# Patient Record
Sex: FEMALE | Race: WHITE | NOT HISPANIC OR LATINO | Employment: OTHER | URBAN - METROPOLITAN AREA
[De-identification: names, ages, dates, MRNs, and addresses within clinical notes are randomized per-mention and may not be internally consistent; named-entity substitution may affect disease eponyms.]

---

## 2017-02-17 ENCOUNTER — OFFICE VISIT (OUTPATIENT)
Dept: PAIN MEDICINE | Facility: CLINIC | Age: 77
End: 2017-02-17
Payer: MEDICARE

## 2017-02-17 VITALS
HEIGHT: 59 IN | WEIGHT: 121.06 LBS | BODY MASS INDEX: 24.4 KG/M2 | SYSTOLIC BLOOD PRESSURE: 112 MMHG | HEART RATE: 82 BPM | DIASTOLIC BLOOD PRESSURE: 70 MMHG

## 2017-02-17 DIAGNOSIS — M51.36 DDD (DEGENERATIVE DISC DISEASE), LUMBAR: Primary | ICD-10-CM

## 2017-02-17 DIAGNOSIS — M54.16 LUMBAR RADICULITIS: ICD-10-CM

## 2017-02-17 PROCEDURE — 99204 OFFICE O/P NEW MOD 45 MIN: CPT | Mod: S$GLB,,, | Performed by: ANESTHESIOLOGY

## 2017-02-17 PROCEDURE — 3078F DIAST BP <80 MM HG: CPT | Mod: S$GLB,,, | Performed by: ANESTHESIOLOGY

## 2017-02-17 PROCEDURE — 1159F MED LIST DOCD IN RCRD: CPT | Mod: S$GLB,,, | Performed by: ANESTHESIOLOGY

## 2017-02-17 PROCEDURE — 3074F SYST BP LT 130 MM HG: CPT | Mod: S$GLB,,, | Performed by: ANESTHESIOLOGY

## 2017-02-17 PROCEDURE — 99999 PR PBB SHADOW E&M-EST. PATIENT-LVL III: CPT | Mod: PBBFAC,,, | Performed by: ANESTHESIOLOGY

## 2017-02-17 PROCEDURE — 1160F RVW MEDS BY RX/DR IN RCRD: CPT | Mod: S$GLB,,, | Performed by: ANESTHESIOLOGY

## 2017-02-17 PROCEDURE — 1157F ADVNC CARE PLAN IN RCRD: CPT | Mod: S$GLB,,, | Performed by: ANESTHESIOLOGY

## 2017-02-17 PROCEDURE — 1125F AMNT PAIN NOTED PAIN PRSNT: CPT | Mod: S$GLB,,, | Performed by: ANESTHESIOLOGY

## 2017-02-17 RX ORDER — TEMAZEPAM 30 MG/1
CAPSULE ORAL
Refills: 0 | COMMUNITY
Start: 2017-01-26 | End: 2017-10-13 | Stop reason: SDUPTHER

## 2017-02-17 RX ORDER — GABAPENTIN 300 MG/1
CAPSULE ORAL
Refills: 1 | Status: ON HOLD | COMMUNITY
Start: 2016-12-04 | End: 2020-02-06

## 2017-02-17 RX ORDER — PRAVASTATIN SODIUM 40 MG/1
TABLET ORAL NIGHTLY
COMMUNITY
Start: 2017-01-12 | End: 2020-02-13 | Stop reason: ALTCHOICE

## 2017-02-17 RX ORDER — ONDANSETRON 4 MG/1
TABLET, ORALLY DISINTEGRATING ORAL
Refills: 0 | COMMUNITY
Start: 2016-12-02 | End: 2017-10-13 | Stop reason: SDUPTHER

## 2017-02-17 RX ORDER — HYDROCODONE BITARTRATE AND ACETAMINOPHEN 5; 325 MG/1; MG/1
1 TABLET ORAL EVERY 8 HOURS PRN
Qty: 45 TABLET | Refills: 0 | Status: SHIPPED | OUTPATIENT
Start: 2017-02-17 | End: 2017-03-19

## 2017-02-17 NOTE — MR AVS SNAPSHOT
[facility]  [address] [place] [address]  [address]  [facility] [place] [address]  Phone: [phone]                  Sunita Carlson   2017 9:20 AM   Office Visit    Description:  Female : 1940   Provider:  Christian James MD   Department:  [facility]           Reason for Visit     Low-back Pain     Hip Pain           Diagnoses this Visit        Comments    DDD (degenerative disc disease), lumbar    -  Primary     Lumbar radiculitis                To Do List           Goals (5 Years of Data)     None       These Medications        Disp Refills Start End    hydrocodone-acetaminophen 5-325mg (NORCO) 5-325 mg per tablet 45 tablet 0 2017 3/19/2017    Take 1 tablet by mouth every 8 (eight) hours as needed for Pain. - Oral    Pharmacy: [address] [facility] Ph #: [phone]         [facility] On Call     [facility] On Call Nurse Care Line -  Assistance  Registered nurses in the [facility] On Call Center provide clinical advisement, health education, appointment booking, and other advisory services.  Call for this free service at [phone].             Medications           Message regarding Medications     Verify the changes and/or additions to your medication regime listed below are the same as discussed with your clinician today.  If any of these changes or additions are incorrect, please notify your healthcare provider.        START taking these NEW medications        Refills    hydrocodone-acetaminophen 5-325mg (NORCO) 5-325 mg per tablet 0    Sig: Take 1 tablet by mouth every 8 (eight) hours as needed for Pain.    Class: Print    Route: Oral           Verify that the below list of medications is an accurate representation of the medications you are currently taking.  If none reported, the list may be blank. If incorrect, please contact your healthcare provider. Carry this list with you in case of emergency.          "  Current Medications     dexlansoprazole (DEXILANT) 30 mg CpDM Take 60 mg by mouth.    diphenoxylate-atropine 2.5-0.025 mg (LOMOTIL) 2.5-0.025 mg per tablet Take 1 tablet by mouth 4 (four) times daily as needed for Diarrhea.    eluxadoline (VIBERZI) 75 mg Tab Take by mouth.    gabapentin (NEURONTIN) 400 MG capsule Take 400 mg by mouth 3 (three) times daily.    lidocaine (LIDODERM) 5 %(700 mg/patch) Place 1 patch onto the skin every 24 hours. Remove & Discard patch within 12 hours or as directed by MD    lisinopril 10 MG tablet     moxifloxacin (VIGAMOX) 0.5 % ophthalmic solution Place 1 drop into the right eye 4 (four) times daily.    ondansetron (ZOFRAN-ODT) 4 MG TbDL DIS ONE T PO  Q 8 H PRN NV    pravastatin (PRAVACHOL) 40 MG tablet     temazepam (RESTORIL) 30 mg capsule TK 1 C PO QHS    gabapentin (NEURONTIN) 300 MG capsule TK 2 CS PO HS    hydrocodone-acetaminophen 5-325mg (NORCO) 5-325 mg per tablet Take 1 tablet by mouth every 8 (eight) hours as needed for Pain.    ondansetron (ZOFRAN) 4 MG tablet Take 1 tablet (4 mg total) by mouth every 8 (eight) hours as needed for Nausea.    sucralfate (CARAFATE) 1 gram tablet Take 1 g by mouth 4 (four) times daily.    temazepam (RESTORIL) 7.5 MG Cap Take 15 mg by mouth nightly as needed.           Clinical Reference Information           Your Vitals Were     BP Pulse Height Weight BMI    112/70 82 4' 11" (1.499 m) 54.9 kg (121 lb 0.5 oz) 24.45 kg/m2      Blood Pressure          Most Recent Value    BP  112/70      Allergies as of 2/17/2017     Demerol [Meperidine]    Percocet [Oxycodone-acetaminophen]      Immunizations Administered on Date of Encounter - 2/17/2017     None      Language Assistance Services     ATTENTION: Language assistance services are available, free of charge. Please call [phone].      ATENCIÓN: Si habla radhaañol, tiene a hazel disposición servicios gratuitos de asistencia lingüística. Llame al [phone].     [place] Ý: N?u b?n nói Ti?ng Vi?t, có " các d?ch v? h? tr? ngôn ng? mi?n phí michelleh cho b?n. G?i s? [phone].         [facility] complies with applicable Federal civil rights laws and does not discriminate on the basis of race, color, national origin, age, disability, or sex.

## 2017-02-17 NOTE — PROGRESS NOTES
This note was completed with dictation software and grammatical errors may exist.    Referring Physician: Froilan Barba MD    PCP: Behzad Birch MD      CC: Low back and left leg pain    HPI:   Patient is 68-year-old female referred to us for lower back and left leg pain.  Pain has been present for over 5 years.  No traumatic incident.  She has intermittent aching, throbbing, sharp pain in her lower back.  Pain radiates down her left leg into her left posterior thigh.  Pain worsens with standing, walking, bending and getting up.  Pain improves with rest.  She denies any weakness.  No bowel bladder changes.  MRI lumbar spine shows lumbar facet hypertrophy as well as possible L5 nerve impingement.  She underwent caudal ALANNA's with Dr. Barba with moderate benefit of her generalized lower back pain.  She continues to have left-sided radicular pain.  She rates her pain 4/10 today, 10/10 at worse.    ROS:  CONSTITUTIONAL: No fevers, chills, night sweats, wt. loss, appetite changes  SKIN: no rashes or itching  ENT: No headaches, head trauma, vision changes, or eye pain  LYMPH NODES: None noticed   CV: No chest pain, palpitations.   RESP: No shortness of breath, dyspnea on exertion, cough, wheezing, or hemoptysis  GI: No nausea, emesis, diarrhea, constipation, melena, hematochezia, pain.    : No dysuria, hematuria, urgency, or frequency   HEME: No easy bruising, bleeding problems  PSYCHIATRIC: No depression, anxiety, psychosis, hallucinations.  NEURO: No seizures, memory loss, dizziness or difficulty sleeping  MSK: + History of present illness      Past Medical History   Diagnosis Date    Arthritis     Cataract     Coronary artery disease     Hypertension     Insomnia     Neuropathy     Retinal detachment     Stomach ulcer      Past Surgical History   Procedure Laterality Date    Cardiac surgery      Carotid endarterectomy       L    Cholecystectomy      Appendectomy      Hysterectomy       "Tonsillectomy      Bladder suspension      Cataract extraction       Family History   Problem Relation Age of Onset    Cancer Father     Macular degeneration Maternal Aunt     Cancer Maternal Aunt     Macular degeneration Maternal Uncle     Diabetes Paternal Aunt     Cancer Paternal Aunt     Blindness Maternal Grandfather      Social History     Social History    Marital status:      Spouse name: N/A    Number of children: N/A    Years of education: N/A     Social History Main Topics    Smoking status: Never Smoker    Smokeless tobacco: None    Alcohol use No    Drug use: No    Sexual activity: Yes     Birth control/ protection: Surgical     Other Topics Concern    None     Social History Narrative    None         Medications/Allergies: See med card    Vitals:    02/17/17 0953   BP: 112/70   Pulse: 82   Weight: 54.9 kg (121 lb 0.5 oz)   Height: 4' 11" (1.499 m)   PainSc:   7   PainLoc: Hip         Physical exam:    GENERAL: A and O x3, the patient appears well groomed and is in no acute distress.  Skin: No rashes or obvious lesions  HEENT: normocephalic, atraumatic  CARDIOVASCULAR:  Palpable peripheral pulses  LUNGS: easy work of breathing  ABDOMEN: soft, nontender   UPPER EXTREMITIES: Normal alignment, normal range of motion, no atrophy, no skin changes,  hair growth and nail growth normal and equal bilaterally. No swelling, no tenderness.    LOWER EXTREMITIES:  Normal alignment, normal range of motion, no atrophy, no skin changes,  hair growth and nail growth normal and equal bilaterally. No swelling, no tenderness.    LUMBAR SPINE  Lumbar spine: ROM is full with flexion extension and oblique extension with no increased pain.    Tom's test causes no increased pain on either side.    Supine straight leg raise is positive on the left at 60°    Internal and external rotation of the hip causes no increased pain on either side.  Myofascial exam: No tenderness to palpation across lumbar " paraspinous muscles.      MENTAL STATUS: normal orientation, speech, language, and fund of knowledge for social situation.  Emotional state appropriate.    CRANIAL NERVES:  II:  PERRL bilaterally,   III,IV,VI: EOMI.    V:  Facial sensation equal bilaterally  VII:  Facial motor function normal.  VIII:  Hearing equal to finger rub bilaterally  IX/X: Gag normal, palate symmetric  XI:  Shoulder shrug equal, head turn equal  XII:  Tongue midline without fasciculations      MOTOR: Tone and bulk: normal bilateral upper and lower Strength: normal   Delt Bi Tri WE WF     R 5 5 5 5 5 5   L 5 5 5 5 5 5     IP ADD ABD Quad TA Gas HAM  R 5 5 5 5 5 5 5  L 5 5 5 5 5 5 5    SENSATION: Light touch and pinprick intact bilaterally  REFLEXES: normal, symmetric, nonbrisk.  Toes down, no clonus. No hoffmans.  GAIT: normal rise, base, steps, and arm swing.        Imaging:  MRI lumbar spine without contrast 10/2016 ([facility])  L3-4, broad-based disc bulge and facet hypertrophy results in mild central canal narrowing and foraminal narrowing.  L4-5, broad-based disc bulge with moderate facet hypertrophy,  Left greater than right.  There is mild spurring of the left facet joint which narrows the left lateral recess and compresses the left L5 nerve.  L5-S1, mild facet hypertrophy    Assessment:  Patient referred for lower back and left leg pain  1. DDD (degenerative disc disease), lumbar    2. Lumbar radiculitis          Plan:  - I have stressed the importance of physical activity and exercise to improve overall health  - I think that the patient's back pain and radicular leg symptoms are due to degenerative disc disease and have recommended a lumbar epidural steroid injection to the Left L4-5 and L5-S1 level.    - She did request medication.  She can take Norco 5 mg as needed for pain.  Hold for any sedation  - Continue gabapentin as prescribed  - Follow-up after procedure          Thank you for referring this interesting patient, and I look  forward to continuing to collaborate in her care.

## 2017-02-24 DIAGNOSIS — M54.16 LUMBAR RADICULOPATHY: Primary | ICD-10-CM

## 2017-03-06 ENCOUNTER — SURGERY (OUTPATIENT)
Age: 77
End: 2017-03-06

## 2017-03-06 ENCOUNTER — HOSPITAL ENCOUNTER (OUTPATIENT)
Facility: AMBULARY SURGERY CENTER | Age: 77
Discharge: HOME | End: 2017-03-06
Attending: ANESTHESIOLOGY | Admitting: ANESTHESIOLOGY
Payer: MEDICARE

## 2017-03-06 DIAGNOSIS — M51.36 DDD (DEGENERATIVE DISC DISEASE), LUMBAR: Primary | ICD-10-CM

## 2017-03-06 PROBLEM — M51.369 DDD (DEGENERATIVE DISC DISEASE), LUMBAR: Status: ACTIVE | Noted: 2017-03-06

## 2017-03-06 PROCEDURE — 64484 NJX AA&/STRD TFRM EPI L/S EA: CPT | Mod: LT,,, | Performed by: ANESTHESIOLOGY

## 2017-03-06 PROCEDURE — 99152 MOD SED SAME PHYS/QHP 5/>YRS: CPT | Mod: ,,, | Performed by: ANESTHESIOLOGY

## 2017-03-06 PROCEDURE — 64483 NJX AA&/STRD TFRM EPI L/S 1: CPT | Mod: LT,,, | Performed by: ANESTHESIOLOGY

## 2017-03-06 PROCEDURE — 64484 NJX AA&/STRD TFRM EPI L/S EA: CPT | Performed by: ANESTHESIOLOGY

## 2017-03-06 PROCEDURE — 64483 NJX AA&/STRD TFRM EPI L/S 1: CPT | Performed by: ANESTHESIOLOGY

## 2017-03-06 RX ORDER — DEXAMETHASONE SODIUM PHOSPHATE 10 MG/ML
INJECTION INTRAMUSCULAR; INTRAVENOUS
Status: DISCONTINUED | OUTPATIENT
Start: 2017-03-06 | End: 2017-03-06 | Stop reason: HOSPADM

## 2017-03-06 RX ORDER — FENTANYL CITRATE 50 UG/ML
INJECTION, SOLUTION INTRAMUSCULAR; INTRAVENOUS
Status: DISCONTINUED
Start: 2017-03-06 | End: 2017-03-06 | Stop reason: HOSPADM

## 2017-03-06 RX ORDER — BUPIVACAINE HYDROCHLORIDE 2.5 MG/ML
INJECTION, SOLUTION EPIDURAL; INFILTRATION; INTRACAUDAL
Status: DISCONTINUED | OUTPATIENT
Start: 2017-03-06 | End: 2017-03-06 | Stop reason: HOSPADM

## 2017-03-06 RX ORDER — SODIUM CHLORIDE, SODIUM LACTATE, POTASSIUM CHLORIDE, CALCIUM CHLORIDE 600; 310; 30; 20 MG/100ML; MG/100ML; MG/100ML; MG/100ML
INJECTION, SOLUTION INTRAVENOUS ONCE AS NEEDED
Status: COMPLETED | OUTPATIENT
Start: 2017-03-06 | End: 2017-03-06

## 2017-03-06 RX ORDER — MIDAZOLAM HYDROCHLORIDE 1 MG/ML
INJECTION INTRAMUSCULAR; INTRAVENOUS
Status: DISCONTINUED
Start: 2017-03-06 | End: 2017-03-06 | Stop reason: HOSPADM

## 2017-03-06 RX ORDER — MIDAZOLAM HYDROCHLORIDE 1 MG/ML
INJECTION INTRAMUSCULAR; INTRAVENOUS
Status: DISCONTINUED | OUTPATIENT
Start: 2017-03-06 | End: 2017-03-06 | Stop reason: HOSPADM

## 2017-03-06 RX ORDER — ALPRAZOLAM 1 MG/1
1 TABLET, ORALLY DISINTEGRATING ORAL
Status: DISCONTINUED | OUTPATIENT
Start: 2017-03-06 | End: 2017-03-06 | Stop reason: HOSPADM

## 2017-03-06 RX ORDER — FENTANYL CITRATE 50 UG/ML
INJECTION, SOLUTION INTRAMUSCULAR; INTRAVENOUS
Status: DISCONTINUED | OUTPATIENT
Start: 2017-03-06 | End: 2017-03-06 | Stop reason: HOSPADM

## 2017-03-06 RX ORDER — ONDANSETRON 2 MG/ML
INJECTION INTRAMUSCULAR; INTRAVENOUS
Status: DISCONTINUED | OUTPATIENT
Start: 2017-03-06 | End: 2017-03-06 | Stop reason: HOSPADM

## 2017-03-06 RX ORDER — LIDOCAINE HYDROCHLORIDE 10 MG/ML
INJECTION, SOLUTION EPIDURAL; INFILTRATION; INTRACAUDAL; PERINEURAL
Status: DISCONTINUED | OUTPATIENT
Start: 2017-03-06 | End: 2017-03-06 | Stop reason: HOSPADM

## 2017-03-06 RX ORDER — ONDANSETRON 2 MG/ML
INJECTION INTRAMUSCULAR; INTRAVENOUS
Status: DISCONTINUED
Start: 2017-03-06 | End: 2017-03-06 | Stop reason: HOSPADM

## 2017-03-06 RX ADMIN — MIDAZOLAM HYDROCHLORIDE 2 MG: 1 INJECTION INTRAMUSCULAR; INTRAVENOUS at 02:03

## 2017-03-06 RX ADMIN — ONDANSETRON 4 MG: 2 INJECTION INTRAMUSCULAR; INTRAVENOUS at 02:03

## 2017-03-06 RX ADMIN — LIDOCAINE HYDROCHLORIDE 10 ML: 10 INJECTION, SOLUTION EPIDURAL; INFILTRATION; INTRACAUDAL; PERINEURAL at 02:03

## 2017-03-06 RX ADMIN — BUPIVACAINE HYDROCHLORIDE 3 ML: 2.5 INJECTION, SOLUTION EPIDURAL; INFILTRATION; INTRACAUDAL at 02:03

## 2017-03-06 RX ADMIN — SODIUM CHLORIDE, SODIUM LACTATE, POTASSIUM CHLORIDE, CALCIUM CHLORIDE: 600; 310; 30; 20 INJECTION, SOLUTION INTRAVENOUS at 02:03

## 2017-03-06 RX ADMIN — DEXAMETHASONE SODIUM PHOSPHATE 10 MG: 10 INJECTION INTRAMUSCULAR; INTRAVENOUS at 02:03

## 2017-03-06 RX ADMIN — FENTANYL CITRATE 50 MCG: 50 INJECTION, SOLUTION INTRAMUSCULAR; INTRAVENOUS at 02:03

## 2017-03-06 NOTE — OP NOTE
PROCEDURE DATE: 3/6/2017    PROCEDURE: Left L4-5 and L5-S1 transforaminal epidural steroid injection under fluoroscopy    DIAGNOSIS: Lumbar disc displacement without myelopathy  Post op diagnosis: Same    PHYSICIAN: Christian James MD    MEDICATIONS INJECTED:  Dexamethasone 5mg (0.5ml) and 1.5ml 0.25% bupivicaine at each nerve root.     LOCAL ANESTHETIC INJECTED:  Lidocaine 1%. 2 ml per site.    SEDATION MEDICATIONS: RN IV sedation    ESTIMATED BLOOD LOSS:  None    COMPLICATIONS:  none    TECHNIQUE:   A time-out was taken to identify patient and procedure side prior to starting the procedure. The patient was placed in a prone position, prepped and draped in the usual sterile fashion using ChloraPrep and sterile towels.  The area to be injected was determined under fluoroscopic guidance in AP and oblique view.  Local anesthetic was given by raising a wheal and going down to the hub of a 25-gauge 1.5 inch needle.  In oblique view, a 3.5 inch 22-gauge bent-tip spinal needle was introduced towards 6 oclock position of the pedicle of each above named nerve root level.  The needle was walked medially then hinged into the neural foramen and position was confirmed in AP and lateral views.  1ml contrast dye was injected to confirm appropriate placement and that there was no vascular uptake.  After negative aspiration for blood or CSF, the medication was then injected. This was performed at the left L4-5 and L5-S1 level(s). The patient tolerated the procedure well.    The patient was monitored after the procedure.  Patient was given post procedure and discharge instructions to follow at home. The patient was discharged in a stable condition.

## 2017-03-06 NOTE — PLAN OF CARE
Patient stable and states ready to go home. Spouse at chairside and states ready to take patient home. Both patient and spouse verbalize understanding of all discharge instructions. Discharged via ambulatory accompanied by pnurse and patient's spouse who is driving patient home.

## 2017-03-06 NOTE — IP AVS SNAPSHOT
[facility] [place] Location  [address] [address]           Patient Discharge Instructions     Our goal is to set you up for success. This packet includes information on your condition, medications, and your home care. It will help you to care for yourself so you don't get sicker and need to go back to the hospital.     Please ask your nurse if you have any questions.        There are many details to remember when preparing to leave the hospital. Here is what you will need to do:    1. Take your medicine. If you are prescribed medications, review your Medication List in the following pages. You may have new medications to  at the pharmacy and others that you'll need to stop taking. Review the instructions for how and when to take your medications. Talk with your doctor or nurses if you are unsure of what to do.     2. Go to your follow-up appointments. Specific follow-up information is listed in the following pages. Your may be contacted by a transition nurse or clinical provider about future appointments. Be sure we have all of the phone numbers to reach you, if needed. Please contact your provider's office if you are unable to make an appointment.     3. Watch for warning signs. Your doctor or nurse will give you detailed warning signs to watch for and when to call for assistance. These instructions may also include educational information about your condition. If you experience any of warning signs to your health, call your doctor.               [facility]  Unless otherwise directed by your provider, please contact [facility], our nurse care line that is available for 24/7 assistance.     [phone] (toll-free)    Registered nurses in the [facility] Center provide clinical advisement, health education, appointment booking, and other advisory services.                    ** Verify the list of medication(s) below is accurate and up  to date. Carry this with you in case of emergency. If your medications have changed, please notify your healthcare provider.             Medication List      CONTINUE taking these medications        Additional Info                      BC HEADACHE POWDER ORAL   Refills:  0    Instructions:  Take by mouth daily as needed.     Begin Date    AM    Noon    PM    Bedtime       DEXILANT 30 mg Cpdm   Refills:  0   Dose:  60 mg   Generic drug:  dexlansoprazole    Instructions:  Take 60 mg by mouth.     Begin Date    AM    Noon    PM    Bedtime       diphenoxylate-atropine 2.5-0.025 mg 2.5-0.025 mg per tablet   Commonly known as:  LOMOTIL   Refills:  0   Dose:  1 tablet    Instructions:  Take 1 tablet by mouth 4 (four) times daily as needed for Diarrhea.     Begin Date    AM    Noon    PM    Bedtime       * gabapentin 300 MG capsule   Commonly known as:  NEURONTIN   Refills:  1    Instructions:  TK 2 CS PO HS     Begin Date    AM    Noon    PM    Bedtime       * gabapentin 400 MG capsule   Commonly known as:  NEURONTIN   Refills:  0   Dose:  400 mg    Instructions:  Take 400 mg by mouth 3 (three) times daily.     Begin Date    AM    Noon    PM    Bedtime       hydrocodone-acetaminophen 5-325mg 5-325 mg per tablet   Commonly known as:  NORCO   Quantity:  45 tablet   Refills:  0   Dose:  1 tablet    Instructions:  Take 1 tablet by mouth every 8 (eight) hours as needed for Pain.     Begin Date    AM    Noon    PM    Bedtime       lidocaine 5 %   Commonly known as:  LIDODERM   Refills:  0   Dose:  1 patch    Instructions:  Place 1 patch onto the skin every 24 hours. Remove & Discard patch within 12 hours or as directed by MD     Begin Date    AM    Noon    PM    Bedtime       lisinopril 10 MG tablet   Refills:  3      Begin Date    AM    Noon    PM    Bedtime       ondansetron 4 MG tablet   Commonly known as:  ZOFRAN   Quantity:  14 tablet   Refills:  0   Dose:  4 mg    Instructions:  Take 1 tablet (4 mg total) by mouth every 8  (eight) hours as needed for Nausea.     Begin Date    AM    Noon    PM    Bedtime       ondansetron 4 MG Tbdl   Commonly known as:  ZOFRAN-ODT   Refills:  0    Instructions:  DIS ONE T PO  Q 8 H PRN NV     Begin Date    AM    Noon    PM    Bedtime       pravastatin 40 MG tablet   Commonly known as:  PRAVACHOL   Refills:  0      Begin Date    AM    Noon    PM    Bedtime       * temazepam 30 mg capsule   Commonly known as:  RESTORIL   Refills:  0    Instructions:  TK 1 C PO QHS     Begin Date    AM    Noon    PM    Bedtime       * temazepam 7.5 MG Cap   Commonly known as:  RESTORIL   Refills:  0   Dose:  15 mg    Instructions:  Take 15 mg by mouth nightly as needed.     Begin Date    AM    Noon    PM    Bedtime       VIBERZI 75 mg Tab   Refills:  0   Generic drug:  eluxadoline    Instructions:  Take by mouth.     Begin Date    AM    Noon    PM    Bedtime       * Notice:  This list has 4 medication(s) that are the same as other medications prescribed for you. Read the directions carefully, and ask your doctor or other care provider to review them with you.      STOP taking these medications     moxifloxacin 0.5 % ophthalmic solution   Commonly known as:  VIGAMOX       sucralfate 1 gram tablet   Commonly known as:  CARAFATE                  Please bring to all follow up appointments:    1. A copy of your discharge instructions.  2. All medicines you are currently taking in their original bottles.  3. Identification and insurance card.    Please arrive 15 minutes ahead of scheduled appointment time.    Please call 24 hours in advance if you must reschedule your appointment and/or time.        Your Scheduled Appointments     Mar 27, 2017 10:00 AM CDT   Established Patient Visit with [facility] ([facility])    [address] [place] [address] [address]  [place] [id number] [phone]                Discharge Instructions     Future Orders    Activity as tolerated      Call MD for:  difficulty breathing or increased cough     Call MD for:  persistent nausea and vomiting or diarrhea     Call MD for:  redness, tenderness, or signs of infection (pain, swelling, redness, odor or green/yellow discharge around incision site)     Call MD for:  severe persistent headache     Call MD for:  severe uncontrolled pain     Call MD for:  temperature >100.4     Diet general     Questions:    Total calories:      Fat restriction, if any:      Protein restriction, if any:      Na restriction, if any:      Fluid restriction:      Additional restrictions:          Discharge Instructions       steroid Injection: Recovery at Home  What to know about pain relief  An injection to reduce inflammation takes a few days to work, sometimes even up to a week. There may even be more pain at first.  Tips for recovery  · You may use an ice pack at your injection site for comfort.  · You may shower this evening.   · Do not use a heating pad or take tub baths or swim for 2 days.  · Take your usual medication for pain if needed.  · Gradually increase your activities.  · Dont lift anything over 10 lbs for the first 24 hours  · Dont drive the day of the procedure.  · Wait until tomorrow to resume any blood thinners (aspirin, Plavix, Coumadin) but you may resume all your other medications today.      When to call your doctor  Call right away if you notice any of the following symptoms:  · Severe pain or headache  · Fever or chills  · Redness or swelling around the injection site   · Difficulty breathing  · Vomiting  · Increasing numbness or weakness in arms or legs  · If you are diabetic, a steroid injection can increase your blood sugar so moniter it carefully.            You have been given medicine by vein to sedate you during your procedure today. This may have included both a pain medicine and sleeping medicine. Most of the effects have worn off; however, you may continue to have some drowsiness for the next 6-8  "hours.  HOME CARE:  Have a responsible adult for the next 8 hours for worsening of your condition  Do not take any oral medicine for pain/sleep during the next 4 hours since this might react with the other medicines you were given  Do not drink any alcohol for the next 24 hours  Do not drive or operate dangerous machinery during next 24 hours.  Follow up with your doctor if not alert and back to your usual level of activity in 24 hours    SEEK PROMPT MEDICAL ATTENTION FOR:  Increased drowsiness, weakness, or dizziness  Repeated vomiting  If care giver is unable to wake you        Primary Diagnosis     Your primary diagnosis was:  Degeneration Of Lumbar Or Lumbosacral Intervertebral Disc      Admission Information     Date & Time Provider Department CSN    3/6/2017  2:03 PM Christian James MD [facility] [id number]      Care Providers     Provider Role Specialty Primary office phone    Christian James MD Attending Provider Pain Medicine [phone]    Christian James MD Surgeon  Pain Medicine [phone]      Your Vitals Were     BP Pulse Temp Resp Height Weight    177/71 (BP Location: Right arm, Patient Position: Lying, BP Method: Automatic) 76 97.9 °F (36.6 °C) (Skin) 18 4' 11" (1.499 m) 54.9 kg (121 lb)    SpO2 BMI             98% 24.44 kg/m2         Recent Lab Values        6/25/2015                          12:10 PM           A1C 5.4                       Allergies as of 3/6/2017        Reactions    Demerol [Meperidine] Nausea And Vomiting    Percocet [Oxycodone-acetaminophen] Itching      Language Assistance Services     ATTENTION: Language assistance services are available, free of charge. Please call [phone].      ATENCIÓN: Si habla español, tiene a hazel disposición servicios gratuitos de asistencia lingüística. Llame al [phone].     [facility] Ý: N?u b?n nói Ti?ng Vi?t, có các d?ch v? h? tr? ngôn ng? mi?n phí dành cho b?n. G?i s? [phone].         [facility] complies " with applicable Federal civil rights laws and does not discriminate on the basis of race, color, national origin, age, disability, or sex.

## 2017-03-06 NOTE — DISCHARGE SUMMARY
[facility]  Discharge Note  Short Stay    Admit Date: 3/6/2017    Discharge Date and Time: 3/6/2017    Attending Physician: Christian James MD     Discharge Provider: Christian James    Diagnoses:  Active Hospital Problems    Diagnosis  POA    *DDD (degenerative disc disease), lumbar [M51.36]  Yes      Resolved Hospital Problems    Diagnosis Date Resolved POA   No resolved problems to display.       Hospital Course: Lumbar ALANNA  Discharged Condition: Good    Final Diagnoses:   Active Hospital Problems    Diagnosis  POA    *DDD (degenerative disc disease), lumbar [M51.36]  Yes      Resolved Hospital Problems    Diagnosis Date Resolved POA   No resolved problems to display.       Disposition: Home or Self Care    Follow up/Patient Instructions:    Medications:  Reconciled Home Medications:   Current Discharge Medication List      CONTINUE these medications which have NOT CHANGED    Details   ASPIRIN/SALICYLAMIDE/CAFFEINE (BC HEADACHE POWDER ORAL) Take by mouth daily as needed.      dexlansoprazole (DEXILANT) 30 mg CpDM Take 60 mg by mouth.      diphenoxylate-atropine 2.5-0.025 mg (LOMOTIL) 2.5-0.025 mg per tablet Take 1 tablet by mouth 4 (four) times daily as needed for Diarrhea.      eluxadoline (VIBERZI) 75 mg Tab Take by mouth.      !! gabapentin (NEURONTIN) 300 MG capsule TK 2 CS PO HS  Refills: 1      !! gabapentin (NEURONTIN) 400 MG capsule Take 400 mg by mouth 3 (three) times daily.      hydrocodone-acetaminophen 5-325mg (NORCO) 5-325 mg per tablet Take 1 tablet by mouth every 8 (eight) hours as needed for Pain.  Qty: 45 tablet, Refills: 0    Associated Diagnoses: DDD (degenerative disc disease), lumbar; Lumbar radiculitis      lidocaine (LIDODERM) 5 %(700 mg/patch) Place 1 patch onto the skin every 24 hours. Remove & Discard patch within 12 hours or as directed by MD      lisinopril 10 MG tablet Refills: 3      ondansetron (ZOFRAN) 4 MG tablet Take 1 tablet (4 mg total) by mouth every 8 (eight) hours as  needed for Nausea.  Qty: 14 tablet, Refills: 0      ondansetron (ZOFRAN-ODT) 4 MG TbDL DIS ONE T PO  Q 8 H PRN NV  Refills: 0      pravastatin (PRAVACHOL) 40 MG tablet       !! temazepam (RESTORIL) 30 mg capsule TK 1 C PO QHS  Refills: 0      !! temazepam (RESTORIL) 7.5 MG Cap Take 15 mg by mouth nightly as needed.       !! - Potential duplicate medications found. Please discuss with provider.      STOP taking these medications       moxifloxacin (VIGAMOX) 0.5 % ophthalmic solution Comments:   Reason for Stopping:         sucralfate (CARAFATE) 1 gram tablet Comments:   Reason for Stopping:               Discharge Procedure Orders  Diet general     Activity as tolerated     Call MD for:  temperature >100.4     Call MD for:  persistent nausea and vomiting or diarrhea     Call MD for:  severe uncontrolled pain     Call MD for:  redness, tenderness, or signs of infection (pain, swelling, redness, odor or green/yellow discharge around incision site)     Call MD for:  difficulty breathing or increased cough     Call MD for:  severe persistent headache          Follow up with MD in 2-3 weeks    Discharge Procedure Orders (must include Diet, Follow-up, Activity):    Discharge Procedure Orders (must include Diet, Follow-up, Activity)  Diet general     Activity as tolerated     Call MD for:  temperature >100.4     Call MD for:  persistent nausea and vomiting or diarrhea     Call MD for:  severe uncontrolled pain     Call MD for:  redness, tenderness, or signs of infection (pain, swelling, redness, odor or green/yellow discharge around incision site)     Call MD for:  difficulty breathing or increased cough     Call MD for:  severe persistent headache

## 2017-03-06 NOTE — DISCHARGE INSTRUCTIONS
steroid Injection: Recovery at Home  What to know about pain relief  An injection to reduce inflammation takes a few days to work, sometimes even up to a week. There may even be more pain at first.  Tips for recovery  · You may use an ice pack at your injection site for comfort.  · You may shower this evening.   · Do not use a heating pad or take tub baths or swim for 2 days.  · Take your usual medication for pain if needed.  · Gradually increase your activities.  · Dont lift anything over 10 lbs for the first 24 hours  · Dont drive the day of the procedure.  · Wait until tomorrow to resume any blood thinners (aspirin, Plavix, Coumadin) but you may resume all your other medications today.      When to call your doctor  Call right away if you notice any of the following symptoms:  · Severe pain or headache  · Fever or chills  · Redness or swelling around the injection site   · Difficulty breathing  · Vomiting  · Increasing numbness or weakness in arms or legs  · If you are diabetic, a steroid injection can increase your blood sugar so moniter it carefully.            You have been given medicine by vein to sedate you during your procedure today. This may have included both a pain medicine and sleeping medicine. Most of the effects have worn off; however, you may continue to have some drowsiness for the next 6-8 hours.  HOME CARE:  Have a responsible adult for the next 8 hours for worsening of your condition  Do not take any oral medicine for pain/sleep during the next 4 hours since this might react with the other medicines you were given  Do not drink any alcohol for the next 24 hours  Do not drive or operate dangerous machinery during next 24 hours.  Follow up with your doctor if not alert and back to your usual level of activity in 24 hours    SEEK PROMPT MEDICAL ATTENTION FOR:  Increased drowsiness, weakness, or dizziness  Repeated vomiting  If care giver is unable to wake you

## 2017-03-06 NOTE — H&P (VIEW-ONLY)
This note was completed with dictation software and grammatical errors may exist.    Referring Physician: Froilan Barba MD    PCP: Behzad Birch MD      CC: Low back and left leg pain    HPI:   Patient is 68-year-old female referred to us for lower back and left leg pain.  Pain has been present for over 5 years.  No traumatic incident.  She has intermittent aching, throbbing, sharp pain in her lower back.  Pain radiates down her left leg into her left posterior thigh.  Pain worsens with standing, walking, bending and getting up.  Pain improves with rest.  She denies any weakness.  No bowel bladder changes.  MRI lumbar spine shows lumbar facet hypertrophy as well as possible L5 nerve impingement.  She underwent caudal ALANNA's with Dr. Barba with moderate benefit of her generalized lower back pain.  She continues to have left-sided radicular pain.  She rates her pain 4/10 today, 10/10 at worse.    ROS:  CONSTITUTIONAL: No fevers, chills, night sweats, wt. loss, appetite changes  SKIN: no rashes or itching  ENT: No headaches, head trauma, vision changes, or eye pain  LYMPH NODES: None noticed   CV: No chest pain, palpitations.   RESP: No shortness of breath, dyspnea on exertion, cough, wheezing, or hemoptysis  GI: No nausea, emesis, diarrhea, constipation, melena, hematochezia, pain.    : No dysuria, hematuria, urgency, or frequency   HEME: No easy bruising, bleeding problems  PSYCHIATRIC: No depression, anxiety, psychosis, hallucinations.  NEURO: No seizures, memory loss, dizziness or difficulty sleeping  MSK: + History of present illness      Past Medical History   Diagnosis Date    Arthritis     Cataract     Coronary artery disease     Hypertension     Insomnia     Neuropathy     Retinal detachment     Stomach ulcer      Past Surgical History   Procedure Laterality Date    Cardiac surgery      Carotid endarterectomy       L    Cholecystectomy      Appendectomy      Hysterectomy       "Tonsillectomy      Bladder suspension      Cataract extraction       Family History   Problem Relation Age of Onset    Cancer Father     Macular degeneration Maternal Aunt     Cancer Maternal Aunt     Macular degeneration Maternal Uncle     Diabetes Paternal Aunt     Cancer Paternal Aunt     Blindness Maternal Grandfather      Social History     Social History    Marital status:      Spouse name: N/A    Number of children: N/A    Years of education: N/A     Social History Main Topics    Smoking status: Never Smoker    Smokeless tobacco: None    Alcohol use No    Drug use: No    Sexual activity: Yes     Birth control/ protection: Surgical     Other Topics Concern    None     Social History Narrative    None         Medications/Allergies: See med card    Vitals:    02/17/17 0953   BP: 112/70   Pulse: 82   Weight: 54.9 kg (121 lb 0.5 oz)   Height: 4' 11" (1.499 m)   PainSc:   7   PainLoc: Hip         Physical exam:    GENERAL: A and O x3, the patient appears well groomed and is in no acute distress.  Skin: No rashes or obvious lesions  HEENT: normocephalic, atraumatic  CARDIOVASCULAR:  Palpable peripheral pulses  LUNGS: easy work of breathing  ABDOMEN: soft, nontender   UPPER EXTREMITIES: Normal alignment, normal range of motion, no atrophy, no skin changes,  hair growth and nail growth normal and equal bilaterally. No swelling, no tenderness.    LOWER EXTREMITIES:  Normal alignment, normal range of motion, no atrophy, no skin changes,  hair growth and nail growth normal and equal bilaterally. No swelling, no tenderness.    LUMBAR SPINE  Lumbar spine: ROM is full with flexion extension and oblique extension with no increased pain.    Tom's test causes no increased pain on either side.    Supine straight leg raise is positive on the left at 60°    Internal and external rotation of the hip causes no increased pain on either side.  Myofascial exam: No tenderness to palpation across lumbar " paraspinous muscles.      MENTAL STATUS: normal orientation, speech, language, and fund of knowledge for social situation.  Emotional state appropriate.    CRANIAL NERVES:  II:  PERRL bilaterally,   III,IV,VI: EOMI.    V:  Facial sensation equal bilaterally  VII:  Facial motor function normal.  VIII:  Hearing equal to finger rub bilaterally  IX/X: Gag normal, palate symmetric  XI:  Shoulder shrug equal, head turn equal  XII:  Tongue midline without fasciculations      MOTOR: Tone and bulk: normal bilateral upper and lower Strength: normal   Delt Bi Tri WE WF     R 5 5 5 5 5 5   L 5 5 5 5 5 5     IP ADD ABD Quad TA Gas HAM  R 5 5 5 5 5 5 5  L 5 5 5 5 5 5 5    SENSATION: Light touch and pinprick intact bilaterally  REFLEXES: normal, symmetric, nonbrisk.  Toes down, no clonus. No hoffmans.  GAIT: normal rise, base, steps, and arm swing.        Imaging:  MRI lumbar spine without contrast 10/2016 ([facility])  L3-4, broad-based disc bulge and facet hypertrophy results in mild central canal narrowing and foraminal narrowing.  L4-5, broad-based disc bulge with moderate facet hypertrophy,  Left greater than right.  There is mild spurring of the left facet joint which narrows the left lateral recess and compresses the left L5 nerve.  L5-S1, mild facet hypertrophy    Assessment:  Patient referred for lower back and left leg pain  1. DDD (degenerative disc disease), lumbar    2. Lumbar radiculitis          Plan:  - I have stressed the importance of physical activity and exercise to improve overall health  - I think that the patient's back pain and radicular leg symptoms are due to degenerative disc disease and have recommended a lumbar epidural steroid injection to the Left L4-5 and L5-S1 level.    - She did request medication.  She can take Norco 5 mg as needed for pain.  Hold for any sedation  - Continue gabapentin as prescribed  - Follow-up after procedure          Thank you for referring this interesting patient, and I look  forward to continuing to collaborate in her care.

## 2017-03-07 VITALS
DIASTOLIC BLOOD PRESSURE: 77 MMHG | HEART RATE: 71 BPM | SYSTOLIC BLOOD PRESSURE: 163 MMHG | OXYGEN SATURATION: 99 % | BODY MASS INDEX: 24.39 KG/M2 | HEIGHT: 59 IN | TEMPERATURE: 98 F | WEIGHT: 121 LBS | RESPIRATION RATE: 18 BRPM

## 2017-03-21 ENCOUNTER — HOSPITAL ENCOUNTER (OUTPATIENT)
Dept: RADIOLOGY | Facility: HOSPITAL | Age: 77
Discharge: HOME | End: 2017-03-21
Attending: FAMILY MEDICINE
Payer: MEDICARE

## 2017-03-21 DIAGNOSIS — R10.32 ABDOMINAL PAIN, LEFT LOWER QUADRANT: ICD-10-CM

## 2017-03-21 PROCEDURE — 74178 CT ABD&PLV WO CNTR FLWD CNTR: CPT | Mod: TC

## 2017-03-21 PROCEDURE — 74178 CT ABD&PLV WO CNTR FLWD CNTR: CPT | Mod: 26,,, | Performed by: RADIOLOGY

## 2017-03-21 PROCEDURE — 25500020 PHARM REV CODE 255

## 2017-03-21 RX ADMIN — IOHEXOL 75 ML: 350 INJECTION, SOLUTION INTRAVENOUS at 10:03

## 2017-03-27 ENCOUNTER — OFFICE VISIT (OUTPATIENT)
Dept: PAIN MEDICINE | Facility: CLINIC | Age: 77
End: 2017-03-27
Payer: MEDICARE

## 2017-03-27 VITALS
HEART RATE: 84 BPM | WEIGHT: 119.69 LBS | DIASTOLIC BLOOD PRESSURE: 85 MMHG | SYSTOLIC BLOOD PRESSURE: 150 MMHG | HEIGHT: 59 IN | BODY MASS INDEX: 24.13 KG/M2

## 2017-03-27 DIAGNOSIS — M79.18 MYOFASCIAL PAIN: ICD-10-CM

## 2017-03-27 DIAGNOSIS — M51.36 DDD (DEGENERATIVE DISC DISEASE), LUMBAR: ICD-10-CM

## 2017-03-27 DIAGNOSIS — M54.16 LUMBAR RADICULOPATHY: Primary | ICD-10-CM

## 2017-03-27 PROCEDURE — 1125F AMNT PAIN NOTED PAIN PRSNT: CPT | Mod: S$GLB,,, | Performed by: PHYSICIAN ASSISTANT

## 2017-03-27 PROCEDURE — 3077F SYST BP >= 140 MM HG: CPT | Mod: S$GLB,,, | Performed by: PHYSICIAN ASSISTANT

## 2017-03-27 PROCEDURE — 99214 OFFICE O/P EST MOD 30 MIN: CPT | Mod: S$GLB,,, | Performed by: PHYSICIAN ASSISTANT

## 2017-03-27 PROCEDURE — 99999 PR PBB SHADOW E&M-EST. PATIENT-LVL III: CPT | Mod: PBBFAC,,, | Performed by: PHYSICIAN ASSISTANT

## 2017-03-27 PROCEDURE — 1159F MED LIST DOCD IN RCRD: CPT | Mod: S$GLB,,, | Performed by: PHYSICIAN ASSISTANT

## 2017-03-27 PROCEDURE — 1160F RVW MEDS BY RX/DR IN RCRD: CPT | Mod: S$GLB,,, | Performed by: PHYSICIAN ASSISTANT

## 2017-03-27 PROCEDURE — 3079F DIAST BP 80-89 MM HG: CPT | Mod: S$GLB,,, | Performed by: PHYSICIAN ASSISTANT

## 2017-03-27 PROCEDURE — 1157F ADVNC CARE PLAN IN RCRD: CPT | Mod: S$GLB,,, | Performed by: PHYSICIAN ASSISTANT

## 2017-03-27 NOTE — PROGRESS NOTES
Referring Physician: No ref. provider found    PCP: Behzad Birch MD      CC: Low back and left leg pain    Interval History:  Ms. Carlson is a 76 y.o. female with chronic low back and left leg pain who presents today for f/u s/p left L4-5 and L5-S1 TFESI. Reports 50% improvement in pain. She was hoping for more relief. Pain is worse in her low back on the left now. Leg pain is greatly improved on the left. Pain today is rated 6/10. Denies LE weakness. Denies b/b incontinence.   Pt has been seen in the clinic before, however pt is new to me.     History below per Dr. James    HPI:   Patient is 68-year-old female referred to us for lower back and left leg pain.  Pain has been present for over 5 years.  No traumatic incident.  She has intermittent aching, throbbing, sharp pain in her lower back.  Pain radiates down her left leg into her left posterior thigh.  Pain worsens with standing, walking, bending and getting up.  Pain improves with rest.  She denies any weakness.  No bowel bladder changes.  MRI lumbar spine shows lumbar facet hypertrophy as well as possible L5 nerve impingement.  She underwent caudal ALANNA's with Dr. Barba with moderate benefit of her generalized lower back pain.  She continues to have left-sided radicular pain.  She rates her pain 4/10 today, 10/10 at worse.    ROS:  CONSTITUTIONAL: No fevers, chills, night sweats, wt. loss, appetite changes  SKIN: no rashes or itching  ENT: No headaches, head trauma, vision changes, or eye pain  LYMPH NODES: None noticed   CV: No chest pain, palpitations.   RESP: No shortness of breath, dyspnea on exertion, cough, wheezing, or hemoptysis  GI: No nausea, emesis, diarrhea, constipation, melena, hematochezia, pain.    : No dysuria, hematuria, urgency, or frequency   HEME: No easy bruising, bleeding problems  PSYCHIATRIC: No depression, anxiety, psychosis, hallucinations.  NEURO: No seizures, memory loss, dizziness or difficulty sleeping  MSK: + History of  "present illness      Past Medical History:   Diagnosis Date    Arthritis     Cataract     Coronary artery disease     Hypertension     Insomnia     Neuropathy     Retinal detachment     Stomach ulcer      Past Surgical History:   Procedure Laterality Date    APPENDECTOMY      BLADDER SUSPENSION      CARDIAC SURGERY      carotid endarterectomy      L    CATARACT EXTRACTION      CHOLECYSTECTOMY      HYSTERECTOMY      TONSILLECTOMY       Family History   Problem Relation Age of Onset    Cancer Father     Macular degeneration Maternal Aunt     Cancer Maternal Aunt     Macular degeneration Maternal Uncle     Diabetes Paternal Aunt     Cancer Paternal Aunt     Blindness Maternal Grandfather      Social History     Social History    Marital status:      Spouse name: N/A    Number of children: N/A    Years of education: N/A     Social History Main Topics    Smoking status: Never Smoker    Smokeless tobacco: None    Alcohol use No    Drug use: No    Sexual activity: Yes     Birth control/ protection: Surgical     Other Topics Concern    None     Social History Narrative         Medications/Allergies: See med card    Vitals:    03/27/17 1018   BP: (!) 150/85   Pulse: 84   Weight: 54.3 kg (119 lb 11.4 oz)   Height: 4' 11" (1.499 m)   PainSc:   6         Physical exam:    GENERAL: A and O x3, the patient appears well groomed and is in no acute distress.  Skin: No rashes or obvious lesions  HEENT: normocephalic, atraumatic  CARDIOVASCULAR:  RRR  LUNGS: non labored breathing  ABDOMEN: soft, nontender   UPPER EXTREMITIES: Normal alignment, normal range of motion, no atrophy, no skin changes,  hair growth and nail growth normal and equal bilaterally. No swelling, no tenderness.    LOWER EXTREMITIES:  Normal alignment, normal range of motion, no atrophy, no skin changes,  hair growth and nail growth normal and equal bilaterally. No swelling, no tenderness.    LUMBAR SPINE  Lumbar spine: ROM is " full with flexion extension and oblique extension with no increased pain.    Tom's test causes no increased pain on either side.    Supine straight leg raise is positive on the left at 60°    Internal and external rotation of the hip causes no increased pain on either side.  Myofascial exam: No tenderness to palpation across lumbar paraspinous muscles.      MENTAL STATUS: normal orientation, speech, language, and fund of knowledge for social situation.  Emotional state appropriate.    CRANIAL NERVES:  II:  PERRL bilaterally,   III,IV,VI: EOMI.    V:  Facial sensation equal bilaterally  VII:  Facial motor function normal.  VIII:  Hearing equal to finger rub bilaterally  IX/X: Gag normal, palate symmetric  XI:  Shoulder shrug equal, head turn equal  XII:  Tongue midline without fasciculations      MOTOR: Tone and bulk: normal bilateral upper and lower Strength: normal   Delt Bi Tri WE WF     R 5 5 5 5 5 5   L 5 5 5 5 5 5     IP ADD ABD Quad TA Gas HAM  R 5 5 5 5 5 5 5  L 5 5 5 5 5 5 5    SENSATION: Light touch and pinprick intact bilaterally  REFLEXES: normal, symmetric, nonbrisk.  Toes down, no clonus. No hoffmans.  GAIT: normal rise, base, steps, and arm swing.        Imaging:  MRI lumbar spine without contrast 10/2016 ([facility])  L3-4, broad-based disc bulge and facet hypertrophy results in mild central canal narrowing and foraminal narrowing.  L4-5, broad-based disc bulge with moderate facet hypertrophy,  Left greater than right.  There is mild spurring of the left facet joint which narrows the left lateral recess and compresses the left L5 nerve.  L5-S1, mild facet hypertrophy    Assessment:  Ms. Carlson is a 76 y.o. female back and left leg pain  1. Lumbar radiculopathy    2. DDD (degenerative disc disease), lumbar    3. Myofascial pain        Plan:  1.  I have stressed the importance of physical activity and exercise to improve overall health. Home exercises given  2. Monitor progress and consider repeating  lumbar epidural steroid injection to the Left L4-5 and L5-S1 level.    3. She did not request medication.  She can continue Norco 5 mg as needed for pain.  Hold for any sedation  4.  Continue gabapentin as prescribed  5. Follow-up prm

## 2017-03-27 NOTE — MR AVS SNAPSHOT
[facility]  [address] [place] [address]  [address]  [place] [address]  Phone: [phone]                  Sunita Carlson   3/27/2017 10:00 AM   Office Visit    Description:  Female : 1940   Provider:  Nivia Peter PA-C   Department:  [facility]           Reason for Visit     Back Pain                To Do List           Goals (5 Years of Data)     None      [facility]     [facility] On [facility] Nurse Care [facility] -  Assistance  Registered nurses in the [facility] Call Center provide clinical advisement, health education, appointment booking, and other advisory services.  Call for this free service at [phone].             Medications           Message regarding Medications     Verify the changes and/or additions to your medication regime listed below are the same as discussed with your clinician today.  If any of these changes or additions are incorrect, please notify your healthcare provider.             Verify that the below list of medications is an accurate representation of the medications you are currently taking.  If none reported, the list may be blank. If incorrect, please contact your healthcare provider. Carry this list with you in case of emergency.           Current Medications     ASPIRIN/SALICYLAMIDE/CAFFEINE (BC HEADACHE POWDER ORAL) Take by mouth daily as needed.    dexlansoprazole (DEXILANT) 30 mg CpDM Take 60 mg by mouth.    diphenoxylate-atropine 2.5-0.025 mg (LOMOTIL) 2.5-0.025 mg per tablet Take 1 tablet by mouth 4 (four) times daily as needed for Diarrhea.    eluxadoline (VIBERZI) 75 mg Tab Take by mouth.    gabapentin (NEURONTIN) 300 MG capsule TK 2 CS PO HS    gabapentin (NEURONTIN) 400 MG capsule Take 400 mg by mouth 3 (three) times daily.    lidocaine (LIDODERM) 5 %(700 mg/patch) Place 1 patch onto the skin every 24 hours. Remove & Discard patch within 12 hours or as directed by MD    lisinopril 10 MG tablet     ondansetron (ZOFRAN) 4  "MG tablet Take 1 tablet (4 mg total) by mouth every 8 (eight) hours as needed for Nausea.    ondansetron (ZOFRAN-ODT) 4 MG TbDL DIS ONE T PO  Q 8 H PRN NV    pravastatin (PRAVACHOL) 40 MG tablet     temazepam (RESTORIL) 30 mg capsule TK 1 C PO QHS    temazepam (RESTORIL) 7.5 MG Cap Take 15 mg by mouth nightly as needed.           Clinical Reference Information           Your Vitals Were     BP Pulse Height Weight BMI    150/85 84 4' 11" (1.499 m) 54.3 kg (119 lb 11.4 oz) 24.18 kg/m2      Blood Pressure          Most Recent Value    BP  (!)  150/85      Allergies as of 3/27/2017     Demerol [Meperidine]    Percocet [Oxycodone-acetaminophen]      Immunizations Administered on Date of Encounter - 3/27/2017     None      Instructions      Exercises To Strengthen Your Lower Back  Strong lower-back and abdominal muscles work together to support your spine. These exercises will help strengthen the muscles of the lower back. It is important that you begin exercising slowly and increase levels gradually.  Always begin any exercise program with stretching. If you feel pain while doing any of these exercises, stop and talk to your doctor about a more specific exercise program that suits your condition better.  Low Back Stretch  · Lie on your back with your knees bent and both feet on the ground.  ·   Slowly raise your left knee to your chest as you flatten your lower back against the floor. Hold for 5 seconds.  · Relax and repeat the exercise with your right knee.  · Do 10 of these exercises for each leg.  · Repeat hugging both knees to your chest at the same time.  Building Lower Back Strength  1. Kneeling Lumbar Extension: Begin on your hands and knees. Simultaneously raise and straighten your right arm and left leg until they are parallel to the ground. Hold for 2 seconds and come back slowly to a starting position. Repeat with left arm and right leg, alternating 10 times.  2. Prone Lumbar Extension: Lie face down, arms " extended overhead, palms on the floor. Simultaneously raise your right arm and left leg as high as comfortably possible. Hold for 10 seconds and slowly return to start. Repeat with left arm and right leg, alternating 10 times. Gradually build up to 20 times. (Advanced: Repeat this exercise raising both arms and both legs a few inches off the floor at the same time. Hold for 5 seconds and release.)  3. Pelvic Tilt: Lie on the floor on your back with your knees bent at 90 degrees. Your feet should be flat on the floor. Inhale, exhale, then slowly contract your abdominal muscles bringing your navel toward your spine. Let your pelvis rock back until your lower back is flat on the floor. Hold for 10 seconds while breathing smoothly.  4. Abdominal Crunch: Perform a Pelvic Tilt (above) flattening your lower back against the floor. Holding the tension in your abdominal muscles, take another breath and raise your shoulder blades off the ground (this is not a full sit-up). Keep your head in line with your body (dont bend your neck forward). Hold for 2 seconds, then slowly lower.      Back Exercises: Abdominal Lift  The Abdominal Lift strengthens your lower abdominal muscles, helping you keep your pelvis and back stable.  · Lie on the floor with both knees bent. Put your feet flat on the floor and your arms by your sides. Tighten your abdominal muscles.  · Lift one bent knee and move it toward your upper body. Keep your abdominal muscles tight and your back flat on the floor. Hold for 10 seconds.  · Repeat 3 times. Then, switch legs.         Back Exercises: Bridge  The Bridge exercise strengthens your abdominal, buttocks, and hamstring muscles. This helps keep your back stable and aligned when you walk.  · Lie on the floor with your back and palms flat. Bend your knees. Keep your feet flat on the floor.  · Contract your abdominal and buttocks muscles. Slowly lift your buttocks off the floor until there is a straight line from  your knees to your shoulders.  · Hold for 5  seconds. Repeat 10 times.          Back Exercises: Hip Lift        To start, lie on your back with your knees bent and feet flat on the floor. Dont press your neck or lower back to the floor. Breathe deeply. You should feel comfortable and relaxed in this position.  · Slowly raise your hips upward.  · Tighten your abdomen and buttocks. Be careful not to arch your back.  · Hold for 5 seconds. Lower your hips to the floor.  · Repeat 10 times.  For your safety, check with your healthcare provider before starting an exercise program.       Back Exercises: Hip Rotator Stretch        To start, lie on your back with your knees bent and feet flat on the floor. Dont press your neck or lower back to the floor. Breathe deeply. You should feel comfortable and relaxed in this position.  · Rest your right ankle on your left knee.  · Place a towel behind your left thigh and use it to pull the knee toward your chest. Feel the stretch in your buttocks.  · Hold for 30-60 seconds. Release.  · Repeat 2 times.  · Switch legs.   For your safety, check with your healthcare provider before starting an exercise program.       Back Exercises: Knee Lift        To start, lie on your back with your knees bent and feet flat on the floor. Dont press your neck or lower back to the floor. Breathe deeply. You should feel comfortable and relaxed in this position.  · Lift one bent knee and move it toward your upper body. Keep your abdominal muscles tight and your back flat on the floor.  · Hold for 10 seconds. Return to start position.  · Repeat 3 times.  · Switch legs.        Back Exercises: Leg Pull        To start, lie on your back with your knees bent and feet flat on the floor. Dont press your neck or lower back to the floor. Breathe deeply. You should feel comfortable and relaxed in this position.  · Pull one knee to your chest.  · Hold for 30-60 seconds. Return to starting  position.  · Repeat 2 times.  · Switch legs.  · For a double leg pull, pull both legs to your chest at the same time. Repeat 2 times.  For your safety, check with your healthcare provider before starting an exercise program.       Back Exercises: Leg Reach        Do this exercise on your hands and knees. Keep your knees under your hips and your hands under your shoulders. Keep your spine in a neutral position (not arched or sagging). Be sure to maintain your necks natural curve.  · Extend one leg straight back. Dont arch your back or let your head or body sag.  · Hold for 5 seconds. Return to starting position.  · Repeat 5 times.  · Switch legs.       Back Exercises: Lower Back Rotation  To start, lie on your back with your knees bent and feet flat on the floor. Dont press your neck or lower back to the floor. Breathe deeply. You should feel comfortable and relaxed in this position.  · Drop both knees to one side. Turn your head to the other side. Keep your shoulders flat on the floor.  · Hold for 20 seconds.  · Slowly switch sides.  · Repeat 2 times.                                   Back Exercises: Lower Back Stretch                        To start, sit in a chair with your feet flat on the floor. Shift your weight slightly forward to avoid rounding your back. Relax, and keep your ears, shoulders, and hips aligned.  · Sit with your feet well apart.  · Bend forward and touch the floor with the backs of your hands. Relax and let your body drop.  · Hold for 20 seconds. Return to starting position.  · Repeat 2 times.       Back Exercises: Partial Curl-Ups        To start, lie on your back with your knees bent and feet flat on the floor. Dont press your neck or lower back to the floor. Breathe deeply. You should feel comfortable and relaxed in this position.  · Cross your arms loosely.  · Tighten your abdomen and curl MCFP up, keeping your head in line with your shoulders.  · Hold for 5 seconds. Uncurl to lie  down.  · Repeat 5 times.       Back Exercises: Pelvic Tilt  To start, lie on your back with your knees bent and feet flat on the floor. Dont press your neck or lower back to the floor. Breathe deeply. You should feel comfortable and relaxed in this position.  · Tighten your abdomen and buttocks, and press your lower back toward the floor. This should be a small, subtle movement.  · Hold for 5 seconds. Release.  · Repeat 5 times.                           Back Exercises: Seated Rotation      To start, sit in a chair with your feet flat on the floor. Shift your weight slightly forward to avoid rounding your back. Relax, and keep your ears, shoulders, and hips aligned.  · Fold your arms, elbows just below shoulder height.  · Turn from the waist with hips forward. Turn your head last.  · Hold for a count of 5. Return to starting position.  · Repeat 5 times on one side. Then switch sides.          Back Exercises: Side Stretch  To start, sit in a chair with your feet flat on the floor. Shift your weight slightly forward to avoid rounding your back. Relax. Keep your ears, shoulders, and hips aligned.  · Stretch your right arm overhead.  · Slowly bend to the left. Dont twist your torso.  · Hold for 20 seconds. Return to starting position.  · Repeat 2 times. Then, switch to the other side.      © 7088-5012 [employer]. [address] [place] [address]. All rights reserved. This information is not intended as a substitute for professional medical care. Always follow your healthcare professional's instructions.               Language Assistance Services     ATTENTION: Language assistance services are available, free of charge. Please call [phone].      ATENCIÓN: Si habla español, tiene a hazel disposición servicios gratuitos de asistencia lingüística. Llame al [phone].     [facility] Ý: N?u b?n nói Ti?ng Vi?t, có các d?ch v? h? tr? ngôn ng? mi?n phí dành cho b?n. G?i s? [phone].          [facility] complies with applicable Federal civil rights laws and does not discriminate on the basis of race, color, national origin, age, disability, or sex.

## 2017-03-27 NOTE — PATIENT INSTRUCTIONS
Exercises To Strengthen Your Lower Back  Strong lower-back and abdominal muscles work together to support your spine. These exercises will help strengthen the muscles of the lower back. It is important that you begin exercising slowly and increase levels gradually.  Always begin any exercise program with stretching. If you feel pain while doing any of these exercises, stop and talk to your doctor about a more specific exercise program that suits your condition better.  Low Back Stretch  · Lie on your back with your knees bent and both feet on the ground.  ·   Slowly raise your left knee to your chest as you flatten your lower back against the floor. Hold for 5 seconds.  · Relax and repeat the exercise with your right knee.  · Do 10 of these exercises for each leg.  · Repeat hugging both knees to your chest at the same time.  Building Lower Back Strength  1. Kneeling Lumbar Extension: Begin on your hands and knees. Simultaneously raise and straighten your right arm and left leg until they are parallel to the ground. Hold for 2 seconds and come back slowly to a starting position. Repeat with left arm and right leg, alternating 10 times.  2. Prone Lumbar Extension: Lie face down, arms extended overhead, palms on the floor. Simultaneously raise your right arm and left leg as high as comfortably possible. Hold for 10 seconds and slowly return to start. Repeat with left arm and right leg, alternating 10 times. Gradually build up to 20 times. (Advanced: Repeat this exercise raising both arms and both legs a few inches off the floor at the same time. Hold for 5 seconds and release.)  3. Pelvic Tilt: Lie on the floor on your back with your knees bent at 90 degrees. Your feet should be flat on the floor. Inhale, exhale, then slowly contract your abdominal muscles bringing your navel toward your spine. Let your pelvis rock back until your lower back is flat on the floor. Hold for 10 seconds while breathing  smoothly.  4. Abdominal Crunch: Perform a Pelvic Tilt (above) flattening your lower back against the floor. Holding the tension in your abdominal muscles, take another breath and raise your shoulder blades off the ground (this is not a full sit-up). Keep your head in line with your body (dont bend your neck forward). Hold for 2 seconds, then slowly lower.      Back Exercises: Abdominal Lift  The Abdominal Lift strengthens your lower abdominal muscles, helping you keep your pelvis and back stable.  · Lie on the floor with both knees bent. Put your feet flat on the floor and your arms by your sides. Tighten your abdominal muscles.  · Lift one bent knee and move it toward your upper body. Keep your abdominal muscles tight and your back flat on the floor. Hold for 10 seconds.  · Repeat 3 times. Then, switch legs.         Back Exercises: Bridge  The Bridge exercise strengthens your abdominal, buttocks, and hamstring muscles. This helps keep your back stable and aligned when you walk.  · Lie on the floor with your back and palms flat. Bend your knees. Keep your feet flat on the floor.  · Contract your abdominal and buttocks muscles. Slowly lift your buttocks off the floor until there is a straight line from your knees to your shoulders.  · Hold for 5  seconds. Repeat 10 times.          Back Exercises: Hip Lift        To start, lie on your back with your knees bent and feet flat on the floor. Dont press your neck or lower back to the floor. Breathe deeply. You should feel comfortable and relaxed in this position.  · Slowly raise your hips upward.  · Tighten your abdomen and buttocks. Be careful not to arch your back.  · Hold for 5 seconds. Lower your hips to the floor.  · Repeat 10 times.  For your safety, check with your healthcare provider before starting an exercise program.       Back Exercises: Hip Rotator Stretch        To start, lie on your back with your knees bent and feet flat on the floor. Dont press your  neck or lower back to the floor. Breathe deeply. You should feel comfortable and relaxed in this position.  · Rest your right ankle on your left knee.  · Place a towel behind your left thigh and use it to pull the knee toward your chest. Feel the stretch in your buttocks.  · Hold for 30-60 seconds. Release.  · Repeat 2 times.  · Switch legs.   For your safety, check with your healthcare provider before starting an exercise program.       Back Exercises: Knee Lift        To start, lie on your back with your knees bent and feet flat on the floor. Dont press your neck or lower back to the floor. Breathe deeply. You should feel comfortable and relaxed in this position.  · Lift one bent knee and move it toward your upper body. Keep your abdominal muscles tight and your back flat on the floor.  · Hold for 10 seconds. Return to start position.  · Repeat 3 times.  · Switch legs.        Back Exercises: Leg Pull        To start, lie on your back with your knees bent and feet flat on the floor. Dont press your neck or lower back to the floor. Breathe deeply. You should feel comfortable and relaxed in this position.  · Pull one knee to your chest.  · Hold for 30-60 seconds. Return to starting position.  · Repeat 2 times.  · Switch legs.  · For a double leg pull, pull both legs to your chest at the same time. Repeat 2 times.  For your safety, check with your healthcare provider before starting an exercise program.       Back Exercises: Leg Reach        Do this exercise on your hands and knees. Keep your knees under your hips and your hands under your shoulders. Keep your spine in a neutral position (not arched or sagging). Be sure to maintain your necks natural curve.  · Extend one leg straight back. Dont arch your back or let your head or body sag.  · Hold for 5 seconds. Return to starting position.  · Repeat 5 times.  · Switch legs.       Back Exercises: Lower Back Rotation  To start, lie on your back with your knees bent  and feet flat on the floor. Dont press your neck or lower back to the floor. Breathe deeply. You should feel comfortable and relaxed in this position.  · Drop both knees to one side. Turn your head to the other side. Keep your shoulders flat on the floor.  · Hold for 20 seconds.  · Slowly switch sides.  · Repeat 2 times.                                   Back Exercises: Lower Back Stretch                        To start, sit in a chair with your feet flat on the floor. Shift your weight slightly forward to avoid rounding your back. Relax, and keep your ears, shoulders, and hips aligned.  · Sit with your feet well apart.  · Bend forward and touch the floor with the backs of your hands. Relax and let your body drop.  · Hold for 20 seconds. Return to starting position.  · Repeat 2 times.       Back Exercises: Partial Curl-Ups        To start, lie on your back with your knees bent and feet flat on the floor. Dont press your neck or lower back to the floor. Breathe deeply. You should feel comfortable and relaxed in this position.  · Cross your arms loosely.  · Tighten your abdomen and curl correction up, keeping your head in line with your shoulders.  · Hold for 5 seconds. Uncurl to lie down.  · Repeat 5 times.       Back Exercises: Pelvic Tilt  To start, lie on your back with your knees bent and feet flat on the floor. Dont press your neck or lower back to the floor. Breathe deeply. You should feel comfortable and relaxed in this position.  · Tighten your abdomen and buttocks, and press your lower back toward the floor. This should be a small, subtle movement.  · Hold for 5 seconds. Release.  · Repeat 5 times.                           Back Exercises: Seated Rotation      To start, sit in a chair with your feet flat on the floor. Shift your weight slightly forward to avoid rounding your back. Relax, and keep your ears, shoulders, and hips aligned.  · Fold your arms, elbows just below shoulder height.  · Turn from the  waist with hips forward. Turn your head last.  · Hold for a count of 5. Return to starting position.  · Repeat 5 times on one side. Then switch sides.          Back Exercises: Side Stretch  To start, sit in a chair with your feet flat on the floor. Shift your weight slightly forward to avoid rounding your back. Relax. Keep your ears, shoulders, and hips aligned.  · Stretch your right arm overhead.  · Slowly bend to the left. Dont twist your torso.  · Hold for 20 seconds. Return to starting position.  · Repeat 2 times. Then, switch to the other side.      © 3980-1982 [employer]. [address] [place] [address], [place] [address]. All rights reserved. This information is not intended as a substitute for professional medical care. Always follow your healthcare professional's instructions.

## 2017-04-24 ENCOUNTER — HOSPITAL ENCOUNTER (OUTPATIENT)
Dept: RADIOLOGY | Facility: HOSPITAL | Age: 77
Discharge: HOME | End: 2017-04-24
Attending: INTERNAL MEDICINE
Payer: MEDICARE

## 2017-04-24 DIAGNOSIS — E04.9 NONTOXIC GOITER, UNSPECIFIED: ICD-10-CM

## 2017-04-24 PROCEDURE — 76536 US EXAM OF HEAD AND NECK: CPT | Mod: 26,,, | Performed by: RADIOLOGY

## 2017-04-24 PROCEDURE — 76536 US EXAM OF HEAD AND NECK: CPT | Mod: TC

## 2017-04-28 ENCOUNTER — TELEPHONE (OUTPATIENT)
Dept: ENDOCRINOLOGY | Facility: CLINIC | Age: 77
End: 2017-04-28

## 2017-04-28 NOTE — TELEPHONE ENCOUNTER
Advised I would place on waitlist.. Advised I would check with Dr Hernández's schedule in [place]. She wanted to be scheduled for 8/10/17 at 8am

## 2017-04-28 NOTE — TELEPHONE ENCOUNTER
----- Message from Iveth Saenz sent at 4/28/2017  9:16 AM CDT -----  Please call patient in regards to receiving her lab results from Dr Birch's office, [phone] (home)

## 2017-08-24 DIAGNOSIS — K52.9 NONINFECTIOUS GASTROENTERITIS AND COLITIS: Primary | ICD-10-CM

## 2017-08-28 ENCOUNTER — HOSPITAL ENCOUNTER (OUTPATIENT)
Dept: RADIOLOGY | Facility: HOSPITAL | Age: 77
Discharge: HOME | End: 2017-08-28
Attending: INTERNAL MEDICINE
Payer: MEDICARE

## 2017-08-28 DIAGNOSIS — K52.9 INFLAMMATORY BOWEL DISEASE: Primary | ICD-10-CM

## 2017-08-28 DIAGNOSIS — K52.9 NONINFECTIOUS GASTROENTERITIS AND COLITIS: ICD-10-CM

## 2017-08-28 PROCEDURE — 74177 CT ABD & PELVIS W/CONTRAST: CPT | Mod: TC

## 2017-08-28 PROCEDURE — 25500020 PHARM REV CODE 255

## 2017-08-28 PROCEDURE — 74177 CT ABD & PELVIS W/CONTRAST: CPT | Mod: 26,,, | Performed by: RADIOLOGY

## 2017-08-28 RX ADMIN — IOHEXOL 75 ML: 350 INJECTION, SOLUTION INTRAVENOUS at 10:08

## 2017-08-28 RX ADMIN — IOHEXOL 30 ML: 350 INJECTION, SOLUTION INTRAVENOUS at 10:08

## 2017-09-20 ENCOUNTER — OFFICE VISIT (OUTPATIENT)
Dept: OPHTHALMOLOGY | Facility: CLINIC | Age: 77
End: 2017-09-20
Payer: MEDICARE

## 2017-09-20 DIAGNOSIS — H54.40 BLINDNESS, ONE EYE: ICD-10-CM

## 2017-09-20 DIAGNOSIS — H33.21 RD (RETINAL DETACHMENT), RIGHT: ICD-10-CM

## 2017-09-20 DIAGNOSIS — H18.421 BAND KERATOPATHY, RIGHT: Primary | ICD-10-CM

## 2017-09-20 PROCEDURE — 99999 PR PBB SHADOW E&M-EST. PATIENT-LVL II: CPT | Mod: PBBFAC,,, | Performed by: OPHTHALMOLOGY

## 2017-09-20 PROCEDURE — 92014 COMPRE OPH EXAM EST PT 1/>: CPT | Mod: S$GLB,,, | Performed by: OPHTHALMOLOGY

## 2017-09-20 RX ORDER — OMEPRAZOLE 40 MG/1
CAPSULE, DELAYED RELEASE ORAL
COMMUNITY
End: 2020-12-07

## 2017-09-20 RX ORDER — ROSUVASTATIN CALCIUM 20 MG/1
TABLET, COATED ORAL
COMMUNITY
End: 2017-10-13

## 2017-09-20 RX ORDER — OLMESARTAN MEDOXOMIL 40 MG/1
TABLET ORAL
COMMUNITY
End: 2017-10-13

## 2017-09-20 RX ORDER — NITROGLYCERIN 0.4 MG/1
TABLET SUBLINGUAL
COMMUNITY

## 2017-09-20 RX ORDER — TEMAZEPAM 30 MG/1
CAPSULE ORAL
COMMUNITY
End: 2019-08-29

## 2017-09-20 RX ORDER — DIAZEPAM 2 MG/1
TABLET ORAL
COMMUNITY
End: 2017-10-13

## 2017-09-20 RX ORDER — DEXLANSOPRAZOLE 60 MG/1
CAPSULE, DELAYED RELEASE ORAL
COMMUNITY
End: 2017-10-13 | Stop reason: SDUPTHER

## 2017-09-20 RX ORDER — ASPIRIN 81 MG/1
TABLET ORAL
COMMUNITY
End: 2019-09-11

## 2017-09-20 RX ORDER — NEBIVOLOL 5 MG/1
TABLET ORAL
COMMUNITY
End: 2017-10-13

## 2017-09-20 NOTE — PROGRESS NOTES
HPI     DLS 4/15/17 Dr. Morales    Pt states she is here today to get the white spot removed from OD as   before. Would love to get it done today.  HAving pain and it is very   noticeable in photos again.      S/P EDTA chelation 4/15/16       Last edited by Tracie Bianchi on 9/20/2017  3:34 PM. (History)            Assessment /Plan     For exam results, see Encounter Report.    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  May consider EDTA chelation OD.

## 2017-09-27 ENCOUNTER — HOSPITAL ENCOUNTER (OUTPATIENT)
Dept: RADIOLOGY | Facility: HOSPITAL | Age: 77
Discharge: HOME | End: 2017-09-27
Attending: INTERNAL MEDICINE
Payer: MEDICARE

## 2017-09-27 DIAGNOSIS — J06.9 ACUTE UPPER RESPIRATORY INFECTIONS OF OTHER MULTIPLE SITES: Primary | ICD-10-CM

## 2017-09-27 DIAGNOSIS — J06.9 ACUTE UPPER RESPIRATORY INFECTIONS OF OTHER MULTIPLE SITES: ICD-10-CM

## 2017-09-27 PROCEDURE — 71020 XR CHEST PA AND LATERAL: CPT | Mod: TC

## 2017-09-27 PROCEDURE — 71020 XR CHEST PA AND LATERAL: CPT | Mod: 26,,, | Performed by: RADIOLOGY

## 2017-10-13 ENCOUNTER — TELEPHONE (OUTPATIENT)
Dept: OPHTHALMOLOGY | Facility: CLINIC | Age: 77
End: 2017-10-13

## 2017-10-13 ENCOUNTER — OFFICE VISIT (OUTPATIENT)
Dept: ENDOCRINOLOGY | Facility: CLINIC | Age: 77
End: 2017-10-13
Payer: MEDICARE

## 2017-10-13 ENCOUNTER — LAB VISIT (OUTPATIENT)
Dept: LAB | Facility: HOSPITAL | Age: 77
End: 2017-10-13
Attending: INTERNAL MEDICINE
Payer: MEDICARE

## 2017-10-13 VITALS
WEIGHT: 120.81 LBS | SYSTOLIC BLOOD PRESSURE: 132 MMHG | HEIGHT: 59 IN | BODY MASS INDEX: 24.36 KG/M2 | HEART RATE: 78 BPM | DIASTOLIC BLOOD PRESSURE: 76 MMHG | RESPIRATION RATE: 18 BRPM

## 2017-10-13 DIAGNOSIS — Z95.1 HX OF CABG: ICD-10-CM

## 2017-10-13 DIAGNOSIS — E78.00 HYPERCHOLESTEROLEMIA: ICD-10-CM

## 2017-10-13 DIAGNOSIS — Z78.0 POSTMENOPAUSAL: ICD-10-CM

## 2017-10-13 DIAGNOSIS — I10 ESSENTIAL HYPERTENSION: ICD-10-CM

## 2017-10-13 DIAGNOSIS — K21.9 GASTROESOPHAGEAL REFLUX DISEASE WITHOUT ESOPHAGITIS: ICD-10-CM

## 2017-10-13 DIAGNOSIS — E78.5 HYPERLIPIDEMIA, UNSPECIFIED HYPERLIPIDEMIA TYPE: ICD-10-CM

## 2017-10-13 DIAGNOSIS — M81.0 OSTEOPOROSIS, UNSPECIFIED OSTEOPOROSIS TYPE, UNSPECIFIED PATHOLOGICAL FRACTURE PRESENCE: Primary | ICD-10-CM

## 2017-10-13 DIAGNOSIS — M81.0 OSTEOPOROSIS, UNSPECIFIED OSTEOPOROSIS TYPE, UNSPECIFIED PATHOLOGICAL FRACTURE PRESENCE: ICD-10-CM

## 2017-10-13 DIAGNOSIS — E04.2 MULTIPLE THYROID NODULES: ICD-10-CM

## 2017-10-13 PROBLEM — Z86.73 HISTORY OF STROKE: Status: ACTIVE | Noted: 2017-10-13

## 2017-10-13 LAB
25(OH)D3+25(OH)D2 SERPL-MCNC: 31 NG/ML
ALBUMIN SERPL BCP-MCNC: 3.4 G/DL
ALP SERPL-CCNC: 95 U/L
ALT SERPL W/O P-5'-P-CCNC: 19 U/L
ANION GAP SERPL CALC-SCNC: 11 MMOL/L
AST SERPL-CCNC: 22 U/L
BILIRUB SERPL-MCNC: 0.5 MG/DL
BUN SERPL-MCNC: 12 MG/DL
CA-I BLDV-SCNC: 1.32 MMOL/L
CALCIUM SERPL-MCNC: 9.8 MG/DL
CHLORIDE SERPL-SCNC: 106 MMOL/L
CO2 SERPL-SCNC: 27 MMOL/L
CREAT SERPL-MCNC: 0.8 MG/DL
EST. GFR  (AFRICAN AMERICAN): >60 ML/MIN/1.73 M^2
EST. GFR  (NON AFRICAN AMERICAN): >60 ML/MIN/1.73 M^2
GLUCOSE SERPL-MCNC: 163 MG/DL
MAGNESIUM SERPL-MCNC: 1.9 MG/DL
PHOSPHATE SERPL-MCNC: 3 MG/DL
POTASSIUM SERPL-SCNC: 3.9 MMOL/L
PROT SERPL-MCNC: 6.9 G/DL
PTH-INTACT SERPL-MCNC: 74 PG/ML
SODIUM SERPL-SCNC: 144 MMOL/L
T3 SERPL-MCNC: 96 NG/DL
T4 FREE SERPL-MCNC: 0.98 NG/DL
TSH SERPL DL<=0.005 MIU/L-ACNC: 1.69 UIU/ML

## 2017-10-13 PROCEDURE — 82523 COLLAGEN CROSSLINKS: CPT

## 2017-10-13 PROCEDURE — 82306 VITAMIN D 25 HYDROXY: CPT

## 2017-10-13 PROCEDURE — 84100 ASSAY OF PHOSPHORUS: CPT

## 2017-10-13 PROCEDURE — 99999 PR PBB SHADOW E&M-EST. PATIENT-LVL III: CPT | Mod: PBBFAC,,, | Performed by: INTERNAL MEDICINE

## 2017-10-13 PROCEDURE — 84480 ASSAY TRIIODOTHYRONINE (T3): CPT

## 2017-10-13 PROCEDURE — 84443 ASSAY THYROID STIM HORMONE: CPT

## 2017-10-13 PROCEDURE — 83970 ASSAY OF PARATHORMONE: CPT

## 2017-10-13 PROCEDURE — 82330 ASSAY OF CALCIUM: CPT

## 2017-10-13 PROCEDURE — 80053 COMPREHEN METABOLIC PANEL: CPT

## 2017-10-13 PROCEDURE — 84439 ASSAY OF FREE THYROXINE: CPT

## 2017-10-13 PROCEDURE — 83735 ASSAY OF MAGNESIUM: CPT

## 2017-10-13 PROCEDURE — 99204 OFFICE O/P NEW MOD 45 MIN: CPT | Mod: S$GLB,,, | Performed by: INTERNAL MEDICINE

## 2017-10-13 RX ORDER — ZOLEDRONIC ACID 5 MG/100ML
5 INJECTION, SOLUTION INTRAVENOUS ONCE
Qty: 100 ML | Refills: 0 | Status: SHIPPED | OUTPATIENT
Start: 2017-10-13 | End: 2017-10-13 | Stop reason: SDUPTHER

## 2017-10-13 NOTE — PROGRESS NOTES
Subjective:      Patient ID: Sunita Carlson is a 76 y.o. female.    Chief Complaint:   76 yr old lady seen for initial care visit on account of osteoporosis.    History of Present Illness    Patient is a 76 yr old lady with osteoporosis (last DEXA from 12/15) seen for initial care visit today. She in addition has GERD, essential HTN, hyperlipidemia with hypercholesterolemia and thyroid nodular disease with most recent thyroid USS from 04/17 (the nodules are presently below the threshold for FNAB). +FHx of thyroid disease sister, niece.    She is here to have her thyroid checked.     Pt has noticed hair loss this past year, trouble swallowing with liquids and solids every now and then, no swelling, no constipation. Good mood.    Wt is stable    She does have diarrhea, sweating, no palpitations  Pt had apparently been on Reclast infusions for a few years which she tolerated well but was then switched to prolia which was associated with significant post injection asthenia. She has thus stopped taking prolia and has not been on any active osteoporosis medication for ~ 2 years now.    She has some intermittent dysphagia both to solids and liquids which does not appear progressive but no voice change nor SOB. This does not appear to be progressive. She also has some chronic stable scalp hair loss.    Trouble falling asleep.    Pt stays at home most of the time.    Review of Systems   Constitutional: Negative for appetite change and unexpected weight change.   HENT: Positive for trouble swallowing. Negative for congestion.    Eyes: Positive for visual disturbance (Visual loss in right eye; chronic).   Respiratory: Negative for cough and shortness of breath.    Cardiovascular: Negative for chest pain and palpitations.   Gastrointestinal: Positive for abdominal pain and nausea.        Hiatal Hernia   Endocrine: Negative for polydipsia, polyphagia and polyuria.   Genitourinary: Negative for difficulty urinating and dysuria.  "  Musculoskeletal: Positive for back pain. Negative for arthralgias.   Skin: Negative for rash.   Neurological: Positive for numbness (Fingers). Negative for dizziness.   Psychiatric/Behavioral: Positive for sleep disturbance. The patient is not nervous/anxious.        Objective: /76   Pulse 78   Resp 18   Ht 4' 11" (1.499 m)   Wt 54.8 kg (120 lb 13 oz)   BMI 24.40 kg/m²        Physical Exam   Constitutional: She is oriented to person, place, and time. She appears well-developed and well-nourished.   HENT:   Head: Normocephalic and atraumatic.   Eyes: EOM are normal.       White growth on right eye   Neck: Normal range of motion. Neck supple.   Cardiovascular: Normal rate, regular rhythm and normal heart sounds.  Exam reveals no friction rub.    No murmur heard.  Pulmonary/Chest: Breath sounds normal. No respiratory distress. She has no wheezes.   Abdominal: Soft. She exhibits no distension. There is no tenderness.   Musculoskeletal: Normal range of motion. She exhibits no edema.   Neurological: She is alert and oriented to person, place, and time. She displays abnormal reflex.   Steady gait   Skin: Skin is warm and dry. No rash noted. She is not diaphoretic.   Psychiatric: She has a normal mood and affect. Her behavior is normal. Judgment and thought content normal.     Lab Review:     Results for MILVIA AGUIAR (MRN [id number]) as of 10/13/2017 09:19   Ref. Range 4/24/2017 14:18 4/24/2017 14:57 8/28/2017 10:25   BUN, Bld Latest Ref Range: 8 - 23 mg/dL   19   Creatinine Latest Ref Range: 0.5 - 1.4 mg/dL   0.8   eGFR if non African American Latest Ref Range: >60 mL/min/1.73 m^2   >60   eGFR if  Latest Ref Range: >60 mL/min/1.73 m^2   >60   TSH Latest Ref Range: 0.400 - 4.000 uIU/mL 1.441         Assessment:     1. Osteoporosis, unspecified osteoporosis type, unspecified pathological fracture presence  PTH, intact    Vitamin D    Phosphorus    Calcium, ionized    Comprehensive metabolic " panel    C TELOPEPTIDE (CTX), SERUM    MAGNESIUM    TSH    T3    T4, free    DXA Bone Density Spine And Hip    CBC auto differential   2. Postmenopausal  DXA Bone Density Spine and Hip   3. Gastroesophageal reflux disease without esophagitis     4. Essential hypertension  CBC auto differential    Lipid panel   5. Hyperlipidemia, unspecified hyperlipidemia type  CBC auto differential    Lipid panel   6. Hypercholesterolemia     7. Hx of CABG  CBC auto differential    Lipid panel   8. Multiple thyroid nodules  US Soft Tissue Head Neck Thyroid     Plan:     Osteoporosis  Reclast 5 mg IV after dental approval  Advised on fall precaution  DXA in December 2017  Labs noted above today    GERD  Continue Prilosec and Dexlansoprazole    Hypertension  Chronic-stable-Continue ACEi    Hyperlipidemia  Chronic-monitor- LP today    Hx of CABG  Chronic-stable-f/u with PCP    Multiple Thyroid Nodules  Chronic-stable- repeat u/s in 2018  TFTs today    Seen in conjunction with NAVI Jimenez PA-C     Patient was seen, evaluated and discussed with Richmond Jimenez. I agree with the clinical case summary, history and examination findings as detailed above as well as the indicated management plan detailed in the note above.

## 2017-10-13 NOTE — TELEPHONE ENCOUNTER
----- Message from Lashonda Mcdermott sent at 10/13/2017  3:35 PM CDT -----  Contact: Sunita  Ms. Carlson is returning your call. She can be reached at [phone].

## 2017-10-13 NOTE — LETTER
October 13, 2017      Behzad Birch MD  [address]  [place] [address]           [place] [facility]  [address]  [place] [address]  Phone: [phone]          Patient: Sunita Carlson   MR Number: [id number]   YOB: 1940   Date of Visit: 10/13/2017       Dear Dr. Behzad Birch:    Thank you for referring Sunita Carlson to me for evaluation. Attached you will find relevant portions of my assessment and plan of care.    If you have questions, please do not hesitate to call me. I look forward to following Sunita Carlson along with you.    Sincerely,    Salvador Gutiérrez MD    Enclosure  CC:  No Recipients    If you would like to receive this communication electronically, please contact [email or web address] or [phone] to request more information on [employer] Link access.    For providers and/or their staff who would like to refer a patient to [facility], please contact us through our one-stop-shop provider referral line, [facility], at [phone].    If you feel you have received this communication in error or would no longer like to receive these types of communications, please e-mail [email or web address]

## 2017-10-13 NOTE — TELEPHONE ENCOUNTER
----- Message from Sunita Yanes sent at 10/12/2017  2:48 PM CDT -----  Contact: Sunita Carlson   Pt would like to speak with  nurse to rescheduled the appointment ,pt can be reached at [phone] please thank you.

## 2017-10-15 ENCOUNTER — TELEPHONE (OUTPATIENT)
Dept: DIABETES | Facility: CLINIC | Age: 77
End: 2017-10-15

## 2017-10-16 LAB — COLLAGEN CTX SERPL-MCNC: 198 PG/ML

## 2017-10-18 NOTE — TELEPHONE ENCOUNTER
Results given.. Verbalized understanding.. Patient advised she would like to hold off on Reclast until she has her repeat dexa scan in December.

## 2017-10-23 ENCOUNTER — TELEPHONE (OUTPATIENT)
Dept: ENDOCRINOLOGY | Facility: CLINIC | Age: 77
End: 2017-10-23

## 2017-10-23 NOTE — TELEPHONE ENCOUNTER
----- Message from Donna Jain sent at 10/23/2017 10:37 AM CDT -----  Contact: patient  Patient is requesting a return call regarding her injection, at [phone].    Thank you

## 2017-10-27 ENCOUNTER — PROCEDURE VISIT (OUTPATIENT)
Dept: OPHTHALMOLOGY | Facility: CLINIC | Age: 77
End: 2017-10-27
Payer: MEDICARE

## 2017-10-27 DIAGNOSIS — H33.21 RD (RETINAL DETACHMENT), RIGHT: ICD-10-CM

## 2017-10-27 DIAGNOSIS — H18.421 BAND KERATOPATHY, RIGHT: ICD-10-CM

## 2017-10-27 DIAGNOSIS — H54.40 BLINDNESS, ONE EYE: Primary | ICD-10-CM

## 2017-10-27 PROCEDURE — 92012 INTRM OPH EXAM EST PATIENT: CPT | Mod: 57,S$GLB,, | Performed by: OPHTHALMOLOGY

## 2017-10-27 PROCEDURE — 65436 CURETTE/TREAT CORNEA: CPT | Mod: RT,S$GLB,, | Performed by: OPHTHALMOLOGY

## 2017-10-27 NOTE — PROGRESS NOTES
HPI     Band Keratopathy    Additional comments: Right Eye.            Comments   75 y/o female presents for EDTA today.  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   Pt states Percocet that was given at discharge last procedure visit caused   her to itch. Questions if she can take Benedryl.        Last edited by Renée Alvarenga on 10/27/2017  2:32 PM. (History)            Assessment /Plan     For exam results, see Encounter Report.    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  Discussed risks, benefits, and alternatives to surgical intervention. Patient voices understanding and wishes to proceed.      SURGEON:  Hiral Morales M.D.    PREOPERATIVE DIAGNOSIS:Band K    POSTOPERATIVE DIAGNOSIS: Band K    PROCEDURES:    Superficial Keratectomy OD    ANESTHESIA: Topical Tetracaine 0.5%    COMPLICATIONS:  None    ESTIMATED BLOOD LOSS: None    SPECIMENS: None    INDICATIONS:  The patient has a history of the diagnosis above, causing visually significant visual loss. After a thorough discussion of risks, benefits, and alternatives, it was agreed to proceed with surgical removal to attempt visual rehabilitation and improve activities of daily living which have suffered due to the impaired visual status.    PROCEDURE IN DETAIL  The patient was placed in a supine position on the procedure table while the eye was prepped and draped in standard sterile fashion with 5% Betadine. A lid speculum was placed and the procedure begun by debridement of the corneal epithelium with a [place] blade, which was also used to remove as much of the diseased cornea as could safely and easily be accomplished.   A iva zane was used to remove additional diseased cornea and smooth the stromal surface.  EDTA 2% solution was used to remove any residual calcium deposits in the cornea, if present.  Antibiotic drops were placed on the eye, and a bandage contact lens  applied.  The patient will continue with antibiotic and anti-inflammatory treatment, and be followed up in the eye clinic in 1 week.

## 2017-11-01 ENCOUNTER — OFFICE VISIT (OUTPATIENT)
Dept: OPHTHALMOLOGY | Facility: CLINIC | Age: 77
End: 2017-11-01
Payer: MEDICARE

## 2017-11-01 DIAGNOSIS — H54.40 BLINDNESS, ONE EYE: ICD-10-CM

## 2017-11-01 DIAGNOSIS — H18.421 BAND KERATOPATHY, RIGHT: ICD-10-CM

## 2017-11-01 DIAGNOSIS — H33.21 RD (RETINAL DETACHMENT), RIGHT: ICD-10-CM

## 2017-11-01 DIAGNOSIS — Z98.890 POST-OPERATIVE STATE: Primary | ICD-10-CM

## 2017-11-01 PROCEDURE — 99999 PR PBB SHADOW E&M-EST. PATIENT-LVL II: CPT | Mod: PBBFAC,,, | Performed by: OPHTHALMOLOGY

## 2017-11-01 PROCEDURE — 99024 POSTOP FOLLOW-UP VISIT: CPT | Mod: S$GLB,,, | Performed by: OPHTHALMOLOGY

## 2017-11-01 NOTE — PROGRESS NOTES
HPI     S/P SK OD 10/27/2017    Patient states her right eye is doing much better today.     Eye meds:  Vigamox QID OD    Last edited by Chantal Ta on 11/1/2017  3:38 PM. (History)            Assessment /Plan     For exam results, see Encounter Report.    Post-operative state    RD (retinal detachment), right    Blindness, one eye    Band keratopathy, right      S/p SK EDTA with healed epi  BCL removed.

## 2017-12-13 DIAGNOSIS — Z12.31 ENCOUNTER FOR SCREENING MAMMOGRAM FOR MALIGNANT NEOPLASM OF BREAST: Primary | ICD-10-CM

## 2017-12-28 ENCOUNTER — HOSPITAL ENCOUNTER (OUTPATIENT)
Dept: RADIOLOGY | Facility: CLINIC | Age: 77
Discharge: HOME | End: 2017-12-28
Attending: PHYSICIAN ASSISTANT
Payer: MEDICARE

## 2017-12-28 ENCOUNTER — HOSPITAL ENCOUNTER (OUTPATIENT)
Dept: RADIOLOGY | Facility: CLINIC | Age: 77
Discharge: HOME | End: 2017-12-28
Attending: INTERNAL MEDICINE
Payer: MEDICARE

## 2017-12-28 DIAGNOSIS — M81.0 OSTEOPOROSIS, UNSPECIFIED OSTEOPOROSIS TYPE, UNSPECIFIED PATHOLOGICAL FRACTURE PRESENCE: ICD-10-CM

## 2017-12-28 DIAGNOSIS — Z12.31 ENCOUNTER FOR SCREENING MAMMOGRAM FOR MALIGNANT NEOPLASM OF BREAST: ICD-10-CM

## 2017-12-28 PROCEDURE — 77080 DXA BONE DENSITY AXIAL: CPT | Mod: 26,,, | Performed by: RADIOLOGY

## 2017-12-28 PROCEDURE — 77067 SCR MAMMO BI INCL CAD: CPT | Mod: 26,,, | Performed by: RADIOLOGY

## 2017-12-28 PROCEDURE — 77063 BREAST TOMOSYNTHESIS BI: CPT | Mod: 26,,, | Performed by: RADIOLOGY

## 2017-12-28 PROCEDURE — 77080 DXA BONE DENSITY AXIAL: CPT | Mod: TC,PO

## 2017-12-28 PROCEDURE — 77067 SCR MAMMO BI INCL CAD: CPT | Mod: TC,PO

## 2018-01-02 ENCOUNTER — TELEPHONE (OUTPATIENT)
Dept: ENDOCRINOLOGY | Facility: CLINIC | Age: 78
End: 2018-01-02

## 2018-01-02 NOTE — TELEPHONE ENCOUNTER
----- Message from Emilee Hernandez sent at 1/2/2018  9:53 AM CST -----  Contact: self  Patient is calling for bone density results. Please call patient at [phone]. Thanks!

## 2018-01-04 ENCOUNTER — TELEPHONE (OUTPATIENT)
Dept: ENDOCRINOLOGY | Facility: CLINIC | Age: 78
End: 2018-01-04

## 2018-01-04 NOTE — TELEPHONE ENCOUNTER
----- Message from Megan Blakely sent at 1/4/2018 12:13 PM CST -----  Contact: Patient  Patient would like the doctors office to fax her a copy of her bone density report.  Call Back#[phone]  Fax#Is the same as the home  Thanks

## 2018-03-19 ENCOUNTER — HOSPITAL ENCOUNTER (OUTPATIENT)
Dept: RADIOLOGY | Facility: CLINIC | Age: 78
Discharge: HOME | End: 2018-03-19
Attending: PHYSICIAN ASSISTANT
Payer: MEDICARE

## 2018-03-19 DIAGNOSIS — E04.2 MULTIPLE THYROID NODULES: ICD-10-CM

## 2018-03-19 PROCEDURE — 76536 US EXAM OF HEAD AND NECK: CPT | Mod: TC,PO

## 2018-03-19 PROCEDURE — 76536 US EXAM OF HEAD AND NECK: CPT | Mod: 26,,, | Performed by: RADIOLOGY

## 2018-03-22 ENCOUNTER — TELEPHONE (OUTPATIENT)
Dept: ENDOCRINOLOGY | Facility: CLINIC | Age: 78
End: 2018-03-22

## 2018-03-22 NOTE — TELEPHONE ENCOUNTER
Patient states that she want to know if she should come in for appointment since she has already been given her test results

## 2018-03-26 ENCOUNTER — OFFICE VISIT (OUTPATIENT)
Dept: ENDOCRINOLOGY | Facility: CLINIC | Age: 78
End: 2018-03-26
Payer: MEDICARE

## 2018-03-26 VITALS
BODY MASS INDEX: 23.56 KG/M2 | RESPIRATION RATE: 18 BRPM | HEIGHT: 59 IN | HEART RATE: 76 BPM | DIASTOLIC BLOOD PRESSURE: 73 MMHG | SYSTOLIC BLOOD PRESSURE: 146 MMHG | WEIGHT: 116.88 LBS

## 2018-03-26 DIAGNOSIS — I10 ESSENTIAL HYPERTENSION: ICD-10-CM

## 2018-03-26 DIAGNOSIS — K21.9 GASTROESOPHAGEAL REFLUX DISEASE WITHOUT ESOPHAGITIS: ICD-10-CM

## 2018-03-26 DIAGNOSIS — E78.5 HYPERLIPIDEMIA, UNSPECIFIED HYPERLIPIDEMIA TYPE: ICD-10-CM

## 2018-03-26 DIAGNOSIS — E78.00 HYPERCHOLESTEROLEMIA: ICD-10-CM

## 2018-03-26 DIAGNOSIS — M81.0 OSTEOPOROSIS, UNSPECIFIED OSTEOPOROSIS TYPE, UNSPECIFIED PATHOLOGICAL FRACTURE PRESENCE: Primary | ICD-10-CM

## 2018-03-26 DIAGNOSIS — E04.1 THYROID CYST: ICD-10-CM

## 2018-03-26 PROCEDURE — 3078F DIAST BP <80 MM HG: CPT | Mod: S$GLB,,, | Performed by: INTERNAL MEDICINE

## 2018-03-26 PROCEDURE — 99999 PR PBB SHADOW E&M-EST. PATIENT-LVL IV: CPT | Mod: PBBFAC,,, | Performed by: INTERNAL MEDICINE

## 2018-03-26 PROCEDURE — 99214 OFFICE O/P EST MOD 30 MIN: CPT | Mod: S$GLB,,, | Performed by: INTERNAL MEDICINE

## 2018-03-26 PROCEDURE — 3077F SYST BP >= 140 MM HG: CPT | Mod: S$GLB,,, | Performed by: INTERNAL MEDICINE

## 2018-03-26 RX ORDER — DICYCLOMINE HYDROCHLORIDE 10 MG/1
CAPSULE ORAL 2 TIMES DAILY PRN
COMMUNITY
Start: 2018-03-23

## 2018-03-26 NOTE — PROGRESS NOTES
Subjective:       Patient ID: Sunita Carlson is a 77 y.o. female.    Chief Complaint: Patient in for follow up of osteoporosis, thyroid cysts.    HPI    Patient is a 76 yr old lady with osteoporosis (last DEXA from 12/17) seen for followup.     She in addition has GERD, essential HTN, hyperlipidemia with hypercholesterolemia and thyroid nodular disease     Most recent Thyroid US from 03/18 (+FHx of thyroid disease sister, niece).     She thinks her previous symptoms were secondary to a medications she was taking.     Denies any complaints of compressive symptoms.  Denies any hyper or hypothyroidism     Review of Systems   Constitutional: Negative for unexpected weight change.   Eyes: Negative for visual disturbance.   Respiratory: Negative for shortness of breath.    Cardiovascular: Negative for chest pain.   Gastrointestinal: Negative for abdominal pain.   Endocrine: Negative for polydipsia.   Genitourinary: Negative for urgency.   Musculoskeletal: Negative for arthralgias.   Skin: Negative for wound.   Neurological: Negative for headaches.   Hematological: Does not bruise/bleed easily.   Psychiatric/Behavioral: Negative for sleep disturbance.         Objective:       Vitals:    03/26/18 1152   BP: (!) 146/73   Pulse: 76   Resp: 18     Body mass index is 23.6 kg/m².    Physical Exam   Neck: No thyromegaly present.   Cardiovascular: Normal rate.    Pulmonary/Chest: Effort normal.   Abdominal: Soft.   Musculoskeletal: She exhibits no edema.   Vitals reviewed.      TSH   Date Value Ref Range Status   10/13/2017 1.687 0.400 - 4.000 uIU/mL Final     Free T4   Date Value Ref Range Status   10/13/2017 0.98 0.71 - 1.51 ng/dL Final       Thyroid US 3/19/18  FINDINGS:  The right thyroid measures 5.3 x 1.6 x 1.7 cm for a calculated volume of 7.8 cc.  The left thyroid measures 4 x 1 x 1.2 cm for a calculated volume of 2.6 cc and a total thyroid volume of 10.4 cc which is unchanged.    There are multiple cysts identified in  both sides of the thyroid.  On the right these measure 5 mm, 6 mm, 1.2 cm and 1 cm.  On the left the largest cyst is 4 mm.  Significant solid nodule is not identified.    IMPRESSION:  Small thyroid with multiple small cysts.  An enlarging solid nodule is not seen      DXA 12/28/17  FINDINGS:    DEXA scanning was performed over the left hip and lumbar spine.  Review of the images confirms satisfactory positioning and technique.    The L1 to L4 vertebral bone mineral density is equal to 0.905  g/cm squared with a T score of -1.3 and a Z score of 1.3.  This represents a 0.6 decrease in bone mineral density in the spine since the prior study, not significant.    The left femoral neck bone mineral density is equal to 0.588 g/cm squared with a T score of -2.4 and a Z score of -0.1.  This represents a 3.6% increase in bone density in the left femoral neck since the prior study.    The left hip total bone mineral density is 0.762g/cm squared with a T score of -1.5 and a Z score of 0.5.  This represents a 0.3% increase in bone density since the prior study, not significant.    Assessment:       1. Osteoporosis, unspecified osteoporosis type, unspecified pathological fracture presence    2. Hypercholesterolemia    3. Hyperlipidemia, unspecified hyperlipidemia type    4. Essential hypertension    5. Gastroesophageal reflux disease without esophagitis    6. Thyroid cyst        Plan:       Gastroesophageal reflux disease without esophagitis  PO bisphosphonates contraindicated    Osteoporosis  Risks include low weight,  race, menopause    Reviewed basics of quantity versus quality  Reassuring she is not fracturing      RDA of calcium (2650-8376 mg /day in divided doses) and vitamin D 2000iu a day  discussed  Calcium from food sources preferred  Fall precautions emphasized  +weight bearing exercise  [email or web address] website information given    Continue Reclast - tolerated first dose without issues.   Prolia tried in the past with  bad.    Discussed optimal duration of bisphosphonate use is unknown - drug holiday after 5-10 po versus drug hold ay after 3 reclast treatment     Hyperlipidemia  Tolerating statin    Thyroid cyst  Multiple cysts seen on US.  TSH wnl.    Discussed options for nodular/cystic thyroid:  Observe, FNA, surgery.    Cysts are likely benign, and currently do not meet criteria for FNA.  Will repeat US in one year.  If enlarging or change to characteristic, will consider FNA at that time.    Discussed surgery indications - compressive symptoms.  Does not have that currently.    Will continue to monitor thyroid.  If FNA is negative then plan follow up in 1 year with TSH and thyroid u/s prior    All questions were answered.    Essential hypertension  On ACEi    Jaqueline Umesh Chowdary MD  Endocrinology Fellow     This case has been reviewed with staff, Dr. Gutiérrez.

## 2018-03-27 PROBLEM — E04.1 THYROID CYST: Status: ACTIVE | Noted: 2018-03-27

## 2018-03-27 NOTE — ASSESSMENT & PLAN NOTE
Risks include low weight,  race, menopause    Reviewed basics of quantity versus quality  Reassuring she is not fracturing      RDA of calcium (2979-3727 mg /day in divided doses) and vitamin D 2000iu a day  discussed  Calcium from food sources preferred  Fall precautions emphasized  +weight bearing exercise  [email or web address] website information given    Continue Reclast - tolerated first dose without issues.   Prolia tried in the past with bad.    Discussed optimal duration of bisphosphonate use is unknown - drug holiday after 5-10 po versus drug hold ay after 3 reclast treatment

## 2018-03-27 NOTE — ASSESSMENT & PLAN NOTE
Multiple cysts seen on US.  TSH wnl.    Discussed options for nodular/cystic thyroid:  Observe, FNA, surgery.    Cysts are likely benign, and currently do not meet criteria for FNA.  Will repeat US in one year.  If enlarging or change to characteristic, will consider FNA at that time.    Discussed surgery indications - compressive symptoms.  Does not have that currently.    Will continue to monitor thyroid.  If FNA is negative then plan follow up in 1 year with TSH and thyroid u/s prior    All questions were answered.

## 2018-03-29 NOTE — TELEPHONE ENCOUNTER
The patient has been called and a message has been left for the patient to call the office back

## 2018-04-02 ENCOUNTER — LAB VISIT (OUTPATIENT)
Dept: LAB | Facility: HOSPITAL | Age: 78
End: 2018-04-02
Attending: INTERNAL MEDICINE
Payer: MEDICARE

## 2018-04-02 DIAGNOSIS — I25.810 CORONARY ATHEROSCLEROSIS OF AUTOLOGOUS VEIN BYPASS GRAFT: ICD-10-CM

## 2018-04-02 DIAGNOSIS — E78.2 MIXED HYPERLIPIDEMIA: ICD-10-CM

## 2018-04-02 DIAGNOSIS — I10 HYPERTENSION, ESSENTIAL: ICD-10-CM

## 2018-04-02 LAB
ALBUMIN SERPL BCP-MCNC: 3.8 G/DL
ALP SERPL-CCNC: 86 U/L
ALT SERPL W/O P-5'-P-CCNC: 12 U/L
ANION GAP SERPL CALC-SCNC: 8 MMOL/L
ANISOCYTOSIS BLD QL SMEAR: SLIGHT
AST SERPL-CCNC: 19 U/L
BASOPHILS # BLD AUTO: 0.06 K/UL
BASOPHILS NFR BLD: 2.1 %
BILIRUB SERPL-MCNC: 0.7 MG/DL
BUN SERPL-MCNC: 14 MG/DL
BURR CELLS BLD QL SMEAR: ABNORMAL
CALCIUM SERPL-MCNC: 9.6 MG/DL
CHLORIDE SERPL-SCNC: 103 MMOL/L
CHOLEST SERPL-MCNC: 188 MG/DL
CHOLEST/HDLC SERPL: 3.4 {RATIO}
CO2 SERPL-SCNC: 28 MMOL/L
CREAT SERPL-MCNC: 0.8 MG/DL
DIFFERENTIAL METHOD: ABNORMAL
EOSINOPHIL # BLD AUTO: 0.1 K/UL
EOSINOPHIL NFR BLD: 2.5 %
ERYTHROCYTE [DISTWIDTH] IN BLOOD BY AUTOMATED COUNT: 12.9 %
EST. GFR  (AFRICAN AMERICAN): >60 ML/MIN/1.73 M^2
EST. GFR  (NON AFRICAN AMERICAN): >60 ML/MIN/1.73 M^2
GLUCOSE SERPL-MCNC: 84 MG/DL
HCT VFR BLD AUTO: 42.6 %
HDLC SERPL-MCNC: 55 MG/DL
HDLC SERPL: 29.3 %
HGB BLD-MCNC: 13.5 G/DL
IMM GRANULOCYTES # BLD AUTO: 0.01 K/UL
IMM GRANULOCYTES NFR BLD AUTO: 0.4 %
LDLC SERPL CALC-MCNC: 108 MG/DL
LYMPHOCYTES # BLD AUTO: 1.7 K/UL
LYMPHOCYTES NFR BLD: 58.5 %
MCH RBC QN AUTO: 29.3 PG
MCHC RBC AUTO-ENTMCNC: 31.7 G/DL
MCV RBC AUTO: 93 FL
MONOCYTES # BLD AUTO: 0.4 K/UL
MONOCYTES NFR BLD: 14.9 %
NEUTROPHILS # BLD AUTO: 0.6 K/UL
NEUTROPHILS NFR BLD: 21.6 %
NONHDLC SERPL-MCNC: 133 MG/DL
NRBC BLD-RTO: 0 /100 WBC
OVALOCYTES BLD QL SMEAR: ABNORMAL
PLATELET # BLD AUTO: 238 K/UL
PLATELET BLD QL SMEAR: ABNORMAL
PMV BLD AUTO: 9.7 FL
POIKILOCYTOSIS BLD QL SMEAR: SLIGHT
POTASSIUM SERPL-SCNC: 4.5 MMOL/L
PROT SERPL-MCNC: 7.1 G/DL
RBC # BLD AUTO: 4.6 M/UL
SODIUM SERPL-SCNC: 139 MMOL/L
TRIGL SERPL-MCNC: 125 MG/DL
WBC # BLD AUTO: 2.82 K/UL

## 2018-04-02 PROCEDURE — 80053 COMPREHEN METABOLIC PANEL: CPT

## 2018-04-02 PROCEDURE — 80061 LIPID PANEL: CPT

## 2018-04-02 PROCEDURE — 36415 COLL VENOUS BLD VENIPUNCTURE: CPT | Mod: PO

## 2018-04-02 PROCEDURE — 85025 COMPLETE CBC W/AUTO DIFF WBC: CPT

## 2018-06-12 ENCOUNTER — TELEPHONE (OUTPATIENT)
Dept: PAIN MEDICINE | Facility: CLINIC | Age: 78
End: 2018-06-12

## 2018-06-12 NOTE — TELEPHONE ENCOUNTER
----- Message from Catrachita Olivares sent at 6/12/2018  9:45 AM CDT -----  Contact: self  Patient [phone] is a patient of Dr James/patient only wants to see Dr James but the first available appt that I have is the middle of July 2018/she is asking to speak with nurse

## 2018-06-12 NOTE — TELEPHONE ENCOUNTER
----- Message from Patsy Smith sent at 6/12/2018  3:01 PM CDT -----  Contact: [phone]  Patient is returning nurse's phone call, stated she don't need the appt.  Please call patient back at [phone].

## 2018-08-23 ENCOUNTER — LAB VISIT (OUTPATIENT)
Dept: LAB | Facility: HOSPITAL | Age: 78
End: 2018-08-23
Attending: INTERNAL MEDICINE
Payer: MEDICARE

## 2018-08-23 DIAGNOSIS — E78.2 MIXED HYPERLIPIDEMIA: Primary | ICD-10-CM

## 2018-08-23 DIAGNOSIS — I10 ESSENTIAL HYPERTENSION, MALIGNANT: ICD-10-CM

## 2018-08-23 LAB
ALBUMIN SERPL BCP-MCNC: 4 G/DL
ALP SERPL-CCNC: 97 U/L
ALT SERPL W/O P-5'-P-CCNC: 13 U/L
ANION GAP SERPL CALC-SCNC: 9 MMOL/L
AST SERPL-CCNC: 18 U/L
BASOPHILS # BLD AUTO: 0 K/UL
BASOPHILS NFR BLD: 0.8 %
BILIRUB SERPL-MCNC: 0.4 MG/DL
BILIRUB UR QL STRIP: NEGATIVE
BUN SERPL-MCNC: 16 MG/DL
CALCIUM SERPL-MCNC: 10.7 MG/DL
CHLORIDE SERPL-SCNC: 103 MMOL/L
CHOLEST SERPL-MCNC: 175 MG/DL
CHOLEST/HDLC SERPL: 2.8 {RATIO}
CLARITY UR: CLEAR
CO2 SERPL-SCNC: 27 MMOL/L
COLOR UR: YELLOW
CREAT SERPL-MCNC: 0.9 MG/DL
DIFFERENTIAL METHOD: ABNORMAL
EOSINOPHIL # BLD AUTO: 0.2 K/UL
EOSINOPHIL NFR BLD: 3.4 %
ERYTHROCYTE [DISTWIDTH] IN BLOOD BY AUTOMATED COUNT: 12.3 %
EST. GFR  (AFRICAN AMERICAN): >60 ML/MIN/1.73 M^2
EST. GFR  (NON AFRICAN AMERICAN): >60 ML/MIN/1.73 M^2
GLUCOSE SERPL-MCNC: 87 MG/DL
GLUCOSE UR QL STRIP: NEGATIVE
HCT VFR BLD AUTO: 40.4 %
HDLC SERPL-MCNC: 63 MG/DL
HDLC SERPL: 36 %
HGB BLD-MCNC: 14 G/DL
HGB UR QL STRIP: ABNORMAL
KETONES UR QL STRIP: NEGATIVE
LDLC SERPL CALC-MCNC: 84.4 MG/DL
LEUKOCYTE ESTERASE UR QL STRIP: NEGATIVE
LYMPHOCYTES # BLD AUTO: 2 K/UL
LYMPHOCYTES NFR BLD: 43.5 %
MCH RBC QN AUTO: 30.5 PG
MCHC RBC AUTO-ENTMCNC: 34.5 G/DL
MCV RBC AUTO: 88 FL
MONOCYTES # BLD AUTO: 0.6 K/UL
MONOCYTES NFR BLD: 13.1 %
NEUTROPHILS # BLD AUTO: 1.8 K/UL
NEUTROPHILS NFR BLD: 39.2 %
NITRITE UR QL STRIP: NEGATIVE
NONHDLC SERPL-MCNC: 112 MG/DL
PH UR STRIP: 6 [PH] (ref 5–8)
PLATELET # BLD AUTO: 340 K/UL
PMV BLD AUTO: 7.4 FL
POTASSIUM SERPL-SCNC: 4.1 MMOL/L
PROT SERPL-MCNC: 7.2 G/DL
PROT UR QL STRIP: NEGATIVE
RBC # BLD AUTO: 4.58 M/UL
SODIUM SERPL-SCNC: 139 MMOL/L
SP GR UR STRIP: <=1.005 (ref 1–1.03)
TRIGL SERPL-MCNC: 138 MG/DL
URN SPEC COLLECT METH UR: ABNORMAL
UROBILINOGEN UR STRIP-ACNC: NEGATIVE EU/DL
WBC # BLD AUTO: 4.6 K/UL

## 2018-08-23 PROCEDURE — 36415 COLL VENOUS BLD VENIPUNCTURE: CPT

## 2018-08-23 PROCEDURE — 87086 URINE CULTURE/COLONY COUNT: CPT

## 2018-08-23 PROCEDURE — 80061 LIPID PANEL: CPT

## 2018-08-23 PROCEDURE — 81003 URINALYSIS AUTO W/O SCOPE: CPT

## 2018-08-23 PROCEDURE — 80053 COMPREHEN METABOLIC PANEL: CPT

## 2018-08-23 PROCEDURE — 85025 COMPLETE CBC W/AUTO DIFF WBC: CPT

## 2018-08-25 LAB
BACTERIA UR CULT: NORMAL
BACTERIA UR CULT: NORMAL

## 2018-09-14 ENCOUNTER — OFFICE VISIT (OUTPATIENT)
Dept: ENDOCRINOLOGY | Facility: CLINIC | Age: 78
End: 2018-09-14
Payer: MEDICARE

## 2018-09-14 ENCOUNTER — LAB VISIT (OUTPATIENT)
Dept: LAB | Facility: HOSPITAL | Age: 78
End: 2018-09-14
Attending: INTERNAL MEDICINE
Payer: MEDICARE

## 2018-09-14 VITALS
DIASTOLIC BLOOD PRESSURE: 72 MMHG | WEIGHT: 116.63 LBS | BODY MASS INDEX: 23.51 KG/M2 | HEIGHT: 59 IN | HEART RATE: 83 BPM | TEMPERATURE: 98 F | SYSTOLIC BLOOD PRESSURE: 125 MMHG | RESPIRATION RATE: 18 BRPM

## 2018-09-14 DIAGNOSIS — E04.1 THYROID CYST: ICD-10-CM

## 2018-09-14 DIAGNOSIS — Z95.1 HX OF CABG: ICD-10-CM

## 2018-09-14 DIAGNOSIS — E78.5 HYPERLIPIDEMIA, UNSPECIFIED HYPERLIPIDEMIA TYPE: ICD-10-CM

## 2018-09-14 DIAGNOSIS — K21.9 GASTROESOPHAGEAL REFLUX DISEASE WITHOUT ESOPHAGITIS: ICD-10-CM

## 2018-09-14 DIAGNOSIS — Z86.73 HISTORY OF STROKE: ICD-10-CM

## 2018-09-14 DIAGNOSIS — I10 ESSENTIAL HYPERTENSION: ICD-10-CM

## 2018-09-14 DIAGNOSIS — E78.00 HYPERCHOLESTEROLEMIA: ICD-10-CM

## 2018-09-14 DIAGNOSIS — Z78.0 POSTMENOPAUSAL: ICD-10-CM

## 2018-09-14 DIAGNOSIS — M81.0 OSTEOPOROSIS, UNSPECIFIED OSTEOPOROSIS TYPE, UNSPECIFIED PATHOLOGICAL FRACTURE PRESENCE: Primary | ICD-10-CM

## 2018-09-14 LAB
T3 SERPL-MCNC: 74 NG/DL
T4 FREE SERPL-MCNC: 0.9 NG/DL
TSH SERPL DL<=0.005 MIU/L-ACNC: 1.28 UIU/ML

## 2018-09-14 PROCEDURE — 1101F PT FALLS ASSESS-DOCD LE1/YR: CPT | Mod: ,,, | Performed by: INTERNAL MEDICINE

## 2018-09-14 PROCEDURE — 84443 ASSAY THYROID STIM HORMONE: CPT

## 2018-09-14 PROCEDURE — 84439 ASSAY OF FREE THYROXINE: CPT

## 2018-09-14 PROCEDURE — 84432 ASSAY OF THYROGLOBULIN: CPT

## 2018-09-14 PROCEDURE — 99214 OFFICE O/P EST MOD 30 MIN: CPT | Mod: PBBFAC,PO | Performed by: INTERNAL MEDICINE

## 2018-09-14 PROCEDURE — 84480 ASSAY TRIIODOTHYRONINE (T3): CPT

## 2018-09-14 PROCEDURE — 99999 PR PBB SHADOW E&M-EST. PATIENT-LVL IV: CPT | Mod: PBBFAC,,, | Performed by: INTERNAL MEDICINE

## 2018-09-14 PROCEDURE — 3074F SYST BP LT 130 MM HG: CPT | Mod: ,,, | Performed by: INTERNAL MEDICINE

## 2018-09-14 PROCEDURE — 99214 OFFICE O/P EST MOD 30 MIN: CPT | Mod: S$PBB,,, | Performed by: INTERNAL MEDICINE

## 2018-09-14 PROCEDURE — 3078F DIAST BP <80 MM HG: CPT | Mod: ,,, | Performed by: INTERNAL MEDICINE

## 2018-09-14 RX ORDER — BUPROPION HYDROCHLORIDE 300 MG/1
300 TABLET ORAL DAILY
COMMUNITY
End: 2020-09-10

## 2018-09-14 RX ORDER — ZOLEDRONIC ACID 5 MG/100ML
5 INJECTION, SOLUTION INTRAVENOUS ONCE
Qty: 100 ML | Refills: 0 | Status: SHIPPED | OUTPATIENT
Start: 2018-09-14 | End: 2018-09-14

## 2018-09-14 NOTE — PROGRESS NOTES
"Subjective:      Patient ID: Sunita Carlson is a 77 y.o. female.    Chief Complaint:      77yr old postmenopausal lady with osteoporosis and thyroid cysts seen in ffup.    History of Present Illness    Patient is a 77 yr old postmenopausal lady with osteoporosis (last DEXA from 12/17) seen for followup. Her next scheduled DEXA should be for ~ 12/19. She is presently being managed for this with Reclast infusions and received her first dose earlier in November 2017.     She in addition has GERD, essential HTN, hyperlipidemia with hypercholesterolemia and thyroid nodular disease      Most recent Thyroid US from 03/18  showed multiple simple cysts with no nodules visualized and so no up thyroid sonogram is indicated.     She thinks her previous symptoms were secondary to a medications she was taking.      Denies any complaints of compressive symptoms.  Denies any hyper or hypothyroidism suggestive symptoms.    Patient has had no falls since last visit.         Review of Systems   Constitutional: Negative for unexpected weight change.   HENT: Negative for facial swelling.    Eyes: Negative for visual disturbance.   Respiratory: Negative for shortness of breath.    Cardiovascular: Negative for chest pain.   Gastrointestinal: Negative for abdominal pain.   Endocrine: Negative for polydipsia.   Genitourinary: Negative for urgency.   Musculoskeletal: Negative for arthralgias.   Skin: Negative for wound.   Neurological: Negative for headaches.   Hematological: Does not bruise/bleed easily.   Psychiatric/Behavioral: Negative for sleep disturbance.       Objective:   /72 (BP Location: Right arm, Patient Position: Sitting, BP Method: Medium (Automatic))   Pulse 83   Temp 98.2 °F (36.8 °C) (Oral)   Resp 18   Ht 4' 11" (1.499 m)   Wt 52.9 kg (116 lb 10 oz)   BMI 23.56 kg/m² neck circ; 12" waist circ; 32.5" hip circ; 38" waist to hip ratio; 0.86 Body surface area is 1.48 meters squared.         Physical Exam "   Constitutional: She appears well-developed.   Pleasant elderly lady. Asthenic build. In no acute distress. Well hydrated. Not pale, anicteric and afebrile.   HENT:   Head: Normocephalic.   Eyes: Conjunctivae and EOM are normal. Pupils are equal, round, and reactive to light. No scleral icterus.   Neck: No thyromegaly present.   Cardiovascular: Normal rate, regular rhythm and normal heart sounds.   No murmur heard.  Pulmonary/Chest: Effort normal and breath sounds normal. No respiratory distress.   Abdominal: Soft. There is no tenderness.   Musculoskeletal: She exhibits no edema or deformity.   Lymphadenopathy:     She has no cervical adenopathy.   Neurological: No cranial nerve deficit.   Skin: Skin is warm and dry. No erythema. No pallor.   Psychiatric: She has a normal mood and affect. Her behavior is normal. Thought content normal.   Vitals reviewed.      Lab Review:     Results for MILVIA AGUIAR (MRN [id number]) as of 9/14/2018 13:56   Ref. Range 4/2/2018 13:07 4/2/2018 13:12 8/23/2018 07:46 8/23/2018 08:00   WBC Latest Ref Range: 3.90 - 12.70 K/uL  2.82 (L) 4.60    RBC Latest Ref Range: 4.00 - 5.40 M/uL  4.60 4.58    Hemoglobin Latest Ref Range: 12.0 - 16.0 g/dL  13.5 14.0    Hematocrit Latest Ref Range: 37.0 - 48.5 %  42.6 40.4    MCV Latest Ref Range: 82 - 98 fL  93 88    MCH Latest Ref Range: 27.0 - 31.0 pg  29.3 30.5    MCHC Latest Ref Range: 32.0 - 36.0 g/dL  31.7 (L) 34.5    RDW Latest Ref Range: 11.5 - 14.5 %  12.9 12.3    Platelets Latest Ref Range: 150 - 350 K/uL  238 340    MPV Latest Ref Range: 9.2 - 12.9 fL  9.7 7.4 (L)    Gran% Latest Ref Range: 38.0 - 73.0 %  21.6 (L) 39.2    Gran # (ANC) Latest Ref Range: 1.8 - 7.7 K/uL  0.6 (L) 1.8    Immature Granulocytes Latest Ref Range: 0.0 - 0.5 %  0.4     Immature Grans (Abs) Latest Ref Range: 0.00 - 0.04 K/uL  0.01     Lymph% Latest Ref Range: 18.0 - 48.0 %  58.5 (H) 43.5    Lymph # Latest Ref Range: 1.0 - 4.8 K/uL  1.7 2.0    Mono% Latest Ref Range:  4.0 - 15.0 %  14.9 13.1    Mono # Latest Ref Range: 0.3 - 1.0 K/uL  0.4 0.6    Eosinophil% Latest Ref Range: 0.0 - 8.0 %  2.5 3.4    Eos # Latest Ref Range: 0.0 - 0.5 K/uL  0.1 0.2    Basophil% Latest Ref Range: 0.0 - 1.9 %  2.1 (H) 0.8    Baso # Latest Ref Range: 0.00 - 0.20 K/uL  0.06 0.00    nRBC Latest Ref Range: 0 /100 WBC  0     Ovalocytes Unknown  Occasional     Aniso Unknown  Slight     Poik Unknown  Slight     Platelet Estimate Unknown  Appears normal     [place] Cells Unknown  Occasional     Sodium Latest Ref Range: 136 - 145 mmol/L  139 139    Potassium Latest Ref Range: 3.5 - 5.1 mmol/L  4.5 4.1    Chloride Latest Ref Range: 95 - 110 mmol/L  103 103    CO2 Latest Ref Range: 23 - 29 mmol/L  28 27    Anion Gap Latest Ref Range: 8 - 16 mmol/L  8 9    BUN, Bld Latest Ref Range: 8 - 23 mg/dL  14 16    Creatinine Latest Ref Range: 0.5 - 1.4 mg/dL  0.8 0.9    eGFR if non African American Latest Ref Range: >60 mL/min/1.73 m^2  >60.0 >60    eGFR if  Latest Ref Range: >60 mL/min/1.73 m^2  >60.0 >60    Glucose Latest Ref Range: 70 - 110 mg/dL  84 87    Calcium Latest Ref Range: 8.7 - 10.5 mg/dL  9.6 10.7 (H)    Alkaline Phosphatase Latest Ref Range: 55 - 135 U/L  86 97    Total Protein Latest Ref Range: 6.0 - 8.4 g/dL  7.1 7.2    Albumin Latest Ref Range: 3.5 - 5.2 g/dL  3.8 4.0    Total Bilirubin Latest Ref Range: 0.1 - 1.0 mg/dL  0.7 0.4    AST Latest Ref Range: 10 - 40 U/L  19 18    ALT Latest Ref Range: 10 - 44 U/L  12 13    Triglycerides Latest Ref Range: 30 - 150 mg/dL  125 138    Cholesterol Latest Ref Range: 120 - 199 mg/dL  188 175    HDL Latest Ref Range: 40 - 75 mg/dL  55 63    LDL Cholesterol Latest Ref Range: 63.0 - 159.0 mg/dL  108.0 84.4    Total Cholesterol/HDL Ratio Latest Ref Range: 2.0 - 5.0   3.4 2.8    Specimen UA Unknown Urine, Clean Catch   Urine, Clean Catch   Color, UA Latest Ref Range: Yellow, Straw, Eliz  Yellow   Yellow   pH, UA Latest Ref Range: 5.0 - 8.0  6.0   6.0    Specific [place], UA Latest Ref Range: 1.005 - 1.030  1.010   <=1.005 (A)   Appearance, UA Latest Ref Range: Clear  Clear   Clear   Protein, UA Latest Ref Range: Negative  Negative   Negative   Glucose, UA Latest Ref Range: Negative  Negative   Negative   Ketones, UA Latest Ref Range: Negative  Negative   Negative   Occult Blood UA Latest Ref Range: Negative  1+ (A)   Trace (A)   Nitrite, UA Latest Ref Range: Negative  Negative   Negative   Urobilinogen, UA Latest Ref Range: <2.0 EU/dL Negative   Negative   Bilirubin (UA) Latest Ref Range: Negative  Negative   Negative   Leukocytes, UA Latest Ref Range: Negative  Negative   Negative   RBC, UA Latest Ref Range: 0 - 4 /hpf 1      WBC, UA Latest Ref Range: 0 - 5 /hpf 0      Squam Epithel, UA Latest Units: /hpf 0      Microscopic Comment Unknown SEE COMMENT      CULTURE, URINE Unknown    Rpt   Urine Culture, Routine Unknown    clinically necess...   Differential Method Unknown  Automated Automated    HDL/Chol Ratio Latest Ref Range: 20.0 - 50.0 %  29.3 36.0    Non-HDL Cholesterol Latest Units: mg/dL  133 112        Assessment:     1. Osteoporosis, unspecified osteoporosis type, unspecified pathological fracture presence  zoledronic acid-mannitol & water (RECLAST) 5 mg/100 mL PgBk   2. Postmenopausal     3. Essential hypertension     4. Hyperlipidemia, unspecified hyperlipidemia type     5. Hypercholesterolemia     6. Hx of CABG     7. History of stroke     8. Thyroid cyst     9. Gastroesophageal reflux disease without esophagitis  zoledronic acid-mannitol & water (RECLAST) 5 mg/100 mL PgBk        Gastroesophageal reflux disease without esophagitis  PO bisphosphonates contraindicated     Osteoporosis  Risks include low weight,  race, menopause     Reviewed basics of quantity versus quality  Reassuring she is not fracturing      RDA of calcium (6245-3417 mg /day in divided doses) and vitamin D 2000iu a day  discussed  Calcium from food sources preferred  Fall  precautions emphasized  +weight bearing exercise  [email or web address] website information given     Continue Reclast - tolerated first dose without issues.              Prolia tried in the past with bad.     Discussed optimal duration of bisphosphonate use is unknown - drug holiday after 5-10 po versus drug hold ay after 3 reclast treatment      Hyperlipidemia  Tolerating statin     Thyroid cyst  These were all simple cysts. No further sonographic surviellance of these is indicated at the moment.     All questions were answered.     Essential hypertension  On ACEi        Plan:       FFup in ~ 6mths

## 2018-09-17 LAB
THRYOGLOBULIN INTERPRETATION: ABNORMAL
THYROGLOB AB SERPL-ACNC: <1.8 IU/ML
THYROGLOB SERPL-MCNC: 21 NG/ML

## 2018-10-12 ENCOUNTER — TELEPHONE (OUTPATIENT)
Dept: ENDOCRINOLOGY | Facility: CLINIC | Age: 78
End: 2018-10-12

## 2018-10-12 NOTE — TELEPHONE ENCOUNTER
Advised patient to contact [facility] for Reclast, referral is approved

## 2018-10-12 NOTE — TELEPHONE ENCOUNTER
----- Message from Catrachita Olivares sent at 10/12/2018 11:41 AM CDT -----  Contact: self  Patient [phone] is calling to schedule her injection that the Nurse schedules for her/please call

## 2018-10-23 ENCOUNTER — TELEPHONE (OUTPATIENT)
Dept: ENDOCRINOLOGY | Facility: CLINIC | Age: 78
End: 2018-10-23

## 2018-10-23 DIAGNOSIS — M81.0 OSTEOPOROSIS, UNSPECIFIED OSTEOPOROSIS TYPE, UNSPECIFIED PATHOLOGICAL FRACTURE PRESENCE: Primary | ICD-10-CM

## 2018-10-23 RX ORDER — ZOLEDRONIC ACID 5 MG/100ML
5 INJECTION, SOLUTION INTRAVENOUS ONCE
Qty: 100 ML | Refills: 0 | OUTPATIENT
Start: 2018-10-23 | End: 2018-10-23

## 2018-10-23 NOTE — TELEPHONE ENCOUNTER
"----- Message from Linsey De La Cruz sent at 10/22/2018 12:57 PM CDT -----  Regarding: Needed for Reclast appointment  Johnny Moctezuma,     Please request Dr Gutiérrez to sign a Treatment Plan for Reclast.  She will also need a Serum Calcium and Creatinine checked within 30 days of getting Reclast.      Her Reclast appointment here at [facility] is on 11/5/18. Her last Reclast was on 11/2/17.     Thank you,     Linsey Marroquin" Dorothy  [facility]  [phone]  "

## 2018-10-31 ENCOUNTER — LAB VISIT (OUTPATIENT)
Dept: LAB | Facility: HOSPITAL | Age: 78
End: 2018-10-31
Attending: INTERNAL MEDICINE
Payer: MEDICARE

## 2018-10-31 DIAGNOSIS — M81.0 OSTEOPOROSIS, UNSPECIFIED OSTEOPOROSIS TYPE, UNSPECIFIED PATHOLOGICAL FRACTURE PRESENCE: ICD-10-CM

## 2018-10-31 LAB
ANION GAP SERPL CALC-SCNC: 11 MMOL/L
BUN SERPL-MCNC: 8 MG/DL
CALCIUM SERPL-MCNC: 9.7 MG/DL
CALCIUM SERPL-MCNC: 9.7 MG/DL
CHLORIDE SERPL-SCNC: 106 MMOL/L
CO2 SERPL-SCNC: 25 MMOL/L
CREAT SERPL-MCNC: 0.9 MG/DL
EST. GFR  (AFRICAN AMERICAN): >60 ML/MIN/1.73 M^2
EST. GFR  (NON AFRICAN AMERICAN): >60 ML/MIN/1.73 M^2
GLUCOSE SERPL-MCNC: 127 MG/DL
POTASSIUM SERPL-SCNC: 4 MMOL/L
SODIUM SERPL-SCNC: 142 MMOL/L

## 2018-10-31 PROCEDURE — 80048 BASIC METABOLIC PNL TOTAL CA: CPT

## 2018-10-31 PROCEDURE — 36415 COLL VENOUS BLD VENIPUNCTURE: CPT

## 2018-11-05 ENCOUNTER — TELEPHONE (OUTPATIENT)
Dept: ENDOCRINOLOGY | Facility: CLINIC | Age: 78
End: 2018-11-05

## 2018-11-05 RX ORDER — ZOLEDRONIC ACID 5 MG/100ML
5 INJECTION, SOLUTION INTRAVENOUS ONCE
Qty: 100 ML | Refills: 0 | Status: SHIPPED | OUTPATIENT
Start: 2018-11-05 | End: 2018-11-05

## 2018-11-05 NOTE — TELEPHONE ENCOUNTER
----- Message from Jorge L Arriaga sent at 11/5/2018  1:08 PM CST -----  Type: Needs Medical Advice    Who Called:  Patient  Best Call Back Number: [phone] (home)   Additional Information: Please call patient - in regards to a prescription that has to be ordered..

## 2018-11-05 NOTE — TELEPHONE ENCOUNTER
----- Message from Kelly Cross sent at 11/5/2018 10:59 AM CST -----  Contact: Cinthya davis/ [facility]  Type: Needs Medical Advice    Who Called:  Cinthya Saha [facility]  Symptoms (please be specific):  na  How long has patient had these symptoms:  na  Pharmacy name and phone #:  na  Best Call Back Number: Cinthya at   Additional Information: Calling to speak with the Nurse because the Infusion is today. She needs the order before the patient arrives. Please advise.

## 2018-11-05 NOTE — TELEPHONE ENCOUNTER
----- Message from Ariana Ruiz RN sent at 11/5/2018 10:02 AM CST -----  Good morning,    My name is Ariana Ruiz and I am a nurse with the [facility].  Mrs Carlson is on our schedule today for her Reclast infusion but I do not have any current orders for her for this medication.  Could you please have Dr Gutiérrez put in a new order for her so that we can administer her the reclast when she arrives this afternoon.  Should you have any questions or need any assistance, our infusion center can be reached at [phone].    Thank you  Ariana Ruiz RN

## 2018-11-16 ENCOUNTER — TELEPHONE (OUTPATIENT)
Dept: ENDOCRINOLOGY | Facility: CLINIC | Age: 78
End: 2018-11-16

## 2018-11-16 NOTE — TELEPHONE ENCOUNTER
----- Message from Emilee Hernandez sent at 11/16/2018 12:48 PM CST -----  Contact: Juan with [facility] infusion  Juan with [facility] infusion calling regarding a order clarification. Juan is faxing the request. Please call Juan if any questions at [phone]. Thanks!

## 2018-11-26 ENCOUNTER — TELEPHONE (OUTPATIENT)
Dept: ENDOCRINOLOGY | Facility: CLINIC | Age: 78
End: 2018-11-26

## 2018-11-26 NOTE — TELEPHONE ENCOUNTER
----- Message from Linsey De La Cruz sent at 11/26/2018  1:34 PM CST -----  Contact: order clarification  Johnny Moctezuma,     I didn't receive the fax with the signed clarification.  Please try and send it again at:  [phone].    Thank you so much,  Linsey Espinal (Toni)  [facility]  [phone]

## 2018-11-27 DIAGNOSIS — Z12.31 ENCOUNTER FOR SCREENING MAMMOGRAM FOR MALIGNANT NEOPLASM OF BREAST: Primary | ICD-10-CM

## 2018-12-31 ENCOUNTER — HOSPITAL ENCOUNTER (OUTPATIENT)
Dept: RADIOLOGY | Facility: HOSPITAL | Age: 78
Discharge: HOME | End: 2018-12-31
Attending: INTERNAL MEDICINE
Payer: MEDICARE

## 2018-12-31 DIAGNOSIS — Z12.31 ENCOUNTER FOR SCREENING MAMMOGRAM FOR MALIGNANT NEOPLASM OF BREAST: ICD-10-CM

## 2018-12-31 PROCEDURE — 77067 SCR MAMMO BI INCL CAD: CPT | Mod: 26,,, | Performed by: RADIOLOGY

## 2018-12-31 PROCEDURE — 77063 MAMMO DIGITAL SCREENING BILAT WITH TOMOSYNTHESIS_CAD: ICD-10-PCS | Mod: 26,,, | Performed by: RADIOLOGY

## 2018-12-31 PROCEDURE — 77067 SCR MAMMO BI INCL CAD: CPT | Mod: TC

## 2018-12-31 PROCEDURE — 77067 MAMMO DIGITAL SCREENING BILAT WITH TOMOSYNTHESIS_CAD: ICD-10-PCS | Mod: 26,,, | Performed by: RADIOLOGY

## 2018-12-31 PROCEDURE — 77063 BREAST TOMOSYNTHESIS BI: CPT | Mod: 26,,, | Performed by: RADIOLOGY

## 2019-01-02 DIAGNOSIS — R92.8 OTH ABN AND INCONCLUSIVE FINDINGS ON DX IMAGING OF BREAST: Primary | ICD-10-CM

## 2019-01-14 ENCOUNTER — HOSPITAL ENCOUNTER (OUTPATIENT)
Dept: RADIOLOGY | Facility: HOSPITAL | Age: 79
Discharge: HOME | End: 2019-01-14
Attending: INTERNAL MEDICINE
Payer: MEDICARE

## 2019-01-14 DIAGNOSIS — R92.8 OTH ABN AND INCONCLUSIVE FINDINGS ON DX IMAGING OF BREAST: ICD-10-CM

## 2019-01-14 PROCEDURE — 77065 DX MAMMO INCL CAD UNI: CPT | Mod: 26,,, | Performed by: RADIOLOGY

## 2019-01-14 PROCEDURE — 77061 BREAST TOMOSYNTHESIS UNI: CPT | Mod: TC

## 2019-01-14 PROCEDURE — 77065 MAMMO DIGITAL DIAGNOSTIC RIGHT WITH TOMOSYNTHESIS_CAD: ICD-10-PCS | Mod: 26,,, | Performed by: RADIOLOGY

## 2019-01-14 PROCEDURE — 76642 US BREAST RIGHT LIMITED: ICD-10-PCS | Mod: 26,RT,, | Performed by: RADIOLOGY

## 2019-01-14 PROCEDURE — 77061 MAMMO DIGITAL DIAGNOSTIC RIGHT WITH TOMOSYNTHESIS_CAD: ICD-10-PCS | Mod: 26,,, | Performed by: RADIOLOGY

## 2019-01-14 PROCEDURE — 77061 BREAST TOMOSYNTHESIS UNI: CPT | Mod: 26,,, | Performed by: RADIOLOGY

## 2019-01-14 PROCEDURE — 76642 ULTRASOUND BREAST LIMITED: CPT | Mod: TC,RT

## 2019-01-14 PROCEDURE — 76642 ULTRASOUND BREAST LIMITED: CPT | Mod: 26,RT,, | Performed by: RADIOLOGY

## 2019-01-14 PROCEDURE — 77065 DX MAMMO INCL CAD UNI: CPT | Mod: TC

## 2019-01-23 ENCOUNTER — OFFICE VISIT (OUTPATIENT)
Dept: SURGERY | Facility: CLINIC | Age: 79
End: 2019-01-23
Payer: MEDICARE

## 2019-01-23 VITALS
BODY MASS INDEX: 22.58 KG/M2 | SYSTOLIC BLOOD PRESSURE: 131 MMHG | WEIGHT: 111.75 LBS | HEART RATE: 66 BPM | DIASTOLIC BLOOD PRESSURE: 66 MMHG

## 2019-01-23 DIAGNOSIS — N63.10 BREAST MASS, RIGHT: Primary | ICD-10-CM

## 2019-01-23 PROCEDURE — 99999 PR PBB SHADOW E&M-EST. PATIENT-LVL III: ICD-10-PCS | Mod: PBBFAC,,, | Performed by: SURGERY

## 2019-01-23 PROCEDURE — 99203 OFFICE O/P NEW LOW 30 MIN: CPT | Mod: S$GLB,,, | Performed by: SURGERY

## 2019-01-23 PROCEDURE — 3078F PR MOST RECENT DIASTOLIC BLOOD PRESSURE < 80 MM HG: ICD-10-PCS | Mod: CPTII,S$GLB,, | Performed by: SURGERY

## 2019-01-23 PROCEDURE — 99203 PR OFFICE/OUTPT VISIT, NEW, LEVL III, 30-44 MIN: ICD-10-PCS | Mod: S$GLB,,, | Performed by: SURGERY

## 2019-01-23 PROCEDURE — 1101F PT FALLS ASSESS-DOCD LE1/YR: CPT | Mod: CPTII,S$GLB,, | Performed by: SURGERY

## 2019-01-23 PROCEDURE — 99999 PR PBB SHADOW E&M-EST. PATIENT-LVL III: CPT | Mod: PBBFAC,,, | Performed by: SURGERY

## 2019-01-23 PROCEDURE — 1101F PR PT FALLS ASSESS DOC 0-1 FALLS W/OUT INJ PAST YR: ICD-10-PCS | Mod: CPTII,S$GLB,, | Performed by: SURGERY

## 2019-01-23 PROCEDURE — 3078F DIAST BP <80 MM HG: CPT | Mod: CPTII,S$GLB,, | Performed by: SURGERY

## 2019-01-23 PROCEDURE — 3075F SYST BP GE 130 - 139MM HG: CPT | Mod: CPTII,S$GLB,, | Performed by: SURGERY

## 2019-01-23 PROCEDURE — 3075F PR MOST RECENT SYSTOLIC BLOOD PRESS GE 130-139MM HG: ICD-10-PCS | Mod: CPTII,S$GLB,, | Performed by: SURGERY

## 2019-01-23 NOTE — LETTER
January 27, 2019      Behzad Birch MD  [address]  [facility] [address]           [facility]  [address]  [place] [address]  Phone: [phone]          Patient: Sunita Carlson   MR Number: [id number]   YOB: 1940   Date of Visit: 1/23/2019       Dear Dr. Behzad Birch:    Thank you for referring Sunita Carlson to me for evaluation. Attached you will find relevant portions of my assessment and plan of care.    If you have questions, please do not hesitate to call me. I look forward to following Sunita Carlson along with you.    Sincerely,    Satnam Paz MD    Enclosure  CC:  No Recipients    If you would like to receive this communication electronically, please contact [email or web address] or [phone] to request more information on [employer] Link access.    For providers and/or their staff who would like to refer a patient to [facility], please contact us through our one-stop-shop provider referral line, [facility], at [phone].    If you feel you have received this communication in error or would no longer like to receive these types of communications, please e-mail [email or web address]

## 2019-01-23 NOTE — PATIENT INSTRUCTIONS
.STEREOTACTIC BREAST BIOPSY  ULTRASOUND GUIDED BREAST BIOPSY  INSTRUCTIONS    YOUR BIOPSY WILL BE PERFORMED AT [facility](REGISTRATION/OUTPATIENT)  [phone]  PLEASE NOTIFY YOUR DOCTOR IF YOU NEED TO CANCEL YOUR PROCEDURE: [phone]  IF YOU ARE TAKING BLOOD THINNERS LIKE ASPIRIN, COUMADIN, PRADEXA, PLAVIX, EFFIENT, APIXABAN OR ANY OTHER MEDICATION THAT THINS YOUR BLOOD PLEASE NOTIFY THE PRESCRIBING DOCTOR FOR SPECIAL INSTRUCTIONS AS YOU WILL NEED TO STOP THOSE PRIOR TO THE BIOPSY  DATE___730 am 01/30/19_____________________________________________  730AM 745AM 800AM  ULTRASOUND BIOPSY  1230PM 100PM 130PM  STEREOTACTICS BIOPSY    PLEASE ARRIVE 15 MINUTES PRIOR TO YOUR APPOINTMENT TIME  THE BIOPSY WILL BE PERFORMED BY DR JAIRO STALEY  DO NOT WEAR ANY LOTION, POWDER OR DEODARANT FOR THE PROCEDURE  POST BIOPSY INSTRUCTIONS:  YOU  MAY APPLY ICE PACKS TO THE BIOPSY AREA IF NEEDED  WEAR A GOOD SUPPORT BRA AROUND THE CLOCK FOR THE FIRST 48 HOURS FOLLOWING THE BIOPSY.    FOLLOW UP:    WHEN THE PATHOLOGY RESULTS COME IN YOU WILL GET A PHONE CALL FROM OUR BREAST CARE COORDINATOR JAD SOFIA LPN.   SHE WILL GIVE YOU THE RESULTS AND SCHEDULE APPOINTMENTS ACCORDINGLY.  IT CAN TAKE UP 7-14 DAYS FOR THE PATHOLOGY REPORTS TO COME IN

## 2019-01-27 NOTE — PROGRESS NOTES
Subjective:       Patient ID: Sunita Carlson is a 78 y.o. female.    Chief Complaint: Consult (needs breast biopsy)      HPI 78-year-old female referred for evaluation of a new right breast mass.  Family history is significant for 3 maternal aunts with breast cancer.  Age of menarche was 10.  Patient had a hysterectomy at age 18.  She took hormones for many years but has been off for 18 years.  She denies any symptoms.  The mass was found on a screening test.    Past Medical History:   Diagnosis Date    Arthritis     Cataract     Coronary artery disease     Hypertension     Insomnia     Neuropathy     Retinal detachment     Stomach ulcer      Past Surgical History:   Procedure Laterality Date    APPENDECTOMY      BLADDER SUSPENSION      CARDIAC SURGERY      carotid endarterectomy      L    CATARACT EXTRACTION      CHOLECYSTECTOMY      HYSTERECTOMY      INJECTION-STEROID-EPIDURAL-TRANSFORAMINAL Left 3/6/2017    Performed by [facility] at [facility]    OOPHORECTOMY      SUPERFICIAL KERATECTOMY Right 10/27/2017    Dr. Morales    TONSILLECTOMY           Current Outpatient Medications:     aspirin (ECOTRIN) 81 MG EC tablet, Take by mouth., Disp: , Rfl:     ASPIRIN/SALICYLAMIDE/CAFFEINE (BC HEADACHE POWDER ORAL), Take by mouth daily as needed., Disp: , Rfl:     buPROPion (WELLBUTRIN XL) 300 MG 24 hr tablet, Take 300 mg by mouth once daily., Disp: , Rfl:     dexlansoprazole (DEXILANT) 30 mg CpDM, Take 60 mg by mouth., Disp: , Rfl:     dicyclomine (BENTYL) 10 MG capsule, , Disp: , Rfl:     diphenoxylate-atropine 2.5-0.025 mg (LOMOTIL) 2.5-0.025 mg per tablet, Take 1 tablet by mouth 4 (four) times daily as needed for Diarrhea., Disp: , Rfl:     eluxadoline (VIBERZI) 75 mg Tab, Take by mouth., Disp: , Rfl:     gabapentin (NEURONTIN) 300 MG capsule, 3 tablets HS, Disp: , Rfl: 1    lidocaine (LIDODERM) 5 %(700 mg/patch), Place 1 patch onto the skin every 24 hours. Remove & Discard patch within 12  hours or as directed by MD, Disp: , Rfl:     lisinopril 10 MG tablet, , Disp: , Rfl: 3    nitroGLYCERIN (NITROSTAT) 0.4 MG SL tablet, Place under the tongue., Disp: , Rfl:     omeprazole (PRILOSEC) 40 MG capsule, Take by mouth., Disp: , Rfl:     ondansetron (ZOFRAN) 4 MG tablet, Take 1 tablet (4 mg total) by mouth every 8 (eight) hours as needed for Nausea., Disp: 14 tablet, Rfl: 0    pravastatin (PRAVACHOL) 40 MG tablet, , Disp: , Rfl:     temazepam (RESTORIL) 30 mg capsule, Take by mouth., Disp: , Rfl:     Review of patient's allergies indicates:   Allergen Reactions    Demerol [meperidine] Nausea And Vomiting    Hydrocodone Itching    Percocet [oxycodone-acetaminophen] Itching    Tramadol        Family History   Problem Relation Age of Onset    Cancer Father     Macular degeneration Maternal Aunt     Cancer Maternal Aunt     Breast cancer Maternal Aunt     Macular degeneration Maternal Uncle     Diabetes Paternal Aunt     Cancer Paternal Aunt     Blindness Maternal Grandfather      Social History     Socioeconomic History    Marital status:      Spouse name: Not on file    Number of children: Not on file    Years of education: Not on file    Highest education level: Not on file   Social Needs    Financial resource strain: Not on file    Food insecurity - worry: Not on file    Food insecurity - inability: Not on file    Transportation needs - medical: Not on file    Transportation needs - non-medical: Not on file   Occupational History    Not on file   Tobacco Use    Smoking status: Former Smoker     Years: 4.00     Types: Cigarettes     Last attempt to quit: 10/13/1962     Years since quittin.3    Smokeless tobacco: Never Used   Substance and Sexual Activity    Alcohol use: No    Drug use: No    Sexual activity: Yes     Birth control/protection: Surgical   Other Topics Concern    Not on file   Social History Narrative    Not on file       Review of Systems    Constitutional: Negative for activity change, chills, fever and unexpected weight change.   HENT: Negative for congestion, sore throat, trouble swallowing and voice change.    Eyes: Negative for redness and visual disturbance.   Respiratory: Negative for cough, shortness of breath and wheezing.    Cardiovascular: Negative for chest pain and palpitations.   Gastrointestinal: Negative for abdominal pain, blood in stool, nausea and vomiting.   Endocrine: Negative.    Genitourinary: Negative for dysuria, frequency and hematuria.   Musculoskeletal: Negative for arthralgias, back pain and neck pain.   Skin: Negative for rash and wound.   Allergic/Immunologic: Negative.    Neurological: Negative for dizziness, weakness and headaches.   Hematological: Negative for adenopathy.   Psychiatric/Behavioral: Negative for agitation and dysphoric mood. The patient is not nervous/anxious.      Objective:     Physical Exam   Constitutional: She is oriented to person, place, and time. She appears well-developed and well-nourished. No distress.   HENT:   Head: Normocephalic and atraumatic.   Mouth/Throat: Oropharynx is clear and moist. No oropharyngeal exudate.   Eyes: Conjunctivae and EOM are normal. Pupils are equal, round, and reactive to light. No scleral icterus.   Neck: Normal range of motion. No thyromegaly present.   Cardiovascular: Normal rate and regular rhythm.   No murmur heard.  Pulmonary/Chest: Effort normal and breath sounds normal. She has no wheezes. She has no rales.   Right Breast Exam:  There are no palpable dominant masses.  There are no overlying skin changes.  There is no contour change of the breast.  There is no nipple discharge.  There is no significant breast tenderness.  There is no palpable axillary or supraclavicular adenopathy.    There is no abnormality detected on physical exam of the contralateral breast.   Abdominal: Soft. Bowel sounds are normal. She exhibits no distension and no mass. There is no  tenderness. No hernia.   Musculoskeletal: Normal range of motion. She exhibits no edema.   Lymphadenopathy:     She has no cervical adenopathy.   Neurological: She is alert and oriented to person, place, and time. No cranial nerve deficit.   Skin: Skin is warm and dry. No rash noted. No erythema.   Psychiatric: She has a normal mood and affect. Her behavior is normal.        Impression:  Right  Mass: Right breast 7 mm mass at the posterior 10 o'clock position. Assessment: 4A - Suspicious finding. Biopsy is recommended.      BI-RADS Category:   Right: 4A - Low Suspicion for Malignancy  Overall: 4A - Low Suspicion for Malignancy    Assessment:     Encounter Diagnosis   Name Primary?    Breast mass, right Yes       Plan:      1.  Plan ultrasound-guided biopsy of this right breast mass.  2. Risks and benefits of the planned procedure were discussed at length with the patient.  Risks and benefits of not proceeding with the procedure were discussed as well. All questions were answered. The patient expressed clear understanding and would like to proceed with the procedure as discussed.

## 2019-01-28 ENCOUNTER — TELEPHONE (OUTPATIENT)
Dept: SURGERY | Facility: CLINIC | Age: 79
End: 2019-01-28

## 2019-01-28 NOTE — TELEPHONE ENCOUNTER
Pt has called with concerns about weather for Wednesday morning as she is concerened that she will not make it due to the possibility of snow

## 2019-01-28 NOTE — TELEPHONE ENCOUNTER
----- Message from Alejandra Perez sent at 1/28/2019  9:56 AM CST -----  Contact: Self  Patient is requesting a return call back from Ale  to get another appt scheduled with the doctor.  Call back at [phone] (home).  Thanks

## 2019-01-30 ENCOUNTER — HOSPITAL ENCOUNTER (OUTPATIENT)
Dept: RADIOLOGY | Facility: HOSPITAL | Age: 79
Discharge: HOME | End: 2019-01-30
Attending: SURGERY
Payer: MEDICARE

## 2019-01-30 DIAGNOSIS — N63.10 BREAST MASS, RIGHT: ICD-10-CM

## 2019-01-30 PROCEDURE — 27201044 US BREAST BIOPSY WITH IMAGING 1ST SITE RIGHT

## 2019-01-30 PROCEDURE — 19083 US BREAST BIOPSY WITH IMAGING 1ST SITE RIGHT: ICD-10-PCS | Mod: RT,,, | Performed by: SURGERY

## 2019-01-30 PROCEDURE — 19083 BX BREAST 1ST LESION US IMAG: CPT | Mod: RT,,, | Performed by: SURGERY

## 2019-01-30 PROCEDURE — 88305 TISSUE SPECIMEN TO PATHOLOGY, RADIOLOGY: ICD-10-PCS | Mod: 26,,, | Performed by: PATHOLOGY

## 2019-01-30 PROCEDURE — A4648 IMPLANTABLE TISSUE MARKER: HCPCS

## 2019-01-30 PROCEDURE — 88305 TISSUE EXAM BY PATHOLOGIST: CPT | Performed by: PATHOLOGY

## 2019-01-30 PROCEDURE — 88341 IMHCHEM/IMCYTCHM EA ADD ANTB: CPT | Mod: 26,,, | Performed by: PATHOLOGY

## 2019-01-30 PROCEDURE — 88341 PR IHC OR ICC EACH ADD'L SINGLE ANTIBODY  STAINPR: ICD-10-PCS | Mod: 26,,, | Performed by: PATHOLOGY

## 2019-01-30 PROCEDURE — 88342 IMHCHEM/IMCYTCHM 1ST ANTB: CPT | Mod: 26,,, | Performed by: PATHOLOGY

## 2019-01-30 PROCEDURE — 88342 TISSUE SPECIMEN TO PATHOLOGY, RADIOLOGY: ICD-10-PCS | Mod: 26,,, | Performed by: PATHOLOGY

## 2019-02-01 ENCOUNTER — TELEPHONE (OUTPATIENT)
Dept: SURGERY | Facility: CLINIC | Age: 79
End: 2019-02-01

## 2019-02-01 NOTE — TELEPHONE ENCOUNTER
Pt reports that she is doing fine just a little sore and awaiting results,avised pt that result may take up to 2 weeks,pt verbalized understanding

## 2019-02-01 NOTE — TELEPHONE ENCOUNTER
----- Message from Meliton Baxter sent at 2/1/2019  1:46 PM CST -----  Contact: Patient  Type:  Patient Returning Call    Who Called:  Patient  Who Left Message for Patient:  Susan  Does the patient know what this is regarding?:  unknown  Best Call Back Number:  [phone]

## 2019-02-02 ENCOUNTER — NURSE TRIAGE (OUTPATIENT)
Dept: ADMINISTRATIVE | Facility: CLINIC | Age: 79
End: 2019-02-02

## 2019-02-03 ENCOUNTER — NURSE TRIAGE (OUTPATIENT)
Dept: ADMINISTRATIVE | Facility: CLINIC | Age: 79
End: 2019-02-03

## 2019-02-03 NOTE — TELEPHONE ENCOUNTER
Reason for Disposition   Breast lump    Protocols used: ST BREAST SYMPTOMS-A-AH    Patient called and states that she feels a pecan sized lump around that spot she had her biopsy of the right breast done on 1/30/2019.

## 2019-02-08 ENCOUNTER — TELEPHONE (OUTPATIENT)
Dept: RADIOLOGY | Facility: HOSPITAL | Age: 79
End: 2019-02-08

## 2019-02-08 ENCOUNTER — TELEPHONE (OUTPATIENT)
Dept: SURGERY | Facility: CLINIC | Age: 79
End: 2019-02-08

## 2019-02-08 NOTE — TELEPHONE ENCOUNTER
----- Message from Azucena Oakes sent at 2/8/2019 12:54 PM CST -----  Contact: carson  Type: Needs Medical Advice    Who Called:  Patient  Symptoms (please be specific):    How long has patient had these symptoms:    Pharmacy name and phone #:    Best Call Back Number: [phone]  Additional Information: called to advise that she only received one sheet of paper came through,patient is requesting copy of bisospy test results fax to [phone]

## 2019-02-08 NOTE — TELEPHONE ENCOUNTER
----- Message from Satnam Espinal sent at 2/8/2019 12:20 PM CST -----  Contact: Patient  Advised returning call regarding her biopsy results. She would like a copy of the results.  Please fax results to [phone]  Please call the same number to speak with ptnt.

## 2019-02-08 NOTE — TELEPHONE ENCOUNTER
Patient has been speaking to Pastora Storey, messaged forwarded to her.  She has sent the results to the patients PCP and will contact patient again.

## 2019-02-08 NOTE — TELEPHONE ENCOUNTER
..Patient notified of breast biopsy results.     FINAL PATHOLOGIC DIAGNOSIS  RIGHT BREAST MASS, 10:00 O'CLOCK, 5 CM FROM THE NIPPLE, NEEDLE CORE BIOPSIES:  - Fibrocystic changes with apocrine metaplasia, usual ductal hyperplasia, micropapillary ductal hyperplasia and  columnar cell change.  - No evidence of atypical ductal hyperplasia or malignancy.    Instructed to repeat diagnostic mammogram in one month

## 2019-03-01 ENCOUNTER — LAB VISIT (OUTPATIENT)
Dept: LAB | Facility: HOSPITAL | Age: 79
End: 2019-03-01
Attending: INTERNAL MEDICINE
Payer: MEDICARE

## 2019-03-01 ENCOUNTER — OFFICE VISIT (OUTPATIENT)
Dept: ENDOCRINOLOGY | Facility: CLINIC | Age: 79
End: 2019-03-01
Payer: MEDICARE

## 2019-03-01 VITALS
HEIGHT: 59 IN | SYSTOLIC BLOOD PRESSURE: 143 MMHG | WEIGHT: 112.31 LBS | HEART RATE: 72 BPM | DIASTOLIC BLOOD PRESSURE: 81 MMHG | RESPIRATION RATE: 16 BRPM | TEMPERATURE: 98 F | BODY MASS INDEX: 22.64 KG/M2

## 2019-03-01 DIAGNOSIS — E78.49 OTHER HYPERLIPIDEMIA: ICD-10-CM

## 2019-03-01 DIAGNOSIS — E55.9 HYPOVITAMINOSIS D: ICD-10-CM

## 2019-03-01 DIAGNOSIS — K21.9 GASTROESOPHAGEAL REFLUX DISEASE WITHOUT ESOPHAGITIS: ICD-10-CM

## 2019-03-01 DIAGNOSIS — E04.9 GOITER: ICD-10-CM

## 2019-03-01 DIAGNOSIS — I10 ESSENTIAL HYPERTENSION: ICD-10-CM

## 2019-03-01 DIAGNOSIS — Z95.1 HX OF CABG: ICD-10-CM

## 2019-03-01 DIAGNOSIS — E04.1 THYROID CYST: ICD-10-CM

## 2019-03-01 DIAGNOSIS — Z78.0 POSTMENOPAUSAL: ICD-10-CM

## 2019-03-01 DIAGNOSIS — E78.00 HYPERCHOLESTEROLEMIA: ICD-10-CM

## 2019-03-01 DIAGNOSIS — E06.9 THYROIDITIS: ICD-10-CM

## 2019-03-01 DIAGNOSIS — M81.8 OTHER OSTEOPOROSIS WITHOUT CURRENT PATHOLOGICAL FRACTURE: ICD-10-CM

## 2019-03-01 DIAGNOSIS — E04.1 THYROID CYST: Primary | ICD-10-CM

## 2019-03-01 DIAGNOSIS — Z86.73 HISTORY OF STROKE: ICD-10-CM

## 2019-03-01 LAB
25(OH)D3+25(OH)D2 SERPL-MCNC: 29 NG/ML
ALBUMIN SERPL BCP-MCNC: 4.2 G/DL
ALP SERPL-CCNC: 75 U/L
ALT SERPL W/O P-5'-P-CCNC: 13 U/L
ANION GAP SERPL CALC-SCNC: 10 MMOL/L
AST SERPL-CCNC: 20 U/L
BILIRUB SERPL-MCNC: 0.7 MG/DL
BUN SERPL-MCNC: 13 MG/DL
CA-I BLDV-SCNC: 1.28 MMOL/L
CALCIUM SERPL-MCNC: 10.5 MG/DL
CHLORIDE SERPL-SCNC: 102 MMOL/L
CHOLEST SERPL-MCNC: 209 MG/DL
CHOLEST/HDLC SERPL: 2.9 {RATIO}
CO2 SERPL-SCNC: 28 MMOL/L
CREAT SERPL-MCNC: 0.8 MG/DL
EST. GFR  (AFRICAN AMERICAN): >60 ML/MIN/1.73 M^2
EST. GFR  (NON AFRICAN AMERICAN): >60 ML/MIN/1.73 M^2
GLUCOSE SERPL-MCNC: 78 MG/DL
HDLC SERPL-MCNC: 73 MG/DL
HDLC SERPL: 34.9 %
LDLC SERPL CALC-MCNC: 110.6 MG/DL
NONHDLC SERPL-MCNC: 136 MG/DL
POTASSIUM SERPL-SCNC: 4.3 MMOL/L
PROT SERPL-MCNC: 7.4 G/DL
PTH-INTACT SERPL-MCNC: 82 PG/ML
SODIUM SERPL-SCNC: 140 MMOL/L
T3 SERPL-MCNC: 90 NG/DL
T4 FREE SERPL-MCNC: 1.09 NG/DL
TRIGL SERPL-MCNC: 127 MG/DL
TSH SERPL DL<=0.005 MIU/L-ACNC: 1.44 UIU/ML
URATE SERPL-MCNC: 6.5 MG/DL

## 2019-03-01 PROCEDURE — 84443 ASSAY THYROID STIM HORMONE: CPT

## 2019-03-01 PROCEDURE — 3079F DIAST BP 80-89 MM HG: CPT | Mod: CPTII,S$GLB,, | Performed by: INTERNAL MEDICINE

## 2019-03-01 PROCEDURE — 82330 ASSAY OF CALCIUM: CPT

## 2019-03-01 PROCEDURE — 80053 COMPREHEN METABOLIC PANEL: CPT | Mod: 91

## 2019-03-01 PROCEDURE — 86800 THYROGLOBULIN ANTIBODY: CPT

## 2019-03-01 PROCEDURE — 99999 PR PBB SHADOW E&M-EST. PATIENT-LVL IV: ICD-10-PCS | Mod: PBBFAC,,, | Performed by: INTERNAL MEDICINE

## 2019-03-01 PROCEDURE — 84480 ASSAY TRIIODOTHYRONINE (T3): CPT

## 2019-03-01 PROCEDURE — 84439 ASSAY OF FREE THYROXINE: CPT

## 2019-03-01 PROCEDURE — 80061 LIPID PANEL: CPT | Mod: 91

## 2019-03-01 PROCEDURE — 3077F PR MOST RECENT SYSTOLIC BLOOD PRESSURE >= 140 MM HG: ICD-10-PCS | Mod: CPTII,S$GLB,, | Performed by: INTERNAL MEDICINE

## 2019-03-01 PROCEDURE — 82306 VITAMIN D 25 HYDROXY: CPT

## 2019-03-01 PROCEDURE — 84550 ASSAY OF BLOOD/URIC ACID: CPT

## 2019-03-01 PROCEDURE — 1101F PT FALLS ASSESS-DOCD LE1/YR: CPT | Mod: CPTII,S$GLB,, | Performed by: INTERNAL MEDICINE

## 2019-03-01 PROCEDURE — 3079F PR MOST RECENT DIASTOLIC BLOOD PRESSURE 80-89 MM HG: ICD-10-PCS | Mod: CPTII,S$GLB,, | Performed by: INTERNAL MEDICINE

## 2019-03-01 PROCEDURE — 99214 OFFICE O/P EST MOD 30 MIN: CPT | Mod: S$GLB,,, | Performed by: INTERNAL MEDICINE

## 2019-03-01 PROCEDURE — 99214 PR OFFICE/OUTPT VISIT, EST, LEVL IV, 30-39 MIN: ICD-10-PCS | Mod: S$GLB,,, | Performed by: INTERNAL MEDICINE

## 2019-03-01 PROCEDURE — 99999 PR PBB SHADOW E&M-EST. PATIENT-LVL IV: CPT | Mod: PBBFAC,,, | Performed by: INTERNAL MEDICINE

## 2019-03-01 PROCEDURE — 83970 ASSAY OF PARATHORMONE: CPT

## 2019-03-01 PROCEDURE — 1101F PR PT FALLS ASSESS DOC 0-1 FALLS W/OUT INJ PAST YR: ICD-10-PCS | Mod: CPTII,S$GLB,, | Performed by: INTERNAL MEDICINE

## 2019-03-01 PROCEDURE — 3077F SYST BP >= 140 MM HG: CPT | Mod: CPTII,S$GLB,, | Performed by: INTERNAL MEDICINE

## 2019-03-01 NOTE — PROGRESS NOTES
"Subjective:      Patient ID: Sunita Carlson is a 78 y.o. female.    Chief Complaint:  Osteoporosis and Thyroid Nodule    78yr old postmenopausal lady with osteoporosis and thyroid cysts seen in ffup.        History of Present Illness    Patient is a 78 yr old postmenopausal lady with osteoporosis (last DEXA from 12/17) seen for followup. Her next scheduled DEXA should be for ~ 12/19. She is presently being managed for this with Reclast infusions and received her second dose in November 2018.     She in addition has GERD, essential HTN, hyperlipidemia with hypercholesterolemia and thyroid nodular disease      Most recent Thyroid US from 03/18  showed multiple simple cysts with no nodules visualized and so no ffup thyroid sonogram is indicated.     She thinks her previous symptoms were secondary to a medications she was taking.      Denies any complaints of compressive symptoms.  Denies any hyper or hypothyroidism suggestive symptoms.  She has no fresh complaints today though she does continues to morun the passing of her  from ~ 7 mths ago     Patient has had no falls since last visit.            Review of Systems   Constitutional: Negative for unexpected weight change.   HENT: Negative for facial swelling.    Eyes: Negative for visual disturbance.   Respiratory: Negative for shortness of breath.    Cardiovascular: Negative for chest pain.   Gastrointestinal: Negative for abdominal pain.   Endocrine: Negative for polydipsia.   Genitourinary: Negative for urgency.   Musculoskeletal: Negative for arthralgias.   Skin: Negative for wound.   Neurological: Negative for headaches.   Hematological: Does not bruise/bleed easily.   Psychiatric/Behavioral: Negative for confusion and sleep disturbance.       Objective: BP (!) 143/81 (BP Location: Left arm, Patient Position: Sitting, BP Method: Medium (Automatic))   Pulse 72   Temp 98.3 °F (36.8 °C) (Oral)   Resp 16   Ht 4' 11" (1.499 m)   Wt 51 kg (112 lb 5.2 oz)  "  BMI 22.69 kg/m²  Body surface area is 1.46 meters squared.         Physical Exam   Constitutional: She appears well-developed.   Pleasant elderly lady. Asthenic build. In no acute distress. Well hydrated. Not pale, anicteric and afebrile.   HENT:   Head: Normocephalic.   Eyes: Conjunctivae and EOM are normal. No scleral icterus.   Has right corneal opacification; chronic   Neck: No thyromegaly present.   Cardiovascular: Normal rate, regular rhythm and normal heart sounds.   No murmur heard.  Pulmonary/Chest: Effort normal and breath sounds normal. No respiratory distress.           Abdominal: Soft. There is no tenderness.   Musculoskeletal: She exhibits no edema or deformity.   Lymphadenopathy:     She has no cervical adenopathy.   Neurological: No cranial nerve deficit.   Skin: Skin is warm and dry. No erythema. No pallor.   Psychiatric: She has a normal mood and affect. Her behavior is normal. Thought content normal.   Vitals reviewed.      Lab Review:     Results for MILVIA AGUIAR (MRN [id number]) as of 3/1/2019 14:34   Ref. Range 9/14/2018 15:02 10/31/2018 13:20 10/31/2018 13:20   Sodium Latest Ref Range: 136 - 145 mmol/L   142   Potassium Latest Ref Range: 3.5 - 5.1 mmol/L   4.0   Chloride Latest Ref Range: 95 - 110 mmol/L   106   CO2 Latest Ref Range: 23 - 29 mmol/L   25   Anion Gap Latest Ref Range: 8 - 16 mmol/L   11   BUN, Bld Latest Ref Range: 8 - 23 mg/dL   8   Creatinine Latest Ref Range: 0.5 - 1.4 mg/dL   0.9   eGFR if non African American Latest Ref Range: >60 mL/min/1.73 m^2   >60   eGFR if  Latest Ref Range: >60 mL/min/1.73 m^2   >60   Glucose Latest Ref Range: 70 - 110 mg/dL   127 (H)   Calcium Latest Ref Range: 8.7 - 10.5 mg/dL  9.7 9.7   TSH Latest Ref Range: 0.400 - 4.000 uIU/mL 1.284     T3, Total Latest Ref Range: 60 - 180 ng/dL 74     Free T4 Latest Ref Range: 0.71 - 1.51 ng/dL 0.90     Thyroglobulin Interpretation Unknown SEE BELOW     Thyroglobulin Antibody Screen Latest  Ref Range: <4.0 IU/mL <1.8     Thyroglobulin, Tumor Marker Latest Units: ng/mL 21 (H)       Results for MILVIA AGUIAR (MRN [id number]) as of 3/1/2019 14:34   Ref. Range 8/23/2018 07:46   WBC Latest Ref Range: 3.90 - 12.70 K/uL 4.60   RBC Latest Ref Range: 4.00 - 5.40 M/uL 4.58   Hemoglobin Latest Ref Range: 12.0 - 16.0 g/dL 14.0   Hematocrit Latest Ref Range: 37.0 - 48.5 % 40.4   MCV Latest Ref Range: 82 - 98 fL 88   MCH Latest Ref Range: 27.0 - 31.0 pg 30.5   MCHC Latest Ref Range: 32.0 - 36.0 g/dL 34.5   RDW Latest Ref Range: 11.5 - 14.5 % 12.3   Platelets Latest Ref Range: 150 - 350 K/uL 340   MPV Latest Ref Range: 9.2 - 12.9 fL 7.4 (L)   Gran% Latest Ref Range: 38.0 - 73.0 % 39.2   Gran # (ANC) Latest Ref Range: 1.8 - 7.7 K/uL 1.8   Lymph% Latest Ref Range: 18.0 - 48.0 % 43.5   Lymph # Latest Ref Range: 1.0 - 4.8 K/uL 2.0   Mono% Latest Ref Range: 4.0 - 15.0 % 13.1   Mono # Latest Ref Range: 0.3 - 1.0 K/uL 0.6   Eosinophil% Latest Ref Range: 0.0 - 8.0 % 3.4   Eos # Latest Ref Range: 0.0 - 0.5 K/uL 0.2   Basophil% Latest Ref Range: 0.0 - 1.9 % 0.8   Baso # Latest Ref Range: 0.00 - 0.20 K/uL 0.00   Differential Method Unknown Automated     Assessment:     1. Thyroid cyst  T4, free    T3    Thyroglobulin    TSH   2. Goiter  T4, free    T3    Thyroglobulin    TSH   3. Thyroiditis  Thyroglobulin   4. History of stroke     5. Hx of CABG     6. Hypercholesterolemia  Comprehensive metabolic panel    Lipid panel    Uric acid   7. Other hyperlipidemia  Comprehensive metabolic panel    Lipid panel    Uric acid   8. Essential hypertension  Comprehensive metabolic panel    Microalbumin/creatinine urine ratio    Urinalysis   9. Postmenopausal     10. Gastroesophageal reflux disease without esophagitis     11. Other osteoporosis without current pathological fracture  Vitamin D   12. Hypovitaminosis D  Vitamin D    PTH, intact    Calcium, ionized        Gastroesophageal reflux disease without esophagitis  PO bisphosphonates  contraindicated     Osteoporosis  Risks include low weight,  race, menopause     Reviewed basics of quantity versus quality  Reassuring she is not fracturing      RDA of calcium (4412-3080 mg /day in divided doses) and vitamin D 2000iu a day  discussed  Calcium from food sources preferred  Fall precautions emphasized  +weight bearing exercise  [email or web address] website information given     Continue Reclast - tolerated first dose without issues.              Prolia tried in the past with bad.     Discussed optimal duration of bisphosphonate use is unknown - drug holiday after 5-10 po versus drug hold ay after 3 reclast treatment      Hyperlipidemia  Tolerating statin     Thyroid cyst  These were all simple cysts. No further sonographic surviellance of these is indicated at the moment.     All questions were answered.     Essential hypertension  On ACEi        Plan:     FFup in ~ 6mths.

## 2019-03-02 PROBLEM — E55.9 HYPOVITAMINOSIS D: Status: ACTIVE | Noted: 2019-03-02

## 2019-03-04 LAB
THRYOGLOBULIN INTERPRETATION: ABNORMAL
THYROGLOB AB SERPL-ACNC: <1.8 IU/ML
THYROGLOB SERPL-MCNC: 32 NG/ML

## 2019-03-07 ENCOUNTER — HOSPITAL ENCOUNTER (OUTPATIENT)
Dept: RADIOLOGY | Facility: HOSPITAL | Age: 79
Discharge: HOME | End: 2019-03-07
Attending: SURGERY
Payer: MEDICARE

## 2019-03-07 DIAGNOSIS — R92.8 ABNORMAL MAMMOGRAM OF RIGHT BREAST: ICD-10-CM

## 2019-03-07 PROCEDURE — 77065 MAMMO DIGITAL DIAGNOSTIC RIGHT WITH TOMOSYNTHESIS_CAD: ICD-10-PCS | Mod: 26,,, | Performed by: RADIOLOGY

## 2019-03-07 PROCEDURE — 77061 BREAST TOMOSYNTHESIS UNI: CPT | Mod: 26,,, | Performed by: RADIOLOGY

## 2019-03-07 PROCEDURE — 77061 MAMMO DIGITAL DIAGNOSTIC RIGHT WITH TOMOSYNTHESIS_CAD: ICD-10-PCS | Mod: 26,,, | Performed by: RADIOLOGY

## 2019-03-07 PROCEDURE — 77065 DX MAMMO INCL CAD UNI: CPT | Mod: TC

## 2019-03-07 PROCEDURE — 77061 BREAST TOMOSYNTHESIS UNI: CPT | Mod: TC

## 2019-03-07 PROCEDURE — 77065 DX MAMMO INCL CAD UNI: CPT | Mod: 26,,, | Performed by: RADIOLOGY

## 2019-06-24 ENCOUNTER — HOSPITAL ENCOUNTER (OUTPATIENT)
Dept: RADIOLOGY | Facility: HOSPITAL | Age: 79
Discharge: HOME | End: 2019-06-24
Attending: FAMILY MEDICINE
Payer: MEDICARE

## 2019-06-24 DIAGNOSIS — S00.12XD CONTUSION OF LEFT EYELID AND PERIOCULAR AREA, SUBSEQUENT ENCOUNTER: ICD-10-CM

## 2019-06-24 DIAGNOSIS — H53.8 OTHER VISUAL DISTURBANCES: Primary | ICD-10-CM

## 2019-06-24 DIAGNOSIS — H53.8 OTHER VISUAL DISTURBANCES: ICD-10-CM

## 2019-06-24 PROCEDURE — 70470 CT HEAD/BRAIN W/O & W/DYE: CPT | Mod: TC

## 2019-06-24 PROCEDURE — 25500020 PHARM REV CODE 255: Performed by: RADIOLOGY

## 2019-06-24 PROCEDURE — 70470 CT HEAD WITH AND WITHOUT: ICD-10-PCS | Mod: 26,,, | Performed by: RADIOLOGY

## 2019-06-24 PROCEDURE — 70470 CT HEAD/BRAIN W/O & W/DYE: CPT | Mod: 26,,, | Performed by: RADIOLOGY

## 2019-06-24 RX ADMIN — IOHEXOL 75 ML: 350 INJECTION, SOLUTION INTRAVENOUS at 03:06

## 2019-07-15 ENCOUNTER — OFFICE VISIT (OUTPATIENT)
Dept: PAIN MEDICINE | Facility: CLINIC | Age: 79
End: 2019-07-15
Payer: MEDICARE

## 2019-07-15 ENCOUNTER — TELEPHONE (OUTPATIENT)
Dept: PAIN MEDICINE | Facility: CLINIC | Age: 79
End: 2019-07-15

## 2019-07-15 ENCOUNTER — HOSPITAL ENCOUNTER (OUTPATIENT)
Dept: RADIOLOGY | Facility: HOSPITAL | Age: 79
Discharge: HOME | End: 2019-07-15
Attending: PHYSICIAN ASSISTANT
Payer: MEDICARE

## 2019-07-15 VITALS
WEIGHT: 112 LBS | HEIGHT: 59 IN | HEART RATE: 65 BPM | BODY MASS INDEX: 22.58 KG/M2 | DIASTOLIC BLOOD PRESSURE: 65 MMHG | SYSTOLIC BLOOD PRESSURE: 103 MMHG

## 2019-07-15 DIAGNOSIS — G89.29 CHRONIC PAIN OF LEFT KNEE: ICD-10-CM

## 2019-07-15 DIAGNOSIS — G89.29 CHRONIC PAIN OF LEFT KNEE: Primary | ICD-10-CM

## 2019-07-15 DIAGNOSIS — M54.16 LUMBAR RADICULOPATHY: ICD-10-CM

## 2019-07-15 DIAGNOSIS — M25.562 CHRONIC PAIN OF LEFT KNEE: ICD-10-CM

## 2019-07-15 DIAGNOSIS — M25.562 CHRONIC PAIN OF LEFT KNEE: Primary | ICD-10-CM

## 2019-07-15 PROCEDURE — 99999 PR PBB SHADOW E&M-EST. PATIENT-LVL IV: ICD-10-PCS | Mod: PBBFAC,,, | Performed by: PHYSICIAN ASSISTANT

## 2019-07-15 PROCEDURE — 73562 X-RAY EXAM OF KNEE 3: CPT | Mod: 59,TC,FY,RT

## 2019-07-15 PROCEDURE — 3078F PR MOST RECENT DIASTOLIC BLOOD PRESSURE < 80 MM HG: ICD-10-PCS | Mod: CPTII,S$GLB,, | Performed by: PHYSICIAN ASSISTANT

## 2019-07-15 PROCEDURE — 73562 XR KNEE ORTHO LEFT WITH FLEXION: ICD-10-PCS | Mod: 26,59,RT, | Performed by: RADIOLOGY

## 2019-07-15 PROCEDURE — 99213 OFFICE O/P EST LOW 20 MIN: CPT | Mod: S$GLB,,, | Performed by: PHYSICIAN ASSISTANT

## 2019-07-15 PROCEDURE — 3078F DIAST BP <80 MM HG: CPT | Mod: CPTII,S$GLB,, | Performed by: PHYSICIAN ASSISTANT

## 2019-07-15 PROCEDURE — 73564 X-RAY EXAM KNEE 4 OR MORE: CPT | Mod: 26,LT,, | Performed by: RADIOLOGY

## 2019-07-15 PROCEDURE — 99213 PR OFFICE/OUTPT VISIT, EST, LEVL III, 20-29 MIN: ICD-10-PCS | Mod: S$GLB,,, | Performed by: PHYSICIAN ASSISTANT

## 2019-07-15 PROCEDURE — 3074F SYST BP LT 130 MM HG: CPT | Mod: CPTII,S$GLB,, | Performed by: PHYSICIAN ASSISTANT

## 2019-07-15 PROCEDURE — 99999 PR PBB SHADOW E&M-EST. PATIENT-LVL IV: CPT | Mod: PBBFAC,,, | Performed by: PHYSICIAN ASSISTANT

## 2019-07-15 PROCEDURE — 73564 XR KNEE ORTHO LEFT WITH FLEXION: ICD-10-PCS | Mod: 26,LT,, | Performed by: RADIOLOGY

## 2019-07-15 PROCEDURE — 3074F PR MOST RECENT SYSTOLIC BLOOD PRESSURE < 130 MM HG: ICD-10-PCS | Mod: CPTII,S$GLB,, | Performed by: PHYSICIAN ASSISTANT

## 2019-07-15 PROCEDURE — 1101F PT FALLS ASSESS-DOCD LE1/YR: CPT | Mod: CPTII,S$GLB,, | Performed by: PHYSICIAN ASSISTANT

## 2019-07-15 PROCEDURE — 73562 X-RAY EXAM OF KNEE 3: CPT | Mod: 26,59,RT, | Performed by: RADIOLOGY

## 2019-07-15 PROCEDURE — 1101F PR PT FALLS ASSESS DOC 0-1 FALLS W/OUT INJ PAST YR: ICD-10-PCS | Mod: CPTII,S$GLB,, | Performed by: PHYSICIAN ASSISTANT

## 2019-07-15 NOTE — PROGRESS NOTES
Referring Physician: No ref. provider found    PCP: Behzad Birch MD      CC: Low back and left leg pain    Interval History:  Ms. Carlson is a 78 y.o. female with chronic low back and left leg pain who presents today for reevaluation. She was last evaluated in 2017. At that time she was s/p left L4-5 and L5-S1 TFESI that provided 50% improvement in pain. She was hoping for more relief. She is interested in additional treatment options. She was evaluated by a outside pain provider but it doesn't sound like any procedures were performed. Pain has not changed in quality or location. Today she also c/o pain in her left knee without laxity. She has had similar pain in the past that responded to steroid injections with . Pain today is rated 8/10. Denies LE weakness. Denies b/b incontinence.   HPI:   Patient is 68-year-old female referred to us for lower back and left leg pain.  Pain has been present for over 5 years.  No traumatic incident.  She has intermittent aching, throbbing, sharp pain in her lower back.  Pain radiates down her left leg into her left posterior thigh.  Pain worsens with standing, walking, bending and getting up.  Pain improves with rest.  She denies any weakness.  No bowel bladder changes.  MRI lumbar spine shows lumbar facet hypertrophy as well as possible L5 nerve impingement.  She underwent caudal ALANNA's with Dr. Barba with moderate benefit of her generalized lower back pain.  She continues to have left-sided radicular pain.  She rates her pain 4/10 today, 10/10 at worse.    ROS:  CONSTITUTIONAL: No fevers, chills, night sweats, wt. loss, appetite changes  SKIN: no rashes or itching  ENT: No headaches, head trauma, vision changes, or eye pain  LYMPH NODES: None noticed   CV: No chest pain, palpitations.   RESP: No shortness of breath, dyspnea on exertion, cough, wheezing, or hemoptysis  GI: No nausea, emesis, diarrhea, constipation, melena, hematochezia, pain.    : No dysuria,  hematuria, urgency, or frequency   HEME: No easy bruising, bleeding problems  PSYCHIATRIC: No depression, anxiety, psychosis, hallucinations.  NEURO: No seizures, memory loss, dizziness or difficulty sleeping  MSK: + History of present illness      Past Medical History:   Diagnosis Date    Arthritis     Cataract     Coronary artery disease     Hypertension     Insomnia     Neuropathy     Retinal detachment     Stomach ulcer      Past Surgical History:   Procedure Laterality Date    APPENDECTOMY      BLADDER SUSPENSION      BREAST BIOPSY      CARDIAC SURGERY      carotid endarterectomy      L    CATARACT EXTRACTION      CHOLECYSTECTOMY      HYSTERECTOMY      INJECTION-STEROID-EPIDURAL-TRANSFORAMINAL Left 3/6/2017    Performed by Christian James [facility] at [facility]    OOPHORECTOMY      SUPERFICIAL KERATECTOMY Right 10/27/2017    Dr. Morales    TONSILLECTOMY       Family History   Problem Relation Age of Onset    Cancer Father     Macular degeneration Maternal Aunt     Cancer Maternal Aunt     Breast cancer Maternal Aunt     Macular degeneration Maternal Uncle     Diabetes Paternal Aunt     Cancer Paternal Aunt     Blindness Maternal Grandfather      Social History     Socioeconomic History    Marital status:      Spouse name: Not on file    Number of children: Not on file    Years of education: Not on file    Highest education level: Not on file   Occupational History    Not on file   Social Needs    Financial resource strain: Not on file    Food insecurity:     Worry: Not on file     Inability: Not on file    Transportation needs:     Medical: Not on file     Non-medical: Not on file   Tobacco Use    Smoking status: Former Smoker     Years: 4.00     Types: Cigarettes     Last attempt to quit: 10/13/1962     Years since quittin.7    Smokeless tobacco: Never Used   Substance and Sexual Activity    Alcohol use: No    Drug use: No    Sexual activity: Yes     Birth  "control/protection: Surgical   Lifestyle    Physical activity:     Days per week: Not on file     Minutes per session: Not on file    Stress: Not on file   Relationships    Social connections:     Talks on phone: Not on file     Gets together: Not on file     Attends Restorationist service: Not on file     Active member of club or organization: Not on file     Attends meetings of clubs or organizations: Not on file     Relationship status: Not on file   Other Topics Concern    Not on file   Social History Narrative    Not on file         Medications/Allergies: See med card    Vitals:    07/15/19 1335   BP: 103/65   Pulse: 65   Weight: 50.8 kg (112 lb)   Height: 4' 11" (1.499 m)   PainSc:   8   PainLoc: Back         Physical exam:    GENERAL: A and O x3, the patient appears well groomed and is in no acute distress.  Skin: No rashes or obvious lesions  HEENT: normocephalic, atraumatic  CARDIOVASCULAR:  RRR  LUNGS: non labored breathing  ABDOMEN: soft, nontender   UPPER EXTREMITIES: Normal alignment, normal range of motion, no atrophy, no skin changes,  hair growth and nail growth normal and equal bilaterally. No swelling, no tenderness.    LOWER EXTREMITIES:  Normal alignment, normal range of motion, no atrophy, no skin changes,  hair growth and nail growth normal and equal bilaterally. No swelling, mild tenderness to left patellar    LUMBAR SPINE  Lumbar spine: ROM is full with flexion extension and oblique extension with no increased pain.    Tom's test causes no increased pain on either side.    Supine straight leg raise is positive on the left at 60°    Internal and external rotation of the hip causes no increased pain on either side.  Myofascial exam: No tenderness to palpation across lumbar paraspinous muscles.      MENTAL STATUS: normal orientation, speech, language, and fund of knowledge for social situation.  Emotional state appropriate.    CRANIAL NERVES:  II:  PERRL bilaterally,   III,IV,VI: EOMI.    V: "  Facial sensation equal bilaterally  VII:  Facial motor function normal.  VIII:  Hearing equal to finger rub bilaterally  IX/X: Gag normal, palate symmetric  XI:  Shoulder shrug equal, head turn equal  XII:  Tongue midline without fasciculations      MOTOR: Tone and bulk: normal bilateral upper and lower Strength: normal   Delt Bi Tri WE WF     R 5 5 5 5 5 5   L 5 5 5 5 5 5     IP ADD ABD Quad TA Gas HAM  R 5 5 5 5 5 5 5  L 5 5 5 5 5 5 5    SENSATION: Light touch and pinprick intact bilaterally  REFLEXES: normal, symmetric, nonbrisk.  Toes down, no clonus. No hoffmans.  GAIT: normal rise, base, steps, and arm swing.        Imaging:  MRI lumbar spine without contrast 10/2016 ([facility])  L3-4, broad-based disc bulge and facet hypertrophy results in mild central canal narrowing and foraminal narrowing.  L4-5, broad-based disc bulge with moderate facet hypertrophy,  Left greater than right.  There is mild spurring of the left facet joint which narrows the left lateral recess and compresses the left L5 nerve.  L5-S1, mild facet hypertrophy    Assessment:  Ms. Carlson is a 78 y.o. female back and left leg pain  1. Chronic pain of left knee    2. Lumbar radiculopathy        Plan:  1.  I have stressed the importance of physical activity and exercise to improve overall health. Home exercises given  2. Schedule repeat lumbar epidural steroid injection to the Left L4-5 and L5-S1 level.  I have explained the risks, benefits, and alternatives of the procedure in detail. The patient voices understanding and all questions have been answered. The patient agrees to proceed as planned. Written Consent obtained.   3. She did not request medication.    4.  Continue gabapentin as prescribed  5. Left knee xray today  6. Will schedule left knee intra articular knee injection with Dr. James

## 2019-07-15 NOTE — TELEPHONE ENCOUNTER
----- Message from Luis Chaney sent at 7/15/2019  9:44 AM CDT -----  Contact: patient   Patient want to know if she needs to pre register before she come in today. Please call back at [phone] (home)

## 2019-07-16 ENCOUNTER — TELEPHONE (OUTPATIENT)
Dept: PAIN MEDICINE | Facility: CLINIC | Age: 79
End: 2019-07-16

## 2019-07-22 ENCOUNTER — OFFICE VISIT (OUTPATIENT)
Dept: ENDOCRINOLOGY | Facility: CLINIC | Age: 79
End: 2019-07-22
Payer: MEDICARE

## 2019-07-22 ENCOUNTER — LAB VISIT (OUTPATIENT)
Dept: LAB | Facility: HOSPITAL | Age: 79
End: 2019-07-22
Attending: INTERNAL MEDICINE
Payer: MEDICARE

## 2019-07-22 VITALS
BODY MASS INDEX: 22.47 KG/M2 | HEIGHT: 59 IN | WEIGHT: 111.44 LBS | SYSTOLIC BLOOD PRESSURE: 120 MMHG | DIASTOLIC BLOOD PRESSURE: 80 MMHG | HEART RATE: 73 BPM | TEMPERATURE: 98 F

## 2019-07-22 DIAGNOSIS — E78.5 HYPERLIPIDEMIA, UNSPECIFIED HYPERLIPIDEMIA TYPE: ICD-10-CM

## 2019-07-22 DIAGNOSIS — M81.8 OTHER OSTEOPOROSIS WITHOUT CURRENT PATHOLOGICAL FRACTURE: Primary | ICD-10-CM

## 2019-07-22 DIAGNOSIS — R73.9 HYPERGLYCEMIA: ICD-10-CM

## 2019-07-22 DIAGNOSIS — E06.9 THYROIDITIS: ICD-10-CM

## 2019-07-22 DIAGNOSIS — I10 ESSENTIAL HYPERTENSION: ICD-10-CM

## 2019-07-22 DIAGNOSIS — R79.89 ABNORMAL THYROID BLOOD TEST: ICD-10-CM

## 2019-07-22 DIAGNOSIS — Z95.1 HX OF CABG: ICD-10-CM

## 2019-07-22 DIAGNOSIS — K21.9 GASTROESOPHAGEAL REFLUX DISEASE WITHOUT ESOPHAGITIS: ICD-10-CM

## 2019-07-22 DIAGNOSIS — Z78.0 POSTMENOPAUSAL: ICD-10-CM

## 2019-07-22 DIAGNOSIS — E04.9 GOITER: ICD-10-CM

## 2019-07-22 DIAGNOSIS — E55.9 HYPOVITAMINOSIS D: ICD-10-CM

## 2019-07-22 DIAGNOSIS — E78.00 HYPERCHOLESTEROLEMIA: ICD-10-CM

## 2019-07-22 DIAGNOSIS — E04.1 THYROID CYST: ICD-10-CM

## 2019-07-22 LAB
25(OH)D3+25(OH)D2 SERPL-MCNC: 31 NG/ML (ref 30–96)
CA-I BLDV-SCNC: 1.32 MMOL/L (ref 1.06–1.42)
ESTIMATED AVG GLUCOSE: 111 MG/DL (ref 68–131)
HBA1C MFR BLD HPLC: 5.5 % (ref 4–5.6)
PTH-INTACT SERPL-MCNC: 52 PG/ML (ref 9–77)
T3 SERPL-MCNC: 84 NG/DL (ref 60–180)
T4 FREE SERPL-MCNC: 1.03 NG/DL (ref 0.71–1.51)
TSH SERPL DL<=0.005 MIU/L-ACNC: 1.22 UIU/ML (ref 0.4–4)
URATE SERPL-MCNC: 6.5 MG/DL (ref 2.4–5.7)

## 2019-07-22 PROCEDURE — 36415 COLL VENOUS BLD VENIPUNCTURE: CPT | Mod: PO

## 2019-07-22 PROCEDURE — 99214 OFFICE O/P EST MOD 30 MIN: CPT | Mod: S$GLB,,, | Performed by: INTERNAL MEDICINE

## 2019-07-22 PROCEDURE — 83036 HEMOGLOBIN GLYCOSYLATED A1C: CPT

## 2019-07-22 PROCEDURE — 83018 HEAVY METAL QUAN EACH NES: CPT

## 2019-07-22 PROCEDURE — 86800 THYROGLOBULIN ANTIBODY: CPT

## 2019-07-22 PROCEDURE — 3074F PR MOST RECENT SYSTOLIC BLOOD PRESSURE < 130 MM HG: ICD-10-PCS | Mod: CPTII,S$GLB,, | Performed by: INTERNAL MEDICINE

## 2019-07-22 PROCEDURE — 84550 ASSAY OF BLOOD/URIC ACID: CPT

## 2019-07-22 PROCEDURE — 82306 VITAMIN D 25 HYDROXY: CPT

## 2019-07-22 PROCEDURE — 99214 PR OFFICE/OUTPT VISIT, EST, LEVL IV, 30-39 MIN: ICD-10-PCS | Mod: S$GLB,,, | Performed by: INTERNAL MEDICINE

## 2019-07-22 PROCEDURE — 99999 PR PBB SHADOW E&M-EST. PATIENT-LVL III: ICD-10-PCS | Mod: PBBFAC,,, | Performed by: INTERNAL MEDICINE

## 2019-07-22 PROCEDURE — 84480 ASSAY TRIIODOTHYRONINE (T3): CPT

## 2019-07-22 PROCEDURE — 1101F PT FALLS ASSESS-DOCD LE1/YR: CPT | Mod: CPTII,S$GLB,, | Performed by: INTERNAL MEDICINE

## 2019-07-22 PROCEDURE — 3079F DIAST BP 80-89 MM HG: CPT | Mod: CPTII,S$GLB,, | Performed by: INTERNAL MEDICINE

## 2019-07-22 PROCEDURE — 99999 PR PBB SHADOW E&M-EST. PATIENT-LVL III: CPT | Mod: PBBFAC,,, | Performed by: INTERNAL MEDICINE

## 2019-07-22 PROCEDURE — 1101F PR PT FALLS ASSESS DOC 0-1 FALLS W/OUT INJ PAST YR: ICD-10-PCS | Mod: CPTII,S$GLB,, | Performed by: INTERNAL MEDICINE

## 2019-07-22 PROCEDURE — 3079F PR MOST RECENT DIASTOLIC BLOOD PRESSURE 80-89 MM HG: ICD-10-PCS | Mod: CPTII,S$GLB,, | Performed by: INTERNAL MEDICINE

## 2019-07-22 PROCEDURE — 82330 ASSAY OF CALCIUM: CPT

## 2019-07-22 PROCEDURE — 84439 ASSAY OF FREE THYROXINE: CPT

## 2019-07-22 PROCEDURE — 83970 ASSAY OF PARATHORMONE: CPT

## 2019-07-22 PROCEDURE — 84443 ASSAY THYROID STIM HORMONE: CPT

## 2019-07-22 PROCEDURE — 3074F SYST BP LT 130 MM HG: CPT | Mod: CPTII,S$GLB,, | Performed by: INTERNAL MEDICINE

## 2019-07-22 NOTE — PROGRESS NOTES
Subjective:      Patient ID: Sunita Carlson is a 78 y.o. female.    Chief Complaint:  Osteoporosis    78yr old postmenopausal lady with osteoporosis and thyroid cysts seen in ffup.    History of Present Illness    Patient is a 78 yr old postmenopausal lady with osteoporosis (last DEXA from 12/17) seen for followup. Her next scheduled DEXA should be for ~ 12/19. She is presently being managed for this with Reclast infusions and received her second dose in November 2018.     She in addition has GERD, essential HTN, hyperlipidemia with hypercholesterolemia and thyroid nodular disease      Most recent Thyroid US from 03/18  showed multiple simple cysts with no nodules visualized and so no ffup thyroid sonogram is indicated.     She thinks her previous symptoms were secondary to a medications she was taking.      Denies any complaints of compressive symptoms.  Denies any hyper or hypothyroidism suggestive symptoms.  She has no fresh complaints today though she does continues to mourn the passing of her  from ~ 7 mths ago     Patient has had no falls since last visit.   Patient is scheduled for knee steroid injection tomorrow and then  epidural steroid injection ~ 2 weeks.      Review of Systems   Constitutional: Negative for unexpected weight change.   HENT: Negative for facial swelling.    Eyes: Negative for visual disturbance.   Respiratory: Negative for shortness of breath.    Cardiovascular: Negative for chest pain.   Gastrointestinal: Negative for abdominal pain.   Endocrine: Negative for polydipsia.   Genitourinary: Negative for urgency.   Musculoskeletal: Negative for arthralgias.   Skin: Negative for wound.   Neurological: Negative for headaches.   Hematological: Does not bruise/bleed easily.   Psychiatric/Behavioral: Positive for dysphoric mood (Mild mood lability persists.). Negative for confusion and sleep disturbance.       Objective: /80 (BP Location: Left arm, Patient Position: Sitting, BP  "Method: Medium (Manual))   Pulse 73   Temp 98.1 °F (36.7 °C) (Oral)   Ht 4' 11" (1.499 m)   Wt 50.5 kg (111 lb 7.1 oz)   BMI 22.51 kg/m²  Body surface area is 1.45 meters squared.         Physical Exam   Constitutional: She appears well-developed.   Pleasant elderly lady. Asthenic build. In no acute distress. Well hydrated. Not pale, anicteric and afebrile.   HENT:   Head: Normocephalic.   Eyes: Pupils are equal, round, and reactive to light. Conjunctivae and EOM are normal. No scleral icterus.   Has right corneal opacification; chronic   Neck: Normal range of motion. Neck supple. No thyromegaly present.   Cardiovascular: Normal rate, regular rhythm and normal heart sounds.   No murmur heard.  Pulmonary/Chest: Effort normal and breath sounds normal. No respiratory distress.           Abdominal: Soft. There is no tenderness.   Musculoskeletal: She exhibits no edema or deformity.   Lymphadenopathy:     She has no cervical adenopathy.   Neurological: No cranial nerve deficit.   Skin: Skin is warm and dry. No erythema. No pallor.   Psychiatric: She has a normal mood and affect. Her behavior is normal. Thought content normal.   Vitals reviewed.      Lab Review:       Results for MILVIA AGUIAR (MRN [id number]) as of 7/22/2019 11:40   Ref. Range 3/1/2019 15:27 3/7/2019 14:29 6/24/2019 15:06 6/24/2019 16:05   WBC Latest Ref Range: 3.90 - 12.70 K/uL   5.40    RBC Latest Ref Range: 4.00 - 5.40 M/uL   4.57    Hemoglobin Latest Ref Range: 12.0 - 16.0 g/dL   13.7    Hematocrit Latest Ref Range: 37.0 - 48.5 %   41.7    MCV Latest Ref Range: 82 - 98 fL   91    MCH Latest Ref Range: 27.0 - 31.0 pg   30.1    MCHC Latest Ref Range: 32.0 - 36.0 g/dL   33.0    RDW Latest Ref Range: 11.5 - 14.5 %   12.2    Platelets Latest Ref Range: 150 - 350 K/uL   342    MPV Latest Ref Range: 9.2 - 12.9 fL   7.2 (L)    Gran% Latest Ref Range: 38.0 - 73.0 %   51.3    Gran # (ANC) Latest Ref Range: 1.8 - 7.7 K/uL   2.8    Lymph% Latest Ref Range: " 18.0 - 48.0 %   37.5    Lymph # Latest Ref Range: 1.0 - 4.8 K/uL   2.0    Mono% Latest Ref Range: 4.0 - 15.0 %   8.7    Mono # Latest Ref Range: 0.3 - 1.0 K/uL   0.5    Eosinophil% Latest Ref Range: 0.0 - 8.0 %   1.9    Eos # Latest Ref Range: 0.0 - 0.5 K/uL   0.1    Basophil% Latest Ref Range: 0.0 - 1.9 %   0.6    Baso # Latest Ref Range: 0.00 - 0.20 K/uL   0.00    Differential Method Unknown   Automated    Sodium Latest Ref Range: 136 - 145 mmol/L 140  143    Potassium Latest Ref Range: 3.5 - 5.1 mmol/L 4.3  4.4    Chloride Latest Ref Range: 95 - 110 mmol/L 102  104    CO2 Latest Ref Range: 23 - 29 mmol/L 28  32 (H)    Anion Gap Latest Ref Range: 8 - 16 mmol/L 10  7 (L)    BUN, Bld Latest Ref Range: 8 - 23 mg/dL 13  11    Creatinine Latest Ref Range: 0.5 - 1.4 mg/dL 0.8  0.9    eGFR if non African American Latest Ref Range: >60 mL/min/1.73 m^2 >60.0  >60    eGFR if  Latest Ref Range: >60 mL/min/1.73 m^2 >60.0  >60    Glucose Latest Ref Range: 70 - 110 mg/dL 78  88    Calcium Latest Ref Range: 8.7 - 10.5 mg/dL 10.5  10.1    Alkaline Phosphatase Latest Ref Range: 55 - 135 U/L 75  86    PROTEIN TOTAL Latest Ref Range: 6.0 - 8.4 g/dL 7.4  7.0    Albumin Latest Ref Range: 3.5 - 5.2 g/dL 4.2  3.8    BILIRUBIN TOTAL Latest Ref Range: 0.1 - 1.0 mg/dL 0.7  0.3    AST Latest Ref Range: 10 - 40 U/L 20  17    ALT Latest Ref Range: 10 - 44 U/L 13  14    Triglycerides Latest Ref Range: 30 - 150 mg/dL 127      Cholesterol Latest Ref Range: 120 - 199 mg/dL 209 (H)      HDL Latest Ref Range: 40 - 75 mg/dL 73      Hdl/Cholesterol Ratio Latest Ref Range: 20.0 - 50.0 % 34.9      LDL Cholesterol External Latest Ref Range: 63.0 - 159.0 mg/dL 110.6      Non-HDL Cholesterol Latest Units: mg/dL 136      Total Cholesterol/HDL Ratio Latest Ref Range: 2.0 - 5.0  2.9          Assessment:     1. Other osteoporosis without current pathological fracture     2. Gastroesophageal reflux disease without esophagitis     3.  Hypovitaminosis D  Vitamin D    PTH, intact    Calcium, ionized   4. Thyroid cyst     5. Thyroiditis     6. Postmenopausal     7. Hyperlipidemia, unspecified hyperlipidemia type  T4, free    T3    Thyroglobulin    TSH    Uric acid   8. Hypercholesterolemia  T4, free    T3    Thyroglobulin    TSH    Uric acid   9. Hx of CABG     10. Essential hypertension  Uric acid   11. Goiter     12. Abnormal thyroid blood test  T4, free    T3    Thyroglobulin    TSH    Iodine, Serum        Gastroesophageal reflux disease without esophagitis  PO bisphosphonates relatively contraindicated     Osteoporosis  Risks include low weight,  race, menopause     Reviewed basics of quantity versus quality  Reassuring she is not fracturing      RDA of calcium (0382-7326 mg /day in divided doses) and vitamin D 2000iu a day  discussed  Calcium from food sources preferred  Fall precautions emphasized  +weight bearing exercise  [email or web address] website information given     Continue Reclast - tolerated first dose without issues.              Prolia tried in the past with bad results.  For third reclast infusion ~ 11/19.     Discussed optimal duration of bisphosphonate use is unknown - drug holiday after 5-10 po versus drug hold ay after 3 reclast treatment      Hyperlipidemia  Tolerating statin     Thyroid cyst  These were all simple cysts. No further sonographic surviellance of these is indicated at the moment.     All questions were answered.     Essential hypertension  On ACEi       Plan:       FFup in ~ 6mths

## 2019-07-23 ENCOUNTER — OFFICE VISIT (OUTPATIENT)
Dept: PAIN MEDICINE | Facility: CLINIC | Age: 79
End: 2019-07-23
Payer: MEDICARE

## 2019-07-23 VITALS
HEART RATE: 82 BPM | BODY MASS INDEX: 22.38 KG/M2 | SYSTOLIC BLOOD PRESSURE: 113 MMHG | WEIGHT: 111 LBS | HEIGHT: 59 IN | DIASTOLIC BLOOD PRESSURE: 67 MMHG

## 2019-07-23 DIAGNOSIS — M54.16 LUMBAR RADICULITIS: ICD-10-CM

## 2019-07-23 DIAGNOSIS — M25.562 CHRONIC PAIN OF LEFT KNEE: Primary | ICD-10-CM

## 2019-07-23 DIAGNOSIS — G89.29 CHRONIC PAIN OF LEFT KNEE: Primary | ICD-10-CM

## 2019-07-23 PROCEDURE — 20610 DRAIN/INJ JOINT/BURSA W/O US: CPT | Mod: LT,S$GLB,, | Performed by: ANESTHESIOLOGY

## 2019-07-23 PROCEDURE — 99999 PR PBB SHADOW E&M-EST. PATIENT-LVL III: ICD-10-PCS | Mod: PBBFAC,,, | Performed by: ANESTHESIOLOGY

## 2019-07-23 PROCEDURE — 20610 LARGE JOINT ASPIRATION/INJECTION: L KNEE: ICD-10-PCS | Mod: LT,S$GLB,, | Performed by: ANESTHESIOLOGY

## 2019-07-23 PROCEDURE — 3074F PR MOST RECENT SYSTOLIC BLOOD PRESSURE < 130 MM HG: ICD-10-PCS | Mod: CPTII,S$GLB,, | Performed by: ANESTHESIOLOGY

## 2019-07-23 PROCEDURE — 3078F PR MOST RECENT DIASTOLIC BLOOD PRESSURE < 80 MM HG: ICD-10-PCS | Mod: CPTII,S$GLB,, | Performed by: ANESTHESIOLOGY

## 2019-07-23 PROCEDURE — 1101F PT FALLS ASSESS-DOCD LE1/YR: CPT | Mod: CPTII,S$GLB,, | Performed by: ANESTHESIOLOGY

## 2019-07-23 PROCEDURE — 99999 PR PBB SHADOW E&M-EST. PATIENT-LVL III: CPT | Mod: PBBFAC,,, | Performed by: ANESTHESIOLOGY

## 2019-07-23 PROCEDURE — 99213 PR OFFICE/OUTPT VISIT, EST, LEVL III, 20-29 MIN: ICD-10-PCS | Mod: 25,S$GLB,, | Performed by: ANESTHESIOLOGY

## 2019-07-23 PROCEDURE — 99213 OFFICE O/P EST LOW 20 MIN: CPT | Mod: 25,S$GLB,, | Performed by: ANESTHESIOLOGY

## 2019-07-23 PROCEDURE — 3074F SYST BP LT 130 MM HG: CPT | Mod: CPTII,S$GLB,, | Performed by: ANESTHESIOLOGY

## 2019-07-23 PROCEDURE — 3078F DIAST BP <80 MM HG: CPT | Mod: CPTII,S$GLB,, | Performed by: ANESTHESIOLOGY

## 2019-07-23 PROCEDURE — 1101F PR PT FALLS ASSESS DOC 0-1 FALLS W/OUT INJ PAST YR: ICD-10-PCS | Mod: CPTII,S$GLB,, | Performed by: ANESTHESIOLOGY

## 2019-07-23 RX ORDER — METHYLPREDNISOLONE ACETATE 40 MG/ML
40 INJECTION, SUSPENSION INTRA-ARTICULAR; INTRALESIONAL; INTRAMUSCULAR; SOFT TISSUE
Status: DISCONTINUED | OUTPATIENT
Start: 2019-07-23 | End: 2019-07-23 | Stop reason: HOSPADM

## 2019-07-23 RX ADMIN — METHYLPREDNISOLONE ACETATE 40 MG: 40 INJECTION, SUSPENSION INTRA-ARTICULAR; INTRALESIONAL; INTRAMUSCULAR; SOFT TISSUE at 11:07

## 2019-07-23 NOTE — PROCEDURES
Large Joint Aspiration/Injection: L knee  Date/Time: 7/23/2019 11:24 AM  Performed by: Christian James MD  Authorized by: Christian James MD     Consent Done?:  Yes (Written)  Indications:  Pain  Procedure site marked: Yes    Timeout: Prior to procedure the correct patient, procedure, and site was verified      Location:  Knee  Site:  L knee  Prep: Patient was prepped and draped in usual sterile fashion    Needle size:  25 G  Approach:  Anterolateral  Medications:  40 mg methylPREDNISolone acetate 40 mg/mL  Patient tolerance:  Patient tolerated the procedure well with no immediate complications

## 2019-07-23 NOTE — H&P (VIEW-ONLY)
Referring Physician: No ref. provider found    PCP: Behzad Birch MD      CC: Low back and left leg pain    Interval History:  Ms. Carlson is a 78 y.o. female with chronic low back and left leg pain who presents today for reevaluation. She was last evaluated in 2017. At that time she was s/p left L4-5 and L5-S1 TFESI that provided 50% improvement in pain. She was hoping for more relief. She is interested in additional treatment options. She was evaluated by a outside pain provider but it doesn't sound like any procedures were performed. Pain has not changed in quality or location. Today she also c/o pain in her left knee without laxity. She has had similar pain in the past that responded to steroid injections with .  Plan for repeat left knee injection with us today.  Pain today is rated 8/10. Denies LE weakness. Denies b/b incontinence.   Prior HPI:   Patient is 68-year-old female referred to us for lower back and left leg pain.  Pain has been present for over 5 years.  No traumatic incident.  She has intermittent aching, throbbing, sharp pain in her lower back.  Pain radiates down her left leg into her left posterior thigh.  Pain worsens with standing, walking, bending and getting up.  Pain improves with rest.  She denies any weakness.  No bowel bladder changes.  MRI lumbar spine shows lumbar facet hypertrophy as well as possible L5 nerve impingement.  She underwent caudal ALANNA's with Dr. Barba with moderate benefit of her generalized lower back pain.  She continues to have left-sided radicular pain.  She rates her pain 4/10 today, 10/10 at worse.    ROS:  CONSTITUTIONAL: No fevers, chills, night sweats, wt. loss, appetite changes  SKIN: no rashes or itching  ENT: No headaches, head trauma, vision changes, or eye pain  LYMPH NODES: None noticed   CV: No chest pain, palpitations.   RESP: No shortness of breath, dyspnea on exertion, cough, wheezing, or hemoptysis  GI: No nausea, emesis, diarrhea,  constipation, melena, hematochezia, pain.    : No dysuria, hematuria, urgency, or frequency   HEME: No easy bruising, bleeding problems  PSYCHIATRIC: No depression, anxiety, psychosis, hallucinations.  NEURO: No seizures, memory loss, dizziness or difficulty sleeping  MSK: + History of present illness      Past Medical History:   Diagnosis Date    Arthritis     Cataract     Coronary artery disease     Hypertension     Insomnia     Neuropathy     Retinal detachment     Stomach ulcer      Past Surgical History:   Procedure Laterality Date    APPENDECTOMY      BLADDER SUSPENSION      BREAST BIOPSY      CARDIAC SURGERY      carotid endarterectomy      L    CATARACT EXTRACTION      CHOLECYSTECTOMY      HYSTERECTOMY      INJECTION-STEROID-EPIDURAL-TRANSFORAMINAL Left 3/6/2017    [facility] by [facility]    OOPHORECTOMY      SUPERFICIAL KERATECTOMY Right 10/27/2017    Dr. Morales    TONSILLECTOMY       Family History   Problem Relation Age of Onset    Cancer Father     Macular degeneration Maternal Aunt     Cancer Maternal Aunt     Breast cancer Maternal Aunt     Macular degeneration Maternal Uncle     Diabetes Paternal Aunt     Cancer Paternal Aunt     Blindness Maternal Grandfather      Social History     Socioeconomic History    Marital status:      Spouse name: Not on file    Number of children: Not on file    Years of education: Not on file    Highest education level: Not on file   Occupational History    Not on file   Social Needs    Financial resource strain: Not on file    Food insecurity:     Worry: Not on file     Inability: Not on file    Transportation needs:     Medical: Not on file     Non-medical: Not on file   Tobacco Use    Smoking status: Former Smoker     Years: 4.00     Types: Cigarettes     Last attempt to quit: 10/13/1962     Years since quittin.8    Smokeless tobacco: Never Used   Substance and Sexual Activity    Alcohol use: No    Drug  "use: No    Sexual activity: Yes     Birth control/protection: Surgical   Lifestyle    Physical activity:     Days per week: Not on file     Minutes per session: Not on file    Stress: Not on file   Relationships    Social connections:     Talks on phone: Not on file     Gets together: Not on file     Attends Jewish service: Not on file     Active member of club or organization: Not on file     Attends meetings of clubs or organizations: Not on file     Relationship status: Not on file   Other Topics Concern    Not on file   Social History Narrative    Not on file         Medications/Allergies: See med card    Vitals:    07/23/19 1105   BP: 113/67   Pulse: 82   Weight: 50.3 kg (111 lb)   Height: 4' 11" (1.499 m)   PainSc:   8   PainLoc: Knee         Physical exam:    GENERAL: A and O x3, the patient appears well groomed and is in no acute distress.  Skin: No rashes or obvious lesions  HEENT: normocephalic, atraumatic  CARDIOVASCULAR:  RRR  LUNGS: non labored breathing  ABDOMEN: soft, nontender   UPPER EXTREMITIES: Normal alignment, normal range of motion, no atrophy, no skin changes,  hair growth and nail growth normal and equal bilaterally. No swelling, no tenderness.    LOWER EXTREMITIES:  Normal alignment, normal range of motion, no atrophy, no skin changes,  hair growth and nail growth normal and equal bilaterally. No swelling, mild tenderness to left patellar    LUMBAR SPINE  Lumbar spine: ROM is full with flexion extension and oblique extension with no increased pain.    Tom's test causes no increased pain on either side.    Supine straight leg raise is positive on the left at 60°    Internal and external rotation of the hip causes no increased pain on either side.  Myofascial exam: No tenderness to palpation across lumbar paraspinous muscles.      MENTAL STATUS: normal orientation, speech, language, and fund of knowledge for social situation.  Emotional state appropriate.    CRANIAL NERVES:  II: "  PERRL bilaterally,   III,IV,VI: EOMI.    V:  Facial sensation equal bilaterally  VII:  Facial motor function normal.  VIII:  Hearing equal to finger rub bilaterally  IX/X: Gag normal, palate symmetric  XI:  Shoulder shrug equal, head turn equal  XII:  Tongue midline without fasciculations      MOTOR: Tone and bulk: normal bilateral upper and lower Strength: normal   Delt Bi Tri WE WF     R 5 5 5 5 5 5   L 5 5 5 5 5 5     IP ADD ABD Quad TA Gas HAM  R 5 5 5 5 5 5 5  L 5 5 5 5 5 5 5    SENSATION: Light touch and pinprick intact bilaterally  REFLEXES: normal, symmetric, nonbrisk.  Toes down, no clonus. No hoffmans.  GAIT: normal rise, base, steps, and arm swing.        Imaging:  MRI lumbar spine without contrast 10/2016 ([facility])  L3-4, broad-based disc bulge and facet hypertrophy results in mild central canal narrowing and foraminal narrowing.  L4-5, broad-based disc bulge with moderate facet hypertrophy,  Left greater than right.  There is mild spurring of the left facet joint which narrows the left lateral recess and compresses the left L5 nerve.  L5-S1, mild facet hypertrophy    Assessment:  Ms. Carlson is a 78 y.o. female back and left leg pain  1. Chronic pain of left knee    2. Lumbar radiculitis        Plan:  1.  I have stressed the importance of physical activity and exercise to improve overall health. Home exercises given  2. Schedule repeat lumbar epidural steroid injection to the Left L4-5 and L5-S1 level.  I have explained the risks, benefits, and alternatives of the procedure in detail. The patient voices understanding and all questions have been answered. The patient agrees to proceed as planned. Written Consent obtained.   3.  Perform left knee injection today.  4.  May consider viscosupplementation injection of above is not helpful.  5.  Follow-up after lumbar ALANNA

## 2019-07-23 NOTE — PROGRESS NOTES
Referring Physician: No ref. provider found    PCP: Behzad Birch MD      CC: Low back and left leg pain    Interval History:  Ms. Carlson is a 78 y.o. female with chronic low back and left leg pain who presents today for reevaluation. She was last evaluated in 2017. At that time she was s/p left L4-5 and L5-S1 TFESI that provided 50% improvement in pain. She was hoping for more relief. She is interested in additional treatment options. She was evaluated by a outside pain provider but it doesn't sound like any procedures were performed. Pain has not changed in quality or location. Today she also c/o pain in her left knee without laxity. She has had similar pain in the past that responded to steroid injections with .  Plan for repeat left knee injection with us today.  Pain today is rated 8/10. Denies LE weakness. Denies b/b incontinence.   Prior HPI:   Patient is 68-year-old female referred to us for lower back and left leg pain.  Pain has been present for over 5 years.  No traumatic incident.  She has intermittent aching, throbbing, sharp pain in her lower back.  Pain radiates down her left leg into her left posterior thigh.  Pain worsens with standing, walking, bending and getting up.  Pain improves with rest.  She denies any weakness.  No bowel bladder changes.  MRI lumbar spine shows lumbar facet hypertrophy as well as possible L5 nerve impingement.  She underwent caudal ALANNA's with Dr. Barba with moderate benefit of her generalized lower back pain.  She continues to have left-sided radicular pain.  She rates her pain 4/10 today, 10/10 at worse.    ROS:  CONSTITUTIONAL: No fevers, chills, night sweats, wt. loss, appetite changes  SKIN: no rashes or itching  ENT: No headaches, head trauma, vision changes, or eye pain  LYMPH NODES: None noticed   CV: No chest pain, palpitations.   RESP: No shortness of breath, dyspnea on exertion, cough, wheezing, or hemoptysis  GI: No nausea, emesis, diarrhea,  constipation, melena, hematochezia, pain.    : No dysuria, hematuria, urgency, or frequency   HEME: No easy bruising, bleeding problems  PSYCHIATRIC: No depression, anxiety, psychosis, hallucinations.  NEURO: No seizures, memory loss, dizziness or difficulty sleeping  MSK: + History of present illness      Past Medical History:   Diagnosis Date    Arthritis     Cataract     Coronary artery disease     Hypertension     Insomnia     Neuropathy     Retinal detachment     Stomach ulcer      Past Surgical History:   Procedure Laterality Date    APPENDECTOMY      BLADDER SUSPENSION      BREAST BIOPSY      CARDIAC SURGERY      carotid endarterectomy      L    CATARACT EXTRACTION      CHOLECYSTECTOMY      HYSTERECTOMY      INJECTION-STEROID-EPIDURAL-TRANSFORAMINAL Left 3/6/2017    [facility]    OOPHORECTOMY      SUPERFICIAL KERATECTOMY Right 10/27/2017    Dr. Morales    TONSILLECTOMY       Family History   Problem Relation Age of Onset    Cancer Father     Macular degeneration Maternal Aunt     Cancer Maternal Aunt     Breast cancer Maternal Aunt     Macular degeneration Maternal Uncle     Diabetes Paternal Aunt     Cancer Paternal Aunt     Blindness Maternal Grandfather      Social History     Socioeconomic History    Marital status:      Spouse name: Not on file    Number of children: Not on file    Years of education: Not on file    Highest education level: Not on file   Occupational History    Not on file   Social Needs    Financial resource strain: Not on file    Food insecurity:     Worry: Not on file     Inability: Not on file    Transportation needs:     Medical: Not on file     Non-medical: Not on file   Tobacco Use    Smoking status: Former Smoker     Years: 4.00     Types: Cigarettes     Last attempt to quit: 10/13/1962     Years since quittin.8    Smokeless tobacco: Never Used   Substance and Sexual Activity    Alcohol use: No    Drug  "use: No    Sexual activity: Yes     Birth control/protection: Surgical   Lifestyle    Physical activity:     Days per week: Not on file     Minutes per session: Not on file    Stress: Not on file   Relationships    Social connections:     Talks on phone: Not on file     Gets together: Not on file     Attends Church service: Not on file     Active member of club or organization: Not on file     Attends meetings of clubs or organizations: Not on file     Relationship status: Not on file   Other Topics Concern    Not on file   Social History Narrative    Not on file         Medications/Allergies: See med card    Vitals:    07/23/19 1105   BP: 113/67   Pulse: 82   Weight: 50.3 kg (111 lb)   Height: 4' 11" (1.499 m)   PainSc:   8   PainLoc: Knee         Physical exam:    GENERAL: A and O x3, the patient appears well groomed and is in no acute distress.  Skin: No rashes or obvious lesions  HEENT: normocephalic, atraumatic  CARDIOVASCULAR:  RRR  LUNGS: non labored breathing  ABDOMEN: soft, nontender   UPPER EXTREMITIES: Normal alignment, normal range of motion, no atrophy, no skin changes,  hair growth and nail growth normal and equal bilaterally. No swelling, no tenderness.    LOWER EXTREMITIES:  Normal alignment, normal range of motion, no atrophy, no skin changes,  hair growth and nail growth normal and equal bilaterally. No swelling, mild tenderness to left patellar    LUMBAR SPINE  Lumbar spine: ROM is full with flexion extension and oblique extension with no increased pain.    Tom's test causes no increased pain on either side.    Supine straight leg raise is positive on the left at 60°    Internal and external rotation of the hip causes no increased pain on either side.  Myofascial exam: No tenderness to palpation across lumbar paraspinous muscles.      MENTAL STATUS: normal orientation, speech, language, and fund of knowledge for social situation.  Emotional state appropriate.    CRANIAL NERVES:  II: "  PERRL bilaterally,   III,IV,VI: EOMI.    V:  Facial sensation equal bilaterally  VII:  Facial motor function normal.  VIII:  Hearing equal to finger rub bilaterally  IX/X: Gag normal, palate symmetric  XI:  Shoulder shrug equal, head turn equal  XII:  Tongue midline without fasciculations      MOTOR: Tone and bulk: normal bilateral upper and lower Strength: normal   Delt Bi Tri WE WF     R 5 5 5 5 5 5   L 5 5 5 5 5 5     IP ADD ABD Quad TA Gas HAM  R 5 5 5 5 5 5 5  L 5 5 5 5 5 5 5    SENSATION: Light touch and pinprick intact bilaterally  REFLEXES: normal, symmetric, nonbrisk.  Toes down, no clonus. No hoffmans.  GAIT: normal rise, base, steps, and arm swing.        Imaging:  MRI lumbar spine without contrast 10/2016 ([facility])  L3-4, broad-based disc bulge and facet hypertrophy results in mild central canal narrowing and foraminal narrowing.  L4-5, broad-based disc bulge with moderate facet hypertrophy,  Left greater than right.  There is mild spurring of the left facet joint which narrows the left lateral recess and compresses the left L5 nerve.  L5-S1, mild facet hypertrophy    Assessment:  Ms. Carlson is a 78 y.o. female back and left leg pain  1. Chronic pain of left knee    2. Lumbar radiculitis        Plan:  1.  I have stressed the importance of physical activity and exercise to improve overall health. Home exercises given  2. Schedule repeat lumbar epidural steroid injection to the Left L4-5 and L5-S1 level.  I have explained the risks, benefits, and alternatives of the procedure in detail. The patient voices understanding and all questions have been answered. The patient agrees to proceed as planned. Written Consent obtained.   3.  Perform left knee injection today.  4.  May consider viscosupplementation injection of above is not helpful.  5.  Follow-up after lumbar ALANNA

## 2019-07-24 ENCOUNTER — TELEPHONE (OUTPATIENT)
Dept: PAIN MEDICINE | Facility: CLINIC | Age: 79
End: 2019-07-24

## 2019-07-24 LAB
IODINE SERPL-MCNC: 65 NG/ML (ref 40–92)
THRYOGLOBULIN INTERPRETATION: ABNORMAL
THYROGLOB AB SERPL-ACNC: <1.8 IU/ML
THYROGLOB SERPL-MCNC: 29 NG/ML

## 2019-07-24 NOTE — TELEPHONE ENCOUNTER
Pt called in C/O pain in her left knee. She had a in office injection yesterday. She stated she has mild swelling and it hurts to walk. She was informed it could take a few days for the steroids to start working. Pt will use ice and take OTC medication to help with pain. She will update our office tomorrow.

## 2019-07-24 NOTE — TELEPHONE ENCOUNTER
----- Message from Donna Hankins sent at 7/24/2019  2:56 PM CDT -----  Type: Needs Medical Advice    Who Called:  Patient  Best Call Back Number: [phone]  Additional Information: Patient stated she received injection yesterday for knee pain/today she is having trouble walking on it/please call patient back to advise.

## 2019-07-29 DIAGNOSIS — M54.16 LUMBAR RADICULOPATHY: Primary | ICD-10-CM

## 2019-08-12 ENCOUNTER — HOSPITAL ENCOUNTER (OUTPATIENT)
Facility: AMBULARY SURGERY CENTER | Age: 79
Discharge: HOME | End: 2019-08-12
Attending: ANESTHESIOLOGY | Admitting: ANESTHESIOLOGY
Payer: MEDICARE

## 2019-08-12 DIAGNOSIS — M51.36 DDD (DEGENERATIVE DISC DISEASE), LUMBAR: Primary | ICD-10-CM

## 2019-08-12 DIAGNOSIS — M54.16 LUMBAR RADICULITIS: ICD-10-CM

## 2019-08-12 PROCEDURE — 64484 NJX AA&/STRD TFRM EPI L/S EA: CPT | Performed by: ANESTHESIOLOGY

## 2019-08-12 PROCEDURE — 64484 PRA INJECT ANES/STEROID FORAMEN LUMBAR/SACRAL W IMG GUIDE ,EA ADD LEVEL: ICD-10-PCS | Mod: LT,,, | Performed by: ANESTHESIOLOGY

## 2019-08-12 PROCEDURE — 64483 NJX AA&/STRD TFRM EPI L/S 1: CPT | Mod: LT,,, | Performed by: ANESTHESIOLOGY

## 2019-08-12 PROCEDURE — 64484 NJX AA&/STRD TFRM EPI L/S EA: CPT | Mod: LT,,, | Performed by: ANESTHESIOLOGY

## 2019-08-12 PROCEDURE — 64483 PR EPIDURAL INJ, ANES/STEROID, TRANSFORAMINAL, LUMB/SACR, SNGL LEVL: ICD-10-PCS | Mod: LT,,, | Performed by: ANESTHESIOLOGY

## 2019-08-12 PROCEDURE — 99152 MOD SED SAME PHYS/QHP 5/>YRS: CPT | Mod: ,,, | Performed by: ANESTHESIOLOGY

## 2019-08-12 PROCEDURE — 99152 PR MOD CONSCIOUS SEDATION, SAME PHYS, 5+ YRS, FIRST 15 MIN: ICD-10-PCS | Mod: ,,, | Performed by: ANESTHESIOLOGY

## 2019-08-12 PROCEDURE — 64483 NJX AA&/STRD TFRM EPI L/S 1: CPT | Performed by: ANESTHESIOLOGY

## 2019-08-12 RX ORDER — FENTANYL CITRATE 50 UG/ML
INJECTION, SOLUTION INTRAMUSCULAR; INTRAVENOUS
Status: DISCONTINUED | OUTPATIENT
Start: 2019-08-12 | End: 2019-08-12 | Stop reason: HOSPADM

## 2019-08-12 RX ORDER — MIDAZOLAM HYDROCHLORIDE 1 MG/ML
INJECTION INTRAMUSCULAR; INTRAVENOUS
Status: DISCONTINUED | OUTPATIENT
Start: 2019-08-12 | End: 2019-08-12 | Stop reason: HOSPADM

## 2019-08-12 RX ORDER — LIDOCAINE HYDROCHLORIDE 10 MG/ML
INJECTION, SOLUTION EPIDURAL; INFILTRATION; INTRACAUDAL; PERINEURAL
Status: DISCONTINUED | OUTPATIENT
Start: 2019-08-12 | End: 2019-08-12 | Stop reason: HOSPADM

## 2019-08-12 RX ORDER — BUPIVACAINE HYDROCHLORIDE 2.5 MG/ML
INJECTION, SOLUTION EPIDURAL; INFILTRATION; INTRACAUDAL
Status: DISCONTINUED | OUTPATIENT
Start: 2019-08-12 | End: 2019-08-12 | Stop reason: HOSPADM

## 2019-08-12 RX ORDER — SODIUM CHLORIDE, SODIUM LACTATE, POTASSIUM CHLORIDE, CALCIUM CHLORIDE 600; 310; 30; 20 MG/100ML; MG/100ML; MG/100ML; MG/100ML
INJECTION, SOLUTION INTRAVENOUS ONCE AS NEEDED
Status: DISCONTINUED | OUTPATIENT
Start: 2019-08-12 | End: 2019-08-12 | Stop reason: HOSPADM

## 2019-08-12 RX ORDER — DEXAMETHASONE SODIUM PHOSPHATE 10 MG/ML
INJECTION INTRAMUSCULAR; INTRAVENOUS
Status: DISCONTINUED | OUTPATIENT
Start: 2019-08-12 | End: 2019-08-12 | Stop reason: HOSPADM

## 2019-08-12 NOTE — OR NURSING
Patient asked if she could have a right knee injection.  I told her that the Dr would be in to see her and she could ask him.  Patient stated she will need something when she has her procedures and I reminded her of the conversation we had on the phone about taking her zofran so when she came it would already be in her system.  Patient verbalizes remembering the conversation.  When finished offered her a blanket if she was cold.  States only her feet were cold.  Placed blanket over her feet.  Offered to open it but states only her feet were cold right now.

## 2019-08-12 NOTE — OP NOTE
PROCEDURE DATE: 8/12/2019    PROCEDURE: Left L4-5 and L5-S1 transforaminal epidural steroid injection under fluoroscopy    DIAGNOSIS: Lumbar disc displacement without myelopathy  Post op diagnosis: Same    PHYSICIAN: Christian James MD    MEDICATIONS INJECTED:  Dexamethasone 5mg (0.5ml) and 1.5ml 0.25% bupivicaine at each nerve root.     LOCAL ANESTHETIC INJECTED:  Lidocaine 1%. 2 ml per site.    SEDATION MEDICATIONS: RN IV sedation    ESTIMATED BLOOD LOSS:  None    COMPLICATIONS:  None    TECHNIQUE:   A time-out was taken to identify patient and procedure side prior to starting the procedure. The patient was placed in a prone position, prepped and draped in the usual sterile fashion using ChloraPrep and sterile towels.  The area to be injected was determined under fluoroscopic guidance in AP and oblique view.  Local anesthetic was given by raising a wheal and going down to the hub of a 25-gauge 1.5 inch needle.  In oblique view, a 3.5 inch 22-gauge bent-tip spinal needle was introduced towards 6 oclock position of the pedicle of each above named nerve root level.  The needle was walked medially then hinged into the neural foramen and position was confirmed in AP and lateral views.  1ml contrast dye was injected to confirm appropriate placement and that there was no vascular uptake.  After negative aspiration for blood or CSF, the medication was then injected. This was performed at the left L4-5 and L5-S1 level(s). The patient tolerated the procedure well.    The patient was monitored after the procedure.  Patient was given post procedure and discharge instructions to follow at home. The patient was discharged in a stable condition.

## 2019-08-12 NOTE — PLAN OF CARE
Patient is going home with her son in law. She was able to lift each leg prior to standing. She is planning on going to lunch after her procedure. She was able to walk to his truck without assistance.

## 2019-08-12 NOTE — DISCHARGE INSTRUCTIONS
You had versed and fentanyl for your procedure today.   Anesthesia information    Anesthesia Safety      You have been given medicine  to sedate you during your procedure today. This may have included both a pain medicine and sleeping medicine. Most of the effects have worn off; however, you may continue to have some drowsiness for the next  24 hours. Anesthesia and pain medicines can cause nausea, sleepiness, dizziness and  constipation.    HOME CARE:  1) For the next EIGHT HOURS, you should be watched by a responsible adult to look for any worsening of your condition.  2) DO NOT DRINK any ALCOHOL for the next 24 HOURS.  3) DO NOT DRIVE or operate dangerous machinery during the next 24 HOURS.  FOLLOW UP with your doctor or this facility if you are not alert and back to your usual level of activity within 24 hrs.  GET PROMPT MEDICAL ATTENTION if any of the following occur:  -- Increased drowsiness  -- Increased weakness or dizziness  -- Repeated vomiting  -- If you cannot be awakened    Before leaving, please make sure you have all your personal belongings such as glasses, purses, wallets, keys, cell phones, jewelry, jackets etc Pain injection instructions:     This procedure may take a couple weeks to relieve pain    No driving for 24 hrs.   Activity as tolerated- gradually increase activities.  Dont lift over 10 lbs for 24 hrs   No heat at injection sites x 2 days. No heating pads, hot tubs, saunas, or swimming in any body of water or pool for 2 days.  Use ice pack for mild swelling and for comfort , apply for 20 minutes, remove for 20 minute intervals. No direct contact of ice itself  to skin.  May shower today. No tub baths for two days.      Resume Aspirin, Plavix, or Coumadin the day after the procedure unless otherwise instructed.   If diabetic,monitor your glucose carefully as steroids can increase your glucose level    Seek immediate medical help for:   Severe increase in your usual pain or appearance of  new pain.  Prolonged (mor than 8 hours) or increasing weakness or numbness in the legs or arms.  .    Fever above 101 ,Drainage,redness,active bleeding, or increased swelling at the injection site.  Headache, shortness of breath, chest pain, or breathing problems.

## 2019-08-12 NOTE — DISCHARGE SUMMARY
[facility]  Discharge Note  Short Stay    Admit Date: 8/12/2019    Discharge Date and Time: 8/12/2019    Attending Physician: Christian James MD     Discharge Provider: Christian James    Diagnoses:  Active Hospital Problems    Diagnosis  POA    *Lumbar radiculitis [M54.16]  Yes      Resolved Hospital Problems   No resolved problems to display.       Hospital Course: Lumbar ALANNA  Discharged Condition: Good    Final Diagnoses:   Active Hospital Problems    Diagnosis  POA    *Lumbar radiculitis [M54.16]  Yes      Resolved Hospital Problems   No resolved problems to display.       Disposition: Home or Self Care    Follow up/Patient Instructions:    Medications:  Reconciled Home Medications:      Medication List      CHANGE how you take these medications    ondansetron 4 MG tablet  Commonly known as:  ZOFRAN  Take 1 tablet (4 mg total) by mouth every 8 (eight) hours as needed for Nausea.  What changed:  how much to take        CONTINUE taking these medications    aspirin 81 MG EC tablet  Commonly known as:  ECOTRIN  Take by mouth.     BC HEADACHE POWDER ORAL  Take by mouth daily as needed.     buPROPion 300 MG 24 hr tablet  Commonly known as:  WELLBUTRIN XL  Take 300 mg by mouth once daily.     DEXILANT 30 mg Cpdm  Generic drug:  dexlansoprazole  Take 60 mg by mouth.     dicyclomine 10 MG capsule  Commonly known as:  BENTYL     diphenoxylate-atropine 2.5-0.025 mg 2.5-0.025 mg per tablet  Commonly known as:  LOMOTIL  Take 1 tablet by mouth 4 (four) times daily as needed for Diarrhea.     gabapentin 300 MG capsule  Commonly known as:  NEURONTIN  3 tablets HS     lidocaine 5 %  Commonly known as:  LIDODERM  Place 1 patch onto the skin every 24 hours. Remove & Discard patch within 12 hours or as directed by MD     lisinopril 10 MG tablet     NITROSTAT 0.4 MG SL tablet  Generic drug:  nitroGLYCERIN  Place under the tongue.     omeprazole 40 MG capsule  Commonly known as:  PRILOSEC  Take by mouth.     pravastatin 40 MG  tablet  Commonly known as:  PRAVACHOL     temazepam 30 mg capsule  Commonly known as:  RESTORIL  Take by mouth.     VIBERZI 75 mg Tab  Generic drug:  eluxadoline  Take by mouth.          Discharge Procedure Orders   Call MD for:  temperature >100.4     Call MD for:  persistent nausea and vomiting or diarrhea     Call MD for:  severe uncontrolled pain     Call MD for:  redness, tenderness, or signs of infection (pain, swelling, redness, odor or green/yellow discharge around incision site)     Call MD for:  difficulty breathing or increased cough     Call MD for:  severe persistent headache        Follow up with MD in 2-3 weeks    Discharge Procedure Orders (must include Diet, Follow-up, Activity):   Discharge Procedure Orders (must include Diet, Follow-up, Activity)   Call MD for:  temperature >100.4     Call MD for:  persistent nausea and vomiting or diarrhea     Call MD for:  severe uncontrolled pain     Call MD for:  redness, tenderness, or signs of infection (pain, swelling, redness, odor or green/yellow discharge around incision site)     Call MD for:  difficulty breathing or increased cough     Call MD for:  severe persistent headache

## 2019-08-13 VITALS
RESPIRATION RATE: 19 BRPM | TEMPERATURE: 98 F | WEIGHT: 111 LBS | HEIGHT: 59 IN | HEART RATE: 72 BPM | SYSTOLIC BLOOD PRESSURE: 142 MMHG | OXYGEN SATURATION: 97 % | DIASTOLIC BLOOD PRESSURE: 70 MMHG | BODY MASS INDEX: 22.38 KG/M2

## 2019-08-27 ENCOUNTER — TELEPHONE (OUTPATIENT)
Dept: PAIN MEDICINE | Facility: CLINIC | Age: 79
End: 2019-08-27

## 2019-08-27 NOTE — TELEPHONE ENCOUNTER
"Patient requested an earlier appointment to discuss pain in her "tailbone" offered appointment with Dr. James on 8/29/19. Patient accepted and voiced understanding.  "

## 2019-08-27 NOTE — TELEPHONE ENCOUNTER
----- Message from Alejandra Perez sent at 8/27/2019 10:28 AM CDT -----  Contact: Self  Type: Needs Medical Advice    Who Called:  Patient   Symptoms (please be specific):  Her tail bone is killing her really bad  How long has patient had these symptoms:  For about 2 weeks but last week it is getting worse, the back feels better but the tailbone now the issue  Pharmacy name and phone #:    [id number] - [place] - [id number] [facility] [address]  [facility] [place] [address]  Phone: [phone] Fax: [phone]  Best Call Back Number: [phone] (home)   Additional Information: na

## 2019-08-29 ENCOUNTER — OFFICE VISIT (OUTPATIENT)
Dept: PAIN MEDICINE | Facility: CLINIC | Age: 79
End: 2019-08-29
Payer: MEDICARE

## 2019-08-29 VITALS
SYSTOLIC BLOOD PRESSURE: 125 MMHG | BODY MASS INDEX: 22.38 KG/M2 | HEIGHT: 59 IN | DIASTOLIC BLOOD PRESSURE: 71 MMHG | WEIGHT: 111 LBS | HEART RATE: 93 BPM

## 2019-08-29 DIAGNOSIS — M54.16 LUMBAR RADICULOPATHY: Primary | ICD-10-CM

## 2019-08-29 DIAGNOSIS — M25.562 CHRONIC PAIN OF LEFT KNEE: ICD-10-CM

## 2019-08-29 DIAGNOSIS — G89.29 CHRONIC PAIN OF LEFT KNEE: ICD-10-CM

## 2019-08-29 DIAGNOSIS — M53.3 COCCYDYNIA: ICD-10-CM

## 2019-08-29 PROCEDURE — 99213 PR OFFICE/OUTPT VISIT, EST, LEVL III, 20-29 MIN: ICD-10-PCS | Mod: S$GLB,,, | Performed by: PHYSICIAN ASSISTANT

## 2019-08-29 PROCEDURE — 1101F PT FALLS ASSESS-DOCD LE1/YR: CPT | Mod: CPTII,S$GLB,, | Performed by: PHYSICIAN ASSISTANT

## 2019-08-29 PROCEDURE — 3074F PR MOST RECENT SYSTOLIC BLOOD PRESSURE < 130 MM HG: ICD-10-PCS | Mod: CPTII,S$GLB,, | Performed by: PHYSICIAN ASSISTANT

## 2019-08-29 PROCEDURE — 99213 OFFICE O/P EST LOW 20 MIN: CPT | Mod: S$GLB,,, | Performed by: PHYSICIAN ASSISTANT

## 2019-08-29 PROCEDURE — 3078F DIAST BP <80 MM HG: CPT | Mod: CPTII,S$GLB,, | Performed by: PHYSICIAN ASSISTANT

## 2019-08-29 PROCEDURE — 1101F PR PT FALLS ASSESS DOC 0-1 FALLS W/OUT INJ PAST YR: ICD-10-PCS | Mod: CPTII,S$GLB,, | Performed by: PHYSICIAN ASSISTANT

## 2019-08-29 PROCEDURE — 3074F SYST BP LT 130 MM HG: CPT | Mod: CPTII,S$GLB,, | Performed by: PHYSICIAN ASSISTANT

## 2019-08-29 PROCEDURE — 3078F PR MOST RECENT DIASTOLIC BLOOD PRESSURE < 80 MM HG: ICD-10-PCS | Mod: CPTII,S$GLB,, | Performed by: PHYSICIAN ASSISTANT

## 2019-08-29 PROCEDURE — 99999 PR PBB SHADOW E&M-EST. PATIENT-LVL IV: ICD-10-PCS | Mod: PBBFAC,,, | Performed by: PHYSICIAN ASSISTANT

## 2019-08-29 PROCEDURE — 99999 PR PBB SHADOW E&M-EST. PATIENT-LVL IV: CPT | Mod: PBBFAC,,, | Performed by: PHYSICIAN ASSISTANT

## 2019-08-29 RX ORDER — DICLOFENAC SODIUM 20 MG/G
1 SOLUTION TOPICAL 2 TIMES DAILY PRN
Qty: 1 BOTTLE | Refills: 1 | Status: CANCELLED | OUTPATIENT
Start: 2019-08-29 | End: 2019-09-27

## 2019-08-29 RX ORDER — TEMAZEPAM 15 MG/1
CAPSULE ORAL
Refills: 3 | COMMUNITY
Start: 2019-08-27 | End: 2019-08-29

## 2019-08-29 NOTE — H&P (VIEW-ONLY)
Referring Physician: No ref. provider found    PCP: Behzad Birch MD      CC: Low back and left leg pain    Interval History:  Ms. Carlson is a 78 y.o. female with chronic low back and left leg pain who presents today for f/u s/p TF ALANNA at L4-5 and L5-S1 on left. Reports mild relief. Pain has changed in quality and location> pain is currently located in her tailbone. Denies any trauma. Previous left L4-5 and L5-S1 TFESI provided 50% improvement in pain.  Pain today is rated 8/10. Denies LE weakness. Denies b/b incontinence.     Prior HPI:   Patient is 68-year-old female referred to us for lower back and left leg pain.  Pain has been present for over 5 years.  No traumatic incident.  She has intermittent aching, throbbing, sharp pain in her lower back.  Pain radiates down her left leg into her left posterior thigh.  Pain worsens with standing, walking, bending and getting up.  Pain improves with rest.  She denies any weakness.  No bowel bladder changes.  MRI lumbar spine shows lumbar facet hypertrophy as well as possible L5 nerve impingement.  She underwent caudal ALANNA's with Dr. Barba with moderate benefit of her generalized lower back pain.  She continues to have left-sided radicular pain.  She rates her pain 4/10 today, 10/10 at worse.    ROS:  CONSTITUTIONAL: No fevers, chills, night sweats, wt. loss, appetite changes  SKIN: no rashes or itching  ENT: No headaches, head trauma, vision changes, or eye pain  LYMPH NODES: None noticed   CV: No chest pain, palpitations.   RESP: No shortness of breath, dyspnea on exertion, cough, wheezing, or hemoptysis  GI: No nausea, emesis, diarrhea, constipation, melena, hematochezia, pain.    : No dysuria, hematuria, urgency, or frequency   HEME: No easy bruising, bleeding problems  PSYCHIATRIC: No depression, anxiety, psychosis, hallucinations.  NEURO: No seizures, memory loss, dizziness or difficulty sleeping  MSK: + History of present illness      Past Medical History:    Diagnosis Date    Arthritis     Cataract     Coronary artery disease     Hypertension     Insomnia     Neuropathy     Retinal detachment     Stomach ulcer      Past Surgical History:   Procedure Laterality Date    APPENDECTOMY      BLADDER SUSPENSION      BREAST BIOPSY      CARDIAC SURGERY      carotid endarterectomy      L    CATARACT EXTRACTION      CHOLECYSTECTOMY      HYSTERECTOMY      Injection,steroid,epidural,transforaminal approach L4-5, L5-S1 Left 2019    [facility]formed [facility]    INJECTION-STEROID-EPIDURAL-TRANSFORAMINAL Left 3/6/2017    [facility]    OOPHORECTOMY      SUPERFICIAL KERATECTOMY Right 10/27/2017    Dr. Morales    TONSILLECTOMY       Family History   Problem Relation Age of Onset    Cancer Father     Macular degeneration Maternal Aunt     Cancer Maternal Aunt     Breast cancer Maternal Aunt     Macular degeneration Maternal Uncle     Diabetes Paternal Aunt     Cancer Paternal Aunt     Blindness Maternal Grandfather      Social History     Socioeconomic History    Marital status:      Spouse name: Not on file    Number of children: Not on file    Years of education: Not on file    Highest education level: Not on file   Occupational History    Not on file   Social Needs    Financial resource strain: Not on file    Food insecurity:     Worry: Not on file     Inability: Not on file    Transportation needs:     Medical: Not on file     Non-medical: Not on file   Tobacco Use    Smoking status: Former Smoker     Years: 4.00     Types: Cigarettes     Last attempt to quit: 10/13/1962     Years since quittin.9    Smokeless tobacco: Never Used   Substance and Sexual Activity    Alcohol use: No    Drug use: No    Sexual activity: Yes     Birth control/protection: Surgical   Lifestyle    Physical activity:     Days per week: Not on file     Minutes per session: Not on file    Stress: Not on file  "  Relationships    Social connections:     Talks on phone: Not on file     Gets together: Not on file     Attends Mormonism service: Not on file     Active member of club or organization: Not on file     Attends meetings of clubs or organizations: Not on file     Relationship status: Not on file   Other Topics Concern    Not on file   Social History Narrative    Not on file         Medications/Allergies: See med card    Vitals:    08/29/19 1128   BP: 125/71   Pulse: 93   Weight: 50.3 kg (111 lb)   Height: 4' 11" (1.499 m)   PainSc:   8   PainLoc: Back         Physical exam:    GENERAL: A and O x3, the patient appears well groomed and is in no acute distress.  Skin: No rashes or obvious lesions  HEENT: normocephalic, atraumatic  CARDIOVASCULAR:  RRR  LUNGS: non labored breathing  ABDOMEN: soft, nontender   UPPER EXTREMITIES: Normal alignment, normal range of motion, no atrophy, no skin changes,  hair growth and nail growth normal and equal bilaterally. No swelling, no tenderness.    LOWER EXTREMITIES:  Normal alignment, normal range of motion, no atrophy, no skin changes,  hair growth and nail growth normal and equal bilaterally. No swelling, mild tenderness to left patellar    LUMBAR SPINE  Lumbar spine: ROM is full with flexion extension and oblique extension with no increased pain.    Tom's test causes no increased pain on either side.    Supine straight leg raise is positive on the left at 60°    Internal and external rotation of the hip causes no increased pain on either side.  Myofascial exam: No tenderness to palpation across lumbar paraspinous muscles.  Tenderness to coccyx    MENTAL STATUS: normal orientation, speech, language, and fund of knowledge for social situation.  Emotional state appropriate.    CRANIAL NERVES:  II:  PERRL bilaterally,   III,IV,VI: EOMI.    V:  Facial sensation equal bilaterally  VII:  Facial motor function normal.  VIII:  Hearing equal to finger rub bilaterally  IX/X: Gag " normal, palate symmetric  XI:  Shoulder shrug equal, head turn equal  XII:  Tongue midline without fasciculations      MOTOR: Tone and bulk: normal bilateral upper and lower Strength: normal   Delt Bi Tri WE WF     R 5 5 5 5 5 5   L 5 5 5 5 5 5     IP ADD ABD Quad TA Gas HAM  R 5 5 5 5 5 5 5  L 5 5 5 5 5 5 5    SENSATION: Light touch and pinprick intact bilaterally  REFLEXES: normal, symmetric, nonbrisk.  Toes down, no clonus. No hoffmans.  GAIT: normal rise, base, steps, and arm swing.        Imaging:  MRI lumbar spine without contrast 10/2016 ([facility])  L3-4, broad-based disc bulge and facet hypertrophy results in mild central canal narrowing and foraminal narrowing.  L4-5, broad-based disc bulge with moderate facet hypertrophy,  Left greater than right.  There is mild spurring of the left facet joint which narrows the left lateral recess and compresses the left L5 nerve.  L5-S1, mild facet hypertrophy    Assessment:  Ms. Carlson is a 78 y.o. female back and left leg pain  1. Lumbar radiculopathy    2. Chronic pain of left knee    3. Coccydynia      Plan:  1.  I have stressed the importance of physical activity and exercise to improve overall health. Home exercises given  2. Schedule ganglion impar block for tailbone pain. I have explained the risks, benefits, and alternatives of the procedure in detail. The patient voices understanding and all questions have been answered. The patient agrees to proceed as planned. Written Consent obtained.   3. Consider repeat ALANNA.   4. Pensaid solution BID prn   5. Discussed coccygeal pillow  6. F/u s/p ganglion impar block

## 2019-08-29 NOTE — PROGRESS NOTES
Referring Physician: No ref. provider found    PCP: Behzad Birch MD      CC: Low back and left leg pain    Interval History:  Ms. Carlson is a 78 y.o. female with chronic low back and left leg pain who presents today for f/u s/p TF ALANNA at L4-5 and L5-S1 on left. Reports mild relief. Pain has changed in quality and location> pain is currently located in her tailbone. Denies any trauma. Previous left L4-5 and L5-S1 TFESI provided 50% improvement in pain.  Pain today is rated 8/10. Denies LE weakness. Denies b/b incontinence.     Prior HPI:   Patient is 68-year-old female referred to us for lower back and left leg pain.  Pain has been present for over 5 years.  No traumatic incident.  She has intermittent aching, throbbing, sharp pain in her lower back.  Pain radiates down her left leg into her left posterior thigh.  Pain worsens with standing, walking, bending and getting up.  Pain improves with rest.  She denies any weakness.  No bowel bladder changes.  MRI lumbar spine shows lumbar facet hypertrophy as well as possible L5 nerve impingement.  She underwent caudal ALANNA's with Dr. Barba with moderate benefit of her generalized lower back pain.  She continues to have left-sided radicular pain.  She rates her pain 4/10 today, 10/10 at worse.    ROS:  CONSTITUTIONAL: No fevers, chills, night sweats, wt. loss, appetite changes  SKIN: no rashes or itching  ENT: No headaches, head trauma, vision changes, or eye pain  LYMPH NODES: None noticed   CV: No chest pain, palpitations.   RESP: No shortness of breath, dyspnea on exertion, cough, wheezing, or hemoptysis  GI: No nausea, emesis, diarrhea, constipation, melena, hematochezia, pain.    : No dysuria, hematuria, urgency, or frequency   HEME: No easy bruising, bleeding problems  PSYCHIATRIC: No depression, anxiety, psychosis, hallucinations.  NEURO: No seizures, memory loss, dizziness or difficulty sleeping  MSK: + History of present illness      Past Medical History:    Diagnosis Date    Arthritis     Cataract     Coronary artery disease     Hypertension     Insomnia     Neuropathy     Retinal detachment     Stomach ulcer      Past Surgical History:   Procedure Laterality Date    APPENDECTOMY      BLADDER SUSPENSION      BREAST BIOPSY      CARDIAC SURGERY      carotid endarterectomy      L    CATARACT EXTRACTION      CHOLECYSTECTOMY      HYSTERECTOMY      Injection,steroid,epidural,transforaminal approach L4-5, L5-S1 Left 2019    [facility] [place]    INJECTION-STEROID-EPIDURAL-TRANSFORAMINAL Left 3/6/2017    [facility] at [facility] [place]    OOPHORECTOMY      SUPERFICIAL KERATECTOMY Right 10/27/2017    Dr. Morales    TONSILLECTOMY       Family History   Problem Relation Age of Onset    Cancer Father     Macular degeneration Maternal Aunt     Cancer Maternal Aunt     Breast cancer Maternal Aunt     Macular degeneration Maternal Uncle     Diabetes Paternal Aunt     Cancer Paternal Aunt     Blindness Maternal Grandfather      Social History     Socioeconomic History    Marital status:      Spouse name: Not on file    Number of children: Not on file    Years of education: Not on file    Highest education level: Not on file   Occupational History    Not on file   Social Needs    Financial resource strain: Not on file    Food insecurity:     Worry: Not on file     Inability: Not on file    Transportation needs:     Medical: Not on file     Non-medical: Not on file   Tobacco Use    Smoking status: Former Smoker     Years: 4.00     Types: Cigarettes     Last attempt to quit: 10/13/1962     Years since quittin.9    Smokeless tobacco: Never Used   Substance and Sexual Activity    Alcohol use: No    Drug use: No    Sexual activity: Yes     Birth control/protection: Surgical   Lifestyle    Physical activity:     Days per week: Not on file     Minutes per session: Not on file    Stress: Not on file  "  Relationships    Social connections:     Talks on phone: Not on file     Gets together: Not on file     Attends Zoroastrianism service: Not on file     Active member of club or organization: Not on file     Attends meetings of clubs or organizations: Not on file     Relationship status: Not on file   Other Topics Concern    Not on file   Social History Narrative    Not on file         Medications/Allergies: See med card    Vitals:    08/29/19 1128   BP: 125/71   Pulse: 93   Weight: 50.3 kg (111 lb)   Height: 4' 11" (1.499 m)   PainSc:   8   PainLoc: Back         Physical exam:    GENERAL: A and O x3, the patient appears well groomed and is in no acute distress.  Skin: No rashes or obvious lesions  HEENT: normocephalic, atraumatic  CARDIOVASCULAR:  RRR  LUNGS: non labored breathing  ABDOMEN: soft, nontender   UPPER EXTREMITIES: Normal alignment, normal range of motion, no atrophy, no skin changes,  hair growth and nail growth normal and equal bilaterally. No swelling, no tenderness.    LOWER EXTREMITIES:  Normal alignment, normal range of motion, no atrophy, no skin changes,  hair growth and nail growth normal and equal bilaterally. No swelling, mild tenderness to left patellar    LUMBAR SPINE  Lumbar spine: ROM is full with flexion extension and oblique extension with no increased pain.    Tom's test causes no increased pain on either side.    Supine straight leg raise is positive on the left at 60°    Internal and external rotation of the hip causes no increased pain on either side.  Myofascial exam: No tenderness to palpation across lumbar paraspinous muscles.  Tenderness to coccyx    MENTAL STATUS: normal orientation, speech, language, and fund of knowledge for social situation.  Emotional state appropriate.    CRANIAL NERVES:  II:  PERRL bilaterally,   III,IV,VI: EOMI.    V:  Facial sensation equal bilaterally  VII:  Facial motor function normal.  VIII:  Hearing equal to finger rub bilaterally  IX/X: Gag " normal, palate symmetric  XI:  Shoulder shrug equal, head turn equal  XII:  Tongue midline without fasciculations      MOTOR: Tone and bulk: normal bilateral upper and lower Strength: normal   Delt Bi Tri WE WF     R 5 5 5 5 5 5   L 5 5 5 5 5 5     IP ADD ABD Quad TA Gas HAM  R 5 5 5 5 5 5 5  L 5 5 5 5 5 5 5    SENSATION: Light touch and pinprick intact bilaterally  REFLEXES: normal, symmetric, nonbrisk.  Toes down, no clonus. No hoffmans.  GAIT: normal rise, base, steps, and arm swing.        Imaging:  MRI lumbar spine without contrast 10/2016 ([facility])  L3-4, broad-based disc bulge and facet hypertrophy results in mild central canal narrowing and foraminal narrowing.  L4-5, broad-based disc bulge with moderate facet hypertrophy,  Left greater than right.  There is mild spurring of the left facet joint which narrows the left lateral recess and compresses the left L5 nerve.  L5-S1, mild facet hypertrophy    Assessment:  Ms. Carlson is a 78 y.o. female back and left leg pain  1. Lumbar radiculopathy    2. Chronic pain of left knee    3. Coccydynia      Plan:  1.  I have stressed the importance of physical activity and exercise to improve overall health. Home exercises given  2. Schedule ganglion impar block for tailbone pain. I have explained the risks, benefits, and alternatives of the procedure in detail. The patient voices understanding and all questions have been answered. The patient agrees to proceed as planned. Written Consent obtained.   3. Consider repeat ALANNA.   4. Pensaid solution BID prn   5. Discussed coccygeal pillow  6. F/u s/p ganglion impar block

## 2019-08-30 ENCOUNTER — TELEPHONE (OUTPATIENT)
Dept: PAIN MEDICINE | Facility: CLINIC | Age: 79
End: 2019-08-30

## 2019-08-30 NOTE — TELEPHONE ENCOUNTER
----- Message from Elli Hernandez sent at 8/30/2019 10:19 AM CDT -----  Type: Needs Medical Advice    Who Called:  self  Symptoms (please be specific):  Asking to discuss the procedure   How long has patient had these symptoms:  How it will done ?   Pharmacy name and phone #:     Best Call Back Number: [phone] (home)     Additional Information:  Was seen yesterday

## 2019-09-03 DIAGNOSIS — M53.3 COCCYDYNIA: Primary | ICD-10-CM

## 2019-09-12 ENCOUNTER — LAB VISIT (OUTPATIENT)
Dept: LAB | Facility: HOSPITAL | Age: 79
End: 2019-09-12
Attending: INTERNAL MEDICINE
Payer: MEDICARE

## 2019-09-12 ENCOUNTER — HOSPITAL ENCOUNTER (OUTPATIENT)
Facility: AMBULARY SURGERY CENTER | Age: 79
Discharge: HOME | End: 2019-09-12
Attending: ANESTHESIOLOGY | Admitting: ANESTHESIOLOGY
Payer: MEDICARE

## 2019-09-12 DIAGNOSIS — M51.36 DDD (DEGENERATIVE DISC DISEASE), LUMBAR: ICD-10-CM

## 2019-09-12 DIAGNOSIS — I25.810 CORONARY ATHEROSCLEROSIS OF AUTOLOGOUS VEIN BYPASS GRAFT: ICD-10-CM

## 2019-09-12 DIAGNOSIS — I10 ESSENTIAL HYPERTENSION, MALIGNANT: ICD-10-CM

## 2019-09-12 DIAGNOSIS — M53.3 COCCYDYNIA: Primary | ICD-10-CM

## 2019-09-12 DIAGNOSIS — K21.9 ESOPHAGEAL REFLUX: Primary | ICD-10-CM

## 2019-09-12 DIAGNOSIS — I70.203 BILATERAL FEMORAL ARTERY STENOSIS: ICD-10-CM

## 2019-09-12 LAB
ALBUMIN SERPL BCP-MCNC: 4.1 G/DL (ref 3.5–5.2)
ALP SERPL-CCNC: 82 U/L (ref 55–135)
ALT SERPL W/O P-5'-P-CCNC: 13 U/L (ref 10–44)
ANION GAP SERPL CALC-SCNC: 9 MMOL/L (ref 8–16)
AST SERPL-CCNC: 16 U/L (ref 10–40)
BILIRUB SERPL-MCNC: 0.5 MG/DL (ref 0.1–1)
BUN SERPL-MCNC: 22 MG/DL (ref 8–23)
CALCIUM SERPL-MCNC: 10 MG/DL (ref 8.7–10.5)
CHLORIDE SERPL-SCNC: 106 MMOL/L (ref 95–110)
CHOLEST SERPL-MCNC: 184 MG/DL (ref 120–199)
CHOLEST/HDLC SERPL: 2.5 {RATIO} (ref 2–5)
CO2 SERPL-SCNC: 27 MMOL/L (ref 23–29)
CREAT SERPL-MCNC: 0.9 MG/DL (ref 0.5–1.4)
ERYTHROCYTE [DISTWIDTH] IN BLOOD BY AUTOMATED COUNT: 12.6 % (ref 11.5–14.5)
EST. GFR  (AFRICAN AMERICAN): >60 ML/MIN/1.73 M^2
EST. GFR  (NON AFRICAN AMERICAN): >60 ML/MIN/1.73 M^2
GLUCOSE SERPL-MCNC: 91 MG/DL (ref 70–110)
HCT VFR BLD AUTO: 42.3 % (ref 37–48.5)
HDLC SERPL-MCNC: 73 MG/DL (ref 40–75)
HDLC SERPL: 39.7 % (ref 20–50)
HGB BLD-MCNC: 13.9 G/DL (ref 12–16)
LDLC SERPL CALC-MCNC: 90.8 MG/DL (ref 63–159)
MCH RBC QN AUTO: 30.2 PG (ref 27–31)
MCHC RBC AUTO-ENTMCNC: 32.9 G/DL (ref 32–36)
MCV RBC AUTO: 92 FL (ref 82–98)
NONHDLC SERPL-MCNC: 111 MG/DL
PLATELET # BLD AUTO: 275 K/UL (ref 150–350)
PMV BLD AUTO: 9.1 FL (ref 9.2–12.9)
POTASSIUM SERPL-SCNC: 4.2 MMOL/L (ref 3.5–5.1)
PROT SERPL-MCNC: 7.4 G/DL (ref 6–8.4)
RBC # BLD AUTO: 4.6 M/UL (ref 4–5.4)
SODIUM SERPL-SCNC: 142 MMOL/L (ref 136–145)
TRIGL SERPL-MCNC: 101 MG/DL (ref 30–150)
WBC # BLD AUTO: 6.04 K/UL (ref 3.9–12.7)

## 2019-09-12 PROCEDURE — 64520 N BLOCK LUMBAR/THORACIC: CPT | Performed by: ANESTHESIOLOGY

## 2019-09-12 PROCEDURE — 64999 UNLISTED PX NERVOUS SYSTEM: CPT | Mod: ,,, | Performed by: ANESTHESIOLOGY

## 2019-09-12 PROCEDURE — 99152 MOD SED SAME PHYS/QHP 5/>YRS: CPT | Mod: ,,, | Performed by: ANESTHESIOLOGY

## 2019-09-12 PROCEDURE — 99152 PR MOD CONSCIOUS SEDATION, SAME PHYS, 5+ YRS, FIRST 15 MIN: ICD-10-PCS | Mod: ,,, | Performed by: ANESTHESIOLOGY

## 2019-09-12 PROCEDURE — 36415 COLL VENOUS BLD VENIPUNCTURE: CPT

## 2019-09-12 PROCEDURE — 80061 LIPID PANEL: CPT

## 2019-09-12 PROCEDURE — 64999 PR GANGLION IMPAR INJECTION: ICD-10-PCS | Mod: ,,, | Performed by: ANESTHESIOLOGY

## 2019-09-12 PROCEDURE — 64999 UNLISTED PX NERVOUS SYSTEM: CPT | Performed by: ANESTHESIOLOGY

## 2019-09-12 PROCEDURE — 80053 COMPREHEN METABOLIC PANEL: CPT

## 2019-09-12 PROCEDURE — 85027 COMPLETE CBC AUTOMATED: CPT

## 2019-09-12 PROCEDURE — 77003 FLUOROGUIDE FOR SPINE INJECT: CPT | Performed by: ANESTHESIOLOGY

## 2019-09-12 RX ORDER — DEXAMETHASONE SODIUM PHOSPHATE 100 MG/10ML
INJECTION INTRAMUSCULAR; INTRAVENOUS
Status: DISCONTINUED | OUTPATIENT
Start: 2019-09-12 | End: 2019-09-12 | Stop reason: HOSPADM

## 2019-09-12 RX ORDER — MIDAZOLAM HYDROCHLORIDE 1 MG/ML
INJECTION, SOLUTION INTRAMUSCULAR; INTRAVENOUS
Status: DISCONTINUED | OUTPATIENT
Start: 2019-09-12 | End: 2019-09-12 | Stop reason: HOSPADM

## 2019-09-12 RX ORDER — LIDOCAINE HYDROCHLORIDE 10 MG/ML
INJECTION, SOLUTION EPIDURAL; INFILTRATION; INTRACAUDAL; PERINEURAL
Status: DISCONTINUED | OUTPATIENT
Start: 2019-09-12 | End: 2019-09-12 | Stop reason: HOSPADM

## 2019-09-12 RX ORDER — BUPIVACAINE HYDROCHLORIDE AND EPINEPHRINE 2.5; 5 MG/ML; UG/ML
INJECTION, SOLUTION EPIDURAL; INFILTRATION; INTRACAUDAL; PERINEURAL
Status: DISCONTINUED | OUTPATIENT
Start: 2019-09-12 | End: 2019-09-12 | Stop reason: HOSPADM

## 2019-09-12 RX ORDER — SODIUM CHLORIDE, SODIUM LACTATE, POTASSIUM CHLORIDE, CALCIUM CHLORIDE 600; 310; 30; 20 MG/100ML; MG/100ML; MG/100ML; MG/100ML
INJECTION, SOLUTION INTRAVENOUS ONCE AS NEEDED
Status: COMPLETED | OUTPATIENT
Start: 2019-09-12 | End: 2019-09-12

## 2019-09-12 RX ORDER — FENTANYL CITRATE 50 UG/ML
INJECTION, SOLUTION INTRAMUSCULAR; INTRAVENOUS
Status: DISCONTINUED | OUTPATIENT
Start: 2019-09-12 | End: 2019-09-12 | Stop reason: HOSPADM

## 2019-09-12 RX ADMIN — SODIUM CHLORIDE, SODIUM LACTATE, POTASSIUM CHLORIDE, CALCIUM CHLORIDE: 600; 310; 30; 20 INJECTION, SOLUTION INTRAVENOUS at 11:09

## 2019-09-12 NOTE — DISCHARGE SUMMARY
[facility]  Discharge Note  Short Stay    Admit Date: 9/12/2019    Discharge Date and Time: 9/12/2019    Attending Physician: Christian James MD     Discharge Provider: Christian James    Diagnoses:  Active Hospital Problems    Diagnosis  POA    *Coccydynia [M53.3]  Yes      Resolved Hospital Problems   No resolved problems to display.       Hospital Course: Ganglion Impar  Discharged Condition: Good    Final Diagnoses:   Active Hospital Problems    Diagnosis  POA    *Coccydynia [M53.3]  Yes      Resolved Hospital Problems   No resolved problems to display.       Disposition: Home or Self Care    Follow up/Patient Instructions:    Medications:  Reconciled Home Medications:      Medication List      CHANGE how you take these medications    ondansetron 4 MG tablet  Commonly known as:  ZOFRAN  Take 1 tablet (4 mg total) by mouth every 8 (eight) hours as needed for Nausea.  What changed:  how much to take        CONTINUE taking these medications    BC HEADACHE POWDER ORAL  Take by mouth daily as needed.     buPROPion 300 MG 24 hr tablet  Commonly known as:  WELLBUTRIN XL  Take 300 mg by mouth once daily.     DEXILANT 30 mg Cpdm  Generic drug:  dexlansoprazole  Take 60 mg by mouth.     dicyclomine 10 MG capsule  Commonly known as:  BENTYL     diphenoxylate-atropine 2.5-0.025 mg 2.5-0.025 mg per tablet  Commonly known as:  LOMOTIL  Take 1 tablet by mouth 4 (four) times daily as needed for Diarrhea.     gabapentin 300 MG capsule  Commonly known as:  NEURONTIN  3 tablets HS     lidocaine 5 %  Commonly known as:  LIDODERM  Place 1 patch onto the skin every 24 hours. Remove & Discard patch within 12 hours or as directed by MD     lisinopril 10 MG tablet     NITROSTAT 0.4 MG SL tablet  Generic drug:  nitroGLYCERIN  Place under the tongue.     omeprazole 40 MG capsule  Commonly known as:  PRILOSEC  Take by mouth.     pravastatin 40 MG tablet  Commonly known as:  PRAVACHOL     VIBERZI 75 mg Tab  Generic drug:   eluxadoline  Take by mouth.          Discharge Procedure Orders   Call MD for:  temperature >100.4     Call MD for:  persistent nausea and vomiting or diarrhea     Call MD for:  severe uncontrolled pain     Call MD for:  redness, tenderness, or signs of infection (pain, swelling, redness, odor or green/yellow discharge around incision site)     Call MD for:  difficulty breathing or increased cough     Call MD for:  severe persistent headache        Follow up with MD in 2-3 weeks    Discharge Procedure Orders (must include Diet, Follow-up, Activity):   Discharge Procedure Orders (must include Diet, Follow-up, Activity)   Call MD for:  temperature >100.4     Call MD for:  persistent nausea and vomiting or diarrhea     Call MD for:  severe uncontrolled pain     Call MD for:  redness, tenderness, or signs of infection (pain, swelling, redness, odor or green/yellow discharge around incision site)     Call MD for:  difficulty breathing or increased cough     Call MD for:  severe persistent headache

## 2019-09-12 NOTE — PLAN OF CARE
Stable, states ready to go home, denies pain, eliud po fluids, no legs weakness, ambulated to car with RN to nephew

## 2019-09-12 NOTE — OP NOTE
PROCEDURE DATE: 9/12/2019    PROCEDURE: Ganglion Impar Nerve Block under fluoroscopy    DIAGNOSIS: Coccydynia    Post op diagnosis: Same    PHYSICIAN: Christian James MD    MEDICATIONS INJECTED:  Dexamethasone 10mg and 3ml 0.25% bupivicaine.     LOCAL ANESTHETIC INJECTED:  Lidocaine 1%. 2 ml per site.    SEDATION MEDICATIONS: RN IV sedation    ESTIMATED BLOOD LOSS:  None    COMPLICATIONS:  None    TECHNIQUE:   A time-out was taken to identify patient and procedure side prior to starting the procedure. The patient was placed in a prone position, prepped and draped in the usual sterile fashion using ChloraPrep and sterile towels.  The area to be injected was determined under fluoroscopic guidance in AP and lateral view. A lateral fluoroscopic view is used to visualize the sacrococcygeal junction.   Local anesthetic was given by raising a wheal and going down to the hub of a 25-gauge 1.5 inch needle. A 22 gauge, 3.5inch needle was used to advance through the sacrococcygeal ligament until the needle tip is just barely anterior to the sacrum.  2ml contrast dye was injected to confirm appropriate placement and that there was no vascular uptake.  After negative aspiration for blood or CSF, the medication was then injected.  The patient tolerated the procedure well.    The patient was monitored after the procedure.  Patient was given post procedure and discharge instructions to follow at home. The patient was discharged in a stable condition.

## 2019-09-13 VITALS
TEMPERATURE: 98 F | HEART RATE: 80 BPM | WEIGHT: 110.88 LBS | SYSTOLIC BLOOD PRESSURE: 134 MMHG | DIASTOLIC BLOOD PRESSURE: 84 MMHG | OXYGEN SATURATION: 96 % | HEIGHT: 59 IN | RESPIRATION RATE: 20 BRPM | BODY MASS INDEX: 22.35 KG/M2

## 2019-10-03 ENCOUNTER — OFFICE VISIT (OUTPATIENT)
Dept: PAIN MEDICINE | Facility: CLINIC | Age: 79
End: 2019-10-03
Payer: MEDICARE

## 2019-10-03 VITALS
HEIGHT: 59 IN | HEART RATE: 75 BPM | WEIGHT: 110 LBS | DIASTOLIC BLOOD PRESSURE: 63 MMHG | BODY MASS INDEX: 22.18 KG/M2 | SYSTOLIC BLOOD PRESSURE: 112 MMHG

## 2019-10-03 DIAGNOSIS — M51.36 DDD (DEGENERATIVE DISC DISEASE), LUMBAR: ICD-10-CM

## 2019-10-03 DIAGNOSIS — M53.3 COCCYDYNIA: ICD-10-CM

## 2019-10-03 DIAGNOSIS — M54.16 LUMBAR RADICULITIS: Primary | ICD-10-CM

## 2019-10-03 PROCEDURE — 99213 OFFICE O/P EST LOW 20 MIN: CPT | Mod: S$GLB,,, | Performed by: ANESTHESIOLOGY

## 2019-10-03 PROCEDURE — 99999 PR PBB SHADOW E&M-EST. PATIENT-LVL III: CPT | Mod: PBBFAC,,, | Performed by: ANESTHESIOLOGY

## 2019-10-03 PROCEDURE — 3078F PR MOST RECENT DIASTOLIC BLOOD PRESSURE < 80 MM HG: ICD-10-PCS | Mod: CPTII,S$GLB,, | Performed by: ANESTHESIOLOGY

## 2019-10-03 PROCEDURE — 3078F DIAST BP <80 MM HG: CPT | Mod: CPTII,S$GLB,, | Performed by: ANESTHESIOLOGY

## 2019-10-03 PROCEDURE — 1101F PT FALLS ASSESS-DOCD LE1/YR: CPT | Mod: CPTII,S$GLB,, | Performed by: ANESTHESIOLOGY

## 2019-10-03 PROCEDURE — 3074F PR MOST RECENT SYSTOLIC BLOOD PRESSURE < 130 MM HG: ICD-10-PCS | Mod: CPTII,S$GLB,, | Performed by: ANESTHESIOLOGY

## 2019-10-03 PROCEDURE — 1101F PR PT FALLS ASSESS DOC 0-1 FALLS W/OUT INJ PAST YR: ICD-10-PCS | Mod: CPTII,S$GLB,, | Performed by: ANESTHESIOLOGY

## 2019-10-03 PROCEDURE — 3074F SYST BP LT 130 MM HG: CPT | Mod: CPTII,S$GLB,, | Performed by: ANESTHESIOLOGY

## 2019-10-03 PROCEDURE — 99213 PR OFFICE/OUTPT VISIT, EST, LEVL III, 20-29 MIN: ICD-10-PCS | Mod: S$GLB,,, | Performed by: ANESTHESIOLOGY

## 2019-10-03 PROCEDURE — 99999 PR PBB SHADOW E&M-EST. PATIENT-LVL III: ICD-10-PCS | Mod: PBBFAC,,, | Performed by: ANESTHESIOLOGY

## 2019-10-03 NOTE — PROGRESS NOTES
Referring Physician: No ref. provider found    PCP: Behzad Birch MD      CC: Low back and left leg pain, left knee    Interval History:  Ms. Carlson is a 78 y.o. female with chronic low back and left leg pain who returns to our clinic.  Tailbone pain was more bothersome recent visit.  Ganglion impar help with her tailbone pain. She continues to have low back and radiating left leg pain.  Pain is still very tolerable.  She has had relief with lumbar ALANNA in the past.  She will continue to monitor progress and contact us if she wishes to proceed with repeat procedure. She takes gabapentin nightly 300 mg her peripheral neuropathy.  She denies any worsening weakness.  No bowel bladder changes.   Prior HPI:   Patient is 68-year-old female referred to us for lower back and left leg pain.  Pain has been present for over 5 years.  No traumatic incident.  She has intermittent aching, throbbing, sharp pain in her lower back.  Pain radiates down her left leg into her left posterior thigh.  Pain worsens with standing, walking, bending and getting up.  Pain improves with rest.  She denies any weakness.  No bowel bladder changes.  MRI lumbar spine shows lumbar facet hypertrophy as well as possible L5 nerve impingement.  She underwent caudal ALANNA's with Dr. Barba with moderate benefit of her generalized lower back pain.  She continues to have left-sided radicular pain.  She rates her pain 4/10 today, 10/10 at worse.    Interventional history:  Left L4-5 L5-S1 TFESI 03/2017 with 50% relief  Left knee injection 07/2019 with 50% relief of left knee pain.  Repeat left L4-5 L5-S1 TFESI 08/2019 with 50% relief.  Ganglion impar injection 09/2019 with 60% relief of her tailbone    ROS:  CONSTITUTIONAL: No fevers, chills, night sweats, wt. loss, appetite changes  SKIN: no rashes or itching  ENT: No headaches, head trauma, vision changes, or eye pain  LYMPH NODES: None noticed   CV: No chest pain, palpitations.   RESP: No shortness of  breath, dyspnea on exertion, cough, wheezing, or hemoptysis  GI: No nausea, emesis, diarrhea, constipation, melena, hematochezia, pain.    : No dysuria, hematuria, urgency, or frequency   HEME: No easy bruising, bleeding problems  PSYCHIATRIC: No depression, anxiety, psychosis, hallucinations.  NEURO: No seizures, memory loss, dizziness or difficulty sleeping  MSK: + History of present illness      Past Medical History:   Diagnosis Date    Arthritis     Cataract     Coronary artery disease     Hypertension     Insomnia     Neuropathy     Retinal detachment     Stomach ulcer      Past Surgical History:   Procedure Laterality Date    APPENDECTOMY      BLADDER SUSPENSION      BREAST BIOPSY      CARDIAC SURGERY      carotid endarterectomy      L    CATARACT EXTRACTION      CHOLECYSTECTOMY      HYSTERECTOMY      INJECTION OF ANESTHETIC AGENT AROUND GANGLION IMPAR N/A 9/12/2019    Procedure: BLOCK, GANGLION IMPAR;  Surgeon: Christian James MD;  Location: [facility] OR;  Service: Pain Management;  Laterality: N/A;    OOPHORECTOMY      SUPERFICIAL KERATECTOMY Right 10/27/2017    Dr. Morales    TONSILLECTOMY      TRANSFORAMINAL EPIDURAL INJECTION OF STEROID Left 8/12/2019    Procedure: Injection,steroid,epidural,transforaminal approach L4-5, L5-S1;  Surgeon: Christian James MD;  Location: [facility] OR;  Service: Pain Management;  Laterality: Left;     Family History   Problem Relation Age of Onset    Cancer Father     Macular degeneration Maternal Aunt     Cancer Maternal Aunt     Breast cancer Maternal Aunt     Macular degeneration Maternal Uncle     Diabetes Paternal Aunt     Cancer Paternal Aunt     Blindness Maternal Grandfather      Social History     Socioeconomic History    Marital status:      Spouse name: Not on file    Number of children: Not on file    Years of education: Not on file    Highest education level: Not on file   Occupational History    Not on file   Social Needs    Financial  "resource strain: Not on file    Food insecurity:     Worry: Not on file     Inability: Not on file    Transportation needs:     Medical: Not on file     Non-medical: Not on file   Tobacco Use    Smoking status: Former Smoker     Years: 4.00     Types: Cigarettes     Last attempt to quit: 10/13/1962     Years since quittin.0    Smokeless tobacco: Never Used   Substance and Sexual Activity    Alcohol use: No    Drug use: No    Sexual activity: Yes     Birth control/protection: Surgical   Lifestyle    Physical activity:     Days per week: Not on file     Minutes per session: Not on file    Stress: Not on file   Relationships    Social connections:     Talks on phone: Not on file     Gets together: Not on file     Attends [employer] service: Not on file     Active member of club or organization: Not on file     Attends meetings of clubs or organizations: Not on file     Relationship status: Not on file   Other Topics Concern    Not on file   Social History Narrative    Not on file         Medications/Allergies: See med card    Vitals:    10/03/19 1045   BP: 112/63   Pulse: 75   Weight: 49.9 kg (110 lb)   Height: 4' 11" (1.499 m)   PainSc:   4   PainLoc: Back         Physical exam:    GENERAL: A and O x3, the patient appears well groomed and is in no acute distress.  Skin: No rashes or obvious lesions  HEENT: normocephalic, atraumatic  CARDIOVASCULAR:  RRR  LUNGS: non labored breathing  ABDOMEN: soft, nontender   UPPER EXTREMITIES: Normal alignment, normal range of motion, no atrophy, no skin changes,  hair growth and nail growth normal and equal bilaterally. No swelling, no tenderness.    LOWER EXTREMITIES:  Normal alignment, normal range of motion, no atrophy, no skin changes,  hair growth and nail growth normal and equal bilaterally. No swelling, mild tenderness to left patellar    LUMBAR SPINE  Lumbar spine: ROM is full with flexion extension and oblique extension with no increased pain.    Tom's " test causes no increased pain on either side.    Supine straight leg raise is positive on the left at 60°    Internal and external rotation of the hip causes no increased pain on either side.  Myofascial exam: No tenderness to palpation across lumbar paraspinous muscles.  Tenderness to coccyx    MENTAL STATUS: normal orientation, speech, language, and fund of knowledge for social situation.  Emotional state appropriate.    CRANIAL NERVES:  II:  PERRL bilaterally,   III,IV,VI: EOMI.    V:  Facial sensation equal bilaterally  VII:  Facial motor function normal.  VIII:  Hearing equal to finger rub bilaterally  IX/X: Gag normal, palate symmetric  XI:  Shoulder shrug equal, head turn equal  XII:  Tongue midline without fasciculations      MOTOR: Tone and bulk: normal bilateral upper and lower Strength: normal   Delt Bi Tri WE WF     R 5 5 5 5 5 5   L 5 5 5 5 5 5     IP ADD ABD Quad TA Gas HAM  R 5 5 5 5 5 5 5  L 5 5 5 5 5 5 5    SENSATION: Light touch and pinprick intact bilaterally  REFLEXES: normal, symmetric, nonbrisk.  Toes down, no clonus. No hoffmans.  GAIT: normal rise, base, steps, and arm swing.        Imaging:  MRI lumbar spine without contrast 10/2016 ([facility])  L3-4, broad-based disc bulge and facet hypertrophy results in mild central canal narrowing and foraminal narrowing.  L4-5, broad-based disc bulge with moderate facet hypertrophy,  Left greater than right.  There is mild spurring of the left facet joint which narrows the left lateral recess and compresses the left L5 nerve.  L5-S1, mild facet hypertrophy    Assessment:  Ms. Carlson is a 78 y.o. female back and left leg pain  1. Lumbar radiculitis    2. DDD (degenerative disc disease), lumbar    3. Coccydynia      Plan:  1.  I have stressed the importance of physical activity and exercise to improve overall health. Home exercises given  2. Patient with significant benefit following Lumbar ALANNA.  Patient will continue to monitor her progress.  May consider  repeat procedure in future if pain returns or worsens.   3. Continue gabapentin as prescribed  4. Follow up as needed

## 2019-11-21 ENCOUNTER — TELEPHONE (OUTPATIENT)
Dept: PAIN MEDICINE | Facility: CLINIC | Age: 79
End: 2019-11-21

## 2019-11-21 NOTE — TELEPHONE ENCOUNTER
----- Message from Aster Davis sent at 11/21/2019 12:17 PM CST -----  Type: Needs Medical Advice    Who Called:  Patient  Best Call Back Number: [phone] or [phone] (home)       Additional Information: Is still waiting for Comfort to call her back to let her know about a steroid shot in her knee. Please call to advise.

## 2019-11-21 NOTE — TELEPHONE ENCOUNTER
----- Message from Augustin Baxter, Patient Care Assistant sent at 11/21/2019  9:52 AM CST -----  Contact: Self  Patient would like an injection in her knee as soon as possible    Please advise she can be reached at [phone]

## 2019-11-21 NOTE — TELEPHONE ENCOUNTER
Spoke with patient she is having a great deal of knee pain. Last steroid injection was July 23rd - the first available is Dec 16 here with Dr James- would you like to work her in or do you have a suggestion of what to do with this patient she would like something sooner than 12/16?

## 2019-11-21 NOTE — TELEPHONE ENCOUNTER
Left message on machine that she is booked for 12/16/19 that is the first available   (3) slightly limited

## 2019-12-02 ENCOUNTER — TELEPHONE (OUTPATIENT)
Dept: PAIN MEDICINE | Facility: CLINIC | Age: 79
End: 2019-12-02

## 2019-12-02 DIAGNOSIS — M81.0 SENILE OSTEOPOROSIS: Primary | ICD-10-CM

## 2019-12-02 NOTE — TELEPHONE ENCOUNTER
----- Message from Arvind Chisholm sent at 12/2/2019  2:14 PM CST -----  Contact: pt  Type:  Patient Returning Call    Who Called:  pt  Who Left Message for Patient:  Norma  Does the patient know what this is regarding?:    Best Call Back Number:  [phone]  Additional Information:

## 2019-12-02 NOTE — TELEPHONE ENCOUNTER
----- Message from Azucena Oakes sent at 12/2/2019 12:20 PM CST -----  Contact: carson  Type:  Same Day Appointment Request    Caller is requesting a same day appointment.  Caller declined first available appointment listed below.      Name of Caller:  patient  When is the first available appointment?  12/16  Symptoms:  Extreme pain left knee  Best Call Back Number:  [phone]  Additional Information:   Requesting a call back to confirm appointment

## 2019-12-04 ENCOUNTER — OFFICE VISIT (OUTPATIENT)
Dept: PAIN MEDICINE | Facility: CLINIC | Age: 79
End: 2019-12-04
Payer: MEDICARE

## 2019-12-04 VITALS
SYSTOLIC BLOOD PRESSURE: 120 MMHG | HEIGHT: 59 IN | HEART RATE: 77 BPM | BODY MASS INDEX: 22.18 KG/M2 | WEIGHT: 110 LBS | DIASTOLIC BLOOD PRESSURE: 70 MMHG

## 2019-12-04 DIAGNOSIS — M51.36 DDD (DEGENERATIVE DISC DISEASE), LUMBAR: ICD-10-CM

## 2019-12-04 DIAGNOSIS — M53.3 COCCYDYNIA: ICD-10-CM

## 2019-12-04 DIAGNOSIS — G89.29 CHRONIC PAIN OF LEFT KNEE: Primary | ICD-10-CM

## 2019-12-04 DIAGNOSIS — M25.562 CHRONIC PAIN OF LEFT KNEE: Primary | ICD-10-CM

## 2019-12-04 DIAGNOSIS — M47.896 OTHER SPONDYLOSIS, LUMBAR REGION: ICD-10-CM

## 2019-12-04 PROCEDURE — 99999 PR PBB SHADOW E&M-EST. PATIENT-LVL III: ICD-10-PCS | Mod: PBBFAC,,, | Performed by: ANESTHESIOLOGY

## 2019-12-04 PROCEDURE — 3078F PR MOST RECENT DIASTOLIC BLOOD PRESSURE < 80 MM HG: ICD-10-PCS | Mod: CPTII,S$GLB,, | Performed by: ANESTHESIOLOGY

## 2019-12-04 PROCEDURE — 1125F AMNT PAIN NOTED PAIN PRSNT: CPT | Mod: S$GLB,,, | Performed by: ANESTHESIOLOGY

## 2019-12-04 PROCEDURE — 1101F PT FALLS ASSESS-DOCD LE1/YR: CPT | Mod: CPTII,S$GLB,, | Performed by: ANESTHESIOLOGY

## 2019-12-04 PROCEDURE — 3074F SYST BP LT 130 MM HG: CPT | Mod: CPTII,S$GLB,, | Performed by: ANESTHESIOLOGY

## 2019-12-04 PROCEDURE — 1159F PR MEDICATION LIST DOCUMENTED IN MEDICAL RECORD: ICD-10-PCS | Mod: S$GLB,,, | Performed by: ANESTHESIOLOGY

## 2019-12-04 PROCEDURE — 1159F MED LIST DOCD IN RCRD: CPT | Mod: S$GLB,,, | Performed by: ANESTHESIOLOGY

## 2019-12-04 PROCEDURE — 3078F DIAST BP <80 MM HG: CPT | Mod: CPTII,S$GLB,, | Performed by: ANESTHESIOLOGY

## 2019-12-04 PROCEDURE — 99214 PR OFFICE/OUTPT VISIT, EST, LEVL IV, 30-39 MIN: ICD-10-PCS | Mod: S$GLB,,, | Performed by: ANESTHESIOLOGY

## 2019-12-04 PROCEDURE — 1125F PR PAIN SEVERITY QUANTIFIED, PAIN PRESENT: ICD-10-PCS | Mod: S$GLB,,, | Performed by: ANESTHESIOLOGY

## 2019-12-04 PROCEDURE — 1101F PR PT FALLS ASSESS DOC 0-1 FALLS W/OUT INJ PAST YR: ICD-10-PCS | Mod: CPTII,S$GLB,, | Performed by: ANESTHESIOLOGY

## 2019-12-04 PROCEDURE — 3074F PR MOST RECENT SYSTOLIC BLOOD PRESSURE < 130 MM HG: ICD-10-PCS | Mod: CPTII,S$GLB,, | Performed by: ANESTHESIOLOGY

## 2019-12-04 PROCEDURE — 99999 PR PBB SHADOW E&M-EST. PATIENT-LVL III: CPT | Mod: PBBFAC,,, | Performed by: ANESTHESIOLOGY

## 2019-12-04 PROCEDURE — 99214 OFFICE O/P EST MOD 30 MIN: CPT | Mod: S$GLB,,, | Performed by: ANESTHESIOLOGY

## 2019-12-04 RX ORDER — HYDROCHLOROTHIAZIDE 12.5 MG/1
TABLET ORAL
Refills: 2 | Status: ON HOLD | COMMUNITY
Start: 2019-11-22 | End: 2020-02-07 | Stop reason: HOSPADM

## 2019-12-04 RX ORDER — TEMAZEPAM 15 MG/1
30 CAPSULE ORAL NIGHTLY PRN
Status: ON HOLD | COMMUNITY
Start: 2019-11-22 | End: 2020-02-07 | Stop reason: HOSPADM

## 2019-12-04 NOTE — PROGRESS NOTES
Referring Physician: No ref. provider found    PCP: Behzad Birch MD      CC: Low back and left leg pain, left knee    Interval History:  Ms. Carlson is a 79 y.o. female with chronic low back and left leg pain who returns to our clinic.  Tailbone pain has improved with recent ganglion impar injectionShe continues to have low back and radiating left leg pain.  Pain is still very tolerable.  She has had relief with lumbar ALANNA in the past.  She will continue to monitor progress and contact us if she wishes to proceed with repeat procedure. She takes gabapentin nightly 300 mg her peripheral neuropathy.  She does have recurrence of her left knee pain. She underwent a left knee injection in July 2019 and noticed increasing pain recently.  She denies any worsening weakness.  No bowel bladder changes.   Prior HPI:   Patient is 68-year-old female referred to us for lower back and left leg pain.  Pain has been present for over 5 years.  No traumatic incident.  She has intermittent aching, throbbing, sharp pain in her lower back.  Pain radiates down her left leg into her left posterior thigh.  Pain worsens with standing, walking, bending and getting up.  Pain improves with rest.  She denies any weakness.  No bowel bladder changes.  MRI lumbar spine shows lumbar facet hypertrophy as well as possible L5 nerve impingement.  She underwent caudal ALANNA's with Dr. Barba with moderate benefit of her generalized lower back pain.  She continues to have left-sided radicular pain.  She rates her pain 4/10 today, 10/10 at worse.    Interventional history:  Left L4-5 L5-S1 TFESI 03/2017 with 50% relief  Left knee injection 07/2019 with 50% relief of left knee pain.  Repeat left L4-5 L5-S1 TFESI 08/2019 with 50% relief.  Ganglion impar injection 09/2019 with 60% relief of her tailbone    ROS:  CONSTITUTIONAL: No fevers, chills, night sweats, wt. loss, appetite changes  SKIN: no rashes or itching  ENT: No headaches, head trauma, vision  changes, or eye pain  LYMPH NODES: None noticed   CV: No chest pain, palpitations.   RESP: No shortness of breath, dyspnea on exertion, cough, wheezing, or hemoptysis  GI: No nausea, emesis, diarrhea, constipation, melena, hematochezia, pain.    : No dysuria, hematuria, urgency, or frequency   HEME: No easy bruising, bleeding problems  PSYCHIATRIC: No depression, anxiety, psychosis, hallucinations.  NEURO: No seizures, memory loss, dizziness or difficulty sleeping  MSK: + History of present illness      Past Medical History:   Diagnosis Date    Arthritis     Cataract     Coronary artery disease     Hypertension     Insomnia     Neuropathy     Retinal detachment     Stomach ulcer      Past Surgical History:   Procedure Laterality Date    APPENDECTOMY      BLADDER SUSPENSION      BREAST BIOPSY      CARDIAC SURGERY      carotid endarterectomy      L    CATARACT EXTRACTION      CHOLECYSTECTOMY      HYSTERECTOMY      INJECTION OF ANESTHETIC AGENT AROUND GANGLION IMPAR N/A 9/12/2019    Procedure: BLOCK, GANGLION IMPAR;  Surgeon: Christian James MD;  Location: [facility] OR;  Service: Pain Management;  Laterality: N/A;    OOPHORECTOMY      SUPERFICIAL KERATECTOMY Right 10/27/2017    Dr. Morales    TONSILLECTOMY      TRANSFORAMINAL EPIDURAL INJECTION OF STEROID Left 8/12/2019    Procedure: Injection,steroid,epidural,transforaminal approach L4-5, L5-S1;  Surgeon: Christian James MD;  Location: [facility] OR;  Service: Pain Management;  Laterality: Left;     Family History   Problem Relation Age of Onset    Cancer Father     Macular degeneration Maternal Aunt     Cancer Maternal Aunt     Breast cancer Maternal Aunt     Macular degeneration Maternal Uncle     Diabetes Paternal Aunt     Cancer Paternal Aunt     Blindness Maternal Grandfather      Social History     Socioeconomic History    Marital status:      Spouse name: Not on file    Number of children: Not on file    Years of education: Not on file     "Highest education level: Not on file   Occupational History    Not on file   Social Needs    Financial resource strain: Not on file    Food insecurity:     Worry: Not on file     Inability: Not on file    Transportation needs:     Medical: Not on file     Non-medical: Not on file   Tobacco Use    Smoking status: Former Smoker     Years: 4.00     Types: Cigarettes     Last attempt to quit: 10/13/1962     Years since quittin.1    Smokeless tobacco: Never Used   Substance and Sexual Activity    Alcohol use: No    Drug use: No    Sexual activity: Yes     Birth control/protection: Surgical   Lifestyle    Physical activity:     Days per week: Not on file     Minutes per session: Not on file    Stress: Not on file   Relationships    Social connections:     Talks on phone: Not on file     Gets together: Not on file     Attends [employer] service: Not on file     Active member of club or organization: Not on file     Attends meetings of clubs or organizations: Not on file     Relationship status: Not on file   Other Topics Concern    Not on file   Social History Narrative    Not on file         Medications/Allergies: See med card    Vitals:    19 0846   BP: 120/70   Pulse: 77   Weight: 49.9 kg (110 lb)   Height: 4' 11" (1.499 m)   PainSc: 10-Worst pain ever   PainLoc: Knee         Physical exam:    GENERAL: A and O x3, the patient appears well groomed and is in no acute distress.  Skin: No rashes or obvious lesions  HEENT: normocephalic, atraumatic  CARDIOVASCULAR:  RRR  LUNGS: non labored breathing  ABDOMEN: soft, nontender   UPPER EXTREMITIES: Normal alignment, normal range of motion, no atrophy, no skin changes,  hair growth and nail growth normal and equal bilaterally. No swelling, no tenderness.    LOWER EXTREMITIES:  Normal alignment, normal range of motion, no atrophy, no skin changes,  hair growth and nail growth normal and equal bilaterally. No swelling, mild tenderness to left " patellar    LUMBAR SPINE  Lumbar spine: ROM is full with flexion extension and oblique extension with no increased pain.    Tom's test causes no increased pain on either side.    Supine straight leg raise is positive on the left at 60°    Internal and external rotation of the hip causes no increased pain on either side.  Myofascial exam: No tenderness to palpation across lumbar paraspinous muscles.  Tenderness to coccyx    MENTAL STATUS: normal orientation, speech, language, and fund of knowledge for social situation.  Emotional state appropriate.    CRANIAL NERVES:  II:  PERRL bilaterally,   III,IV,VI: EOMI.    V:  Facial sensation equal bilaterally  VII:  Facial motor function normal.  VIII:  Hearing equal to finger rub bilaterally  IX/X: Gag normal, palate symmetric  XI:  Shoulder shrug equal, head turn equal  XII:  Tongue midline without fasciculations      MOTOR: Tone and bulk: normal bilateral upper and lower Strength: normal   Delt Bi Tri WE WF     R 5 5 5 5 5 5   L 5 5 5 5 5 5     IP ADD ABD Quad TA Gas HAM  R 5 5 5 5 5 5 5  L 5 5 5 5 5 5 5    SENSATION: Light touch and pinprick intact bilaterally  REFLEXES: normal, symmetric, nonbrisk.  Toes down, no clonus. No hoffmans.  GAIT: normal rise, base, steps, and arm swing.        Imaging:  MRI lumbar spine without contrast 10/2016 ([facility])  L3-4, broad-based disc bulge and facet hypertrophy results in mild central canal narrowing and foraminal narrowing.  L4-5, broad-based disc bulge with moderate facet hypertrophy,  Left greater than right.  There is mild spurring of the left facet joint which narrows the left lateral recess and compresses the left L5 nerve.  L5-S1, mild facet hypertrophy    Assessment:  Ms. Carlson is a 79 y.o. female back and left leg pain  1. Chronic pain of left knee    2. DDD (degenerative disc disease), lumbar    3. Coccydynia    4. Other spondylosis, lumbar region      Plan:  1.  I have stressed the importance of physical activity  and exercise to improve overall health. Home exercises given  2. Patient with significant benefit following Lumbar ALANNA.  Patient will continue to monitor her progress.  May consider repeat procedure in future if pain returns or worsens.   3. Continue gabapentin as prescribed  4. Patient recurrence of her knee pain.  She had moderate benefit left knee injection in the past.  However, patient has had multiple injections involving steroids recently.  We will not consider repeat left knee injection today.  Did discuss viscosupplementation injections.  Patient wishes to proceed with this procedure.  5.  Follow-up for viscosupplementation injections

## 2019-12-18 ENCOUNTER — PROCEDURE VISIT (OUTPATIENT)
Dept: PAIN MEDICINE | Facility: CLINIC | Age: 79
End: 2019-12-18
Payer: MEDICARE

## 2019-12-18 VITALS
DIASTOLIC BLOOD PRESSURE: 68 MMHG | BODY MASS INDEX: 22.18 KG/M2 | WEIGHT: 110 LBS | HEIGHT: 59 IN | SYSTOLIC BLOOD PRESSURE: 126 MMHG | HEART RATE: 84 BPM

## 2019-12-18 DIAGNOSIS — M17.12 OSTEOARTHRITIS OF LEFT KNEE, UNSPECIFIED OSTEOARTHRITIS TYPE: Primary | ICD-10-CM

## 2019-12-18 PROCEDURE — 20610 DRAIN/INJ JOINT/BURSA W/O US: CPT | Mod: LT,S$GLB,, | Performed by: ANESTHESIOLOGY

## 2019-12-18 PROCEDURE — 20610 LARGE JOINT ASPIRATION/INJECTION: L KNEE: ICD-10-PCS | Mod: LT,S$GLB,, | Performed by: ANESTHESIOLOGY

## 2019-12-18 NOTE — PROCEDURES
Large Joint Aspiration/Injection: L knee  Date/Time: 12/18/2019 10:00 AM  Performed by: Christian James MD  Authorized by: Christian James MD     Consent Done?:  Yes (Written)  Indications:  Diagnostic evaluation  Procedure site marked: Yes    Timeout: Prior to procedure the correct patient, procedure, and site was verified    Anesthesia  Local anesthesia not used      Location:  Knee  Site:  L knee  Prep: Patient was prepped and draped in usual sterile fashion    Needle size:  22 G  Medications:  20 mg sodium hyaluronate (EUFLEXXA) 10 mg/mL(mw 2.4 -3.6 million)  Patient tolerance:  Patient tolerated the procedure well with no immediate complications

## 2019-12-26 ENCOUNTER — PROCEDURE VISIT (OUTPATIENT)
Dept: PAIN MEDICINE | Facility: CLINIC | Age: 79
End: 2019-12-26
Payer: MEDICARE

## 2019-12-26 VITALS
HEIGHT: 59 IN | HEART RATE: 78 BPM | WEIGHT: 110 LBS | DIASTOLIC BLOOD PRESSURE: 66 MMHG | BODY MASS INDEX: 22.18 KG/M2 | SYSTOLIC BLOOD PRESSURE: 111 MMHG

## 2019-12-26 DIAGNOSIS — M17.12 OSTEOARTHRITIS OF LEFT KNEE, UNSPECIFIED OSTEOARTHRITIS TYPE: Primary | ICD-10-CM

## 2019-12-26 PROCEDURE — 20610 DRAIN/INJ JOINT/BURSA W/O US: CPT | Mod: LT,S$GLB,, | Performed by: ANESTHESIOLOGY

## 2019-12-26 PROCEDURE — 20610 LARGE JOINT ASPIRATION/INJECTION: L KNEE: ICD-10-PCS | Mod: LT,S$GLB,, | Performed by: ANESTHESIOLOGY

## 2019-12-26 RX ORDER — ONDANSETRON 8 MG/1
TABLET, ORALLY DISINTEGRATING ORAL EVERY 6 HOURS PRN
COMMUNITY
Start: 2019-12-21

## 2019-12-26 NOTE — PROCEDURES
Large Joint Aspiration/Injection: L knee  Date/Time: 12/26/2019 10:00 AM  Performed by: Christian James MD  Authorized by: Christian James MD     Consent Done?:  Yes (Written)  Indications:  Pain  Procedure site marked: Yes    Timeout: Prior to procedure the correct patient, procedure, and site was verified      Location:  Knee  Site:  L knee  Prep: Patient was prepped and draped in usual sterile fashion    Needle size:  22 G  Approach:  Anterolateral  Medications:  20 mg sodium hyaluronate (EUFLEXXA) 10 mg/mL(mw 2.4 -3.6 million)  Patient tolerance:  Patient tolerated the procedure well with no immediate complications

## 2020-01-02 ENCOUNTER — PROCEDURE VISIT (OUTPATIENT)
Dept: PAIN MEDICINE | Facility: CLINIC | Age: 80
End: 2020-01-02
Payer: MEDICARE

## 2020-01-02 VITALS
WEIGHT: 110 LBS | SYSTOLIC BLOOD PRESSURE: 130 MMHG | BODY MASS INDEX: 22.18 KG/M2 | DIASTOLIC BLOOD PRESSURE: 67 MMHG | HEIGHT: 59 IN | HEART RATE: 77 BPM

## 2020-01-02 DIAGNOSIS — M17.12 OSTEOARTHRITIS OF LEFT KNEE, UNSPECIFIED OSTEOARTHRITIS TYPE: Primary | ICD-10-CM

## 2020-01-02 DIAGNOSIS — M54.16 LUMBAR RADICULITIS: Primary | ICD-10-CM

## 2020-01-02 PROCEDURE — 20610 LARGE JOINT ASPIRATION/INJECTION: L KNEE: ICD-10-PCS | Mod: LT,S$GLB,, | Performed by: ANESTHESIOLOGY

## 2020-01-02 PROCEDURE — 20610 DRAIN/INJ JOINT/BURSA W/O US: CPT | Mod: LT,S$GLB,, | Performed by: ANESTHESIOLOGY

## 2020-01-02 NOTE — PROCEDURES
Large Joint Aspiration/Injection: L knee  Date/Time: 1/2/2020 10:00 AM  Performed by: Christian James MD  Authorized by: Christian James MD     Consent Done?:  Yes (Written)  Indications:  Diagnostic evaluation  Procedure site marked: Yes    Timeout: Prior to procedure the correct patient, procedure, and site was verified      Location:  Knee  Site:  L knee  Prep: Patient was prepped and draped in usual sterile fashion    Needle size:  22 G  Approach:  Anterolateral  Medications:  20 mg sodium hyaluronate (EUFLEXXA) 10 mg/mL(mw 2.4 -3.6 million)  Patient tolerance:  Patient tolerated the procedure well with no immediate complications

## 2020-01-06 ENCOUNTER — HOSPITAL ENCOUNTER (OUTPATIENT)
Dept: RADIOLOGY | Facility: HOSPITAL | Age: 80
Discharge: HOME | End: 2020-01-06
Attending: INTERNAL MEDICINE
Payer: MEDICARE

## 2020-01-06 DIAGNOSIS — M81.0 SENILE OSTEOPOROSIS: ICD-10-CM

## 2020-01-06 PROCEDURE — 77080 DEXA BONE DENSITY SPINE HIP: ICD-10-PCS | Mod: 26,,, | Performed by: RADIOLOGY

## 2020-01-06 PROCEDURE — 77080 DXA BONE DENSITY AXIAL: CPT | Mod: 26,,, | Performed by: RADIOLOGY

## 2020-01-06 PROCEDURE — 77080 DXA BONE DENSITY AXIAL: CPT | Mod: TC

## 2020-01-16 ENCOUNTER — HOSPITAL ENCOUNTER (OUTPATIENT)
Facility: AMBULARY SURGERY CENTER | Age: 80
Discharge: HOME | End: 2020-01-16
Attending: ANESTHESIOLOGY | Admitting: ANESTHESIOLOGY
Payer: MEDICARE

## 2020-01-16 DIAGNOSIS — M54.16 LUMBAR RADICULITIS: Primary | ICD-10-CM

## 2020-01-16 PROCEDURE — 64484 NJX AA&/STRD TFRM EPI L/S EA: CPT | Mod: LT,,, | Performed by: ANESTHESIOLOGY

## 2020-01-16 PROCEDURE — 99152 PR MOD CONSCIOUS SEDATION, SAME PHYS, 5+ YRS, FIRST 15 MIN: ICD-10-PCS | Mod: ,,, | Performed by: ANESTHESIOLOGY

## 2020-01-16 PROCEDURE — 64484 NJX AA&/STRD TFRM EPI L/S EA: CPT | Performed by: ANESTHESIOLOGY

## 2020-01-16 PROCEDURE — 99152 MOD SED SAME PHYS/QHP 5/>YRS: CPT | Mod: ,,, | Performed by: ANESTHESIOLOGY

## 2020-01-16 PROCEDURE — 64483 PR EPIDURAL INJ, ANES/STEROID, TRANSFORAMINAL, LUMB/SACR, SNGL LEVL: ICD-10-PCS | Mod: LT,,, | Performed by: ANESTHESIOLOGY

## 2020-01-16 PROCEDURE — 64483 NJX AA&/STRD TFRM EPI L/S 1: CPT | Performed by: ANESTHESIOLOGY

## 2020-01-16 PROCEDURE — 64483 NJX AA&/STRD TFRM EPI L/S 1: CPT | Mod: LT,,, | Performed by: ANESTHESIOLOGY

## 2020-01-16 PROCEDURE — 64484 PRA INJECT ANES/STEROID FORAMEN LUMBAR/SACRAL W IMG GUIDE ,EA ADD LEVEL: ICD-10-PCS | Mod: LT,,, | Performed by: ANESTHESIOLOGY

## 2020-01-16 RX ORDER — LIDOCAINE HYDROCHLORIDE 10 MG/ML
INJECTION, SOLUTION EPIDURAL; INFILTRATION; INTRACAUDAL; PERINEURAL
Status: DISCONTINUED | OUTPATIENT
Start: 2020-01-16 | End: 2020-01-16 | Stop reason: HOSPADM

## 2020-01-16 RX ORDER — FENTANYL CITRATE 50 UG/ML
INJECTION, SOLUTION INTRAMUSCULAR; INTRAVENOUS
Status: DISCONTINUED | OUTPATIENT
Start: 2020-01-16 | End: 2020-01-16 | Stop reason: HOSPADM

## 2020-01-16 RX ORDER — DEXAMETHASONE SODIUM PHOSPHATE 10 MG/ML
INJECTION INTRAMUSCULAR; INTRAVENOUS
Status: DISCONTINUED | OUTPATIENT
Start: 2020-01-16 | End: 2020-01-16 | Stop reason: HOSPADM

## 2020-01-16 RX ORDER — SODIUM CHLORIDE, SODIUM LACTATE, POTASSIUM CHLORIDE, CALCIUM CHLORIDE 600; 310; 30; 20 MG/100ML; MG/100ML; MG/100ML; MG/100ML
INJECTION, SOLUTION INTRAVENOUS ONCE AS NEEDED
Status: DISCONTINUED | OUTPATIENT
Start: 2020-01-16 | End: 2020-01-16 | Stop reason: HOSPADM

## 2020-01-16 RX ORDER — ONDANSETRON 2 MG/ML
INJECTION INTRAMUSCULAR; INTRAVENOUS
Status: DISCONTINUED | OUTPATIENT
Start: 2020-01-16 | End: 2020-01-16 | Stop reason: HOSPADM

## 2020-01-16 RX ORDER — SODIUM CHLORIDE 9 MG/ML
INJECTION, SOLUTION INTRAVENOUS CONTINUOUS
Status: DISCONTINUED | OUTPATIENT
Start: 2020-01-16 | End: 2020-02-06

## 2020-01-16 RX ORDER — BUPIVACAINE HYDROCHLORIDE 2.5 MG/ML
INJECTION, SOLUTION EPIDURAL; INFILTRATION; INTRACAUDAL
Status: DISCONTINUED | OUTPATIENT
Start: 2020-01-16 | End: 2020-01-16 | Stop reason: HOSPADM

## 2020-01-16 RX ORDER — MIDAZOLAM HYDROCHLORIDE 1 MG/ML
INJECTION INTRAMUSCULAR; INTRAVENOUS
Status: DISCONTINUED | OUTPATIENT
Start: 2020-01-16 | End: 2020-01-16 | Stop reason: HOSPADM

## 2020-01-16 RX ADMIN — SODIUM CHLORIDE: 9 INJECTION, SOLUTION INTRAVENOUS at 11:01

## 2020-01-16 NOTE — PLAN OF CARE
Patient is awake alert and seated in chair; able to stand up and ambulate at bedside without assistance. Patient states she is ready to go home. Patient denies pain, nausea, dizziness or weakness. Patient's injection sites and  vital signs are stable. Patient's daughter present and states she is ready to take patient home and she will be driving the patient home. All belongings listed on pre op check list have been returned to ya.t

## 2020-01-16 NOTE — DISCHARGE SUMMARY
[facility]  Discharge Note  Short Stay    Admit Date: 1/16/2020    Discharge Date and Time: 1/16/2020    Attending Physician: Christian James MD     Discharge Provider: Christian James    Diagnoses:  Active Hospital Problems    Diagnosis  POA    *Lumbar radiculitis [M54.16]  Yes      Resolved Hospital Problems   No resolved problems to display.       Hospital Course: Lumbar Juan  Discharged Condition: Good    Final Diagnoses:   Active Hospital Problems    Diagnosis  POA    *Lumbar radiculitis [M54.16]  Yes      Resolved Hospital Problems   No resolved problems to display.       Disposition: Home or Self Care    Follow up/Patient Instructions:    Medications:  Reconciled Home Medications:      Medication List      CONTINUE taking these medications    buPROPion 300 MG 24 hr tablet  Commonly known as:  WELLBUTRIN XL  Take 300 mg by mouth once daily.     Dexilant 30 mg Cpdm  Generic drug:  dexlansoprazole  Take 60 mg by mouth.     dicyclomine 10 MG capsule  Commonly known as:  BENTYL     diphenoxylate-atropine 2.5-0.025 mg 2.5-0.025 mg per tablet  Commonly known as:  LOMOTIL  Take 1 tablet by mouth 4 (four) times daily as needed for Diarrhea.     Fluzone High-Dose 2019-20 (PF) 180 mcg/0.5 mL Syrg  Generic drug:  flu vacc [id number](65yr up)PF  ADM 0.5ML IM UTD     gabapentin 300 MG capsule  Commonly known as:  NEURONTIN  3 tablets HS     hydroCHLOROthiazide 12.5 MG Tab  Commonly known as:  HYDRODIURIL  TK 1 T PO QD     lidocaine 5 %  Commonly known as:  LIDODERM  Place 1 patch onto the skin every 24 hours. Remove & Discard patch within 12 hours or as directed by MD     lisinopril 10 MG tablet     Nitrostat 0.4 MG SL tablet  Generic drug:  nitroGLYCERIN  Place under the tongue.     omeprazole 40 MG capsule  Commonly known as:  PRILOSEC  Take by mouth.     ondansetron 8 MG Tbdl  Commonly known as:  ZOFRAN-ODT     pravastatin 40 MG tablet  Commonly known as:  PRAVACHOL     temazepam 15 mg Cap  Commonly known as:   RESTORIL          Discharge Procedure Orders   Call MD for:  temperature >100.4     Call MD for:  persistent nausea and vomiting or diarrhea     Call MD for:  severe uncontrolled pain     Call MD for:  redness, tenderness, or signs of infection (pain, swelling, redness, odor or green/yellow discharge around incision site)     Call MD for:  difficulty breathing or increased cough     Call MD for:  severe persistent headache        Follow up with MD in 2-3 weeks    Discharge Procedure Orders (must include Diet, Follow-up, Activity):   Discharge Procedure Orders (must include Diet, Follow-up, Activity)   Call MD for:  temperature >100.4     Call MD for:  persistent nausea and vomiting or diarrhea     Call MD for:  severe uncontrolled pain     Call MD for:  redness, tenderness, or signs of infection (pain, swelling, redness, odor or green/yellow discharge around incision site)     Call MD for:  difficulty breathing or increased cough     Call MD for:  severe persistent headache

## 2020-01-16 NOTE — H&P
CC: Low back pain    HPI: The patient is a 79 y.o. female with a history of lumbar DDD here for lumbar Juan. There are no major changes in history and physical from 12/2019 by myself.    Past Medical History:   Diagnosis Date    Arthritis     Cataract     Coronary artery disease     Hypertension     Insomnia     Neuropathy     Retinal detachment     Stomach ulcer        Past Surgical History:   Procedure Laterality Date    APPENDECTOMY      BLADDER SUSPENSION      BREAST BIOPSY      CARDIAC SURGERY      carotid endarterectomy      L    CATARACT EXTRACTION      CHOLECYSTECTOMY      HYSTERECTOMY      INJECTION OF ANESTHETIC AGENT AROUND GANGLION IMPAR N/A 9/12/2019    Procedure: BLOCK, GANGLION IMPAR;  Surgeon: Christian James MD;  Location: [facility] OR;  Service: Pain Management;  Laterality: N/A;    OOPHORECTOMY      SUPERFICIAL KERATECTOMY Right 10/27/2017    Dr. Morales    TONSILLECTOMY      TRANSFORAMINAL EPIDURAL INJECTION OF STEROID Left 8/12/2019    Procedure: Injection,steroid,epidural,transforaminal approach L4-5, L5-S1;  Surgeon: Christian James MD;  Location: [facility] OR;  Service: Pain Management;  Laterality: Left;       Family History   Problem Relation Age of Onset    Cancer Father     Macular degeneration Maternal Aunt     Cancer Maternal Aunt     Breast cancer Maternal Aunt     Macular degeneration Maternal Uncle     Diabetes Paternal Aunt     Cancer Paternal Aunt     Blindness Maternal Grandfather        Social History     Socioeconomic History    Marital status:      Spouse name: Not on file    Number of children: Not on file    Years of education: Not on file    Highest education level: Not on file   Occupational History    Not on file   Social Needs    Financial resource strain: Not on file    Food insecurity:     Worry: Not on file     Inability: Not on file    Transportation needs:     Medical: Not on file     Non-medical: Not on file   Tobacco Use    Smoking status:  "Former Smoker     Years: 4.00     Types: Cigarettes     Last attempt to quit: 10/13/1962     Years since quittin.2    Smokeless tobacco: Never Used   Substance and Sexual Activity    Alcohol use: No    Drug use: No    Sexual activity: Yes     Birth control/protection: Surgical   Lifestyle    Physical activity:     Days per week: Not on file     Minutes per session: Not on file    Stress: Not on file   Relationships    Social connections:     Talks on phone: Not on file     Gets together: Not on file     Attends Holiness service: Not on file     Active member of club or organization: Not on file     Attends meetings of clubs or organizations: Not on file     Relationship status: Not on file   Other Topics Concern    Not on file   Social History Narrative    Not on file       Current Facility-Administered Medications   Medication Dose Route Frequency Provider Last Rate Last Dose    0.9%  NaCl infusion   Intravenous Continuous Christian James MD        bupivacaine (PF) 0.25% (2.5 mg/ml) injection    PRN Christian James MD   3 mL at 20 1135    dexAMETHasone sodium phos (PF) injection    PRN Christian James MD   10 mg at 20 1135    iohexol (OMNIPAQUE 300) injection    PRN Christian James MD   5 mL at 20 1135    lactated ringers infusion   Intravenous Once PRN Christian James MD        lidocaine (PF) 10 mg/ml (1%) injection    PRN Christian James MD   5 mL at 20 1136       Review of patient's allergies indicates:   Allergen Reactions    Demerol [meperidine] Nausea And Vomiting    Hydrocodone Itching    Percocet [oxycodone-acetaminophen] Itching    Tramadol        Vitals:    20 1042   Weight: 49.9 kg (110 lb 0.2 oz)   Height: 4' 11" (1.499 m)       REVIEW OF SYSTEMS:     GENERAL: No weight loss, malaise or fevers.  HEENT:  No recent changes in vision or hearing  NECK: Negative for lumps, no difficulty with swallowing.  RESPIRATORY: Negative for cough, wheezing or shortness of breath, patient " denies any recent URI.  CARDIOVASCULAR: Negative for chest pain, leg swelling or palpitations.  GI: Negative for abdominal discomfort, blood in stools or black stools or change in bowel habits.  MUSCULOSKELETAL: See HPI.  SKIN: Negative for lesions, rash, and itching.  PSYCH: No suicidal or homicidal ideations, no current mood disturbances.  HEMATOLOGY/LYMPHOLOGY: Negative for prolonged bleeding, bruising easily or swollen nodes. Patient is not currently taking any anti-coagulants  ENDO: No history of diabetes or thyroid dysfunction  NEURO: No history of syncope, paralysis, seizures or tremors.All other reviewed and negative other than HPI.    Physical exam:  Gen: A and O x3, pleasant, well-groomed  Skin: No rashes or obvious lesions  HEENT: PERRLA, no obvious deformities on ears or in canals. No thyroid masses, trachea midline, no palpable lymph nodes in neck, axilla.  CVS: Regular rate and rhythm, normal S1 and S2, no murmurs.  Resp: Clear to auscultation bilaterally.  Abdomen: Soft, NT/ND, normal bowel sounds present.  Musculoskeletal/Neuro: Moving all extremities    Assessment:  Lumbar radiculitis  -     Case Request Operating Room: Injection,steroid,epidural,transforaminal approach  -     Place in Outpatient; Standing  -     Vital signs; Standing  -     Insert peripheral IV; Standing  -     Verify informed consent; Standing  -     Notify physician ; Standing  -     Notify physician ; Standing  -     Notify physician (specify); Standing  -     Diet NPO; Standing    Other orders  -     Progressive Mobility Protocol (mobilize patient to their highest level of functioning at least twice daily); Standing  -     lactated ringers infusion  -     IP VTE LOW RISK PATIENT; Standing  -     bupivacaine (PF) 0.25% (2.5 mg/ml) injection  -     dexAMETHasone sodium phos (PF) injection  -     iohexol (OMNIPAQUE 300) injection  -     lidocaine (PF) 10 mg/ml (1%) injection          PLAN: lumbar Juan      This patient has been  cleared for surgery in an ambulatory surgical facility    ASA 3,  Mallampatti Score 3  No history of anesthetic complications  Plan for RN IV sedation

## 2020-01-16 NOTE — OP NOTE
PROCEDURE DATE: 1/16/2020    PROCEDURE: Left L4-5 and L5-S1 transforaminal epidural steroid injection under fluoroscopy    DIAGNOSIS: Lumbar disc displacement without myelopathy  Post op diagnosis: Same    PHYSICIAN: Christian James MD    MEDICATIONS INJECTED:  Dexamethasone 5mg (0.5ml) and 1.5ml 0.25% bupivicaine at each nerve root.     LOCAL ANESTHETIC INJECTED:  Lidocaine 1%. 2 ml per site.    SEDATION MEDICATIONS: RN IV sedation    ESTIMATED BLOOD LOSS:  None    COMPLICATIONS:  None    TECHNIQUE:   A time-out was taken to identify patient and procedure side prior to starting the procedure. The patient was placed in a prone position, prepped and draped in the usual sterile fashion using ChloraPrep and sterile towels.  The area to be injected was determined under fluoroscopic guidance in AP and oblique view.  Local anesthetic was given by raising a wheal and going down to the hub of a 25-gauge 1.5 inch needle.  In oblique view, a 3.5 inch 22-gauge bent-tip spinal needle was introduced towards 6 oclock position of the pedicle of each above named nerve root level.  The needle was walked medially then hinged into the neural foramen and position was confirmed in AP and lateral views.  1ml contrast dye was injected to confirm appropriate placement and that there was no vascular uptake.  After negative aspiration for blood or CSF, the medication was then injected. This was performed at the left L4-5 and L5-S1 level(s). The patient tolerated the procedure well.    The patient was monitored after the procedure.  Patient was given post procedure and discharge instructions to follow at home. The patient was discharged in a stable condition.

## 2020-01-16 NOTE — DISCHARGE INSTRUCTIONS
Before leaving, please make sure you have all your personal belongings such as glasses, purses, wallets, keys, cell phones, jewelry, jackets etc Pain injection instructions:     This procedure may take a couple weeks to relieve pain  You may get some pain relief from the local anesthetic initally.    No driving for 24 hrs.   Activity as tolerated- gradually increase activities.  Dont lift over 10 lbs for 24 hrs   No heat at injection sites x 2 days. No heating pads, hot tubs, saunas, or swimming in any body of water or pool for 2 days.  Use ice pack for mild swelling and for comfort , apply for 20 minutes, remove for 20 minute intervals. No direct contact of ice itself  to skin.  May shower today. Do not allow shower water to beat on injection sites for 2 days. No tub baths for two days.      Resume Aspirin, Plavix, or Coumadin the day after the procedure unless otherwise instructed.   If diabetic,monitor your glucose carefully as steroids can increase your glucose level    Seek immediate medical help for:   Severe increase in your usual pain or appearance of new pain.  Prolonged (morethan 8 hours) or increasing weakness or numbness in the legs or arms. Numbing medicine was injected and can affect the messages to and from the brain and legs or arms.  .    Fever above 101 ,Drainage,redness,active bleeding, or increased swelling at the injection site.  Headache, shortness of breath, chest pain, or breathing problems.    After Surgery:  Always be aware that any surgery can cause these symptoms:    Pain- Medication can be prescribed for pain to decrease your pain but may not completely take your pain away. Over the Counter pain medicine my be enough and you can always use Ice and rest to help ease pain.    Bleeding- a  very ittle bleeding after a surgery is usually within normal.  If there is a lot of blood you need to notify your MD.  Emergency treatments of bleeding are cold application, elevation of the bleeding site  and compression.    Infection- Infection after surgery is NOT a normal occurrence.  Signs of infection are fever, swelling, hot to touch the incision.  If this occurs notify your MD immediately.    Nausea- this can be common after a surgery especially if you have had anesthesia medicine or are taking pain medicine.  Steroids have a side effect of nausea sometimes. Staying on clear liquids, bland foods, gingerale, or over the counter anti nausea medicines can help.  If you vomit more than once, notify your MD.  Anti Nausea medicines can be prescribed.

## 2020-01-20 VITALS
OXYGEN SATURATION: 98 % | HEART RATE: 77 BPM | TEMPERATURE: 98 F | BODY MASS INDEX: 22.18 KG/M2 | RESPIRATION RATE: 18 BRPM | DIASTOLIC BLOOD PRESSURE: 76 MMHG | WEIGHT: 110 LBS | SYSTOLIC BLOOD PRESSURE: 153 MMHG | HEIGHT: 59 IN

## 2020-01-30 ENCOUNTER — OFFICE VISIT (OUTPATIENT)
Dept: DERMATOLOGY | Facility: CLINIC | Age: 80
End: 2020-01-30
Payer: MEDICARE

## 2020-01-30 DIAGNOSIS — L72.0 MILIA: ICD-10-CM

## 2020-01-30 DIAGNOSIS — L73.8 SEBACEOUS HYPERPLASIA: Primary | ICD-10-CM

## 2020-01-30 PROCEDURE — 1126F PR PAIN SEVERITY QUANTIFIED, NO PAIN PRESENT: ICD-10-PCS | Mod: S$GLB,,, | Performed by: DERMATOLOGY

## 2020-01-30 PROCEDURE — 1159F PR MEDICATION LIST DOCUMENTED IN MEDICAL RECORD: ICD-10-PCS | Mod: S$GLB,,, | Performed by: DERMATOLOGY

## 2020-01-30 PROCEDURE — 1101F PT FALLS ASSESS-DOCD LE1/YR: CPT | Mod: CPTII,S$GLB,, | Performed by: DERMATOLOGY

## 2020-01-30 PROCEDURE — 99201 PR OFFICE/OUTPT VISIT,NEW,LEVL I: CPT | Mod: S$GLB,,, | Performed by: DERMATOLOGY

## 2020-01-30 PROCEDURE — 1159F MED LIST DOCD IN RCRD: CPT | Mod: S$GLB,,, | Performed by: DERMATOLOGY

## 2020-01-30 PROCEDURE — 99201 PR OFFICE/OUTPT VISIT,NEW,LEVL I: ICD-10-PCS | Mod: S$GLB,,, | Performed by: DERMATOLOGY

## 2020-01-30 PROCEDURE — 99999 PR PBB SHADOW E&M-EST. PATIENT-LVL II: ICD-10-PCS | Mod: PBBFAC,,, | Performed by: DERMATOLOGY

## 2020-01-30 PROCEDURE — 1126F AMNT PAIN NOTED NONE PRSNT: CPT | Mod: S$GLB,,, | Performed by: DERMATOLOGY

## 2020-01-30 PROCEDURE — 1101F PR PT FALLS ASSESS DOC 0-1 FALLS W/OUT INJ PAST YR: ICD-10-PCS | Mod: CPTII,S$GLB,, | Performed by: DERMATOLOGY

## 2020-01-30 PROCEDURE — 99999 PR PBB SHADOW E&M-EST. PATIENT-LVL II: CPT | Mod: PBBFAC,,, | Performed by: DERMATOLOGY

## 2020-01-30 RX ORDER — ESCITALOPRAM OXALATE 10 MG/1
TABLET ORAL DAILY
Status: ON HOLD | COMMUNITY
Start: 2020-01-29 | End: 2020-02-07 | Stop reason: HOSPADM

## 2020-01-30 RX ORDER — CHOLECALCIFEROL (VITAMIN D3) 50 MCG
1 TABLET ORAL DAILY
COMMUNITY

## 2020-01-30 NOTE — PROGRESS NOTES
Subjective:       Patient ID:  Sunita Carlson is a 79 y.o. female who presents for   Chief Complaint   Patient presents with    Spot     Face, months, raised, no tx     Initial visit    Here today for spots on face for few months.  Raised, no tx.    First derm visit    Hx of intense sun exposure    Pt denies Phx NMSC  Pt denies Fhx MM      Review of Systems   Constitutional: Negative for fever, chills and fatigue.   Skin: Positive for daily sunscreen use, activity-related sunscreen use and wears hat.   Hematologic/Lymphatic: Bruises/bleeds easily.        Objective:    Physical Exam   Constitutional: She appears well-developed and well-nourished. No distress.   Neurological: She is alert and oriented to person, place, and time. She is not disoriented.   Psychiatric: She has a normal mood and affect.   Skin:   Areas Examined (abnormalities noted in diagram):   Head / Face Inspection Performed              Diagram Legend     Erythematous scaling macule/papule c/w actinic keratosis       Vascular papule c/w angioma      Pigmented verrucoid papule/plaque c/w seborrheic keratosis      Yellow umbilicated papule c/w sebaceous hyperplasia      Irregularly shaped tan macule c/w lentigo     1-2 mm smooth white papules consistent with Milia      Movable subcutaneous cyst with punctum c/w epidermal inclusion cyst      Subcutaneous movable cyst c/w pilar cyst      Firm pink to brown papule c/w dermatofibroma      Pedunculated fleshy papule(s) c/w skin tag(s)      Evenly pigmented macule c/w junctional nevus     Mildly variegated pigmented, slightly irregular-bordered macule c/w mildly atypical nevus      Flesh colored to evenly pigmented papule c/w intradermal nevus       Pink pearly papule/plaque c/w basal cell carcinoma      Erythematous hyperkeratotic cursted plaque c/w SCC      Surgical scar with no sign of skin cancer recurrence      Open and closed comedones      Inflammatory papules and pustules      Verrucoid papule  consistent consistent with wart     Erythematous eczematous patches and plaques     Dystrophic onycholytic nail with subungual debris c/w onychomycosis     Umbilicated papule    Erythematous-base heme-crusted tan verrucoid plaque consistent with inflamed seborrheic keratosis     Erythematous Silvery Scaling Plaque c/w Psoriasis     See annotation      Assessment / Plan:        Sebaceous hyperplasia    Milia    Benign lesions, reassurance provided. No treatment is necessary.   Treatment of benign, asymptomatic lesions is considered cosmetic.           Follow up if symptoms worsen or fail to improve.

## 2020-02-06 ENCOUNTER — HOSPITAL ENCOUNTER (OUTPATIENT)
Facility: HOSPITAL | Age: 80
Discharge: HOME | End: 2020-02-07
Attending: EMERGENCY MEDICINE | Admitting: HOSPITALIST
Payer: MEDICARE

## 2020-02-06 DIAGNOSIS — I25.700 CORONARY ARTERY DISEASE INVOLVING CORONARY BYPASS GRAFT OF NATIVE HEART WITH UNSTABLE ANGINA PECTORIS: Primary | ICD-10-CM

## 2020-02-06 DIAGNOSIS — R55 SYNCOPE: ICD-10-CM

## 2020-02-06 DIAGNOSIS — I10 HYPERTENSION: ICD-10-CM

## 2020-02-06 LAB
ALBUMIN SERPL BCP-MCNC: 4 G/DL (ref 3.5–5.2)
ALP SERPL-CCNC: 91 U/L (ref 55–135)
ALT SERPL W/O P-5'-P-CCNC: 12 U/L (ref 10–44)
ANION GAP SERPL CALC-SCNC: 13 MMOL/L (ref 8–16)
AST SERPL-CCNC: 14 U/L (ref 10–40)
BASOPHILS # BLD AUTO: 0.06 K/UL (ref 0–0.2)
BASOPHILS NFR BLD: 0.5 % (ref 0–1.9)
BILIRUB SERPL-MCNC: 0.5 MG/DL (ref 0.1–1)
BILIRUB UR QL STRIP: NEGATIVE
BNP SERPL-MCNC: <10 PG/ML (ref 0–99)
BUN SERPL-MCNC: 20 MG/DL (ref 8–23)
CALCIUM SERPL-MCNC: 10.1 MG/DL (ref 8.7–10.5)
CHLORIDE SERPL-SCNC: 96 MMOL/L (ref 95–110)
CLARITY UR: CLEAR
CO2 SERPL-SCNC: 21 MMOL/L (ref 23–29)
COLOR UR: YELLOW
CREAT SERPL-MCNC: 0.9 MG/DL (ref 0.5–1.4)
DIFFERENTIAL METHOD: ABNORMAL
EOSINOPHIL # BLD AUTO: 0.1 K/UL (ref 0–0.5)
EOSINOPHIL NFR BLD: 0.5 % (ref 0–8)
ERYTHROCYTE [DISTWIDTH] IN BLOOD BY AUTOMATED COUNT: 11.9 % (ref 11.5–14.5)
EST. GFR  (AFRICAN AMERICAN): >60 ML/MIN/1.73 M^2
EST. GFR  (NON AFRICAN AMERICAN): >60 ML/MIN/1.73 M^2
GLUCOSE SERPL-MCNC: 161 MG/DL (ref 70–110)
GLUCOSE UR QL STRIP: NEGATIVE
HCT VFR BLD AUTO: 40.2 % (ref 37–48.5)
HGB BLD-MCNC: 13.8 G/DL (ref 12–16)
HGB UR QL STRIP: ABNORMAL
IMM GRANULOCYTES # BLD AUTO: 0.06 K/UL (ref 0–0.04)
INR PPP: 0.9 (ref 0.8–1.2)
KETONES UR QL STRIP: NEGATIVE
LEUKOCYTE ESTERASE UR QL STRIP: NEGATIVE
LYMPHOCYTES # BLD AUTO: 1.8 K/UL (ref 1–4.8)
LYMPHOCYTES NFR BLD: 14.6 % (ref 18–48)
MAGNESIUM SERPL-MCNC: 1.7 MG/DL (ref 1.6–2.6)
MCH RBC QN AUTO: 30.5 PG (ref 27–31)
MCHC RBC AUTO-ENTMCNC: 34.3 G/DL (ref 32–36)
MCV RBC AUTO: 89 FL (ref 82–98)
MONOCYTES # BLD AUTO: 0.6 K/UL (ref 0.3–1)
MONOCYTES NFR BLD: 4.6 % (ref 4–15)
NEUTROPHILS # BLD AUTO: 9.6 K/UL (ref 1.8–7.7)
NEUTROPHILS NFR BLD: 79.3 % (ref 38–73)
NITRITE UR QL STRIP: NEGATIVE
NRBC BLD-RTO: 0 /100 WBC
PH UR STRIP: 7 [PH] (ref 5–8)
PLATELET # BLD AUTO: 274 K/UL (ref 150–350)
PMV BLD AUTO: 8.9 FL (ref 9.2–12.9)
POTASSIUM SERPL-SCNC: 4 MMOL/L (ref 3.5–5.1)
PROT SERPL-MCNC: 7.1 G/DL (ref 6–8.4)
PROT UR QL STRIP: NEGATIVE
PROTHROMBIN TIME: 9.6 SEC (ref 9–12.5)
RBC # BLD AUTO: 4.53 M/UL (ref 4–5.4)
SODIUM SERPL-SCNC: 130 MMOL/L (ref 136–145)
SP GR UR STRIP: 1.01 (ref 1–1.03)
T4 FREE SERPL-MCNC: 1.12 NG/DL (ref 0.71–1.51)
TROPONIN I SERPL DL<=0.01 NG/ML-MCNC: <0.006 NG/ML (ref 0–0.03)
TSH SERPL DL<=0.005 MIU/L-ACNC: 4.82 UIU/ML (ref 0.4–4)
URN SPEC COLLECT METH UR: ABNORMAL
UROBILINOGEN UR STRIP-ACNC: NEGATIVE EU/DL
WBC # BLD AUTO: 12.09 K/UL (ref 3.9–12.7)

## 2020-02-06 PROCEDURE — 25000003 PHARM REV CODE 250: Performed by: HOSPITALIST

## 2020-02-06 PROCEDURE — 84439 ASSAY OF FREE THYROXINE: CPT

## 2020-02-06 PROCEDURE — G0378 HOSPITAL OBSERVATION PER HR: HCPCS

## 2020-02-06 PROCEDURE — 99285 EMERGENCY DEPT VISIT HI MDM: CPT | Mod: 25

## 2020-02-06 PROCEDURE — 93005 ELECTROCARDIOGRAM TRACING: CPT

## 2020-02-06 PROCEDURE — 85610 PROTHROMBIN TIME: CPT

## 2020-02-06 PROCEDURE — 25000003 PHARM REV CODE 250: Performed by: EMERGENCY MEDICINE

## 2020-02-06 PROCEDURE — 83735 ASSAY OF MAGNESIUM: CPT

## 2020-02-06 PROCEDURE — 96372 THER/PROPH/DIAG INJ SC/IM: CPT | Mod: 59

## 2020-02-06 PROCEDURE — 36415 COLL VENOUS BLD VENIPUNCTURE: CPT

## 2020-02-06 PROCEDURE — 84443 ASSAY THYROID STIM HORMONE: CPT

## 2020-02-06 PROCEDURE — 96361 HYDRATE IV INFUSION ADD-ON: CPT

## 2020-02-06 PROCEDURE — 25500020 PHARM REV CODE 255: Performed by: EMERGENCY MEDICINE

## 2020-02-06 PROCEDURE — 63600175 PHARM REV CODE 636 W HCPCS: Performed by: HOSPITALIST

## 2020-02-06 PROCEDURE — 81003 URINALYSIS AUTO W/O SCOPE: CPT

## 2020-02-06 PROCEDURE — 96365 THER/PROPH/DIAG IV INF INIT: CPT

## 2020-02-06 PROCEDURE — 63600175 PHARM REV CODE 636 W HCPCS: Performed by: EMERGENCY MEDICINE

## 2020-02-06 PROCEDURE — 84484 ASSAY OF TROPONIN QUANT: CPT

## 2020-02-06 PROCEDURE — 83880 ASSAY OF NATRIURETIC PEPTIDE: CPT

## 2020-02-06 PROCEDURE — 80053 COMPREHEN METABOLIC PANEL: CPT

## 2020-02-06 PROCEDURE — 85025 COMPLETE CBC W/AUTO DIFF WBC: CPT

## 2020-02-06 RX ORDER — IBUPROFEN 200 MG
24 TABLET ORAL
Status: DISCONTINUED | OUTPATIENT
Start: 2020-02-06 | End: 2020-02-07 | Stop reason: HOSPADM

## 2020-02-06 RX ORDER — CALCIUM CARBONATE 200(500)MG
500 TABLET,CHEWABLE ORAL DAILY PRN
Status: DISCONTINUED | OUTPATIENT
Start: 2020-02-06 | End: 2020-02-07 | Stop reason: HOSPADM

## 2020-02-06 RX ORDER — ASPIRIN 325 MG
325 TABLET ORAL DAILY
Status: DISCONTINUED | OUTPATIENT
Start: 2020-02-06 | End: 2020-02-07 | Stop reason: HOSPADM

## 2020-02-06 RX ORDER — GABAPENTIN 300 MG/1
300 CAPSULE ORAL 2 TIMES DAILY
Status: DISCONTINUED | OUTPATIENT
Start: 2020-02-06 | End: 2020-02-07 | Stop reason: HOSPADM

## 2020-02-06 RX ORDER — HYDRALAZINE HYDROCHLORIDE 20 MG/ML
10 INJECTION INTRAMUSCULAR; INTRAVENOUS
Status: DISCONTINUED | OUTPATIENT
Start: 2020-02-06 | End: 2020-02-06

## 2020-02-06 RX ORDER — TALC
8 POWDER (GRAM) TOPICAL NIGHTLY PRN
Status: DISCONTINUED | OUTPATIENT
Start: 2020-02-06 | End: 2020-02-07 | Stop reason: HOSPADM

## 2020-02-06 RX ORDER — POTASSIUM CHLORIDE 20 MEQ/15ML
40 SOLUTION ORAL
Status: DISCONTINUED | OUTPATIENT
Start: 2020-02-06 | End: 2020-02-07 | Stop reason: HOSPADM

## 2020-02-06 RX ORDER — ACETAMINOPHEN 500 MG
1000 TABLET ORAL EVERY 6 HOURS PRN
Status: DISCONTINUED | OUTPATIENT
Start: 2020-02-06 | End: 2020-02-07 | Stop reason: HOSPADM

## 2020-02-06 RX ORDER — PANTOPRAZOLE SODIUM 40 MG/1
40 TABLET, DELAYED RELEASE ORAL DAILY
Status: DISCONTINUED | OUTPATIENT
Start: 2020-02-06 | End: 2020-02-07 | Stop reason: HOSPADM

## 2020-02-06 RX ORDER — LISINOPRIL 10 MG/1
10 TABLET ORAL DAILY
Status: DISCONTINUED | OUTPATIENT
Start: 2020-02-06 | End: 2020-02-07 | Stop reason: HOSPADM

## 2020-02-06 RX ORDER — PRAVASTATIN SODIUM 10 MG/1
20 TABLET ORAL DAILY
Status: DISCONTINUED | OUTPATIENT
Start: 2020-02-06 | End: 2020-02-07 | Stop reason: HOSPADM

## 2020-02-06 RX ORDER — HEPARIN SODIUM 5000 [USP'U]/ML
5000 INJECTION, SOLUTION INTRAVENOUS; SUBCUTANEOUS EVERY 8 HOURS
Status: DISCONTINUED | OUTPATIENT
Start: 2020-02-06 | End: 2020-02-07 | Stop reason: HOSPADM

## 2020-02-06 RX ORDER — IBUPROFEN 200 MG
16 TABLET ORAL
Status: DISCONTINUED | OUTPATIENT
Start: 2020-02-06 | End: 2020-02-07 | Stop reason: HOSPADM

## 2020-02-06 RX ORDER — METOPROLOL TARTRATE 50 MG/1
50 TABLET ORAL 2 TIMES DAILY
Status: DISCONTINUED | OUTPATIENT
Start: 2020-02-06 | End: 2020-02-07 | Stop reason: HOSPADM

## 2020-02-06 RX ORDER — LANOLIN ALCOHOL/MO/W.PET/CERES
800 CREAM (GRAM) TOPICAL
Status: DISCONTINUED | OUTPATIENT
Start: 2020-02-06 | End: 2020-02-07 | Stop reason: HOSPADM

## 2020-02-06 RX ORDER — SODIUM CHLORIDE 0.9 % (FLUSH) 0.9 %
10 SYRINGE (ML) INJECTION
Status: DISCONTINUED | OUTPATIENT
Start: 2020-02-06 | End: 2020-02-07 | Stop reason: HOSPADM

## 2020-02-06 RX ORDER — ESCITALOPRAM OXALATE 10 MG/1
10 TABLET ORAL DAILY
Status: DISCONTINUED | OUTPATIENT
Start: 2020-02-06 | End: 2020-02-07 | Stop reason: HOSPADM

## 2020-02-06 RX ORDER — POTASSIUM CHLORIDE 20 MEQ/15ML
60 SOLUTION ORAL
Status: DISCONTINUED | OUTPATIENT
Start: 2020-02-06 | End: 2020-02-07 | Stop reason: HOSPADM

## 2020-02-06 RX ORDER — BUPROPION HYDROCHLORIDE 150 MG/1
300 TABLET ORAL DAILY
Status: DISCONTINUED | OUTPATIENT
Start: 2020-02-06 | End: 2020-02-07 | Stop reason: HOSPADM

## 2020-02-06 RX ORDER — GLUCAGON 1 MG
1 KIT INJECTION
Status: DISCONTINUED | OUTPATIENT
Start: 2020-02-06 | End: 2020-02-07 | Stop reason: HOSPADM

## 2020-02-06 RX ORDER — AMOXICILLIN 250 MG
1 CAPSULE ORAL 2 TIMES DAILY
Status: DISCONTINUED | OUTPATIENT
Start: 2020-02-06 | End: 2020-02-07 | Stop reason: HOSPADM

## 2020-02-06 RX ORDER — ONDANSETRON 2 MG/ML
8 INJECTION INTRAMUSCULAR; INTRAVENOUS EVERY 8 HOURS PRN
Status: DISCONTINUED | OUTPATIENT
Start: 2020-02-06 | End: 2020-02-07 | Stop reason: HOSPADM

## 2020-02-06 RX ORDER — METOPROLOL TARTRATE 50 MG/1
50 TABLET ORAL
Status: COMPLETED | OUTPATIENT
Start: 2020-02-06 | End: 2020-02-06

## 2020-02-06 RX ADMIN — PANTOPRAZOLE SODIUM 40 MG: 40 TABLET, DELAYED RELEASE ORAL at 02:02

## 2020-02-06 RX ADMIN — BUPROPION HYDROCHLORIDE 300 MG: 150 TABLET, FILM COATED, EXTENDED RELEASE ORAL at 10:02

## 2020-02-06 RX ADMIN — IOHEXOL 75 ML: 350 INJECTION, SOLUTION INTRAVENOUS at 08:02

## 2020-02-06 RX ADMIN — PRAVASTATIN SODIUM 20 MG: 10 TABLET ORAL at 10:02

## 2020-02-06 RX ADMIN — SODIUM CHLORIDE 500 ML: 0.9 INJECTION, SOLUTION INTRAVENOUS at 09:02

## 2020-02-06 RX ADMIN — HEPARIN SODIUM 5000 UNITS: 5000 INJECTION, SOLUTION INTRAVENOUS; SUBCUTANEOUS at 02:02

## 2020-02-06 RX ADMIN — PROMETHAZINE HYDROCHLORIDE 12.5 MG: 25 INJECTION INTRAMUSCULAR; INTRAVENOUS at 08:02

## 2020-02-06 RX ADMIN — Medication 9 MG: at 10:02

## 2020-02-06 RX ADMIN — LISINOPRIL 10 MG: 10 TABLET ORAL at 02:02

## 2020-02-06 RX ADMIN — METOPROLOL TARTRATE 50 MG: 50 TABLET, FILM COATED ORAL at 10:02

## 2020-02-06 RX ADMIN — ACETAMINOPHEN 1000 MG: 500 TABLET ORAL at 03:02

## 2020-02-06 RX ADMIN — METOPROLOL TARTRATE 50 MG: 50 TABLET ORAL at 10:02

## 2020-02-06 RX ADMIN — ESCITALOPRAM OXALATE 10 MG: 10 TABLET ORAL at 10:02

## 2020-02-06 RX ADMIN — HEPARIN SODIUM 5000 UNITS: 5000 INJECTION, SOLUTION INTRAVENOUS; SUBCUTANEOUS at 10:02

## 2020-02-06 NOTE — PLAN OF CARE
POC reviewed with patient. Patient verbalizes understanding. Safety and tele maintained throughout shift. Call light within reach.

## 2020-02-06 NOTE — ED PROVIDER NOTES
Encounter Date: 2/6/2020    SCRIBE #1 NOTE: I, Marychuy Spring, am scribing for, and in the presence of, Tnai Ferrer MD.       History     Chief Complaint   Patient presents with    syncope     this am       Time seen by provider: 7:26 AM on 02/06/2020    Sunita Carslon is a 79 y.o. female with HTN, CAD, prior MI, and acid reflux who presents to the ED via EMS with an onset of passing out. She woke up at 4:30 AM (3 hours ago) with a burning in her throat, which the patient points to her mid chest radiating to her back, and took a Prilosec. The patient had no relief with the Prilosec and took a NTG around 5:30 AM (2 hours ago). She began to feel nausea and had an episode of vomiting. The patient went to the sink to wash her face and suddenly felt light-headed. The last thing she remembers is waking up in vomit on the floor prior to activating 911. Per EMS, the patient had a BP of 230's/110's and was given 2 doses of Zofran 4 mg. She is not on BP medication but is on Lasix. The patient is not currently on blood thinners as she weaned off of the medication on her own volition. She reports no pain similar to the previous episode of chest pain that lead to a stress test with subsequent cardiac bypass in 2011. The patient denies history of kidney complications, neck pain, or any other symptoms at this time. No additional pertinent PSHx.     The history is provided by the patient and the EMS personnel.     Review of patient's allergies indicates:   Allergen Reactions    Demerol [meperidine] Nausea And Vomiting    Hydrocodone Itching    Percocet [oxycodone-acetaminophen] Itching    Tramadol      Past Medical History:   Diagnosis Date    Arthritis     Cataract     Coronary artery disease     Hypertension     Insomnia     Neuropathy     Retinal detachment     Stomach ulcer      Past Surgical History:   Procedure Laterality Date    APPENDECTOMY      BLADDER SUSPENSION      BREAST BIOPSY      CARDIAC SURGERY       carotid endarterectomy      L    CATARACT EXTRACTION      CHOLECYSTECTOMY      HYSTERECTOMY      INJECTION OF ANESTHETIC AGENT AROUND GANGLION IMPAR N/A 2019    Procedure: BLOCK, GANGLION IMPAR;  Surgeon: Christian James MD;  Location: [facility] OR;  Service: Pain Management;  Laterality: N/A;    OOPHORECTOMY      SUPERFICIAL KERATECTOMY Right 10/27/2017    Dr. Morales    TONSILLECTOMY      TRANSFORAMINAL EPIDURAL INJECTION OF STEROID Left 2019    Procedure: Injection,steroid,epidural,transforaminal approach L4-5, L5-S1;  Surgeon: Christian James MD;  Location: [facility] OR;  Service: Pain Management;  Laterality: Left;    TRANSFORAMINAL EPIDURAL INJECTION OF STEROID Left 2020    Procedure: Injection,steroid,epidural,transforaminal approach;  Surgeon: Christian James MD;  Location: [facility] OR;  Service: Pain Management;  Laterality: Left;  L4-5, L5-S1     Family History   Problem Relation Age of Onset    Cancer Father     Macular degeneration Maternal Aunt     Cancer Maternal Aunt     Breast cancer Maternal Aunt     Macular degeneration Maternal Uncle     Diabetes Paternal Aunt     Cancer Paternal Aunt     Blindness Maternal Grandfather     Melanoma Neg Hx     Psoriasis Neg Hx     Lupus Neg Hx     Eczema Neg Hx      Social History     Tobacco Use    Smoking status: Former Smoker     Years: 4.00     Types: Cigarettes     Last attempt to quit: 10/13/1962     Years since quittin.3    Smokeless tobacco: Never Used   Substance Use Topics    Alcohol use: No    Drug use: No     Review of Systems   Constitutional: Negative for fever.   HENT: Negative for congestion.    Eyes: Negative for visual disturbance.   Respiratory: Negative for wheezing.    Cardiovascular: Positive for chest pain.   Gastrointestinal: Positive for nausea and vomiting. Negative for abdominal pain.   Genitourinary: Negative for dysuria.   Musculoskeletal: Negative for joint swelling and neck pain.   Skin: Negative for rash.    Neurological: Positive for syncope.   Hematological: Does not bruise/bleed easily.   Psychiatric/Behavioral: Negative for confusion.     Physical Exam     Initial Vitals [02/06/20 0716]   BP Pulse Resp Temp SpO2   (!) 222/99 76 16 -- 100 %      MAP       --         Physical Exam    Nursing note and vitals reviewed.  Constitutional: She appears well-nourished. She appears distressed.   Distressed secondary to nausea.    HENT:   Head: Normocephalic. Head is with contusion.   Contusion to the posterior head. No open wounds.    Eyes: Conjunctivae and EOM are normal.   Right eye cataract.    Neck: Normal range of motion. Neck supple. No thyroid mass present.   Cardiovascular: Normal rate, regular rhythm and normal heart sounds. Exam reveals no gallop and no friction rub.    No murmur heard.  Pulses:       Radial pulses are 1+ on the right side, and 3+ on the left side.   Unequal radial pulses with a diminished pulse on the right and a bounding pulse on the left.    Pulmonary/Chest: Breath sounds normal. She has no wheezes. She has no rhonchi. She has no rales.   Abdominal: Soft. Normal appearance and bowel sounds are normal. There is no tenderness.   Musculoskeletal:        Cervical back: She exhibits no bony tenderness.   No midline cervical pain.    Neurological: She is alert and oriented to person, place, and time. She has normal strength. No cranial nerve deficit or sensory deficit.   Skin: Skin is warm and dry. No rash noted. No erythema.   Psychiatric: She has a normal mood and affect. Her speech is normal. Cognition and memory are normal.       ED Course   Procedures  Labs Reviewed   CBC W/ AUTO DIFFERENTIAL - Abnormal; Notable for the following components:       Result Value    MPV 8.9 (*)     Gran # (ANC) 9.6 (*)     Immature Grans (Abs) 0.06 (*)     Gran% 79.3 (*)     Lymph% 14.6 (*)     All other components within normal limits   TSH - Abnormal; Notable for the following components:    TSH 4.820 (*)     All  other components within normal limits   URINALYSIS - Abnormal; Notable for the following components:    Occult Blood UA Trace (*)     All other components within normal limits   COMPREHENSIVE METABOLIC PANEL - Abnormal; Notable for the following components:    Sodium 130 (*)     CO2 21 (*)     Glucose 161 (*)     All other components within normal limits   MAGNESIUM   B-TYPE NATRIURETIC PEPTIDE   TROPONIN I   PROTIME-INR   T4, FREE     EKG Readings: (Independently Interpreted)   Initial Reading: No STEMI. Rhythm: Normal Sinus Rhythm. Heart Rate: 79. Ectopy: No Ectopy. Conduction: Normal. ST Segments: Normal ST Segments. T Waves: Normal. Clinical Impression: Normal Sinus Rhythm     Imaging Results          CTA Chest Non Coronary (Final result)  Result time 02/06/20 08:53:05   Procedure changed from CTA Cardiac     Final result by Austyn Vale Jr., MD (02/06/20 08:53:05)                 Impression:      Atherosclerotic calcification of the thoracic aorta without aneurysm or stenosis seen.  There is atherosclerotic plaque at the origin of the left subclavian artery without severe stenosis demonstrated.  There is a right pericardial fat pad noted.  A small eventration of the posterior left hemidiaphragm containing fat is noted.  Small hiatal hernia is noted.  Possible atherosclerotic stenosis of the left renal artery with further evaluation recommended.  No CTA evidence of pulmonary embolus.      Electronically signed by: Austyn Vale MD  Date:    02/06/2020  Time:    08:53             Narrative:    EXAMINATION:  CTA CHEST NON CORONARY    CLINICAL HISTORY:  Aortic dz, non-traumatic, known or suspect;    TECHNIQUE:  Low dose axial images, sagittal and coronal reformations were obtained from the thoracic inlet to the lung bases following the IV administration of 75 mL of Omnipaque 350.  Contrast timing was optimized to evaluate the pulmonary arteries and aorta.  MIP images were performed.    COMPARISON:  Chest  x-ray of September 27, 2017.    FINDINGS:  There is no CTA evidence of pulmonary embolus.  Opacification of the pulmonary arteries is excellent.  There is no aortic dissection or aneurysm.  The ascending aorta measures 3.1 cm in diameter.  The descending aorta measures 2.3 cm in diameter.  There is atherosclerotic calcification at the arch of the aorta including the origin of the left subclavian artery.  There is atherosclerotic plaque in the descending thoracic aorta and a small amount of mural thrombus distally in the thoracic aorta.  Reconstructed imaging of the arch and left subclavian does not show severe stenosis.  In the upper abdomen stenosis at the celiac trunk or superior mesenteric artery is not seen.  At the lower edge of the study the status of the left renal artery is questionable.    The heart is of normal size without enlargement.  A right pericardial fat pad is noted.  There is a small hiatal hernia identified behind the heart.  Adenopathy or soft tissue masses within the mediastinum are not seen.  The patient has had a prior sternotomy.    Within the lung fields an infiltrate or atelectasis is not seen.  A mass or nodule is not identified.  There is small eventration of the posterior left hemidiaphragm containing fat.  No pneumothorax or pleural effusion is noted.                               CT Head Without Contrast (Final result)  Result time 02/06/20 08:21:36    Final result by Charles Hart MD (02/06/20 08:21:36)                 Impression:      1. There is no acute abnormality.  There is stable volume loss and nonspecific white matter change without intracranial hemorrhage, mass effect or obvious acute edema or ischemia.  2. There is a posterior parietal scalp contusion without underlying fracture      Electronically signed by: Charles Hart MD  Date:    02/06/2020  Time:    08:21             Narrative:    EXAMINATION:  CT HEAD WITHOUT CONTRAST    CLINICAL  HISTORY:  Syncope/fainting;    TECHNIQUE:  Routine unenhanced axial images were obtained through the head.  Sagittal and coronal reformatted images were created.  The study is reviewed in bone and soft tissue windows.    COMPARISON:  Head CT dated 06/24/2019    FINDINGS:  Intracranial contents: There is no acute abnormality or detectable change in the appearance of the brain compared to the prior study.  There is no intracranial hemorrhage or mass effect.  There is mild volume loss with mild-to-moderate nonspecific white matter change.  The gray-white interface is preserved without obvious acute infarction.  There is no abnormal extra-axial fluid collection.  There is no hydrocephalus or midline shift.  The basilar cisterns are open.  The cerebellar tonsils are normal position.  Pituitary volume is decreased, not unexpected for age.    Extracranial contents, calvarium, soft tissues: There is a posterior parietal scalp contusion without underlying fracture.  The calvarium is normal.  There is mild-to-moderate atherosclerosis.  There is scattered mucosal thickening in the ethmoid air cells and right sphenoid sinus.  The remainder of the included paranasal sinuses and mastoid air cells are clear.                               CT Cervical Spine Without Contrast (Final result)  Result time 02/06/20 08:19:25    Final result by Charles Hart MD (02/06/20 08:19:25)                 Impression:      1. There is extensive multilevel degenerative change including degenerative disc and facet disease resulting in some degree of spinal canal and foraminal stenosis.  There is, however, no acute fracture or traumatic subluxation.      Electronically signed by: Charles Hart MD  Date:    02/06/2020  Time:    08:19             Narrative:    EXAMINATION:  CT CERVICAL SPINE WITHOUT CONTRAST    CLINICAL HISTORY:  C-spine trauma, NEXUS/CCR positive, low risk;    TECHNIQUE:  Low dose axial images, sagittal and coronal  reformations were preformed though the cervical spine.  Contrast was not administered.    COMPARISON:  None    FINDINGS:  Vertebral column: There is extensive multilevel degenerative change.  The vertebral bodies maintain normal height.  There is no fracture.  The facet joints maintain normal articulation.  The odontoid process is intact.  The anterior and posterior arches of C1 are normal.  The craniovertebral junction is preserved.  There is marked disc space narrowing at the C4-5 through C6-7 levels.  There is anterior osteophyte formation at these levels.  There is trace anterolisthesis of C7 on T1 which is likely positional and/or degenerative in etiology.  There is a mild dextrocurvature of the cervical spine.    Spinal canal, cord, epidural space: The spinal canal is developmentally normal.  There is no abnormal epidural soft tissue mass or fluid collection.    Findings by level:    C1-2: Alignment is normal.  There is mild joint space narrowing.    C2-3: There is mild left facet joint arthropathy and a minimal disc bulge.  There is no spinal canal or significant foraminal stenosis.    C3-4: There is a mild disc bulge and mild left facet joint arthropathy.  There is no spinal canal or significant foraminal stenosis.    C4-5: There is marked disc space narrowing, bilateral uncovertebral spurring with broad disc protrusion/osteophyte in addition to mild facet joint arthropathy resulting in mild to moderate spinal canal and right foraminal stenosis.    C5-6: There is marked disc space narrowing, bilateral uncovertebral spurring, moderate broad disc protrusion/osteophyte and mild facet joint arthropathy contributing to mild-to-moderate spinal stenosis and mild right foraminal stenosis.    C6-7: There is marked disc space narrowing, left greater than right uncovertebral spurring, broad disc protrusion/osteophyte eccentric to the left and mild facet joint arthropathy contributing to mild spinal stenosis and  mild-to-moderate left foraminal stenosis.    C7-T1: There is facet joint arthropathy.  There is no spinal canal or significant foraminal stenosis.    Soft tissues, other: There is a 10 mm hypodense nodule in the right lobe of the thyroid gland.  Several thyroid cystic nodules are identified on thyroid ultrasound dated 03/19/2018 the airway is patent.  The prevertebral soft tissues are normal.                                 Medical Decision Making:   History:   Old Medical Records: I decided to obtain old medical records.  Independently Interpreted Test(s):   I have ordered and independently interpreted EKG Reading(s) - see prior notes  Clinical Tests:   Lab Tests: Reviewed and Ordered  Radiological Study: Ordered and Reviewed  Medical Tests: Reviewed and Ordered  ED Management:  This patient was interviewed and assessed emergently.  She is noted to be markedly hypertensive and reporting atypical frontal chest pain as well as back pain with syncope/NV.  CT scans of the head, C-spine, including CT angiogram of the chest are found to be unremarkable for contributing pathology. Additional labs including troponin are within normal limits.  The patient does warrant admission for further stabilization of her hypertension and potential syncope workup.  Case discussed with and accepted by the on-call hospitalist.  Patient and family updated on the plan of care and they are in agreement with this disposition.  She is transported to a telemetry bed in guarded condition.            Scribe Attestation:   Scribe #1: I performed the above scribed service and the documentation accurately describes the services I performed. I attest to the accuracy of the note.    I, Dr. Tani Ferrer, personally performed the services described in this documentation. All medical record entries made by the scribe were at my direction and in my presence.  I have reviewed the chart and agree that the record reflects my personal performance and is  accurate and complete. Tani Fererr MD.  5:47 PM 02/06/2020                Clinical Impression:       ICD-10-CM ICD-9-CM   1. Hypertension I10 401.9   2. Syncope R55 780.2         Disposition:   Disposition: Placed in Observation  Condition: Fair                     Tani Ferrer MD  02/06/20 1740

## 2020-02-06 NOTE — RESPIRATORY THERAPY
02/06/20 1511   Patient Assessment/Suction   Level of Consciousness (AVPU) alert   Respiratory Effort Normal;Unlabored   Expansion/Accessory Muscles/Retractions no use of accessory muscles;expansion symmetric;no retractions   All Lung Fields Breath Sounds clear;equal bilaterally   Rhythm/Pattern, Respiratory unlabored;pattern regular;chest wiggle adequate   Cough Frequency no cough   PRE-TX-O2   O2 Device (Oxygen Therapy) room air   SpO2 96 %   Pulse 69   Resp 18

## 2020-02-07 VITALS
RESPIRATION RATE: 16 BRPM | BODY MASS INDEX: 24.31 KG/M2 | OXYGEN SATURATION: 96 % | HEART RATE: 57 BPM | WEIGHT: 120.56 LBS | TEMPERATURE: 98 F | SYSTOLIC BLOOD PRESSURE: 118 MMHG | HEIGHT: 59 IN | DIASTOLIC BLOOD PRESSURE: 53 MMHG

## 2020-02-07 LAB
ALBUMIN SERPL BCP-MCNC: 3.5 G/DL (ref 3.5–5.2)
ALP SERPL-CCNC: 74 U/L (ref 55–135)
ALT SERPL W/O P-5'-P-CCNC: 12 U/L (ref 10–44)
ANION GAP SERPL CALC-SCNC: 8 MMOL/L (ref 8–16)
AORTIC ROOT ANNULUS: 2.64 CM
AORTIC VALVE CUSP SEPERATION: 1.7 CM
AST SERPL-CCNC: 16 U/L (ref 10–40)
AV INDEX (PROSTH): 0.89
AV MEAN GRADIENT: 3 MMHG
AV PEAK GRADIENT: 5 MMHG
AV VALVE AREA: 2.79 CM2
AV VELOCITY RATIO: 0.93
BASOPHILS # BLD AUTO: 0.03 K/UL (ref 0–0.2)
BASOPHILS NFR BLD: 0.5 % (ref 0–1.9)
BILIRUB SERPL-MCNC: 0.5 MG/DL (ref 0.1–1)
BSA FOR ECHO PROCEDURE: 1.44 M2
BUN SERPL-MCNC: 15 MG/DL (ref 8–23)
CALCIUM SERPL-MCNC: 10.5 MG/DL (ref 8.7–10.5)
CHLORIDE SERPL-SCNC: 95 MMOL/L (ref 95–110)
CO2 SERPL-SCNC: 26 MMOL/L (ref 23–29)
CREAT SERPL-MCNC: 0.8 MG/DL (ref 0.5–1.4)
CV ECHO LV RWT: 0.49 CM
DIFFERENTIAL METHOD: ABNORMAL
DOP CALC AO PEAK VEL: 1.15 M/S
DOP CALC AO VTI: 26.39 CM
DOP CALC LVOT AREA: 3.1 CM2
DOP CALC LVOT DIAMETER: 2 CM
DOP CALC LVOT PEAK VEL: 1.07 M/S
DOP CALC LVOT STROKE VOLUME: 73.63 CM3
DOP CALCLVOT PEAK VEL VTI: 23.45 CM
E WAVE DECELERATION TIME: 208.38 MSEC
E/A RATIO: 0.8
E/E' RATIO: 12.43 M/S
ECHO LV POSTERIOR WALL: 0.81 CM (ref 0.6–1.1)
EOSINOPHIL # BLD AUTO: 0.1 K/UL (ref 0–0.5)
EOSINOPHIL NFR BLD: 1.1 % (ref 0–8)
ERYTHROCYTE [DISTWIDTH] IN BLOOD BY AUTOMATED COUNT: 11.9 % (ref 11.5–14.5)
EST. GFR  (AFRICAN AMERICAN): >60 ML/MIN/1.73 M^2
EST. GFR  (NON AFRICAN AMERICAN): >60 ML/MIN/1.73 M^2
FRACTIONAL SHORTENING: 32 % (ref 28–44)
GLUCOSE SERPL-MCNC: 92 MG/DL (ref 70–110)
HCT VFR BLD AUTO: 36.8 % (ref 37–48.5)
HGB BLD-MCNC: 12.7 G/DL (ref 12–16)
IMM GRANULOCYTES # BLD AUTO: 0.01 K/UL (ref 0–0.04)
INTERVENTRICULAR SEPTUM: 1.03 CM (ref 0.6–1.1)
IVRT: 0.11 MSEC
LEFT ATRIUM SIZE: 3.44 CM
LEFT INTERNAL DIMENSION IN SYSTOLE: 2.26 CM (ref 2.1–4)
LEFT VENTRICLE DIASTOLIC VOLUME INDEX: 31.56 ML/M2
LEFT VENTRICLE DIASTOLIC VOLUME: 45.14 ML
LEFT VENTRICLE MASS INDEX: 59 G/M2
LEFT VENTRICLE SYSTOLIC VOLUME INDEX: 12.2 ML/M2
LEFT VENTRICLE SYSTOLIC VOLUME: 17.41 ML
LEFT VENTRICULAR INTERNAL DIMENSION IN DIASTOLE: 3.33 CM (ref 3.5–6)
LEFT VENTRICULAR MASS: 84.85 G
LV LATERAL E/E' RATIO: 10.88 M/S
LV SEPTAL E/E' RATIO: 14.5 M/S
LYMPHOCYTES # BLD AUTO: 2.7 K/UL (ref 1–4.8)
LYMPHOCYTES NFR BLD: 42.9 % (ref 18–48)
MAGNESIUM SERPL-MCNC: 1.7 MG/DL (ref 1.6–2.6)
MCH RBC QN AUTO: 29.8 PG (ref 27–31)
MCHC RBC AUTO-ENTMCNC: 34.5 G/DL (ref 32–36)
MCV RBC AUTO: 86 FL (ref 82–98)
MONOCYTES # BLD AUTO: 0.8 K/UL (ref 0.3–1)
MONOCYTES NFR BLD: 12.8 % (ref 4–15)
MV A" WAVE DURATION": 142 MSEC
MV MEAN GRADIENT: 1 MMHG
MV PEAK A VEL: 1.09 M/S
MV PEAK E VEL: 0.87 M/S
MV STENOSIS PRESSURE HALF TIME: 50 MS
MV VALVE AREA P 1/2 METHOD: 4.4 CM2
NEUTROPHILS # BLD AUTO: 2.7 K/UL (ref 1.8–7.7)
NEUTROPHILS NFR BLD: 42.5 % (ref 38–73)
NRBC BLD-RTO: 0 /100 WBC
PHOSPHATE SERPL-MCNC: 3.7 MG/DL (ref 2.7–4.5)
PISA TR MAX VEL: 2.13 M/S
PLATELET # BLD AUTO: 267 K/UL (ref 150–350)
PMV BLD AUTO: 9.1 FL (ref 9.2–12.9)
POTASSIUM SERPL-SCNC: 4 MMOL/L (ref 3.5–5.1)
PROT SERPL-MCNC: 6.4 G/DL (ref 6–8.4)
PULM VEIN A" WAVE DURATION": 88 MSEC
PULM VEIN S/D RATIO: 1.18
PV PEAK D VEL: 0.45 M/S
PV PEAK S VEL: 0.53 M/S
PV PEAK VELOCITY: 0.73 CM/S
RBC # BLD AUTO: 4.26 M/UL (ref 4–5.4)
RIGHT VENTRICULAR END-DIASTOLIC DIMENSION: 2.69 CM
SODIUM SERPL-SCNC: 129 MMOL/L (ref 136–145)
TDI LATERAL: 0.08 M/S
TDI SEPTAL: 0.06 M/S
TDI: 0.07 M/S
TR MAX PG: 18 MMHG
TRICUSPID ANNULAR PLANE SYSTOLIC EXCURSION: 2 CM
WBC # BLD AUTO: 6.27 K/UL (ref 3.9–12.7)

## 2020-02-07 PROCEDURE — 97116 GAIT TRAINING THERAPY: CPT

## 2020-02-07 PROCEDURE — G0378 HOSPITAL OBSERVATION PER HR: HCPCS

## 2020-02-07 PROCEDURE — 85025 COMPLETE CBC W/AUTO DIFF WBC: CPT

## 2020-02-07 PROCEDURE — 25000003 PHARM REV CODE 250: Performed by: HOSPITALIST

## 2020-02-07 PROCEDURE — 63600175 PHARM REV CODE 636 W HCPCS: Performed by: NURSE PRACTITIONER

## 2020-02-07 PROCEDURE — 63600175 PHARM REV CODE 636 W HCPCS: Performed by: HOSPITALIST

## 2020-02-07 PROCEDURE — 94761 N-INVAS EAR/PLS OXIMETRY MLT: CPT

## 2020-02-07 PROCEDURE — 36415 COLL VENOUS BLD VENIPUNCTURE: CPT

## 2020-02-07 PROCEDURE — 84100 ASSAY OF PHOSPHORUS: CPT

## 2020-02-07 PROCEDURE — 96375 TX/PRO/DX INJ NEW DRUG ADDON: CPT

## 2020-02-07 PROCEDURE — 80053 COMPREHEN METABOLIC PANEL: CPT

## 2020-02-07 PROCEDURE — 83735 ASSAY OF MAGNESIUM: CPT

## 2020-02-07 PROCEDURE — 97162 PT EVAL MOD COMPLEX 30 MIN: CPT

## 2020-02-07 RX ORDER — LISINOPRIL 10 MG/1
20 TABLET ORAL DAILY
Qty: 30 TABLET | Refills: 3 | Status: SHIPPED | OUTPATIENT
Start: 2020-02-07 | End: 2020-10-13

## 2020-02-07 RX ORDER — SODIUM CHLORIDE 9 MG/ML
INJECTION, SOLUTION INTRAVENOUS CONTINUOUS
Status: DISCONTINUED | OUTPATIENT
Start: 2020-02-07 | End: 2020-02-07 | Stop reason: HOSPADM

## 2020-02-07 RX ADMIN — ASPIRIN 325 MG ORAL TABLET 325 MG: 325 PILL ORAL at 09:02

## 2020-02-07 RX ADMIN — METOPROLOL TARTRATE 50 MG: 50 TABLET ORAL at 09:02

## 2020-02-07 RX ADMIN — ONDANSETRON 8 MG: 2 INJECTION INTRAMUSCULAR; INTRAVENOUS at 04:02

## 2020-02-07 RX ADMIN — LISINOPRIL 10 MG: 10 TABLET ORAL at 09:02

## 2020-02-07 RX ADMIN — PANTOPRAZOLE SODIUM 40 MG: 40 TABLET, DELAYED RELEASE ORAL at 09:02

## 2020-02-07 RX ADMIN — SODIUM CHLORIDE: 0.9 INJECTION, SOLUTION INTRAVENOUS at 05:02

## 2020-02-07 RX ADMIN — ACETAMINOPHEN 1000 MG: 500 TABLET ORAL at 04:02

## 2020-02-07 NOTE — PLAN OF CARE
CM unable to schedule follow up appt with PCP due to office being closed.        02/07/20 1400   Discharge Assessment   Assessment Type Discharge Planning Reassessment

## 2020-02-07 NOTE — ASSESSMENT & PLAN NOTE
Chronic, uncontrolled.  Latest blood pressure and vitals reviewed-   Temp:  [98.1 °F (36.7 °C)]   Pulse:  [66-85]   Resp:  [16-18]   BP: (161-222)/(77-99)   SpO2:  [96 %-100 %] .   Home meds for hypertension were reviewed and noted below. Hospital anti-hypertensive changes were made as shown below.  Hypertension Medications             hydroCHLOROthiazide (HYDRODIURIL) 12.5 MG Tab TK 1 T PO QD    lisinopril 10 MG tablet Take by mouth once daily.     nitroGLYCERIN (NITROSTAT) 0.4 MG SL tablet Place under the tongue.      Hospital Medications             lisinopril tablet 10 mg 10 mg, Oral, Daily    metoprolol tartrate (LOPRESSOR) tablet 50 mg (Completed) 50 mg, Oral, ED 1 Time        Will utilize p.r.n. blood pressure medication only if patient's blood pressure greater than  180/110 and she develops symptoms such as worsening chest pain or shortness of breath.

## 2020-02-07 NOTE — DISCHARGE SUMMARY
[facility] Medicine  Discharge Summary      Patient Name: Sunita Carlson  MRN: [id number]  Admission Date: 2/6/2020  Hospital Length of Stay: 0 days  Discharge Date and Time:  02/07/2020 3:29 PM  Attending Physician: Brittney att. providers found   Discharging Provider: Ashley Vigil MD  Primary Care Provider: Behzad Birch MD      HPI:   Sunita Carlson is a 79 y.o. female with HTN, CAD, prior MI, and acid reflux who presents to the ED via EMS with an onset of passing out. She woke up at 4:30 AM (3 hours ago) with a burning in her throat, which the patient points to her mid chest radiating to her back, and took a Prilosec. The patient had no relief with the Prilosec and took a NTG around 5:30 AM (2 hours ago). She began to feel nausea and had an episode of vomiting. The patient went to the sink to wash her face and suddenly felt light-headed. The last thing she remembers is waking up in vomit on the floor prior to activating 911. Per EMS, the patient had a BP of 230's/110's and was given 2 doses of Zofran 4 mg. She is not on BP medication but is on Lasix. The patient is not currently on blood thinners as she weaned off of the medication on her own volition. She reports no pain similar to the previous episode of chest pain that lead to a stress test with subsequent cardiac bypass in 2011. The patient denies history of kidney complications, neck pain, or any other symptoms at this time. No additional pertinent PSHx.      * No surgery found *      Hospital Course:   Patient admitted mainly for syncope and ruled out ACS.  Patient troponins was negative. Patient has been stable during hospital stay. Echo was done and showed · The mean diastolic gradient across the mitral valve is 1 mmHg at a heart rate of bpm. Left ventricular systolic function. The estimated ejection fraction is 60%. Grade I (mild) left ventricular diastolic dysfunction consistent with impaired relaxation. CTA was done  that showed Atherosclerotic calcification of the thoracic aorta without aneurysm or stenosis seen.  There is atherosclerotic plaque at the origin of the left subclavian artery without severe stenosis demonstrated. Patient was symptom free at the time of discharge.  Medications were readjusted undo to the medication induced hyponatremia.  The symptoms possibly was secondary to vasovagal and possibly secondary to hyponatremia.  Discharged with outpatient follow-up with cardiology had and nuclear stress test     Consults:   Consults (From admission, onward)        Status Ordering Provider     Inpatient consult to Social Work/Case Management  Once     Provider:  (Not yet assigned)    ESTER Nunez          No new Assessment & Plan notes have been filed under this hospital service since the last note was generated.  Service: Hospital Medicine    Final Active Diagnoses:    Diagnosis Date Noted POA    PRINCIPAL PROBLEM:  Syncope, cardiogenic [R55] 02/06/2020 Yes    Gastroesophageal reflux disease without esophagitis [K21.9] 10/13/2017 Yes    Essential hypertension [I10] 10/13/2017 Yes    Hyperlipidemia [E78.5] 10/13/2017 Yes    Coronary artery disease involving coronary bypass graft of native heart with unstable angina pectoris [I25.700] 10/13/2017 Yes      Problems Resolved During this Admission:    Diagnosis Date Noted Date Resolved POA    Chest pain at rest [R07.9] 11/26/2013 02/07/2020 Yes       Discharged Condition: stable    Disposition: Home or Self Care    Follow Up:  Follow-up Information     Behzad Birch MD In 2 weeks.    Specialty:  Internal Medicine  Contact information:  [address]  [place] [address]  [phone]             Baldo Epstein MD In 2 weeks.    Specialties:  Cardiovascular Disease, Cardiology  Contact information:  [address]  [address]  [place] [address]  [phone]                 Patient Instructions:      Ambulatory referral/consult to Outpatient Case  Management   Standing Status: Future   Referral Priority: Routine Referral Type: Consultation   Referral Reason: Specialty Services Required   Requested Specialty: Hospitalist   Number of Visits Requested: 1     Ambulatory referral to Cardiology   Standing Status: Future   Referral Priority: Routine Referral Type: Consultation   Referral Reason: Specialty Services Required   Requested Specialty: Cardiology   Number of Visits Requested: 1       Significant Diagnostic Studies: Labs:   BMP:   Recent Labs   Lab 02/06/20  0735 02/06/20  0738 02/07/20  0422   *  --  92   *  --  129*   K 4.0  --  4.0   CL 96  --  95   CO2 21*  --  26   BUN 20  --  15   CREATININE 0.9  --  0.8   CALCIUM 10.1  --  10.5   MG  --  1.7 1.7   , CBC   Recent Labs   Lab 02/06/20  0735 02/07/20  0422   WBC 12.09 6.27   HGB 13.8 12.7   HCT 40.2 36.8*    267   , Lipid Panel   Lab Results   Component Value Date    CHOL 184 09/12/2019    HDL 73 09/12/2019    LDLCALC 90.8 09/12/2019    TRIG 101 09/12/2019    CHOLHDL 39.7 09/12/2019   , Troponin   Recent Labs   Lab 02/06/20  0735   TROPONINI <0.006    and A1C: No results for input(s): HGBA1C in the last 4320 hours.  Radiology: X-Ray: CXR: X-Ray Chest 1 View (CXR): No results found for this visit on 02/06/20. and X-Ray Chest PA and Lateral (CXR): No results found for this visit on 02/06/20.  Cardiac Graphics: Echocardiogram: 2D echo with color flow doppler: No results found for this or any previous visit.    Pending Diagnostic Studies:     None         Medications:  Reconciled Home Medications:      Medication List      CHANGE how you take these medications    lisinopril 10 MG tablet  Take 2 tablets (20 mg total) by mouth once daily.  What changed:  how much to take        CONTINUE taking these medications    buPROPion 300 MG 24 hr tablet  Commonly known as:  WELLBUTRIN XL  Take 300 mg by mouth once daily.     cholecalciferol (vitamin D3) 2,000 unit Tab  Commonly known as:  VITAMIN  D3  Vitamin D3 50 mcg (2,000 unit) tablet   1 tablet every day by mouth     Dexilant 30 mg Cpdm  Generic drug:  dexlansoprazole  Take 60 mg by mouth.     dicyclomine 10 MG capsule  Commonly known as:  BENTYL  2 (two) times daily.     diphenoxylate-atropine 2.5-0.025 mg 2.5-0.025 mg per tablet  Commonly known as:  LOMOTIL  Take 1 tablet by mouth 4 (four) times daily as needed for Diarrhea.     Nitrostat 0.4 MG SL tablet  Generic drug:  nitroGLYCERIN  Place under the tongue.     omeprazole 40 MG capsule  Commonly known as:  PRILOSEC  Take by mouth.     ondansetron 8 MG Tbdl  Commonly known as:  ZOFRAN-ODT  every 6 (six) hours as needed.     pravastatin 40 MG tablet  Commonly known as:  PRAVACHOL  every evening.        STOP taking these medications    escitalopram oxalate 10 MG tablet  Commonly known as:  LEXAPRO     hydroCHLOROthiazide 12.5 MG Tab  Commonly known as:  HYDRODIURIL     temazepam 15 mg Cap  Commonly known as:  RESTORIL            Indwelling Lines/Drains at time of discharge:   Lines/Drains/Airways     None                 Time spent on the discharge of patient: 60 minutes  Patient was seen and examined on the date of discharge and determined to be suitable for discharge.         Ashley Vigil MD  Department of Hospital Medicine  [facility]

## 2020-02-07 NOTE — DISCHARGE INSTRUCTIONS
Thank you for choosing [facility] for your medical care. The primary doctor who is taking care of you at the time of your discharge is Ashley Vigil MD.     You were admitted to the hospital with Syncope, cardiogenic.     Please note your discharge instructions, including diet/activity restrictions, follow-up appointments, and medication changes.  If you have any questions about your medical issues, prescriptions, or any other questions, please feel free to contact the [facility] Medicine Dept at [phone] and we will help.    If you are previously with Home health, outpatient PT/OT or under a therapy program, you are cleared to return to those programs.    Please direct all long term medication refills and follow up to your primary care provider, Behzad Birch MD. Thank you again for letting us take care of your health care needs.    Please note the following discharge instructions per your discharging physician-  Stop taking escitalopram and hydrochlorothiazide   Follow up with primary medical doctor in 1-2 weeks  You can get Nuclear stress test as an outpatient next week

## 2020-02-07 NOTE — ASSESSMENT & PLAN NOTE
Etiology potentially related to cardiogenic.  Trend troponins, and monitor closely.  History of CAD and stroke makes vascular disease highly likely.

## 2020-02-07 NOTE — PLAN OF CARE
Patient is alert and oriented, able to take pills whole, co of tail bone pain, and dizziness with head movement, tele on and functioning, iv patent, able to make needs known, safety and neuro intact

## 2020-02-07 NOTE — PT/OT/SLP DISCHARGE
Occupational Therapy Discharge Summary    Sunita Carlson  MRN: [id number]   Principal Problem: Syncope, cardiogenic      Patient Discharged from acute Occupational Therapy on 2/7/20.    Assessment:      OT screen completed; OTR met with patient and her daughter - patient is at baseline for all ADL and associated functional mobility and not in need of acute OT services at this time.     Objective:     GOALS:   Multidisciplinary Problems     Occupational Therapy Goals     Not on file                Reasons for Discontinuation of Therapy Services  Patient is at baseline for all ADL and associated functional mobility.      Plan:     Patient Discharged to: home with no follow OT needed.    SARI Oliver, LOTR  2/7/2020

## 2020-02-07 NOTE — PLAN OF CARE
02/07/20 1500   Final Note   Assessment Type Final Discharge Note   Anticipated Discharge Disposition Home

## 2020-02-07 NOTE — PT/OT/SLP EVAL
Physical Therapy Evaluation    Patient Name:  Sunita Carlson   MRN:  [id number]    Recommendations:     Discharge Recommendations:  home   Discharge Equipment Recommendations: walker, rolling   Barriers to discharge: None    Assessment:     Sunita Carlson is a 79 y.o. female admitted with a medical diagnosis of Syncope, cardiogenic.  She presents with the following impairments/functional limitations:  weakness, impaired endurance, impaired self care skills, gait instability, pain . Pt presents with buttock pain, slight dizziness with mobility, amb 500ft with RW. Pt is home alone but daughter will stay with pt at discharge. Will benefit from RW and 24 hr supervision from family.    Rehab Prognosis: Good; patient would benefit from acute skilled PT services to address these deficits and reach maximum level of function.    Recent Surgery: * No surgery found *      Plan:     During this hospitalization, patient to be seen 6 x/week to address the identified rehab impairments via gait training, therapeutic activities, therapeutic exercises and progress toward the following goals:    · Plan of Care Expires:  20    Subjective   Pt stated feeling better with dizziness much less  Pt stated spouse recently    Daughter at bedside supportive  Chief Complaint: slightly dizzy  Patient/Family Comments/goals: get well and go home  Pain/Comfort:  · Pain Rating 1: (did not rate)  · Location 1: sacral spine  · Pain Addressed 1: Reposition, Distraction    Patients cultural, spiritual, Yazidism conflicts given the current situation:      Living Environment:  Home alone  Prior to admission, patients level of function was independent, drives.  Equipment used at home: none.  DME owned (not currently used): none.  Upon discharge, patient will have assistance from family.    Objective:     Communicated with nurse William prior to session.  Patient found HOB elevated with telemetry, peripheral IV  upon PT entry to  room.    General Precautions: Standard, fall   Orthopedic Precautions:N/A   Braces: N/A     Exams:  · RLE ROM: WFL  · RLE Strength: WFL  · LLE ROM: WFL  · LLE Strength: WFL    Functional Mobility:  · Bed Mobility:     · Rolling Right: stand by assistance  · Scooting: stand by assistance  · Supine to Sit: stand by assistance  · Transfers:     · Sit to Stand:  contact guard assistance with rolling walker  · Bed to Chair: contact guard assistance with  rolling walker  using  Stand Pivot  · Gait: 500 ft with RW, conversant- gait with HHA with decreased desire and slight instability      Therapeutic Activities and Exercises:   OOB to chair with tray table in front and daughter present    AM-PAC 6 CLICK MOBILITY  Total Score:16     Patient left up in chair with all lines intact, call button in reach and daughter present.    GOALS:   Multidisciplinary Problems     Physical Therapy Goals        Problem: Physical Therapy Goal    Goal Priority Disciplines Outcome Goal Variances Interventions   Physical Therapy Goal     PT, PT/OT Ongoing, Progressing     Description:  Goals to be met by: 2020     Patient will increase functional independence with mobility by performin. Supine to sit with Modified [place]  2. Sit to stand transfer with [place]  3. Bed to chair transfer with [place]  4. Gait  x over 500 feet with Supervision .   5. Lower extremity exercise program x20 reps                     History:     Past Medical History:   Diagnosis Date    Arthritis     Cataract     Coronary artery disease     Hypertension     Insomnia     Neuropathy     Retinal detachment     Stomach ulcer        Past Surgical History:   Procedure Laterality Date    APPENDECTOMY      BLADDER SUSPENSION      BREAST BIOPSY      CARDIAC SURGERY      carotid endarterectomy      L    CATARACT EXTRACTION      CHOLECYSTECTOMY      HYSTERECTOMY      INJECTION OF ANESTHETIC AGENT AROUND GANGLION IMPAR N/A 2019     Procedure: BLOCK, GANGLION IMPAR;  Surgeon: Christian James MD;  Location: [facility] OR;  Service: Pain Management;  Laterality: N/A;    OOPHORECTOMY      SUPERFICIAL KERATECTOMY Right 10/27/2017    Dr. Morales    TONSILLECTOMY      TRANSFORAMINAL EPIDURAL INJECTION OF STEROID Left 8/12/2019    Procedure: Injection,steroid,epidural,transforaminal approach L4-5, L5-S1;  Surgeon: Christian James MD;  Location: [facility] OR;  Service: Pain Management;  Laterality: Left;    TRANSFORAMINAL EPIDURAL INJECTION OF STEROID Left 1/16/2020    Procedure: Injection,steroid,epidural,transforaminal approach;  Surgeon: Christian James MD;  Location: [facility] OR;  Service: Pain Management;  Laterality: Left;  L4-5, L5-S1       Time Tracking:     PT Received On: 02/07/20  PT Start Time: 0918     PT Stop Time: 0952  PT Total Time (min): 34 min     Billable Minutes: Evaluation 14 and Gait Training 20      Li Blackman, PT  02/07/2020

## 2020-02-07 NOTE — PLAN OF CARE
Problem: Physical Therapy Goal  Goal: Physical Therapy Goal  Description  Goals to be met by: 2020     Patient will increase functional independence with mobility by performin. Supine to sit with Modified [place]  2. Sit to stand transfer with [place]  3. Bed to chair transfer with [place]  4. Gait  x over 500 feet with Supervision .   5. Lower extremity exercise program x20 reps    Outcome: Ongoing, Progressing   PT eval and treat completed. Gait  with RW 500ft with CGA, increased assist with decreased desire when switched to HHA- will benefit from RW with supervision from family at discharge

## 2020-02-07 NOTE — ASSESSMENT & PLAN NOTE
Patient is chronically on statin.will continue for now. Monitor clinically. Last LDL was   Lab Results   Component Value Date    LDLCALC 90.8 09/12/2019

## 2020-02-07 NOTE — ASSESSMENT & PLAN NOTE
Patient with evidence of cardiogenic syncope and chest pain. Patient with CSRS score of 5 indicating 19% pretest probability for cardiogenic syncope.  Will monitor patient overnight on telemetry, assess echocardiogram, in control blood pressure.  May require cardiology consult if she has any abnormal initial studies.

## 2020-02-07 NOTE — PLAN OF CARE
CM met with pt bedside to complete discharge assessment. Pt verified information on facesheet as correct. Denies POA/LW. Reports living at listed address alone- reports having help from her daughter or niece when needed. PCP is Dr. Birch. [facility] is [facility] [address]. Pt denies hh/hd/dme. Pt reports being independent with ADLs and able to drive herself to apts. DC plan is home with no needs. CM following.     02/07/20 1005   Discharge Assessment   Assessment Type Discharge Planning Assessment   Confirmed/corrected address and phone number on facesheet? Yes   Assessment information obtained from? Patient   Communicated expected length of stay with patient/caregiver yes   Prior to hospitilization cognitive status: Alert/Oriented   Prior to hospitalization functional status: Independent   Current cognitive status: Alert/Oriented   Current Functional Status: Independent   Lives With alone   Able to Return to Prior Arrangements yes   Is patient able to care for self after discharge? Yes   Patient's perception of discharge disposition home or selfcare   Readmission Within the Last 30 Days no previous admission in last 30 days   Patient currently being followed by outpatient case management? No   Patient currently receives any other outside agency services? No   Equipment Currently Used at Home none   Do you have any problems affording any of your prescribed medications? No   Is the patient taking medications as prescribed? yes   Does the patient have transportation home? Yes   Transportation Anticipated family or friend will provide   Does the patient receive services at the [facility]? No   Discharge Plan A Home   DME Needed Upon Discharge  none   Patient/Family in Agreement with Plan yes

## 2020-02-07 NOTE — HOSPITAL COURSE
Patient admitted mainly for syncope and ruled out ACS.  Patient troponins was negative. Patient has been stable during hospital stay. Echo was done and showed · The mean diastolic gradient across the mitral valve is 1 mmHg at a heart rate of bpm. Left ventricular systolic function. The estimated ejection fraction is 60%. Grade I (mild) left ventricular diastolic dysfunction consistent with impaired relaxation. CTA was done that showed Atherosclerotic calcification of the thoracic aorta without aneurysm or stenosis seen.  There is atherosclerotic plaque at the origin of the left subclavian artery without severe stenosis demonstrated. Patient was symptom free at the time of discharge.  Medications were readjusted undo to the medication induced hyponatremia.  The symptoms possibly was secondary to vasovagal and possibly secondary to hyponatremia.  Discharged with outpatient follow-up with cardiology had and nuclear stress test

## 2020-02-07 NOTE — PLAN OF CARE
CM provided the pt with a list of in network clinicians with the pts People's health insurance plan with in a 10 mile radius of the pts home that provides counseling for grief/bereavement support. MADINA also faxed a request to [facility] for outpt case management services for additional support.      At the request of the pts daughter I provided a listing of [facility] PCP's and cardiologist. They would like to establish care under the [facility] umbrella to follow the pts info via the [facility] online portal.  Juani Robles, CEDRICK    02/07/20 0953   Post-Acute Status   Post-Acute Authorization Other

## 2020-02-07 NOTE — ASSESSMENT & PLAN NOTE
Patient with known CAD s/p CABG, which is uncontrolled Will continue ASA and Statin and monitor for S/Sx of angina/ACS. Continue to monitor on telemetry.

## 2020-02-07 NOTE — HPI
Sunita Carlson is a 79 y.o. female with HTN, CAD, prior MI, and acid reflux who presents to the ED via EMS with an onset of passing out. She woke up at 4:30 AM (3 hours ago) with a burning in her throat, which the patient points to her mid chest radiating to her back, and took a Prilosec. The patient had no relief with the Prilosec and took a NTG around 5:30 AM (2 hours ago). She began to feel nausea and had an episode of vomiting. The patient went to the sink to wash her face and suddenly felt light-headed. The last thing she remembers is waking up in vomit on the floor prior to activating 911. Per EMS, the patient had a BP of 230's/110's and was given 2 doses of Zofran 4 mg. She is not on BP medication but is on Lasix. The patient is not currently on blood thinners as she weaned off of the medication on her own volition. She reports no pain similar to the previous episode of chest pain that lead to a stress test with subsequent cardiac bypass in 2011. The patient denies history of kidney complications, neck pain, or any other symptoms at this time. No additional pertinent PSHx.

## 2020-02-07 NOTE — NURSING
Discharge instruction provided to pt and pt's daughter, understanding verbalized. IV removed catheter intact, pt tolerated well. Cardiac monitor removed and returned to monitor room. Awaiting discharge off unit via wheelchair.

## 2020-02-07 NOTE — H&P
[facility] Medicine  History & Physical    Patient Name: Sunita Carlson  MRN: [id number]  Admission Date: 2/6/2020  Attending Physician: Arvind Ren MD  Primary Care Provider: Behzad Birch MD         Patient information was obtained from patient, relative(s) and ER records.     Subjective:     Principal Problem:Syncope, cardiogenic    Chief Complaint:   Chief Complaint   Patient presents with    syncope     this am        HPI: Sunita Carlson is a 79 y.o. female with HTN, CAD, prior MI, and acid reflux who presents to the ED via EMS with an onset of passing out. She woke up at 4:30 AM (3 hours ago) with a burning in her throat, which the patient points to her mid chest radiating to her back, and took a Prilosec. The patient had no relief with the Prilosec and took a NTG around 5:30 AM (2 hours ago). She began to feel nausea and had an episode of vomiting. The patient went to the sink to wash her face and suddenly felt light-headed. The last thing she remembers is waking up in vomit on the floor prior to activating 911. Per EMS, the patient had a BP of 230's/110's and was given 2 doses of Zofran 4 mg. She is not on BP medication but is on Lasix. The patient is not currently on blood thinners as she weaned off of the medication on her own volition. She reports no pain similar to the previous episode of chest pain that lead to a stress test with subsequent cardiac bypass in 2011. The patient denies history of kidney complications, neck pain, or any other symptoms at this time. No additional pertinent PSHx.      Past Medical History:   Diagnosis Date    Arthritis     Cataract     Coronary artery disease     Hypertension     Insomnia     Neuropathy     Retinal detachment     Stomach ulcer        Past Surgical History:   Procedure Laterality Date    APPENDECTOMY      BLADDER SUSPENSION      BREAST BIOPSY      CARDIAC SURGERY      carotid endarterectomy      L    CATARACT  EXTRACTION      CHOLECYSTECTOMY      HYSTERECTOMY      INJECTION OF ANESTHETIC AGENT AROUND GANGLION IMPAR N/A 9/12/2019    Procedure: BLOCK, GANGLION IMPAR;  Surgeon: Christian James MD;  Location: [facility] OR;  Service: Pain Management;  Laterality: N/A;    OOPHORECTOMY      SUPERFICIAL KERATECTOMY Right 10/27/2017    Dr. Morales    TONSILLECTOMY      TRANSFORAMINAL EPIDURAL INJECTION OF STEROID Left 8/12/2019    Procedure: Injection,steroid,epidural,transforaminal approach L4-5, L5-S1;  Surgeon: Christian James MD;  Location: [facility] OR;  Service: Pain Management;  Laterality: Left;    TRANSFORAMINAL EPIDURAL INJECTION OF STEROID Left 1/16/2020    Procedure: Injection,steroid,epidural,transforaminal approach;  Surgeon: Christian James MD;  Location: [facility] OR;  Service: Pain Management;  Laterality: Left;  L4-5, L5-S1       Review of patient's allergies indicates:   Allergen Reactions    Demerol [meperidine] Nausea And Vomiting    Hydrocodone Itching    Percocet [oxycodone-acetaminophen] Itching    Tramadol        Current Facility-Administered Medications on File Prior to Encounter   Medication    [DISCONTINUED] 0.9%  NaCl infusion     Current Outpatient Medications on File Prior to Encounter   Medication Sig    buPROPion (WELLBUTRIN XL) 300 MG 24 hr tablet Take 300 mg by mouth once daily.    cholecalciferol, vitamin D3, (VITAMIN D3) 2,000 unit Tab Vitamin D3 50 mcg (2,000 unit) tablet   1 tablet every day by mouth    dexlansoprazole (DEXILANT) 30 mg CpDM Take 60 mg by mouth.     dicyclomine (BENTYL) 10 MG capsule 2 (two) times daily.     diphenoxylate-atropine 2.5-0.025 mg (LOMOTIL) 2.5-0.025 mg per tablet Take 1 tablet by mouth 4 (four) times daily as needed for Diarrhea.    escitalopram oxalate (LEXAPRO) 10 MG tablet once daily.     hydroCHLOROthiazide (HYDRODIURIL) 12.5 MG Tab TK 1 T PO QD    lisinopril 10 MG tablet Take by mouth once daily.     nitroGLYCERIN (NITROSTAT) 0.4 MG SL tablet Place under the tongue.     omeprazole (PRILOSEC) 40 MG capsule Take by mouth.     ondansetron (ZOFRAN-ODT) 8 MG TbDL every 6 (six) hours as needed.     pravastatin (PRAVACHOL) 40 MG tablet every evening.     temazepam (RESTORIL) 15 mg Cap 30 mg nightly as needed.     [DISCONTINUED] FLUZONE HIGH-DOSE , PF, 180 mcg/0.5 mL Syrg ADM 0.5ML IM UTD    [DISCONTINUED] gabapentin (NEURONTIN) 300 MG capsule 3 tablets HS    [DISCONTINUED] lidocaine (LIDODERM) 5 %(700 mg/patch) Place 1 patch onto the skin every 24 hours. Remove & Discard patch within 12 hours or as directed by MD     Family History     Problem Relation (Age of Onset)    Blindness Maternal Grandfather    Breast cancer Maternal Aunt    Cancer Father, Maternal Aunt, Paternal Aunt    Diabetes Paternal Aunt    Macular degeneration Maternal Aunt, Maternal Uncle        Tobacco Use    Smoking status: Former Smoker     Years: 4.00     Types: Cigarettes     Last attempt to quit: 10/13/1962     Years since quittin.3    Smokeless tobacco: Never Used   Substance and Sexual Activity    Alcohol use: No    Drug use: No    Sexual activity: Yes     Birth control/protection: Surgical     Review of Systems   Constitutional: Negative for activity change and fever.   HENT: Negative for ear discharge, facial swelling and sore throat.    Eyes: Negative for photophobia, pain and visual disturbance.   Respiratory: Negative for apnea, cough and shortness of breath.    Cardiovascular: Positive for chest pain. Negative for leg swelling.   Gastrointestinal: Negative for abdominal pain and blood in stool.   Endocrine: Negative for cold intolerance and heat intolerance.   Musculoskeletal: Negative for back pain and gait problem.   Skin: Negative for pallor and rash.   Neurological: Positive for dizziness, syncope, weakness and numbness. Negative for speech difficulty and headaches.   Psychiatric/Behavioral: Negative for confusion, hallucinations and suicidal ideas.   All other systems reviewed  and are negative.    Objective:     Vital Signs (Most Recent):  Temp: 98.1 °F (36.7 °C) (02/06/20 1417)  Pulse: 69 (02/06/20 1511)  Resp: 18 (02/06/20 1511)  BP: (!) 161/77 (02/06/20 1417)  SpO2: 96 % (02/06/20 1511) Vital Signs (24h Range):  Temp:  [98.1 °F (36.7 °C)] 98.1 °F (36.7 °C)  Pulse:  [66-85] 69  Resp:  [16-18] 18  SpO2:  [96 %-100 %] 96 %  BP: (161-222)/(77-99) 161/77     Weight: 54.7 kg (120 lb 9.5 oz)  Body mass index is 24.36 kg/m².    Physical Exam   Constitutional: She is oriented to person, place, and time. She appears well-developed and well-nourished.   Elderly  female who appears alert orient x3 in no acute distress   HENT:   Head: Normocephalic and atraumatic.   Eyes: Right eye exhibits no discharge. Left eye exhibits no discharge. No scleral icterus.   Noted unilateral blindness.   Neck: Neck supple. No JVD present.   Cardiovascular: Normal rate and regular rhythm. Exam reveals no gallop and no friction rub.   No murmur heard.  Pulmonary/Chest: Effort normal and breath sounds normal. She has no wheezes. She has no rales.   Abdominal: Soft. Bowel sounds are normal. She exhibits no distension. There is no tenderness.   Musculoskeletal: She exhibits no edema or tenderness.   Lymphadenopathy:     She has no cervical adenopathy.   Neurological: She is alert and oriented to person, place, and time. She has normal reflexes. No cranial nerve deficit.   Skin: No rash noted. No erythema.   Psychiatric: She has a normal mood and affect.   Nursing note and vitals reviewed.          Significant Labs: All pertinent labs within the past 24 hours have been reviewed.    Significant Imaging: I have reviewed all pertinent imaging results/findings within the past 24 hours.    Assessment/Plan:     * Syncope, cardiogenic  Patient with evidence of cardiogenic syncope and chest pain. Patient with CSRS score of 5 indicating 19% pretest probability for cardiogenic syncope.  Will monitor patient overnight on  telemetry, assess echocardiogram, in control blood pressure.  May require cardiology consult if she has any abnormal initial studies.      Coronary artery disease involving coronary bypass graft of native heart with unstable angina pectoris  Patient with known CAD s/p CABG, which is uncontrolled Will continue ASA and Statin and monitor for S/Sx of angina/ACS. Continue to monitor on telemetry.         Hyperlipidemia   Patient is chronically on statin.will continue for now. Monitor clinically. Last LDL was   Lab Results   Component Value Date    LDLCALC 90.8 09/12/2019            Essential hypertension  Chronic, uncontrolled.  Latest blood pressure and vitals reviewed-   Temp:  [98.1 °F (36.7 °C)]   Pulse:  [66-85]   Resp:  [16-18]   BP: (161-222)/(77-99)   SpO2:  [96 %-100 %] .   Home meds for hypertension were reviewed and noted below. Hospital anti-hypertensive changes were made as shown below.  Hypertension Medications             hydroCHLOROthiazide (HYDRODIURIL) 12.5 MG Tab TK 1 T PO QD    lisinopril 10 MG tablet Take by mouth once daily.     nitroGLYCERIN (NITROSTAT) 0.4 MG SL tablet Place under the tongue.      Hospital Medications             lisinopril tablet 10 mg 10 mg, Oral, Daily    metoprolol tartrate (LOPRESSOR) tablet 50 mg (Completed) 50 mg, Oral, ED 1 Time        Will utilize p.r.n. blood pressure medication only if patient's blood pressure greater than  180/110 and she develops symptoms such as worsening chest pain or shortness of breath.        Gastroesophageal reflux disease without esophagitis  Continue PPI      Chest pain at rest  Etiology potentially related to cardiogenic.  Trend troponins, and monitor closely.  History of CAD and stroke makes vascular disease highly likely.        VTE Risk Mitigation (From admission, onward)         Ordered     heparin (porcine) injection 5,000 Units  Every 8 hours      02/06/20 1235     IP VTE HIGH RISK PATIENT  Once      02/06/20 1235                    Arvind Ren MD  Department of Hospital Medicine   [facility]

## 2020-02-07 NOTE — NURSING
Spoke with Dr Vigil whom ordered to cancel cardiology consult. Contacted Dr Proctor office and let them know of canceled consult.

## 2020-02-07 NOTE — RESPIRATORY THERAPY
02/07/20 0739   PRE-TX-O2   O2 Device (Oxygen Therapy) room air   SpO2 95 %   Pulse Oximetry Type Intermittent   $ Pulse Oximetry - Multiple Charge Pulse Oximetry - Multiple

## 2020-02-07 NOTE — SUBJECTIVE & OBJECTIVE
Past Medical History:   Diagnosis Date    Arthritis     Cataract     Coronary artery disease     Hypertension     Insomnia     Neuropathy     Retinal detachment     Stomach ulcer        Past Surgical History:   Procedure Laterality Date    APPENDECTOMY      BLADDER SUSPENSION      BREAST BIOPSY      CARDIAC SURGERY      carotid endarterectomy      L    CATARACT EXTRACTION      CHOLECYSTECTOMY      HYSTERECTOMY      INJECTION OF ANESTHETIC AGENT AROUND GANGLION IMPAR N/A 9/12/2019    Procedure: BLOCK, GANGLION IMPAR;  Surgeon: Christian James MD;  [facility];  Service: Pain Management;  Laterality: N/A;    OOPHORECTOMY      SUPERFICIAL KERATECTOMY Right 10/27/2017    Dr. Morales    TONSILLECTOMY      TRANSFORAMINAL EPIDURAL INJECTION OF STEROID Left 8/12/2019    Procedure: Injection,steroid,epidural,transforaminal approach L4-5, L5-S1;  Surgeon: Christian James MD;  [facility];  Service: Pain Management;  Laterality: Left;    TRANSFORAMINAL EPIDURAL INJECTION OF STEROID Left 1/16/2020    Procedure: Injection,steroid,epidural,transforaminal approach;  Surgeon: Christian James MD;  [facility];  Service: Pain Management;  Laterality: Left;  L4-5, L5-S1       Review of patient's allergies indicates:   Allergen Reactions    Demerol [meperidine] Nausea And Vomiting    Hydrocodone Itching    Percocet [oxycodone-acetaminophen] Itching    Tramadol        Current Facility-Administered Medications on File Prior to Encounter   Medication    [DISCONTINUED] 0.9%  NaCl infusion     Current Outpatient Medications on File Prior to Encounter   Medication Sig    buPROPion (WELLBUTRIN XL) 300 MG 24 hr tablet Take 300 mg by mouth once daily.    cholecalciferol, vitamin D3, (VITAMIN D3) 2,000 unit Tab Vitamin D3 50 mcg (2,000 unit) tablet   1 tablet every day by mouth    dexlansoprazole (DEXILANT) 30 mg CpDM Take 60 mg by mouth.     dicyclomine (BENTYL) 10 MG capsule 2 (two) times daily.      diphenoxylate-atropine 2.5-0.025 mg (LOMOTIL) 2.5-0.025 mg per tablet Take 1 tablet by mouth 4 (four) times daily as needed for Diarrhea.    escitalopram oxalate (LEXAPRO) 10 MG tablet once daily.     hydroCHLOROthiazide (HYDRODIURIL) 12.5 MG Tab TK 1 T PO QD    lisinopril 10 MG tablet Take by mouth once daily.     nitroGLYCERIN (NITROSTAT) 0.4 MG SL tablet Place under the tongue.    omeprazole (PRILOSEC) 40 MG capsule Take by mouth.     ondansetron (ZOFRAN-ODT) 8 MG TbDL every 6 (six) hours as needed.     pravastatin (PRAVACHOL) 40 MG tablet every evening.     temazepam (RESTORIL) 15 mg Cap 30 mg nightly as needed.     [DISCONTINUED] FLUZONE HIGH-DOSE , PF, 180 mcg/0.5 mL Syrg ADM 0.5ML IM UTD    [DISCONTINUED] gabapentin (NEURONTIN) 300 MG capsule 3 tablets HS    [DISCONTINUED] lidocaine (LIDODERM) 5 %(700 mg/patch) Place 1 patch onto the skin every 24 hours. Remove & Discard patch within 12 hours or as directed by MD     Family History     Problem Relation (Age of Onset)    Blindness Maternal Grandfather    Breast cancer Maternal Aunt    Cancer Father, Maternal Aunt, Paternal Aunt    Diabetes Paternal Aunt    Macular degeneration Maternal Aunt, Maternal Uncle        Tobacco Use    Smoking status: Former Smoker     Years: 4.00     Types: Cigarettes     Last attempt to quit: 10/13/1962     Years since quittin.3    Smokeless tobacco: Never Used   Substance and Sexual Activity    Alcohol use: No    Drug use: No    Sexual activity: Yes     Birth control/protection: Surgical     Review of Systems   Constitutional: Negative for activity change and fever.   HENT: Negative for ear discharge, facial swelling and sore throat.    Eyes: Negative for photophobia, pain and visual disturbance.   Respiratory: Negative for apnea, cough and shortness of breath.    Cardiovascular: Positive for chest pain. Negative for leg swelling.   Gastrointestinal: Negative for abdominal pain and blood in stool.    Endocrine: Negative for cold intolerance and heat intolerance.   Musculoskeletal: Negative for back pain and gait problem.   Skin: Negative for pallor and rash.   Neurological: Positive for dizziness, syncope, weakness and numbness. Negative for speech difficulty and headaches.   Psychiatric/Behavioral: Negative for confusion, hallucinations and suicidal ideas.   All other systems reviewed and are negative.    Objective:     Vital Signs (Most Recent):  Temp: 98.1 °F (36.7 °C) (02/06/20 1417)  Pulse: 69 (02/06/20 1511)  Resp: 18 (02/06/20 1511)  BP: (!) 161/77 (02/06/20 1417)  SpO2: 96 % (02/06/20 1511) Vital Signs (24h Range):  Temp:  [98.1 °F (36.7 °C)] 98.1 °F (36.7 °C)  Pulse:  [66-85] 69  Resp:  [16-18] 18  SpO2:  [96 %-100 %] 96 %  BP: (161-222)/(77-99) 161/77     Weight: 54.7 kg (120 lb 9.5 oz)  Body mass index is 24.36 kg/m².    Physical Exam   Constitutional: She is oriented to person, place, and time. She appears well-developed and well-nourished.   Elderly  female who appears alert orient x3 in no acute distress   HENT:   Head: Normocephalic and atraumatic.   Eyes: Right eye exhibits no discharge. Left eye exhibits no discharge. No scleral icterus.   Noted unilateral blindness.   Neck: Neck supple. No JVD present.   Cardiovascular: Normal rate and regular rhythm. Exam reveals no gallop and no friction rub.   No murmur heard.  Pulmonary/Chest: Effort normal and breath sounds normal. She has no wheezes. She has no rales.   Abdominal: Soft. Bowel sounds are normal. She exhibits no distension. There is no tenderness.   Musculoskeletal: She exhibits no edema or tenderness.   Lymphadenopathy:     She has no cervical adenopathy.   Neurological: She is alert and oriented to person, place, and time. She has normal reflexes. No cranial nerve deficit.   Skin: No rash noted. No erythema.   Psychiatric: She has a normal mood and affect.   Nursing note and vitals reviewed.          Significant Labs: All  pertinent labs within the past 24 hours have been reviewed.    Significant Imaging: I have reviewed all pertinent imaging results/findings within the past 24 hours.

## 2020-02-10 ENCOUNTER — TELEPHONE (OUTPATIENT)
Dept: MEDSURG UNIT | Facility: HOSPITAL | Age: 80
End: 2020-02-10

## 2020-02-13 ENCOUNTER — OFFICE VISIT (OUTPATIENT)
Dept: CARDIOLOGY | Facility: CLINIC | Age: 80
End: 2020-02-13
Payer: MEDICARE

## 2020-02-13 VITALS
WEIGHT: 114.44 LBS | DIASTOLIC BLOOD PRESSURE: 69 MMHG | SYSTOLIC BLOOD PRESSURE: 149 MMHG | HEIGHT: 59 IN | HEART RATE: 82 BPM | BODY MASS INDEX: 23.07 KG/M2

## 2020-02-13 DIAGNOSIS — I25.700 CORONARY ARTERY DISEASE INVOLVING CORONARY BYPASS GRAFT OF NATIVE HEART WITH UNSTABLE ANGINA PECTORIS: ICD-10-CM

## 2020-02-13 DIAGNOSIS — E78.2 MIXED HYPERLIPIDEMIA: Primary | ICD-10-CM

## 2020-02-13 DIAGNOSIS — I10 ESSENTIAL HYPERTENSION: ICD-10-CM

## 2020-02-13 PROCEDURE — 1125F PR PAIN SEVERITY QUANTIFIED, PAIN PRESENT: ICD-10-PCS | Mod: GC,S$GLB,, | Performed by: INTERNAL MEDICINE

## 2020-02-13 PROCEDURE — 99203 PR OFFICE/OUTPT VISIT, NEW, LEVL III, 30-44 MIN: ICD-10-PCS | Mod: GC,S$GLB,, | Performed by: INTERNAL MEDICINE

## 2020-02-13 PROCEDURE — 99203 OFFICE O/P NEW LOW 30 MIN: CPT | Mod: GC,S$GLB,, | Performed by: INTERNAL MEDICINE

## 2020-02-13 PROCEDURE — 3078F DIAST BP <80 MM HG: CPT | Mod: CPTII,GC,S$GLB, | Performed by: INTERNAL MEDICINE

## 2020-02-13 PROCEDURE — 3077F PR MOST RECENT SYSTOLIC BLOOD PRESSURE >= 140 MM HG: ICD-10-PCS | Mod: CPTII,GC,S$GLB, | Performed by: INTERNAL MEDICINE

## 2020-02-13 PROCEDURE — 1125F AMNT PAIN NOTED PAIN PRSNT: CPT | Mod: GC,S$GLB,, | Performed by: INTERNAL MEDICINE

## 2020-02-13 PROCEDURE — 1101F PT FALLS ASSESS-DOCD LE1/YR: CPT | Mod: CPTII,GC,S$GLB, | Performed by: INTERNAL MEDICINE

## 2020-02-13 PROCEDURE — 99999 PR PBB SHADOW E&M-EST. PATIENT-LVL III: ICD-10-PCS | Mod: PBBFAC,GC,, | Performed by: INTERNAL MEDICINE

## 2020-02-13 PROCEDURE — 3077F SYST BP >= 140 MM HG: CPT | Mod: CPTII,GC,S$GLB, | Performed by: INTERNAL MEDICINE

## 2020-02-13 PROCEDURE — 3078F PR MOST RECENT DIASTOLIC BLOOD PRESSURE < 80 MM HG: ICD-10-PCS | Mod: CPTII,GC,S$GLB, | Performed by: INTERNAL MEDICINE

## 2020-02-13 PROCEDURE — 1101F PR PT FALLS ASSESS DOC 0-1 FALLS W/OUT INJ PAST YR: ICD-10-PCS | Mod: CPTII,GC,S$GLB, | Performed by: INTERNAL MEDICINE

## 2020-02-13 PROCEDURE — 1159F PR MEDICATION LIST DOCUMENTED IN MEDICAL RECORD: ICD-10-PCS | Mod: GC,S$GLB,, | Performed by: INTERNAL MEDICINE

## 2020-02-13 PROCEDURE — 99999 PR PBB SHADOW E&M-EST. PATIENT-LVL III: CPT | Mod: PBBFAC,GC,, | Performed by: INTERNAL MEDICINE

## 2020-02-13 PROCEDURE — 1159F MED LIST DOCD IN RCRD: CPT | Mod: GC,S$GLB,, | Performed by: INTERNAL MEDICINE

## 2020-02-13 RX ORDER — ATORVASTATIN CALCIUM 40 MG/1
40 TABLET, FILM COATED ORAL DAILY
Qty: 90 TABLET | Refills: 3 | Status: SHIPPED | OUTPATIENT
Start: 2020-02-13 | End: 2021-02-12

## 2020-02-13 RX ORDER — DICLOFENAC SODIUM 10 MG/G
2 GEL TOPICAL DAILY PRN
COMMUNITY

## 2020-02-13 RX ORDER — CHOLESTYRAMINE 4 G/4.8G
POWDER, FOR SUSPENSION ORAL 2 TIMES DAILY
COMMUNITY
End: 2020-09-10 | Stop reason: SDUPTHER

## 2020-02-13 RX ORDER — TEMAZEPAM 15 MG/1
15 CAPSULE ORAL NIGHTLY PRN
COMMUNITY
End: 2020-09-10 | Stop reason: SDUPTHER

## 2020-02-13 RX ORDER — ASPIRIN 81 MG/1
81 TABLET ORAL DAILY
COMMUNITY

## 2020-02-13 NOTE — PROGRESS NOTES
Cardiology Clinic Note  Reason for Visit: establish care    HPI:    80 y/o F with CAD s/p CABG 2/2010 ([facility]), left carotid endarterectomy, HTN, HLD. Patient was admitted last week for one day after a syncopal episode. She woke up around 4:15 am and had mild chest pain that lasted for a few minutes, took one nitroglycerin and then walked to the bathroom where she lost consciousness. She underwent CT head, CTA chest and echocardiogram. Since discharge has been doing well. She is not very active lately. Reports she gets tired more easily. Had one episode of chest pain today while bending over to  something from the floor, lasted few seconds. No SOB. Her SBP at home is 140-150s. Was prescribed lisinopril 10 mg BID in the hospital but only taking it daily.     ROS:    Review of Systems   Constitution: Negative for chills and fever.   Cardiovascular: Positive for chest pain. Negative for dyspnea on exertion, palpitations and syncope.   Respiratory: Negative for cough and sleep disturbances due to breathing.    Musculoskeletal: Negative for back pain.   Gastrointestinal: Negative for abdominal pain, nausea and vomiting.   Neurological: Positive for dizziness. Negative for focal weakness.   Psychiatric/Behavioral: Negative for altered mental status.     PMH:     Past Medical History:   Diagnosis Date    Arthritis     Cataract     Coronary artery disease     Hypertension     Insomnia     Neuropathy     Retinal detachment     Stomach ulcer      Past Surgical History:   Procedure Laterality Date    APPENDECTOMY      BLADDER SUSPENSION      BREAST BIOPSY      CARDIAC SURGERY      carotid endarterectomy      L    CATARACT EXTRACTION      CHOLECYSTECTOMY      HYSTERECTOMY      INJECTION OF ANESTHETIC AGENT AROUND GANGLION IMPAR N/A 9/12/2019    Procedure: BLOCK, GANGLION IMPAR;  Surgeon: Christian James MD;  Location: [facility];  Service: Pain Management;  Laterality: N/A;    OOPHORECTOMY       SUPERFICIAL KERATECTOMY Right 10/27/2017    Dr. Morales    TONSILLECTOMY      TRANSFORAMINAL EPIDURAL INJECTION OF STEROID Left 8/12/2019    Procedure: Injection,steroid,epidural,transforaminal approach L4-5, L5-S1;  Surgeon: Christian James MD;  Location: [facility] OR;  Service: Pain Management;  Laterality: Left;    TRANSFORAMINAL EPIDURAL INJECTION OF STEROID Left 1/16/2020    Procedure: Injection,steroid,epidural,transforaminal approach;  Surgeon: Christian James MD;  Location: [facility] OR;  Service: Pain Management;  Laterality: Left;  L4-5, L5-S1     Allergies:     Review of patient's allergies indicates:   Allergen Reactions    Demerol [meperidine] Nausea And Vomiting    Hydrocodone Itching    Percocet [oxycodone-acetaminophen] Itching    Tramadol      Medications:     Current Outpatient Medications on File Prior to Visit   Medication Sig Dispense Refill    aspirin (ECOTRIN) 81 MG EC tablet Take 81 mg by mouth once daily.      buPROPion (WELLBUTRIN XL) 300 MG 24 hr tablet Take 300 mg by mouth once daily.      cholecalciferol, vitamin D3, (VITAMIN D3) 2,000 unit Tab Vitamin D3 50 mcg (2,000 unit) tablet   1 tablet every day by mouth      cholestyramine-aspartame (CHOLESTYRAMINE LIGHT) 4 gram PwPk Take by mouth 2 (two) times daily.      dexlansoprazole (DEXILANT) 30 mg CpDM Take 60 mg by mouth.       diclofenac sodium (VOLTAREN) 1 % Gel Apply 2 g topically daily as needed.      dicyclomine (BENTYL) 10 MG capsule 2 (two) times daily as needed.       diphenoxylate-atropine 2.5-0.025 mg (LOMOTIL) 2.5-0.025 mg per tablet Take 1 tablet by mouth 4 (four) times daily as needed for Diarrhea.      lisinopril 10 MG tablet Take 2 tablets (20 mg total) by mouth once daily. (Patient taking differently: Take 10 mg by mouth once daily. ) 30 tablet 3    nitroGLYCERIN (NITROSTAT) 0.4 MG SL tablet Place under the tongue.      ondansetron (ZOFRAN-ODT) 8 MG TbDL every 6 (six) hours as needed.       temazepam (RESTORIL) 15 mg Cap  "Take 15 mg by mouth nightly as needed.      [DISCONTINUED] pravastatin (PRAVACHOL) 40 MG tablet every evening.       omeprazole (PRILOSEC) 40 MG capsule Take by mouth.        No current facility-administered medications on file prior to visit.      Social History:     Social History     Tobacco Use    Smoking status: Former Smoker     Years: 4.00     Types: Cigarettes     Last attempt to quit: 10/13/1962     Years since quittin.3    Smokeless tobacco: Never Used   Substance Use Topics    Alcohol use: No     Family History:     Family History   Problem Relation Age of Onset    Cancer Father     Macular degeneration Maternal Aunt     Cancer Maternal Aunt     Breast cancer Maternal Aunt     Macular degeneration Maternal Uncle     Diabetes Paternal Aunt     Cancer Paternal Aunt     Blindness Maternal Grandfather     Melanoma Neg Hx     Psoriasis Neg Hx     Lupus Neg Hx     Eczema Neg Hx      Physical Exam:   BP (!) 149/69 (BP Location: Left arm, Patient Position: Sitting, BP Method: Large (Automatic))   Pulse 82   Ht 4' 11" (1.499 m)   Wt 51.9 kg (114 lb 6.7 oz)   LMP 2020   BMI 23.11 kg/m²      Physical Exam   Constitutional: She is oriented to person, place, and time. She appears well-developed and well-nourished. No distress.   HENT:   Head: Normocephalic and atraumatic.   Eyes: Conjunctivae and EOM are normal.   Neck: Normal range of motion. No tracheal deviation present.   Cardiovascular: Normal rate, regular rhythm and normal heart sounds.   No murmur heard.  Pulmonary/Chest: Effort normal and breath sounds normal. No respiratory distress. She has no wheezes.   Abdominal: Soft. Bowel sounds are normal. She exhibits no distension. There is no tenderness.   Musculoskeletal: Normal range of motion. She exhibits no edema.   Neurological: She is alert and oriented to person, place, and time.   Skin: She is not diaphoretic.       Labs:     Lab Results   Component Value Date     (L) " 02/07/2020    K 4.0 02/07/2020    CL 95 02/07/2020    CO2 26 02/07/2020    BUN 15 02/07/2020    CREATININE 0.8 02/07/2020    ANIONGAP 8 02/07/2020     Lab Results   Component Value Date    HGBA1C 5.5 07/22/2019     Lab Results   Component Value Date    BNP <10 02/06/2020    BNP 31 11/26/2013    Lab Results   Component Value Date    WBC 6.27 02/07/2020    HGB 12.7 02/07/2020    HCT 36.8 (L) 02/07/2020     02/07/2020    GRAN 2.7 02/07/2020    GRAN 42.5 02/07/2020     Lab Results   Component Value Date    CHOL 184 09/12/2019    HDL 73 09/12/2019    LDLCALC 90.8 09/12/2019    TRIG 101 09/12/2019          Imaging:     TTE 2/6/20  · Left ventricular systolic function. The estimated ejection fraction is 60%.  · Grade I (mild) left ventricular diastolic dysfunction consistent with impaired relaxation.  · Mild left atrial enlargement.  · Mild aortic regurgitation.  · No intracardiac thrombi or vegetations    EKG: normal sinus rhythm, no blocks or conduction defects, no ischemic changes     Assessment:     1. Mixed hyperlipidemia    2. Coronary artery disease involving coronary bypass graft of native heart with unstable angina pectoris    3. Essential hypertension        Plan:   Mixed hyperlipidemia  - Lipid panel on 9/2019 showed LDL 90, chol 184  - On pravastatin 40 mg. Will switch to atorvastatin 40 mg  - Repeat lipid panel one week prior to next appt in 3 months    Coronary artery disease involving coronary bypass graft of native heart with unstable angina pectoris  - Chest pain is atypical for cardiac etiology. Would continue to monitor for now, I asked her to keep a symptoms diary and bring to her next visit. If symptoms worsen will consider stress test  - Echo unremarkable on 2/6/20  - Continue aspirin, statin    Essential hypertension  - BP elevated at home. Asked patient to take lisinopril 10 mg BID as prescribed in the hospital last time.     Discussed with Dr. Kraft. RTC in 3 months.     Froilan  Tan  Cardiology Fellow, PGY4

## 2020-02-16 ENCOUNTER — HOSPITAL ENCOUNTER (EMERGENCY)
Facility: HOSPITAL | Age: 80
Discharge: HOME | End: 2020-02-16
Attending: EMERGENCY MEDICINE
Payer: MEDICARE

## 2020-02-16 VITALS
WEIGHT: 110 LBS | TEMPERATURE: 98 F | HEART RATE: 76 BPM | RESPIRATION RATE: 16 BRPM | OXYGEN SATURATION: 100 % | BODY MASS INDEX: 22.22 KG/M2 | DIASTOLIC BLOOD PRESSURE: 82 MMHG | SYSTOLIC BLOOD PRESSURE: 184 MMHG

## 2020-02-16 DIAGNOSIS — R04.0 EPISTAXIS: Primary | ICD-10-CM

## 2020-02-16 PROCEDURE — 25000003 PHARM REV CODE 250: Performed by: NURSE PRACTITIONER

## 2020-02-16 PROCEDURE — 99282 EMERGENCY DEPT VISIT SF MDM: CPT

## 2020-02-16 RX ADMIN — Medication 2 SPRAY: at 11:02

## 2020-02-16 NOTE — ED PROVIDER NOTES
Encounter Date: 2/16/2020    SCRIBE #1 NOTE: Lorraine POWER and kingston scribing for, and in the presence of, ANJANA BoxC.       History     Chief Complaint   Patient presents with    Epistaxis     started at 1030. No blood thinners     Time seen by provider: 11:38 AM on 02/16/2020    Sunita Carlson is a 79 y.o. female with PMHx of HTN and CAD who presents to the ED with an onset of nosebleed starting 1 hour PTA. She reports that she is having clots from her nose, and this is predominantly coming from the right nostril. The patient reports a history of nosebleeds which her doctor has told her is related to her high blood pressure. She did take her blood pressure medication this morning. She reports a recent change in BP meds and has had elevated BP since and pcp told her it would take a few weeks to normalize. She is not on blood thinners. The patient denies headache, blurred vision, SOB, dizziness, or any other symptoms at this time. No pertinent PSHx. Drug allergies to Demerol, Hydrocodone, Percocet, and Tramadol.     The history is provided by the patient.     Review of patient's allergies indicates:   Allergen Reactions    Demerol [meperidine] Nausea And Vomiting    Hydrocodone Itching    Percocet [oxycodone-acetaminophen] Itching    Tramadol      Past Medical History:   Diagnosis Date    Arthritis     Cataract     Coronary artery disease     Hypertension     Insomnia     Neuropathy     Retinal detachment     Stomach ulcer      Past Surgical History:   Procedure Laterality Date    APPENDECTOMY      BLADDER SUSPENSION      BREAST BIOPSY      CARDIAC SURGERY      carotid endarterectomy      L    CATARACT EXTRACTION      CHOLECYSTECTOMY      HYSTERECTOMY      INJECTION OF ANESTHETIC AGENT AROUND GANGLION IMPAR N/A 9/12/2019    Procedure: BLOCK, GANGLION IMPAR;  Surgeon: Christian James MD;  [facility]: [facility] OR;  Service: Pain Management;  Laterality: N/A;    OOPHORECTOMY       SUPERFICIAL KERATECTOMY Right 10/27/2017    Dr. Morales    TONSILLECTOMY      TRANSFORAMINAL EPIDURAL INJECTION OF STEROID Left 2019    Procedure: Injection,steroid,epidural,transforaminal approach L4-5, L5-S1;  Surgeon: Christian James MD;  [facility];  Service: Pain Management;  Laterality: Left;    TRANSFORAMINAL EPIDURAL INJECTION OF STEROID Left 2020    Procedure: Injection,steroid,epidural,transforaminal approach;  Surgeon: Christian James MD;  [facility];  Service: Pain Management;  Laterality: Left;  L4-5, L5-S1     Family History   Problem Relation Age of Onset    Cancer Father     Macular degeneration Maternal Aunt     Cancer Maternal Aunt     Breast cancer Maternal Aunt     Macular degeneration Maternal Uncle     Diabetes Paternal Aunt     Cancer Paternal Aunt     Blindness Maternal Grandfather     Melanoma Neg Hx     Psoriasis Neg Hx     Lupus Neg Hx     Eczema Neg Hx      Social History     Tobacco Use    Smoking status: Former Smoker     Years: 4.00     Types: Cigarettes     Last attempt to quit: 10/13/1962     Years since quittin.3    Smokeless tobacco: Never Used   Substance Use Topics    Alcohol use: No    Drug use: No     Review of Systems   Constitutional: Negative for activity change, appetite change, chills and fever.   HENT: Positive for nosebleeds. Negative for congestion, ear pain, rhinorrhea, sinus pressure, sinus pain, sneezing, sore throat and voice change.    Eyes: Negative for pain, discharge, redness and visual disturbance.   Respiratory: Negative for cough, shortness of breath, wheezing and stridor.    Cardiovascular: Negative for chest pain, palpitations and leg swelling.   Gastrointestinal: Negative for abdominal distention, abdominal pain, blood in stool, constipation, diarrhea, nausea and vomiting.   Genitourinary: Negative for difficulty urinating, dysuria, flank pain, frequency, hematuria and urgency.   Musculoskeletal: Negative for  arthralgias, gait problem, joint swelling and myalgias.   Skin: Negative for rash and wound.   Neurological: Negative for dizziness, syncope, facial asymmetry, speech difficulty, weakness, light-headedness, numbness and headaches.   Hematological: Negative for adenopathy.   Psychiatric/Behavioral: Negative.        Physical Exam     Initial Vitals [02/16/20 1130]   BP Pulse Resp Temp SpO2   (!) 182/84 93 18 98.1 °F (36.7 °C) 100 %      MAP       --         Physical Exam    Nursing note and vitals reviewed.  Constitutional: She appears well-developed and well-nourished. She is active.   HENT:   Head: Normocephalic and atraumatic.   Right Ear: Tympanic membrane, external ear and ear canal normal. No drainage or tenderness. No mastoid tenderness. Tympanic membrane is not perforated, not erythematous and not bulging. No middle ear effusion.   Left Ear: Tympanic membrane, external ear and ear canal normal. No drainage or tenderness. No mastoid tenderness. Tympanic membrane is not perforated, not erythematous and not bulging.  No middle ear effusion.   Nose: No rhinorrhea or nasal septal hematoma. Epistaxis is observed. Right sinus exhibits no maxillary sinus tenderness and no frontal sinus tenderness. Left sinus exhibits no maxillary sinus tenderness and no frontal sinus tenderness.   Mouth/Throat: Uvula is midline, oropharynx is clear and moist and mucous membranes are normal. No uvula swelling. No oropharyngeal exudate, posterior oropharyngeal edema or posterior oropharyngeal erythema.   Small amount of bleeding from the right nares. No active bleeding from the left nares.    Eyes: Conjunctivae, EOM and lids are normal. Pupils are equal, round, and reactive to light. Right eye exhibits no chemosis and no discharge. Left eye exhibits no chemosis and no discharge. Right conjunctiva is not injected. Left conjunctiva is not injected.   Neck: Trachea normal and normal range of motion. Neck supple. No stridor present. No  tracheal deviation present. No neck rigidity.   Cardiovascular: Normal rate, regular rhythm, normal heart sounds and normal pulses. Exam reveals no distant heart sounds and no friction rub.    No murmur heard.  Pulmonary/Chest: Breath sounds normal. No stridor. She has no wheezes. She has no rhonchi. She has no rales.   Musculoskeletal: Normal range of motion.   Neurological: She is alert and oriented to person, place, and time. She has normal strength. No sensory deficit. GCS score is 15. GCS eye subscore is 4. GCS verbal subscore is 5. GCS motor subscore is 6.   Skin: Skin is warm, dry and intact. Capillary refill takes less than 2 seconds. No rash noted.   Psychiatric: She has a normal mood and affect. Her speech is normal and behavior is normal. Thought content normal.         ED Course   Procedures  Labs Reviewed - No data to display       Imaging Results    None          Medical Decision Making:   History:   Old Medical Records: I decided to obtain old medical records.  Differential Diagnosis:   Epistaxis  Hypertensive urgency  Hypovolemia        APC / Resident Notes:   79 year old female presents for evaluation of nose bleed that she believes is r/t elevated BP. The patient's blood pressure is elevated here in the emergency department.  The patient has a history of hypertension and and this is likely long-standing uncontrolled hypertension.  Based on the patient's presentation today I do not see any signs of endorgan damage representing hypertensive urgency or emergency.  I do not think the patient's presentation necessitates further intervention in the Emergency Department to acutely decrease their blood pressure.  Choco-synephrine applied to right nare with complete resolution of bleeding. I do not feel further emergent evaluation or treatment is needed and pt is stable for discharge. I have given the patient specific return precautions and instructions to follow up with their regular doctor. I have discussed  pt with Dr Crockett who agrees with poc. Pt voices understanding and is agreeable to the plan.  She is given specific return precautions.          Scribe Attestation:   Scribe #1: I performed the above scribed service and the documentation accurately describes the services I performed. I attest to the accuracy of the note.    I, JEN Shell, personally performed the services described in this documentation. All medical record entries made by the scribe were at my direction and in my presence.  I have reviewed the chart and agree that the record reflects my personal performance and is accurate and complete. JEN Shell.  7:02 PM 02/16/2020                        Clinical Impression:       ICD-10-CM ICD-9-CM   1. Epistaxis R04.0 784.7         Disposition:   Disposition: Discharged  Condition: Stable                     Haley Marinelli NP  02/16/20 1902

## 2020-02-17 ENCOUNTER — SSC ENCOUNTER (OUTPATIENT)
Dept: ADMINISTRATIVE | Facility: OTHER | Age: 80
End: 2020-02-17

## 2020-02-17 NOTE — PROGRESS NOTES
Please note the following patient's information was forwarded to [facility] ([facility]) for case management and/or  on 02/17/2020.    Please contact Ext. [phone] with any questions.    Thank you,    Stephanie Johnson, [facility] [place]  [phone]

## 2020-03-02 DIAGNOSIS — R92.8 OTH ABN AND INCONCLUSIVE FINDINGS ON DX IMAGING OF BREAST: Primary | ICD-10-CM

## 2020-03-02 DIAGNOSIS — Z12.31 ENCOUNTER FOR SCREENING MAMMOGRAM FOR MALIGNANT NEOPLASM OF BREAST: ICD-10-CM

## 2020-03-04 DIAGNOSIS — R92.8 OTHER ABNORMAL AND INCONCLUSIVE FINDINGS ON DIAGNOSTIC IMAGING OF BREAST: Primary | ICD-10-CM

## 2020-05-01 ENCOUNTER — HOSPITAL ENCOUNTER (OUTPATIENT)
Dept: RADIOLOGY | Facility: HOSPITAL | Age: 80
Discharge: HOME | End: 2020-05-01
Attending: INTERNAL MEDICINE
Payer: MEDICARE

## 2020-05-01 DIAGNOSIS — R92.8 OTH ABN AND INCONCLUSIVE FINDINGS ON DX IMAGING OF BREAST: ICD-10-CM

## 2020-05-01 DIAGNOSIS — R92.8 OTHER ABNORMAL AND INCONCLUSIVE FINDINGS ON DIAGNOSTIC IMAGING OF BREAST: ICD-10-CM

## 2020-05-01 PROCEDURE — 77066 DX MAMMO INCL CAD BI: CPT | Mod: 26,,, | Performed by: RADIOLOGY

## 2020-05-01 PROCEDURE — 77066 MAMMO DIGITAL DIAGNOSTIC BILAT WITH TOMOSYNTHESIS_CAD: ICD-10-PCS | Mod: 26,,, | Performed by: RADIOLOGY

## 2020-05-01 PROCEDURE — 77062 MAMMO DIGITAL DIAGNOSTIC BILAT WITH TOMOSYNTHESIS_CAD: ICD-10-PCS | Mod: 26,,, | Performed by: RADIOLOGY

## 2020-05-01 PROCEDURE — 76642 ULTRASOUND BREAST LIMITED: CPT | Mod: TC,LT

## 2020-05-01 PROCEDURE — 76642 US BREAST LEFT LIMITED: ICD-10-PCS | Mod: 26,LT,, | Performed by: RADIOLOGY

## 2020-05-01 PROCEDURE — 77062 BREAST TOMOSYNTHESIS BI: CPT | Mod: 26,,, | Performed by: RADIOLOGY

## 2020-05-01 PROCEDURE — 77066 DX MAMMO INCL CAD BI: CPT | Mod: TC

## 2020-05-01 PROCEDURE — 76642 ULTRASOUND BREAST LIMITED: CPT | Mod: 26,LT,, | Performed by: RADIOLOGY

## 2020-06-03 ENCOUNTER — LAB VISIT (OUTPATIENT)
Dept: LAB | Facility: HOSPITAL | Age: 80
End: 2020-06-03
Attending: INTERNAL MEDICINE
Payer: MEDICARE

## 2020-06-03 DIAGNOSIS — I10 ESSENTIAL HYPERTENSION: ICD-10-CM

## 2020-06-03 DIAGNOSIS — I25.700 CORONARY ARTERY DISEASE INVOLVING CORONARY BYPASS GRAFT OF NATIVE HEART WITH UNSTABLE ANGINA PECTORIS: ICD-10-CM

## 2020-06-03 DIAGNOSIS — E78.2 MIXED HYPERLIPIDEMIA: ICD-10-CM

## 2020-06-03 LAB
ANION GAP SERPL CALC-SCNC: 11 MMOL/L (ref 8–16)
BUN SERPL-MCNC: 14 MG/DL (ref 8–23)
CALCIUM SERPL-MCNC: 11 MG/DL (ref 8.7–10.5)
CHLORIDE SERPL-SCNC: 106 MMOL/L (ref 95–110)
CHOLEST SERPL-MCNC: 152 MG/DL (ref 120–199)
CHOLEST/HDLC SERPL: 2.2 {RATIO} (ref 2–5)
CO2 SERPL-SCNC: 24 MMOL/L (ref 23–29)
CREAT SERPL-MCNC: 1 MG/DL (ref 0.5–1.4)
EST. GFR  (AFRICAN AMERICAN): >60 ML/MIN/1.73 M^2
EST. GFR  (NON AFRICAN AMERICAN): 53 ML/MIN/1.73 M^2
GLUCOSE SERPL-MCNC: 90 MG/DL (ref 70–110)
HDLC SERPL-MCNC: 70 MG/DL (ref 40–75)
HDLC SERPL: 46.1 % (ref 20–50)
LDLC SERPL CALC-MCNC: 61.2 MG/DL (ref 63–159)
NONHDLC SERPL-MCNC: 82 MG/DL
POTASSIUM SERPL-SCNC: 4.4 MMOL/L (ref 3.5–5.1)
SODIUM SERPL-SCNC: 141 MMOL/L (ref 136–145)
TRIGL SERPL-MCNC: 104 MG/DL (ref 30–150)

## 2020-06-03 PROCEDURE — 80061 LIPID PANEL: CPT

## 2020-06-03 PROCEDURE — 80048 BASIC METABOLIC PNL TOTAL CA: CPT

## 2020-06-03 PROCEDURE — 36415 COLL VENOUS BLD VENIPUNCTURE: CPT | Mod: PO

## 2020-06-04 ENCOUNTER — TELEPHONE (OUTPATIENT)
Dept: PAIN MEDICINE | Facility: CLINIC | Age: 80
End: 2020-06-04

## 2020-06-04 NOTE — TELEPHONE ENCOUNTER
----- Message from Vielka Vail sent at 6/4/2020  1:32 PM CDT -----  Contact: patient  Type: Needs Medical Advice  Who Called:  Patient  Best Call Back Number: [phone] (home)   Additional Information: the patient wants the nurse to call her to sched an appt for an epidural she is in a lots of pain Please call her to advise

## 2020-06-06 ENCOUNTER — NURSE TRIAGE (OUTPATIENT)
Dept: ADMINISTRATIVE | Facility: CLINIC | Age: 80
End: 2020-06-06

## 2020-06-06 NOTE — TELEPHONE ENCOUNTER
Reason for Disposition   [1] SEVERE pain (e.g., excruciating, unable to do any normal activities) AND [2] not improved after 2 hours of pain medicine    Additional Information   Negative: Looks like a broken bone or dislocated joint (e.g., crooked or deformed)   Negative: Sounds like a life-threatening emergency to the triager   Negative: Followed a leg injury   Negative: Leg swelling is main symptom   Negative: Back pain radiating (shooting) into leg(s)   Negative: Knee pain is main symptom   Negative: Ankle pain is main symptom   Negative: Pregnant   Negative: Chest pain   Negative: Difficulty breathing   Negative: Entire foot is cool or blue in comparison to other side   Negative: Unable to walk   Negative: [1] Red area or streak AND [2] fever   Negative: [1] Swollen joint AND [2] fever   Negative: [1] Cast on leg or ankle AND [2] now increased pain   Negative: Patient sounds very sick or weak to the triager    Protocols used: LEG PAIN-A-AH  burning sensation on side of leg, in pain, pt states she had a pain in L left leg from knee to ankle closer to knee. Burning like fire. Goes up to groin. Pt keeping leg elevated but still hurts. Pain in back of leg to groin. Sx started couple days ago. no DM. Pain wraps around to back. feels like charley horse to thigh but now burning. No red not swollen. Some tender to touch. Rates 8. Bothered pt during night. yest got bad and knees buckled. Couldnt see doctor yest. afeb , took Tylenol - didnt really help. No rash. No color change or temp change noted. Rec UC/ED. Pt agrees. call back with questions

## 2020-06-09 ENCOUNTER — TELEPHONE (OUTPATIENT)
Dept: CARDIOLOGY | Facility: CLINIC | Age: 80
End: 2020-06-09

## 2020-06-10 ENCOUNTER — OFFICE VISIT (OUTPATIENT)
Dept: CARDIOLOGY | Facility: CLINIC | Age: 80
End: 2020-06-10
Payer: MEDICARE

## 2020-06-10 VITALS
BODY MASS INDEX: 23.07 KG/M2 | DIASTOLIC BLOOD PRESSURE: 63 MMHG | SYSTOLIC BLOOD PRESSURE: 132 MMHG | HEIGHT: 59 IN | OXYGEN SATURATION: 98 % | HEART RATE: 79 BPM | WEIGHT: 114.44 LBS

## 2020-06-10 DIAGNOSIS — I25.700 CORONARY ARTERY DISEASE INVOLVING CORONARY BYPASS GRAFT OF NATIVE HEART WITH UNSTABLE ANGINA PECTORIS: Primary | ICD-10-CM

## 2020-06-10 DIAGNOSIS — I10 ESSENTIAL HYPERTENSION: ICD-10-CM

## 2020-06-10 DIAGNOSIS — E78.2 MIXED HYPERLIPIDEMIA: ICD-10-CM

## 2020-06-10 PROCEDURE — 1126F AMNT PAIN NOTED NONE PRSNT: CPT | Mod: GC,S$GLB,, | Performed by: INTERNAL MEDICINE

## 2020-06-10 PROCEDURE — 99999 PR PBB SHADOW E&M-EST. PATIENT-LVL IV: CPT | Mod: PBBFAC,GC,, | Performed by: INTERNAL MEDICINE

## 2020-06-10 PROCEDURE — 3075F PR MOST RECENT SYSTOLIC BLOOD PRESS GE 130-139MM HG: ICD-10-PCS | Mod: CPTII,GC,S$GLB, | Performed by: INTERNAL MEDICINE

## 2020-06-10 PROCEDURE — 3078F PR MOST RECENT DIASTOLIC BLOOD PRESSURE < 80 MM HG: ICD-10-PCS | Mod: CPTII,GC,S$GLB, | Performed by: INTERNAL MEDICINE

## 2020-06-10 PROCEDURE — 3078F DIAST BP <80 MM HG: CPT | Mod: CPTII,GC,S$GLB, | Performed by: INTERNAL MEDICINE

## 2020-06-10 PROCEDURE — 99213 OFFICE O/P EST LOW 20 MIN: CPT | Mod: GC,S$GLB,, | Performed by: INTERNAL MEDICINE

## 2020-06-10 PROCEDURE — 99999 PR PBB SHADOW E&M-EST. PATIENT-LVL IV: ICD-10-PCS | Mod: PBBFAC,GC,, | Performed by: INTERNAL MEDICINE

## 2020-06-10 PROCEDURE — 1159F PR MEDICATION LIST DOCUMENTED IN MEDICAL RECORD: ICD-10-PCS | Mod: GC,S$GLB,, | Performed by: INTERNAL MEDICINE

## 2020-06-10 PROCEDURE — 1159F MED LIST DOCD IN RCRD: CPT | Mod: GC,S$GLB,, | Performed by: INTERNAL MEDICINE

## 2020-06-10 PROCEDURE — 1126F PR PAIN SEVERITY QUANTIFIED, NO PAIN PRESENT: ICD-10-PCS | Mod: GC,S$GLB,, | Performed by: INTERNAL MEDICINE

## 2020-06-10 PROCEDURE — 1101F PR PT FALLS ASSESS DOC 0-1 FALLS W/OUT INJ PAST YR: ICD-10-PCS | Mod: CPTII,GC,S$GLB, | Performed by: INTERNAL MEDICINE

## 2020-06-10 PROCEDURE — 99213 PR OFFICE/OUTPT VISIT, EST, LEVL III, 20-29 MIN: ICD-10-PCS | Mod: GC,S$GLB,, | Performed by: INTERNAL MEDICINE

## 2020-06-10 PROCEDURE — 1101F PT FALLS ASSESS-DOCD LE1/YR: CPT | Mod: CPTII,GC,S$GLB, | Performed by: INTERNAL MEDICINE

## 2020-06-10 PROCEDURE — 3075F SYST BP GE 130 - 139MM HG: CPT | Mod: CPTII,GC,S$GLB, | Performed by: INTERNAL MEDICINE

## 2020-06-10 NOTE — PROGRESS NOTES
"    Cardiology Clinic Note  Reason for Visit: f/u HTN and HLD    HPI:    78 y/o F with CAD s/p CABG 2/2010 ([facility]), left carotid endarterectomy, HTN, HLD. I saw her for the first time in 2/2020. She had non specific chest pain that has since resolved. No further episodes since then. She is does not exercise but active at home. No chest discomfort or SOB on exertion. Her BP was "well controlled" at home so she stopped checking it. She does take her lisinopril and atorvastatin (switched from pravastatin on 2/2020). No other issues.     ROS:    Review of Systems   Constitution: Negative for chills and fever.   Cardiovascular: Negative for dyspnea on exertion, palpitations and syncope.   Respiratory: Negative for cough and sleep disturbances due to breathing.    Musculoskeletal: Negative for back pain.   Gastrointestinal: Negative for abdominal pain, nausea and vomiting.   Neurological: Negative for dizziness and focal weakness.   Psychiatric/Behavioral: Negative for altered mental status.     PMH:     Past Medical History:   Diagnosis Date    Arthritis     Cataract     Coronary artery disease     Hypertension     Insomnia     Neuropathy     Retinal detachment     Stomach ulcer      Past Surgical History:   Procedure Laterality Date    APPENDECTOMY      BLADDER SUSPENSION      BREAST BIOPSY      CARDIAC SURGERY      carotid endarterectomy      L    CATARACT EXTRACTION      CHOLECYSTECTOMY      HYSTERECTOMY      INJECTION OF ANESTHETIC AGENT AROUND GANGLION IMPAR N/A 9/12/2019    Procedure: BLOCK, GANGLION IMPAR;  Surgeon: Christian James MD;  Location: [facility] OR;  Service: Pain Management;  Laterality: N/A;    OOPHORECTOMY      SUPERFICIAL KERATECTOMY Right 10/27/2017    Dr. Morales    TONSILLECTOMY      TRANSFORAMINAL EPIDURAL INJECTION OF STEROID Left 8/12/2019    Procedure: Injection,steroid,epidural,transforaminal approach L4-5, L5-S1;  Surgeon: Christian James MD;  Location: [facility] OR;  Service: Pain " Management;  Laterality: Left;    TRANSFORAMINAL EPIDURAL INJECTION OF STEROID Left 1/16/2020    Procedure: Injection,steroid,epidural,transforaminal approach;  Surgeon: Christian James MD;  Location: [facility] OR;  Service: Pain Management;  Laterality: Left;  L4-5, L5-S1     Allergies:     Review of patient's allergies indicates:   Allergen Reactions    Demerol [meperidine] Nausea And Vomiting    Hydrocodone Itching    Percocet [oxycodone-acetaminophen] Itching    Tramadol      Medications:     Current Outpatient Medications on File Prior to Visit   Medication Sig Dispense Refill    aspirin (ECOTRIN) 81 MG EC tablet Take 81 mg by mouth once daily.      atorvastatin (LIPITOR) 40 MG tablet Take 1 tablet (40 mg total) by mouth once daily. 90 tablet 3    buPROPion (WELLBUTRIN XL) 300 MG 24 hr tablet Take 300 mg by mouth once daily.      cholecalciferol, vitamin D3, (VITAMIN D3) 2,000 unit Tab Vitamin D3 50 mcg (2,000 unit) tablet   1 tablet every day by mouth      cholestyramine-aspartame (CHOLESTYRAMINE LIGHT) 4 gram PwPk Take by mouth 2 (two) times daily.      dexlansoprazole (DEXILANT) 30 mg CpDM Take 60 mg by mouth.       diclofenac sodium (VOLTAREN) 1 % Gel Apply 2 g topically daily as needed.      dicyclomine (BENTYL) 10 MG capsule 2 (two) times daily as needed.       diphenoxylate-atropine 2.5-0.025 mg (LOMOTIL) 2.5-0.025 mg per tablet Take 1 tablet by mouth 4 (four) times daily as needed for Diarrhea.      lisinopril 10 MG tablet Take 2 tablets (20 mg total) by mouth once daily. (Patient taking differently: Take 10 mg by mouth once daily. ) 30 tablet 3    nitroGLYCERIN (NITROSTAT) 0.4 MG SL tablet Place under the tongue.      omeprazole (PRILOSEC) 40 MG capsule Take by mouth.       ondansetron (ZOFRAN-ODT) 8 MG TbDL every 6 (six) hours as needed.       temazepam (RESTORIL) 15 mg Cap Take 15 mg by mouth nightly as needed.       No current facility-administered medications on file prior to visit.   "    Social History:     Social History     Tobacco Use    Smoking status: Former Smoker     Years: 4.00     Types: Cigarettes     Last attempt to quit: 10/13/1962     Years since quittin.6    Smokeless tobacco: Never Used   Substance Use Topics    Alcohol use: No     Family History:     Family History   Problem Relation Age of Onset    Cancer Father     Macular degeneration Maternal Aunt     Cancer Maternal Aunt     Breast cancer Maternal Aunt     Macular degeneration Maternal Uncle     Diabetes Paternal Aunt     Cancer Paternal Aunt     Blindness Maternal Grandfather     Melanoma Neg Hx     Psoriasis Neg Hx     Lupus Neg Hx     Eczema Neg Hx      Physical Exam:   /63   Pulse 79   Ht 4' 11" (1.499 m)   Wt 51.9 kg (114 lb 6.7 oz)   LMP 2020   SpO2 98%   BMI 23.11 kg/m²      Physical Exam   Constitutional: She is oriented to person, place, and time. She appears well-developed and well-nourished. No distress.   HENT:   Head: Normocephalic and atraumatic.   Eyes: Conjunctivae and EOM are normal.   Neck: Normal range of motion. No tracheal deviation present.   Cardiovascular: Normal rate, regular rhythm and normal heart sounds.   No murmur heard.  Pulmonary/Chest: Effort normal and breath sounds normal. No respiratory distress. She has no wheezes.   Abdominal: Soft. Bowel sounds are normal. She exhibits no distension. There is no tenderness.   Musculoskeletal: Normal range of motion. She exhibits no edema.   Neurological: She is alert and oriented to person, place, and time.   Skin: She is not diaphoretic.       Labs:     Lab Results   Component Value Date     2020    K 4.4 2020     2020    CO2 24 2020    BUN 14 2020    CREATININE 1.0 2020    ANIONGAP 11 2020     Lab Results   Component Value Date    HGBA1C 5.5 2019     Lab Results   Component Value Date    BNP <10 2020    BNP 31 2013    Lab Results   Component " Value Date    WBC 5.91 05/01/2020    HGB 14.1 05/01/2020    HCT 43.4 05/01/2020     05/01/2020    GRAN 3.6 05/01/2020    GRAN 60.6 05/01/2020     Lab Results   Component Value Date    CHOL 152 06/03/2020    HDL 70 06/03/2020    LDLCALC 61.2 (L) 06/03/2020    TRIG 104 06/03/2020          Imaging:     TTE 2/6/20  · Left ventricular systolic function. The estimated ejection fraction is 60%.  · Grade I (mild) left ventricular diastolic dysfunction consistent with impaired relaxation.  · Mild left atrial enlargement.  · Mild aortic regurgitation.  · No intracardiac thrombi or vegetations    EKG: normal sinus rhythm, no blocks or conduction defects, no ischemic changes     Assessment:     1. Coronary artery disease involving coronary bypass graft of native heart with unstable angina pectoris    2. Mixed hyperlipidemia    3. Essential hypertension        Plan:   Mixed hyperlipidemia  - Lipid panel on 9/2019 showed LDL 90, chol 184 --> 6/3/2020 LDL 61, chol 152  - Switched from pravastatin 40 to atorvastatin 40 on February 2020  - Will repeat lipid panel in 6 month to make sure LDL remains on target    Coronary artery disease involving coronary bypass graft of native heart with unstable angina pectoris  - Asymptomatic  - Echo unremarkable on 2/6/20  - Continue aspirin, statin    Essential hypertension  - BP better controlled with SBP 130s. Continue with lisinopril 10 mg BID. Check BP 2-3 times per week. Call clinic if SBP persistently 140-150s.     Discussed with Dr. Calderon. RTC in 6 months.     Froilan Maddox  Cardiology Fellow

## 2020-06-11 ENCOUNTER — OFFICE VISIT (OUTPATIENT)
Dept: PAIN MEDICINE | Facility: CLINIC | Age: 80
End: 2020-06-11
Payer: MEDICARE

## 2020-06-11 VITALS
BODY MASS INDEX: 22.98 KG/M2 | HEART RATE: 77 BPM | DIASTOLIC BLOOD PRESSURE: 70 MMHG | SYSTOLIC BLOOD PRESSURE: 120 MMHG | HEIGHT: 59 IN | WEIGHT: 114 LBS

## 2020-06-11 DIAGNOSIS — M17.12 OSTEOARTHRITIS OF LEFT KNEE, UNSPECIFIED OSTEOARTHRITIS TYPE: ICD-10-CM

## 2020-06-11 DIAGNOSIS — Z01.818 PRE-OP TESTING: Primary | ICD-10-CM

## 2020-06-11 DIAGNOSIS — M54.16 LUMBAR RADICULITIS: Primary | ICD-10-CM

## 2020-06-11 DIAGNOSIS — M54.16 LUMBAR RADICULITIS: ICD-10-CM

## 2020-06-11 DIAGNOSIS — M51.36 DDD (DEGENERATIVE DISC DISEASE), LUMBAR: ICD-10-CM

## 2020-06-11 PROCEDURE — 1101F PR PT FALLS ASSESS DOC 0-1 FALLS W/OUT INJ PAST YR: ICD-10-PCS | Mod: CPTII,S$GLB,, | Performed by: ANESTHESIOLOGY

## 2020-06-11 PROCEDURE — 99214 OFFICE O/P EST MOD 30 MIN: CPT | Mod: S$GLB,,, | Performed by: ANESTHESIOLOGY

## 2020-06-11 PROCEDURE — 99999 PR PBB SHADOW E&M-EST. PATIENT-LVL III: ICD-10-PCS | Mod: PBBFAC,,, | Performed by: ANESTHESIOLOGY

## 2020-06-11 PROCEDURE — 1159F MED LIST DOCD IN RCRD: CPT | Mod: S$GLB,,, | Performed by: ANESTHESIOLOGY

## 2020-06-11 PROCEDURE — 3074F SYST BP LT 130 MM HG: CPT | Mod: CPTII,S$GLB,, | Performed by: ANESTHESIOLOGY

## 2020-06-11 PROCEDURE — 3078F PR MOST RECENT DIASTOLIC BLOOD PRESSURE < 80 MM HG: ICD-10-PCS | Mod: CPTII,S$GLB,, | Performed by: ANESTHESIOLOGY

## 2020-06-11 PROCEDURE — 1159F PR MEDICATION LIST DOCUMENTED IN MEDICAL RECORD: ICD-10-PCS | Mod: S$GLB,,, | Performed by: ANESTHESIOLOGY

## 2020-06-11 PROCEDURE — 1125F PR PAIN SEVERITY QUANTIFIED, PAIN PRESENT: ICD-10-PCS | Mod: S$GLB,,, | Performed by: ANESTHESIOLOGY

## 2020-06-11 PROCEDURE — 99214 PR OFFICE/OUTPT VISIT, EST, LEVL IV, 30-39 MIN: ICD-10-PCS | Mod: S$GLB,,, | Performed by: ANESTHESIOLOGY

## 2020-06-11 PROCEDURE — 1101F PT FALLS ASSESS-DOCD LE1/YR: CPT | Mod: CPTII,S$GLB,, | Performed by: ANESTHESIOLOGY

## 2020-06-11 PROCEDURE — 3074F PR MOST RECENT SYSTOLIC BLOOD PRESSURE < 130 MM HG: ICD-10-PCS | Mod: CPTII,S$GLB,, | Performed by: ANESTHESIOLOGY

## 2020-06-11 PROCEDURE — 3078F DIAST BP <80 MM HG: CPT | Mod: CPTII,S$GLB,, | Performed by: ANESTHESIOLOGY

## 2020-06-11 PROCEDURE — 1125F AMNT PAIN NOTED PAIN PRSNT: CPT | Mod: S$GLB,,, | Performed by: ANESTHESIOLOGY

## 2020-06-11 PROCEDURE — 99999 PR PBB SHADOW E&M-EST. PATIENT-LVL III: CPT | Mod: PBBFAC,,, | Performed by: ANESTHESIOLOGY

## 2020-06-11 NOTE — PROGRESS NOTES
Referring Physician: No ref. provider found    PCP: Behzad Birch MD      CC: Low back and left leg pain, left knee    Interval History:  Ms. Carlson is a 79 y.o. female with chronic low back and left leg pain who returns to our clinic.  Tailbone pain has improved with recent ganglion impar injection. She has recurrence of her low back and radiating left leg pain.  Pain is still very tolerable.  She has had relief with lumbar ALANNA in the past.  She wishes to have repeat procedure.  She takes gabapentin nightly 300 mg her peripheral neuropathy.  She does have recurrence of her left knee pain.  She desires repeat viscosupplementation injection sites in the near future.  She denies any worsening weakness.  No bowel bladder changes.   Prior HPI:   Patient is 68-year-old female referred to us for lower back and left leg pain.  Pain has been present for over 5 years.  No traumatic incident.  She has intermittent aching, throbbing, sharp pain in her lower back.  Pain radiates down her left leg into her left posterior thigh.  Pain worsens with standing, walking, bending and getting up.  Pain improves with rest.  She denies any weakness.  No bowel bladder changes.  MRI lumbar spine shows lumbar facet hypertrophy as well as possible L5 nerve impingement.  She underwent caudal ALANNA's with Dr. Barba with moderate benefit of her generalized lower back pain.  She continues to have left-sided radicular pain.  She rates her pain 4/10 today, 10/10 at worse.    Interventional history:  Left L4-5 L5-S1 TFESI 03/2017 with 50% relief  Left knee injection 07/2019 with 50% relief of left knee pain.  Repeat left L4-5 L5-S1 TFESI 08/2019 with 50% relief.  Ganglion impar injection 09/2019 with 60% relief of her tailbone  Left L4-5 and L50S1 TFESI 1/2020 with 60%relief  Left knee Eufllexxa series 1/2020 with 60% relief    ROS:  CONSTITUTIONAL: No fevers, chills, night sweats, wt. loss, appetite changes  SKIN: no rashes or itching  ENT: No  headaches, head trauma, vision changes, or eye pain  LYMPH NODES: None noticed   CV: No chest pain, palpitations.   RESP: No shortness of breath, dyspnea on exertion, cough, wheezing, or hemoptysis  GI: No nausea, emesis, diarrhea, constipation, melena, hematochezia, pain.    : No dysuria, hematuria, urgency, or frequency   HEME: No easy bruising, bleeding problems  PSYCHIATRIC: No depression, anxiety, psychosis, hallucinations.  NEURO: No seizures, memory loss, dizziness or difficulty sleeping  MSK: + History of present illness      Past Medical History:   Diagnosis Date    Arthritis     Cataract     Coronary artery disease     Hypertension     Insomnia     Neuropathy     Retinal detachment     Stomach ulcer      Past Surgical History:   Procedure Laterality Date    APPENDECTOMY      BLADDER SUSPENSION      BREAST BIOPSY      CARDIAC SURGERY      carotid endarterectomy      L    CATARACT EXTRACTION      CHOLECYSTECTOMY      HYSTERECTOMY      INJECTION OF ANESTHETIC AGENT AROUND GANGLION IMPAR N/A 9/12/2019    Procedure: BLOCK, GANGLION IMPAR;  Surgeon: Christian James MD;  Location: [facility] OR;  Service: Pain Management;  Laterality: N/A;    OOPHORECTOMY      SUPERFICIAL KERATECTOMY Right 10/27/2017    Dr. Morales    TONSILLECTOMY      TRANSFORAMINAL EPIDURAL INJECTION OF STEROID Left 8/12/2019    Procedure: Injection,steroid,epidural,transforaminal approach L4-5, L5-S1;  Surgeon: Christian James MD;  Location: [facility] OR;  Service: Pain Management;  Laterality: Left;    TRANSFORAMINAL EPIDURAL INJECTION OF STEROID Left 1/16/2020    Procedure: Injection,steroid,epidural,transforaminal approach;  Surgeon: Christian James MD;  Location: [facility] OR;  Service: Pain Management;  Laterality: Left;  L4-5, L5-S1     Family History   Problem Relation Age of Onset    Cancer Father     Macular degeneration Maternal Aunt     Cancer Maternal Aunt     Breast cancer Maternal Aunt     Macular degeneration Maternal Uncle      "Diabetes Paternal Aunt     Cancer Paternal Aunt     Blindness Maternal Grandfather     Melanoma Neg Hx     Psoriasis Neg Hx     Lupus Neg Hx     Eczema Neg Hx      Social History     Socioeconomic History    Marital status:      Spouse name: Not on file    Number of children: Not on file    Years of education: Not on file    Highest education level: Not on file   Occupational History    Not on file   Social Needs    Financial resource strain: Not on file    Food insecurity:     Worry: Not on file     Inability: Not on file    Transportation needs:     Medical: Not on file     Non-medical: Not on file   Tobacco Use    Smoking status: Former Smoker     Years: 4.00     Types: Cigarettes     Last attempt to quit: 10/13/1962     Years since quittin.7    Smokeless tobacco: Never Used   Substance and Sexual Activity    Alcohol use: No    Drug use: No    Sexual activity: Yes     Birth control/protection: Surgical   Lifestyle    Physical activity:     Days per week: Not on file     Minutes per session: Not on file    Stress: Not on file   Relationships    Social connections:     Talks on phone: Not on file     Gets together: Not on file     Attends [employer] service: Not on file     Active member of club or organization: Not on file     Attends meetings of clubs or organizations: Not on file     Relationship status: Not on file   Other Topics Concern    Are you pregnant or think you may be? Not Asked    Breast-feeding Not Asked   Social History Narrative    Not on file         Medications/Allergies: See med card    Vitals:    20 1029   BP: 120/70   Pulse: 77   Weight: 51.7 kg (114 lb)   Height: 4' 11" (1.499 m)   PainSc:   5   PainLoc: Back         Physical exam:    GENERAL: A and O x3, the patient appears well groomed and is in no acute distress.  Skin: No rashes or obvious lesions  HEENT: normocephalic, atraumatic  CARDIOVASCULAR:  RRR  LUNGS: non labored breathing  ABDOMEN: soft, " nontender   UPPER EXTREMITIES: Normal alignment, normal range of motion, no atrophy, no skin changes,  hair growth and nail growth normal and equal bilaterally. No swelling, no tenderness.    LOWER EXTREMITIES:  Normal alignment, normal range of motion, no atrophy, no skin changes,  hair growth and nail growth normal and equal bilaterally. No swelling, mild tenderness to left patellar    LUMBAR SPINE  Lumbar spine: ROM is full with flexion extension and oblique extension with no increased pain.    Tom's test causes no increased pain on either side.    Supine straight leg raise is positive on the left at 60°    Internal and external rotation of the hip causes no increased pain on either side.  Myofascial exam: No tenderness to palpation across lumbar paraspinous muscles.  Tenderness to coccyx    MENTAL STATUS: normal orientation, speech, language, and fund of knowledge for social situation.  Emotional state appropriate.    CRANIAL NERVES:  II:  PERRL bilaterally,   III,IV,VI: EOMI.    V:  Facial sensation equal bilaterally  VII:  Facial motor function normal.  VIII:  Hearing equal to finger rub bilaterally  IX/X: Gag normal, palate symmetric  XI:  Shoulder shrug equal, head turn equal  XII:  Tongue midline without fasciculations      MOTOR: Tone and bulk: normal bilateral upper and lower Strength: normal   Delt Bi Tri WE WF     R 5 5 5 5 5 5   L 5 5 5 5 5 5     IP ADD ABD Quad TA Gas HAM  R 5 5 5 5 5 5 5  L 5 5 5 5 5 5 5    SENSATION: Light touch and pinprick intact bilaterally  REFLEXES: normal, symmetric, nonbrisk.  Toes down, no clonus. No hoffmans.  GAIT: normal rise, base, steps, and arm swing.        Imaging:  MRI lumbar spine without contrast 10/2016 ([facility])  L3-4, broad-based disc bulge and facet hypertrophy results in mild central canal narrowing and foraminal narrowing.  L4-5, broad-based disc bulge with moderate facet hypertrophy,  Left greater than right.  There is mild spurring of the left facet  joint which narrows the left lateral recess and compresses the left L5 nerve.  L5-S1, mild facet hypertrophy    Assessment:  Ms. Carlson is a 79 y.o. female back and left leg pain  1. Lumbar radiculitis    2. Osteoarthritis of left knee, unspecified osteoarthritis type    3. DDD (degenerative disc disease), lumbar      Plan:  1.  I have stressed the importance of physical activity and exercise to improve overall health. Home exercises given  2.  I think that the patient's back pain and radicular leg symptoms are due to degenerative disc disease and have recommended a lumbar epidural steroid injection to the Left L4-5 and L5-S1 level(s).  3. Continue gabapentin as prescribed  4.  Plan for repeat viscosupplementation injection after lumbar epidural steroid injection  5.  Follow-up after the procedure.

## 2020-06-11 NOTE — H&P (VIEW-ONLY)
Referring Physician: No ref. provider found    PCP: Behzad Birch MD      CC: Low back and left leg pain, left knee    Interval History:  Ms. Carlson is a 79 y.o. female with chronic low back and left leg pain who returns to our clinic.  Tailbone pain has improved with recent ganglion impar injection. She has recurrence of her low back and radiating left leg pain.  Pain is still very tolerable.  She has had relief with lumbar ALANNA in the past.  She wishes to have repeat procedure.  She takes gabapentin nightly 300 mg her peripheral neuropathy.  She does have recurrence of her left knee pain.  She desires repeat viscosupplementation injection sites in the near future.  She denies any worsening weakness.  No bowel bladder changes.   Prior HPI:   Patient is 68-year-old female referred to us for lower back and left leg pain.  Pain has been present for over 5 years.  No traumatic incident.  She has intermittent aching, throbbing, sharp pain in her lower back.  Pain radiates down her left leg into her left posterior thigh.  Pain worsens with standing, walking, bending and getting up.  Pain improves with rest.  She denies any weakness.  No bowel bladder changes.  MRI lumbar spine shows lumbar facet hypertrophy as well as possible L5 nerve impingement.  She underwent caudal ALANNA's with Dr. Barba with moderate benefit of her generalized lower back pain.  She continues to have left-sided radicular pain.  She rates her pain 4/10 today, 10/10 at worse.    Interventional history:  Left L4-5 L5-S1 TFESI 03/2017 with 50% relief  Left knee injection 07/2019 with 50% relief of left knee pain.  Repeat left L4-5 L5-S1 TFESI 08/2019 with 50% relief.  Ganglion impar injection 09/2019 with 60% relief of her tailbone  Left L4-5 and L50S1 TFESI 1/2020 with 60%relief  Left knee Eufllexxa series 1/2020 with 60% relief    ROS:  CONSTITUTIONAL: No fevers, chills, night sweats, wt. loss, appetite changes  SKIN: no rashes or itching  ENT: No  headaches, head trauma, vision changes, or eye pain  LYMPH NODES: None noticed   CV: No chest pain, palpitations.   RESP: No shortness of breath, dyspnea on exertion, cough, wheezing, or hemoptysis  GI: No nausea, emesis, diarrhea, constipation, melena, hematochezia, pain.    : No dysuria, hematuria, urgency, or frequency   HEME: No easy bruising, bleeding problems  PSYCHIATRIC: No depression, anxiety, psychosis, hallucinations.  NEURO: No seizures, memory loss, dizziness or difficulty sleeping  MSK: + History of present illness      Past Medical History:   Diagnosis Date    Arthritis     Cataract     Coronary artery disease     Hypertension     Insomnia     Neuropathy     Retinal detachment     Stomach ulcer      Past Surgical History:   Procedure Laterality Date    APPENDECTOMY      BLADDER SUSPENSION      BREAST BIOPSY      CARDIAC SURGERY      carotid endarterectomy      L    CATARACT EXTRACTION      CHOLECYSTECTOMY      HYSTERECTOMY      INJECTION OF ANESTHETIC AGENT AROUND GANGLION IMPAR N/A 9/12/2019    Procedure: BLOCK, GANGLION IMPAR;  Surgeon: Christian James MD;  Location: [facility] OR;  Service: Pain Management;  Laterality: N/A;    OOPHORECTOMY      SUPERFICIAL KERATECTOMY Right 10/27/2017    Dr. Morales    TONSILLECTOMY      TRANSFORAMINAL EPIDURAL INJECTION OF STEROID Left 8/12/2019    Procedure: Injection,steroid,epidural,transforaminal approach L4-5, L5-S1;  Surgeon: Christian James MD;  Location: [facility] OR;  Service: Pain Management;  Laterality: Left;    TRANSFORAMINAL EPIDURAL INJECTION OF STEROID Left 1/16/2020    Procedure: Injection,steroid,epidural,transforaminal approach;  Surgeon: Christian James MD;  Location: [facility] OR;  Service: Pain Management;  Laterality: Left;  L4-5, L5-S1     Family History   Problem Relation Age of Onset    Cancer Father     Macular degeneration Maternal Aunt     Cancer Maternal Aunt     Breast cancer Maternal Aunt     Macular degeneration Maternal Uncle      "Diabetes Paternal Aunt     Cancer Paternal Aunt     Blindness Maternal Grandfather     Melanoma Neg Hx     Psoriasis Neg Hx     Lupus Neg Hx     Eczema Neg Hx      Social History     Socioeconomic History    Marital status:      Spouse name: Not on file    Number of children: Not on file    Years of education: Not on file    Highest education level: Not on file   Occupational History    Not on file   Social Needs    Financial resource strain: Not on file    Food insecurity:     Worry: Not on file     Inability: Not on file    Transportation needs:     Medical: Not on file     Non-medical: Not on file   Tobacco Use    Smoking status: Former Smoker     Years: 4.00     Types: Cigarettes     Last attempt to quit: 10/13/1962     Years since quittin.7    Smokeless tobacco: Never Used   Substance and Sexual Activity    Alcohol use: No    Drug use: No    Sexual activity: Yes     Birth control/protection: Surgical   Lifestyle    Physical activity:     Days per week: Not on file     Minutes per session: Not on file    Stress: Not on file   Relationships    Social connections:     Talks on phone: Not on file     Gets together: Not on file     Attends Anabaptism service: Not on file     Active member of club or organization: Not on file     Attends meetings of clubs or organizations: Not on file     Relationship status: Not on file   Other Topics Concern    Are you pregnant or think you may be? Not Asked    Breast-feeding Not Asked   Social History Narrative    Not on file         Medications/Allergies: See med card    Vitals:    20 1029   BP: 120/70   Pulse: 77   Weight: 51.7 kg (114 lb)   Height: 4' 11" (1.499 m)   PainSc:   5   PainLoc: Back         Physical exam:    GENERAL: A and O x3, the patient appears well groomed and is in no acute distress.  Skin: No rashes or obvious lesions  HEENT: normocephalic, atraumatic  CARDIOVASCULAR:  RRR  LUNGS: non labored breathing  ABDOMEN: soft, " nontender   UPPER EXTREMITIES: Normal alignment, normal range of motion, no atrophy, no skin changes,  hair growth and nail growth normal and equal bilaterally. No swelling, no tenderness.    LOWER EXTREMITIES:  Normal alignment, normal range of motion, no atrophy, no skin changes,  hair growth and nail growth normal and equal bilaterally. No swelling, mild tenderness to left patellar    LUMBAR SPINE  Lumbar spine: ROM is full with flexion extension and oblique extension with no increased pain.    Tom's test causes no increased pain on either side.    Supine straight leg raise is positive on the left at 60°    Internal and external rotation of the hip causes no increased pain on either side.  Myofascial exam: No tenderness to palpation across lumbar paraspinous muscles.  Tenderness to coccyx    MENTAL STATUS: normal orientation, speech, language, and fund of knowledge for social situation.  Emotional state appropriate.    CRANIAL NERVES:  II:  PERRL bilaterally,   III,IV,VI: EOMI.    V:  Facial sensation equal bilaterally  VII:  Facial motor function normal.  VIII:  Hearing equal to finger rub bilaterally  IX/X: Gag normal, palate symmetric  XI:  Shoulder shrug equal, head turn equal  XII:  Tongue midline without fasciculations      MOTOR: Tone and bulk: normal bilateral upper and lower Strength: normal   Delt Bi Tri WE WF     R 5 5 5 5 5 5   L 5 5 5 5 5 5     IP ADD ABD Quad TA Gas HAM  R 5 5 5 5 5 5 5  L 5 5 5 5 5 5 5    SENSATION: Light touch and pinprick intact bilaterally  REFLEXES: normal, symmetric, nonbrisk.  Toes down, no clonus. No hoffmans.  GAIT: normal rise, base, steps, and arm swing.        Imaging:  MRI lumbar spine without contrast 10/2016 ([facility])  L3-4, broad-based disc bulge and facet hypertrophy results in mild central canal narrowing and foraminal narrowing.  L4-5, broad-based disc bulge with moderate facet hypertrophy,  Left greater than right.  There is mild spurring of the left facet  joint which narrows the left lateral recess and compresses the left L5 nerve.  L5-S1, mild facet hypertrophy    Assessment:  Ms. Carlson is a 79 y.o. female back and left leg pain  1. Lumbar radiculitis    2. Osteoarthritis of left knee, unspecified osteoarthritis type    3. DDD (degenerative disc disease), lumbar      Plan:  1.  I have stressed the importance of physical activity and exercise to improve overall health. Home exercises given  2.  I think that the patient's back pain and radicular leg symptoms are due to degenerative disc disease and have recommended a lumbar epidural steroid injection to the Left L4-5 and L5-S1 level(s).  3. Continue gabapentin as prescribed  4.  Plan for repeat viscosupplementation injection after lumbar epidural steroid injection  5.  Follow-up after the procedure.

## 2020-06-26 ENCOUNTER — TELEPHONE (OUTPATIENT)
Dept: PAIN MEDICINE | Facility: CLINIC | Age: 80
End: 2020-06-26

## 2020-06-26 NOTE — TELEPHONE ENCOUNTER
Pt states she gets nose bleeds and wanted to know if there was another way to do the Covid 19 test. Advised only test is a swab. Pt stated she will go and try.

## 2020-06-26 NOTE — TELEPHONE ENCOUNTER
----- Message from Rashawn Mejia sent at 6/26/2020  4:12 PM CDT -----  Regarding: information on testing  Pt calling in regards to she needs information on testing if she is having nose bleeds, or is there another test she should be taking if her nose is bleeding too much        Please advise pt can be contact at [phone]

## 2020-06-27 ENCOUNTER — LAB VISIT (OUTPATIENT)
Dept: PRIMARY CARE CLINIC | Facility: CLINIC | Age: 80
End: 2020-06-27
Payer: MEDICARE

## 2020-06-27 DIAGNOSIS — Z01.818 PRE-OP TESTING: ICD-10-CM

## 2020-06-27 PROCEDURE — U0003 INFECTIOUS AGENT DETECTION BY NUCLEIC ACID (DNA OR RNA); SEVERE ACUTE RESPIRATORY SYNDROME CORONAVIRUS 2 (SARS-COV-2) (CORONAVIRUS DISEASE [COVID-19]), AMPLIFIED PROBE TECHNIQUE, MAKING USE OF HIGH THROUGHPUT TECHNOLOGIES AS DESCRIBED BY CMS-2020-01-R: HCPCS

## 2020-06-28 LAB — SARS-COV-2 RNA RESP QL NAA+PROBE: NOT DETECTED

## 2020-06-30 ENCOUNTER — HOSPITAL ENCOUNTER (OUTPATIENT)
Facility: AMBULARY SURGERY CENTER | Age: 80
Discharge: HOME | End: 2020-06-30
Attending: ANESTHESIOLOGY | Admitting: ANESTHESIOLOGY
Payer: MEDICARE

## 2020-06-30 DIAGNOSIS — M54.16 LUMBAR RADICULITIS: Primary | ICD-10-CM

## 2020-06-30 PROCEDURE — 64483 NJX AA&/STRD TFRM EPI L/S 1: CPT | Mod: LT,,, | Performed by: ANESTHESIOLOGY

## 2020-06-30 PROCEDURE — 64484 PRA INJECT ANES/STEROID FORAMEN LUMBAR/SACRAL W IMG GUIDE ,EA ADD LEVEL: ICD-10-PCS | Mod: LT,,, | Performed by: ANESTHESIOLOGY

## 2020-06-30 PROCEDURE — 64484 NJX AA&/STRD TFRM EPI L/S EA: CPT | Performed by: ANESTHESIOLOGY

## 2020-06-30 PROCEDURE — 64484 NJX AA&/STRD TFRM EPI L/S EA: CPT | Mod: LT,,, | Performed by: ANESTHESIOLOGY

## 2020-06-30 PROCEDURE — 64483 PR EPIDURAL INJ, ANES/STEROID, TRANSFORAMINAL, LUMB/SACR, SNGL LEVL: ICD-10-PCS | Mod: LT,,, | Performed by: ANESTHESIOLOGY

## 2020-06-30 PROCEDURE — 64483 NJX AA&/STRD TFRM EPI L/S 1: CPT | Performed by: ANESTHESIOLOGY

## 2020-06-30 RX ORDER — METHYLPREDNISOLONE ACETATE 80 MG/ML
INJECTION, SUSPENSION INTRA-ARTICULAR; INTRALESIONAL; INTRAMUSCULAR; SOFT TISSUE
Status: DISCONTINUED | OUTPATIENT
Start: 2020-06-30 | End: 2020-06-30 | Stop reason: HOSPADM

## 2020-06-30 RX ORDER — LIDOCAINE HYDROCHLORIDE AND EPINEPHRINE 10; 10 MG/ML; UG/ML
INJECTION, SOLUTION INFILTRATION; PERINEURAL
Status: DISCONTINUED | OUTPATIENT
Start: 2020-06-30 | End: 2020-06-30 | Stop reason: HOSPADM

## 2020-06-30 RX ORDER — FENTANYL CITRATE 50 UG/ML
INJECTION, SOLUTION INTRAMUSCULAR; INTRAVENOUS
Status: DISCONTINUED | OUTPATIENT
Start: 2020-06-30 | End: 2020-06-30 | Stop reason: HOSPADM

## 2020-06-30 RX ORDER — BUPIVACAINE HYDROCHLORIDE 2.5 MG/ML
INJECTION, SOLUTION EPIDURAL; INFILTRATION; INTRACAUDAL
Status: DISCONTINUED | OUTPATIENT
Start: 2020-06-30 | End: 2020-06-30 | Stop reason: HOSPADM

## 2020-06-30 RX ORDER — ONDANSETRON 2 MG/ML
4 INJECTION INTRAMUSCULAR; INTRAVENOUS ONCE
Status: COMPLETED | OUTPATIENT
Start: 2020-06-30 | End: 2020-06-30

## 2020-06-30 RX ORDER — LIDOCAINE HYDROCHLORIDE 10 MG/ML
INJECTION, SOLUTION EPIDURAL; INFILTRATION; INTRACAUDAL; PERINEURAL
Status: DISCONTINUED | OUTPATIENT
Start: 2020-06-30 | End: 2020-06-30 | Stop reason: HOSPADM

## 2020-06-30 RX ORDER — SODIUM CHLORIDE, SODIUM LACTATE, POTASSIUM CHLORIDE, CALCIUM CHLORIDE 600; 310; 30; 20 MG/100ML; MG/100ML; MG/100ML; MG/100ML
INJECTION, SOLUTION INTRAVENOUS ONCE AS NEEDED
Status: COMPLETED | OUTPATIENT
Start: 2020-06-30 | End: 2020-06-30

## 2020-06-30 RX ORDER — MIDAZOLAM HYDROCHLORIDE 1 MG/ML
INJECTION INTRAMUSCULAR; INTRAVENOUS
Status: DISCONTINUED | OUTPATIENT
Start: 2020-06-30 | End: 2020-06-30 | Stop reason: HOSPADM

## 2020-06-30 RX ADMIN — SODIUM CHLORIDE, SODIUM LACTATE, POTASSIUM CHLORIDE, CALCIUM CHLORIDE: 600; 310; 30; 20 INJECTION, SOLUTION INTRAVENOUS at 11:06

## 2020-06-30 RX ADMIN — ONDANSETRON 4 MG: 2 INJECTION INTRAMUSCULAR; INTRAVENOUS at 11:06

## 2020-06-30 NOTE — DISCHARGE SUMMARY
[facility]  Discharge Note  Short Stay    Admit Date: 6/30/2020    Discharge Date and Time: 6/30/2020    Attending Physician: Christian James MD     Discharge Provider: Christian James    Diagnoses:  Active Hospital Problems    Diagnosis  POA    *Lumbar radiculitis [M54.16]  Yes      Resolved Hospital Problems   No resolved problems to display.       Hospital Course: Lumbar ALANNA  Discharged Condition: Good    Final Diagnoses:   Active Hospital Problems    Diagnosis  POA    *Lumbar radiculitis [M54.16]  Yes      Resolved Hospital Problems   No resolved problems to display.       Disposition: Home or Self Care    Follow up/Patient Instructions:    Medications:  Reconciled Home Medications:      Medication List      CHANGE how you take these medications    lisinopriL 10 MG tablet  Take 2 tablets (20 mg total) by mouth once daily.  What changed: how much to take        CONTINUE taking these medications    aspirin 81 MG EC tablet  Commonly known as: ECOTRIN  Take 81 mg by mouth once daily.     atorvastatin 40 MG tablet  Commonly known as: LIPITOR  Take 1 tablet (40 mg total) by mouth once daily.     buPROPion 300 MG 24 hr tablet  Commonly known as: WELLBUTRIN XL  Take 300 mg by mouth once daily.     cholecalciferol (vitamin D3) 50 mcg (2,000 unit) Tab  Commonly known as: VITAMIN D3  Vitamin D3 50 mcg (2,000 unit) tablet   1 tablet every day by mouth     CHOLESTYRAMINE LIGHT 4 gram Pwpk  Generic drug: cholestyramine-aspartame  Take by mouth 2 (two) times daily.     DEXILANT 30 mg Cpdm  Generic drug: dexlansoprazole  Take 60 mg by mouth.     dicyclomine 10 MG capsule  Commonly known as: BENTYL  2 (two) times daily as needed.     diphenoxylate-atropine 2.5-0.025 mg 2.5-0.025 mg per tablet  Commonly known as: LOMOTIL  Take 1 tablet by mouth 4 (four) times daily as needed for Diarrhea.     NITROSTAT 0.4 MG SL tablet  Generic drug: nitroGLYCERIN  Place under the tongue.     omeprazole 40 MG capsule  Commonly known as:  PRILOSEC  Take by mouth.     ondansetron 8 MG Tbdl  Commonly known as: ZOFRAN-ODT  every 6 (six) hours as needed.     temazepam 15 mg Cap  Commonly known as: RESTORIL  Take 15 mg by mouth nightly as needed.     VOLTAREN 1 % Gel  Generic drug: diclofenac sodium  Apply 2 g topically daily as needed.          Discharge Procedure Orders   Call MD for:  temperature >100.4     Call MD for:  persistent nausea and vomiting or diarrhea     Call MD for:  severe uncontrolled pain     Call MD for:  redness, tenderness, or signs of infection (pain, swelling, redness, odor or green/yellow discharge around incision site)     Call MD for:  difficulty breathing or increased cough     Call MD for:  severe persistent headache        Follow up with MD in 2-3 weeks    Discharge Procedure Orders (must include Diet, Follow-up, Activity):   Discharge Procedure Orders (must include Diet, Follow-up, Activity)   Call MD for:  temperature >100.4     Call MD for:  persistent nausea and vomiting or diarrhea     Call MD for:  severe uncontrolled pain     Call MD for:  redness, tenderness, or signs of infection (pain, swelling, redness, odor or green/yellow discharge around incision site)     Call MD for:  difficulty breathing or increased cough     Call MD for:  severe persistent headache

## 2020-06-30 NOTE — PLAN OF CARE
Pt states ready to go home , stable, eliud po fluids, ambulatory, denies pain, Leg raises performed in bed without diff and instructed on fall risk. PT and spouse verbalized understanding

## 2020-06-30 NOTE — OP NOTE
PROCEDURE DATE: 6/30/2020    PROCEDURE: Left L4-5 and L5-S1 transforaminal epidural steroid injection under fluoroscopy    DIAGNOSIS: Lumbar disc displacement without myelopathy  Post op diagnosis: Same    PHYSICIAN: Christian James MD    MEDICATIONS INJECTED:  Methylprednisone 40mg(0.5ml) and 1.5ml 0.25% bupivicaine at each nerve root.     LOCAL ANESTHETIC INJECTED:  Lidocaine 1%. 2 ml per site.    SEDATION MEDICATIONS: RN IV sedation    ESTIMATED BLOOD LOSS:  NOne    COMPLICATIONS:  None    TECHNIQUE:   A time-out was taken to identify patient and procedure side prior to starting the procedure. The patient was placed in a prone position, prepped and draped in the usual sterile fashion using ChloraPrep and sterile towels.  The area to be injected was determined under fluoroscopic guidance in AP and oblique view.  Local anesthetic was given by raising a wheal and going down to the hub of a 25-gauge 1.5 inch needle.  In oblique view, a 3.5 inch 22-gauge bent-tip spinal needle was introduced towards 6 oclock position of the pedicle of each above named nerve root level.  The needle was walked medially then hinged into the neural foramen and position was confirmed in AP and lateral views.  1ml contrast dye was injected to confirm appropriate placement and that there was no vascular uptake.  After negative aspiration for blood or CSF, the medication was then injected. This was performed at the left L4-5 and L5-S1 level(s). The patient tolerated the procedure well.    The patient was monitored after the procedure.  Patient was given post procedure and discharge instructions to follow at home. The patient was discharged in a stable condition.

## 2020-07-06 VITALS
SYSTOLIC BLOOD PRESSURE: 149 MMHG | HEART RATE: 78 BPM | WEIGHT: 114 LBS | DIASTOLIC BLOOD PRESSURE: 83 MMHG | TEMPERATURE: 98 F | BODY MASS INDEX: 23.02 KG/M2 | OXYGEN SATURATION: 100 % | RESPIRATION RATE: 18 BRPM

## 2020-07-13 ENCOUNTER — NURSE TRIAGE (OUTPATIENT)
Dept: ADMINISTRATIVE | Facility: CLINIC | Age: 80
End: 2020-07-13

## 2020-07-13 NOTE — TELEPHONE ENCOUNTER
Pt verified identity by spelling first and last name and verifying . Pt denies any post-procedural symptoms such as fever, cough or SOB. Instructed pt to call [facility] back for further assistance if needed and to call 911 for all emergencies; pt voiced verbal understanding and agrees with instructions.

## 2020-08-11 ENCOUNTER — HOSPITAL ENCOUNTER (OUTPATIENT)
Dept: RADIOLOGY | Facility: HOSPITAL | Age: 80
Discharge: HOME | End: 2020-08-11
Attending: INTERNAL MEDICINE
Payer: MEDICARE

## 2020-08-11 DIAGNOSIS — M25.512 LEFT SHOULDER PAIN: ICD-10-CM

## 2020-08-11 DIAGNOSIS — M25.511 RIGHT SHOULDER PAIN: ICD-10-CM

## 2020-08-11 DIAGNOSIS — M25.511 RIGHT SHOULDER PAIN: Primary | ICD-10-CM

## 2020-08-11 PROCEDURE — 73030 X-RAY EXAM OF SHOULDER: CPT | Mod: 26,RT,, | Performed by: RADIOLOGY

## 2020-08-11 PROCEDURE — 73030 X-RAY EXAM OF SHOULDER: CPT | Mod: 26,LT,, | Performed by: RADIOLOGY

## 2020-08-11 PROCEDURE — 73030 XR SHOULDER COMPLETE 2 OR MORE VIEWS RIGHT: ICD-10-PCS | Mod: 26,RT,, | Performed by: RADIOLOGY

## 2020-08-11 PROCEDURE — 73030 X-RAY EXAM OF SHOULDER: CPT | Mod: TC,FY,LT

## 2020-08-11 PROCEDURE — 73030 X-RAY EXAM OF SHOULDER: CPT | Mod: TC,FY,RT

## 2020-09-10 ENCOUNTER — OFFICE VISIT (OUTPATIENT)
Dept: FAMILY MEDICINE | Facility: CLINIC | Age: 80
End: 2020-09-10
Payer: MEDICARE

## 2020-09-10 ENCOUNTER — OFFICE VISIT (OUTPATIENT)
Dept: OTOLARYNGOLOGY | Facility: CLINIC | Age: 80
End: 2020-09-10
Payer: MEDICARE

## 2020-09-10 VITALS
OXYGEN SATURATION: 97 % | SYSTOLIC BLOOD PRESSURE: 114 MMHG | HEIGHT: 59 IN | BODY MASS INDEX: 23.33 KG/M2 | TEMPERATURE: 98 F | DIASTOLIC BLOOD PRESSURE: 58 MMHG | WEIGHT: 115.75 LBS | HEART RATE: 86 BPM

## 2020-09-10 VITALS
WEIGHT: 115.94 LBS | SYSTOLIC BLOOD PRESSURE: 136 MMHG | HEART RATE: 87 BPM | OXYGEN SATURATION: 95 % | DIASTOLIC BLOOD PRESSURE: 63 MMHG | HEIGHT: 59 IN | BODY MASS INDEX: 23.37 KG/M2

## 2020-09-10 DIAGNOSIS — R04.0 EPISTAXIS: Primary | ICD-10-CM

## 2020-09-10 DIAGNOSIS — F41.9 ANXIETY AND DEPRESSION: Primary | ICD-10-CM

## 2020-09-10 DIAGNOSIS — F32.A ANXIETY AND DEPRESSION: Primary | ICD-10-CM

## 2020-09-10 DIAGNOSIS — G89.4 CHRONIC PAIN SYNDROME: ICD-10-CM

## 2020-09-10 DIAGNOSIS — G47.00 INSOMNIA, UNSPECIFIED TYPE: ICD-10-CM

## 2020-09-10 PROBLEM — M15.9 PRIMARY OSTEOARTHRITIS INVOLVING MULTIPLE JOINTS: Status: ACTIVE | Noted: 2020-09-10

## 2020-09-10 PROBLEM — Z87.891 FORMER SMOKER, STOPPED SMOKING IN DISTANT PAST: Status: ACTIVE | Noted: 2020-09-10

## 2020-09-10 PROBLEM — M85.80 OSTEOPENIA DETERMINED BY X-RAY: Status: ACTIVE | Noted: 2020-09-10

## 2020-09-10 PROBLEM — M15.0 PRIMARY OSTEOARTHRITIS INVOLVING MULTIPLE JOINTS: Status: ACTIVE | Noted: 2020-09-10

## 2020-09-10 PROBLEM — K58.2 IRRITABLE BOWEL SYNDROME WITH BOTH CONSTIPATION AND DIARRHEA: Status: ACTIVE | Noted: 2020-09-10

## 2020-09-10 PROBLEM — K31.84 GASTROPARESIS: Status: ACTIVE | Noted: 2020-09-10

## 2020-09-10 PROBLEM — M81.0 OSTEOPOROSIS: Status: RESOLVED | Noted: 2017-10-13 | Resolved: 2020-09-10

## 2020-09-10 PROBLEM — Z87.39 HISTORY OF OSTEOPOROSIS: Status: ACTIVE | Noted: 2020-09-10

## 2020-09-10 PROBLEM — E78.2 MIXED HYPERLIPIDEMIA: Status: ACTIVE | Noted: 2017-10-13

## 2020-09-10 PROCEDURE — 1126F AMNT PAIN NOTED NONE PRSNT: CPT | Mod: S$GLB,,, | Performed by: OTOLARYNGOLOGY

## 2020-09-10 PROCEDURE — 1101F PR PT FALLS ASSESS DOC 0-1 FALLS W/OUT INJ PAST YR: ICD-10-PCS | Mod: CPTII,S$GLB,, | Performed by: FAMILY MEDICINE

## 2020-09-10 PROCEDURE — 3075F PR MOST RECENT SYSTOLIC BLOOD PRESS GE 130-139MM HG: ICD-10-PCS | Mod: CPTII,S$GLB,, | Performed by: OTOLARYNGOLOGY

## 2020-09-10 PROCEDURE — 1101F PT FALLS ASSESS-DOCD LE1/YR: CPT | Mod: CPTII,S$GLB,, | Performed by: FAMILY MEDICINE

## 2020-09-10 PROCEDURE — 1101F PT FALLS ASSESS-DOCD LE1/YR: CPT | Mod: CPTII,S$GLB,, | Performed by: OTOLARYNGOLOGY

## 2020-09-10 PROCEDURE — 3078F PR MOST RECENT DIASTOLIC BLOOD PRESSURE < 80 MM HG: ICD-10-PCS | Mod: CPTII,S$GLB,, | Performed by: FAMILY MEDICINE

## 2020-09-10 PROCEDURE — 99999 PR PBB SHADOW E&M-EST. PATIENT-LVL IV: CPT | Mod: PBBFAC,,, | Performed by: FAMILY MEDICINE

## 2020-09-10 PROCEDURE — 3074F SYST BP LT 130 MM HG: CPT | Mod: CPTII,S$GLB,, | Performed by: FAMILY MEDICINE

## 2020-09-10 PROCEDURE — 1126F PR PAIN SEVERITY QUANTIFIED, NO PAIN PRESENT: ICD-10-PCS | Mod: S$GLB,,, | Performed by: OTOLARYNGOLOGY

## 2020-09-10 PROCEDURE — 99204 OFFICE O/P NEW MOD 45 MIN: CPT | Mod: 25,S$GLB,, | Performed by: OTOLARYNGOLOGY

## 2020-09-10 PROCEDURE — 3078F DIAST BP <80 MM HG: CPT | Mod: CPTII,S$GLB,, | Performed by: FAMILY MEDICINE

## 2020-09-10 PROCEDURE — 1126F AMNT PAIN NOTED NONE PRSNT: CPT | Mod: S$GLB,,, | Performed by: FAMILY MEDICINE

## 2020-09-10 PROCEDURE — 1159F MED LIST DOCD IN RCRD: CPT | Mod: S$GLB,,, | Performed by: FAMILY MEDICINE

## 2020-09-10 PROCEDURE — 99204 PR OFFICE/OUTPT VISIT, NEW, LEVL IV, 45-59 MIN: ICD-10-PCS | Mod: 25,S$GLB,, | Performed by: OTOLARYNGOLOGY

## 2020-09-10 PROCEDURE — 3074F PR MOST RECENT SYSTOLIC BLOOD PRESSURE < 130 MM HG: ICD-10-PCS | Mod: CPTII,S$GLB,, | Performed by: FAMILY MEDICINE

## 2020-09-10 PROCEDURE — 1159F PR MEDICATION LIST DOCUMENTED IN MEDICAL RECORD: ICD-10-PCS | Mod: S$GLB,,, | Performed by: FAMILY MEDICINE

## 2020-09-10 PROCEDURE — 30901 PR CTRL 2SEBLEED,ANTER,SIMPLE: ICD-10-PCS | Mod: RT,S$GLB,, | Performed by: OTOLARYNGOLOGY

## 2020-09-10 PROCEDURE — 1126F PR PAIN SEVERITY QUANTIFIED, NO PAIN PRESENT: ICD-10-PCS | Mod: S$GLB,,, | Performed by: FAMILY MEDICINE

## 2020-09-10 PROCEDURE — 99213 OFFICE O/P EST LOW 20 MIN: CPT | Mod: S$GLB,,, | Performed by: FAMILY MEDICINE

## 2020-09-10 PROCEDURE — 3078F DIAST BP <80 MM HG: CPT | Mod: CPTII,S$GLB,, | Performed by: OTOLARYNGOLOGY

## 2020-09-10 PROCEDURE — 3075F SYST BP GE 130 - 139MM HG: CPT | Mod: CPTII,S$GLB,, | Performed by: OTOLARYNGOLOGY

## 2020-09-10 PROCEDURE — 30901 CONTROL OF NOSEBLEED: CPT | Mod: RT,S$GLB,, | Performed by: OTOLARYNGOLOGY

## 2020-09-10 PROCEDURE — 99999 PR PBB SHADOW E&M-EST. PATIENT-LVL IV: ICD-10-PCS | Mod: PBBFAC,,, | Performed by: FAMILY MEDICINE

## 2020-09-10 PROCEDURE — 1159F PR MEDICATION LIST DOCUMENTED IN MEDICAL RECORD: ICD-10-PCS | Mod: S$GLB,,, | Performed by: OTOLARYNGOLOGY

## 2020-09-10 PROCEDURE — 1159F MED LIST DOCD IN RCRD: CPT | Mod: S$GLB,,, | Performed by: OTOLARYNGOLOGY

## 2020-09-10 PROCEDURE — 3078F PR MOST RECENT DIASTOLIC BLOOD PRESSURE < 80 MM HG: ICD-10-PCS | Mod: CPTII,S$GLB,, | Performed by: OTOLARYNGOLOGY

## 2020-09-10 PROCEDURE — 99213 PR OFFICE/OUTPT VISIT, EST, LEVL III, 20-29 MIN: ICD-10-PCS | Mod: S$GLB,,, | Performed by: FAMILY MEDICINE

## 2020-09-10 PROCEDURE — 1101F PR PT FALLS ASSESS DOC 0-1 FALLS W/OUT INJ PAST YR: ICD-10-PCS | Mod: CPTII,S$GLB,, | Performed by: OTOLARYNGOLOGY

## 2020-09-10 RX ORDER — ESCITALOPRAM OXALATE 10 MG/1
10 TABLET ORAL DAILY
COMMUNITY
Start: 2020-08-24 | End: 2020-09-10

## 2020-09-10 RX ORDER — DULOXETIN HYDROCHLORIDE 20 MG/1
20 CAPSULE, DELAYED RELEASE ORAL DAILY
Qty: 90 CAPSULE | Refills: 0 | Status: SHIPPED | OUTPATIENT
Start: 2020-09-10 | End: 2020-10-13

## 2020-09-10 RX ORDER — TRAZODONE HYDROCHLORIDE 50 MG/1
50 TABLET ORAL NIGHTLY
Qty: 90 TABLET | Refills: 0 | Status: SHIPPED | OUTPATIENT
Start: 2020-09-10 | End: 2020-10-13

## 2020-09-10 RX ORDER — CHOLESTYRAMINE 4 G/9G
4 POWDER, FOR SUSPENSION ORAL 2 TIMES DAILY
COMMUNITY
Start: 2020-09-08 | End: 2020-10-13

## 2020-09-10 RX ORDER — TEMAZEPAM 30 MG/1
30 CAPSULE ORAL NIGHTLY
COMMUNITY
Start: 2020-09-04 | End: 2020-09-10

## 2020-09-10 NOTE — PROGRESS NOTES
Subjective:       Patient ID: Sunita Carlson is a 79 y.o. female.    Chief Complaint: Establish Care (nose bleeds)    HPI   Sunita Carlson is a 80 yo woman presenting with recurrent epistaxis. They are always right-sided. Nose bleeds typically last 10-15 minutes and stop with afrin and pressure. In June, however, she had a nosebleed that lasted over an hour - she went to the ED. She was found to be quite hypertensive at the time. The nosebleed stopped while she was at the ER. Since then her BP has been under better control but she continues to have weekly nosebleeds. A few nights ago she woke up and she found that she had blood clots in the back of her mouth, likely due to a nosebleed. She reports that she has seen an outside ENT for nosebleeds and had her nose cauterized twice last year - she would like to establish care in the [facility] system. She takes ASA 81 mg daily - h/o MI.     Review of Systems   Constitutional: Negative for activity change, appetite change, chills, fever and unexpected weight change.   HENT: Positive for nosebleeds. Negative for nasal congestion, ear discharge, ear pain, hearing loss, rhinorrhea, sore throat, tinnitus, trouble swallowing and voice change.    Eyes: Negative for photophobia, pain and itching.   Respiratory: Negative for cough, shortness of breath, wheezing and stridor.    Cardiovascular: Negative for chest pain and palpitations.   Gastrointestinal: Negative for abdominal pain, constipation, diarrhea, nausea and vomiting.   Endocrine: Negative for cold intolerance and heat intolerance.   Genitourinary: Negative for difficulty urinating and dysuria.   Musculoskeletal: Negative for arthralgias, myalgias, neck pain and neck stiffness.   Integumentary:  Negative for pallor and rash.   Allergic/Immunologic: Negative for food allergies.   Neurological: Negative for dizziness, vertigo, seizures, syncope, facial asymmetry and headaches.   Psychiatric/Behavioral: Negative for  agitation, confusion and hallucinations.         Objective:      Physical Exam  Constitutional:       Appearance: Normal appearance. She is well-developed.   HENT:      Head: Normocephalic and atraumatic.      Right Ear: Hearing, tympanic membrane, ear canal and external ear normal.      Left Ear: Hearing, tympanic membrane, ear canal and external ear normal.      Nose: Nose normal.      Comments: Prominent vessel of Kesselbach's plexus on right nasal septum     Mouth/Throat:      Pharynx: Uvula midline.   Eyes:      General: Lids are normal. Vision grossly intact.      Conjunctiva/sclera: Conjunctivae normal.      Pupils: Pupils are equal, round, and reactive to light.   Neck:      Musculoskeletal: Normal range of motion and neck supple.      Thyroid: No thyroid mass or thyromegaly.   Pulmonary:      Effort: Pulmonary effort is normal. No tachypnea.      Breath sounds: No stridor.   Skin:     General: Skin is warm and dry.   Neurological:      Mental Status: She is alert and oriented to person, place, and time.         Procedure: Nasal cauterization  After informed consent was obtained, the right nasal cavity was sprayed with 4% lidocaine and afrin.  Silver nitrate cautery stick was applied to prominent vessels of right anterior septum. Lidocaine petroleum jelly was applied. Patient tolerated procedure well.    Assessment:       1. Epistaxis        Plan:       78 yo woman presenting with recurrent epistaxis    -chemical cauterization applied to nose on right  -frequent nasal saline  -vaseline twice daily  -consider humidifier  -RTC in 2 months for recheck

## 2020-09-10 NOTE — PROGRESS NOTES
Subjective:       Patient ID: Sunita Carlson is a 79 y.o. female.    Chief Complaint: Medication changes needed    HPI   Patient presents with her daughter reporting needing medications changed as she could not be seen by her family doctor (Dr. Behzad Birch) for quite some time and reports medications called in over the phone have not been effective.  She is new to me and follows with Cardiology, Endocrinology, Ophthalmology and Pain Management within our system, Gastroenterology (Dr. Goldberg) outside of our system and has a past medical history as in the problem list.  She reports she has a history of chronic pains secondary to arthritis mainly in the back, hips and knees, peripheral neuropathy mainly in the lower extremities and  issues with anxiety, depression and insomnia.  All of her symptoms have been present for many years and stable with exception of worsening insomnia after her 's death 2 years ago.  She reports that she does not like to take unnecessary medications and stopped unknown medications that were working well for her several years ago after dealing with her 's death.  She does not know with these were but secondary to stressors during the pandemic symptoms have returned.  She reports her family doctor has been treating her with a number of medications starting her initially on Wellbutrin but she tells me she stopped this on her own after short appeared of time as she thought it caused her to shake.  Lexapro was then started but she stopped this after having an brief episode of nose bleed.  She was then started on Restoril 15 mg nightly as needed and she reports this help with her sleep somewhat but became ineffective quickly and she was raised to 30 mg nightly as needed.  She reports this seem to work again for short appeared of time but has stopped working completely more recently.  She reports she was advised that she would have to be seen to discuss other treatment options and  she restarted taking Lexapro after this for period of time but tells me she had some blurry vision so she stopped the medication again.  She also tried on her own taking an unknown medication that she still had from 2018 when her  passed away although she reports she thinks she has this in her purse today and was able to produce a prescription bottle for for thirty 0.25 mg Xanax tablets with instructions to take 1 tablet twice daily as needed for anxiety or sleep.  She reports that this worked for periods of high anxiety well taken very infrequently chronically but did not help with sleep.  She denies chronic benzodiazepine use reports she has not used any of these in about a week with no past adverse effects or any type of withdrawal symptoms.  She denies any symptoms of panic attack, sleep apnea or any feelings of helplessness, hopelessness or worthlessness although she reports she does not get a significant of enjoyment out of life anymore.  She reports sleep is her biggest issue, depression symptoms next and she believes that all of these are secondary to her chronic pains which are not well controlled especially at night but she refuses to take controlled substances for these due to his being high risk medications.  She was unaware that benzodiazepines were high risk medications reviewing these with her.  Both her daughter report no other concerns or complaints and her daughter reports that she generally does well outside of her insomnia issues and rare issues with panic attacks occurring last in February or March of this year.  She was seen in the ER and followed up with her cardiologist with no other cause found on extensive workup.    Review of Systems   Constitutional: Negative for activity change, appetite change, fatigue and unexpected weight change.   Respiratory: Negative for cough, chest tightness, shortness of breath and wheezing.    Cardiovascular: Negative for chest pain, palpitations and leg  "swelling.   Gastrointestinal: Negative for abdominal pain, blood in stool, constipation, nausea and vomiting.   Genitourinary: Negative for difficulty urinating.   Musculoskeletal: Positive for arthralgias.   Skin: Negative for rash and wound.   Neurological: Positive for numbness.   Hematological: Negative for adenopathy. Does not bruise/bleed easily.   Psychiatric/Behavioral: Negative for dysphoric mood. The patient is not nervous/anxious.          Objective:      Vitals:    09/10/20 1412   BP: (!) 114/58   Pulse: 86   Temp: 97.8 °F (36.6 °C)   SpO2: 97%   Weight: 52.5 kg (115 lb 11.9 oz)   Height: 4' 11" (1.499 m)   PainSc: 0-No pain     Physical Exam  Vitals signs and nursing note reviewed.   Constitutional:       General: She is not in acute distress.     Appearance: Normal appearance. She is well-developed and normal weight. She is not ill-appearing, toxic-appearing or diaphoretic.   HENT:      Head: Normocephalic and atraumatic.   Eyes:      General: No scleral icterus.        Right eye: No discharge.         Left eye: No discharge.      Conjunctiva/sclera: Conjunctivae normal.   Cardiovascular:      Rate and Rhythm: Normal rate and regular rhythm.      Heart sounds: Normal heart sounds. No murmur. No friction rub. No gallop.    Pulmonary:      Effort: Pulmonary effort is normal. No respiratory distress.      Breath sounds: Normal breath sounds. No wheezing.   Chest:      Chest wall: No tenderness.   Musculoskeletal: Normal range of motion.         General: No tenderness or deformity.      Right lower leg: No edema.      Left lower leg: No edema.   Skin:     General: Skin is warm and dry.   Neurological:      Mental Status: She is alert and oriented to person, place, and time.      Gait: Gait normal.   Psychiatric:         Attention and Perception: Attention and perception normal.         Mood and Affect: Mood and affect normal.         Speech: Speech normal.         Behavior: Behavior normal. Behavior is " cooperative.         Thought Content: Thought content normal.         Cognition and Memory: Cognition and memory normal.         Judgment: Judgment normal.      Comments: She is well dressed, pleasant and has good eye contact during the visit.  She has no difficulty answering questioning or following commands.           Assessment:       1. Anxiety and depression    2. Insomnia, unspecified type    3. Chronic pain syndrome          Plan:   Anxiety and depression  -     traZODone (DESYREL) 50 MG tablet; Take 1 tablet (50 mg total) by mouth every evening.  Dispense: 90 tablet; Refill: 0  -     DULoxetine (CYMBALTA) 20 MG capsule; Take 1 capsule (20 mg total) by mouth once daily.  Dispense: 90 capsule; Refill: 0    Insomnia, unspecified type  -     traZODone (DESYREL) 50 MG tablet; Take 1 tablet (50 mg total) by mouth every evening.  Dispense: 90 tablet; Refill: 0    Chronic pain syndrome  -     DULoxetine (CYMBALTA) 20 MG capsule; Take 1 capsule (20 mg total) by mouth once daily.  Dispense: 90 capsule; Refill: 0      Follow up in about 1 month (around 10/10/2020).    Sunita appears to be stable and well today with exception of some sporadic anxiety symptoms, mild depression symptoms and much worse issues with insomnia which may be related to her chronic arthritis and neuropathy pains.  The I initially suggested a trial of Cymbalta as this medication may improve her chronic pain and work for both anxiety and depression symptoms but after discussing the risks and benefits she was not interested in this and wanted something that specifically worked for insomnia even though her daughter thought Cymbalta was not excellent option to try.  I discussed use of trazodone with risks and benefits as this would help with both insomnia and depression and she initially wanted to discuss other possible treatment options that may work better.  She had suggested Ambien which had worked for friends and advised her that this was an  extremely high risk medication especially in her age group and I would highly recommend avoiding this dangerous and addictive medication.  She was unaware that the medication was addictive and discuss trying a higher dose of Restoril or Xanax.  I had a long discussion with them about the risks of both of these medications and they were not aware that they were high risk, addictive and can have severe withdrawal symptoms.  She then wanted to avoid any benzodiazepines and as she has not taken any in over a week without any withdrawal symptoms I advised her that she is not likely to have any issues if she does not take these again and have removed Restoril from her medication list.  I did advise her if a very small amount of Xanax has worked well for panic attacks in the past that are extremely infrequent I think this is an acceptable medication and recommended she continue using 0.25 mg of Xanax a as needed for this.  She had concerns about this and I did discuss BuSpar that could be used in its place although it may not be as effective.  I advised her to talk with Dr. Birch about this at her follow-up if she wanted to make this change.  I discussed other SSRIs such as Celexa with risks and benefits and she decided that trazodone would be the best option to try 1st but she had concern the does advised her she could have a tablet in half and take 25 mg nightly to see if this helps with insomnia and depression symptoms without any adverse effects.  If no adverse effects but symptoms are not controlled she can increase to a full tablet after 5-7 days.  Just before ending the visit she then changed her mind and wanted to try Cymbalta.  We discussed the medication again and recommended starting at the 20 mg dose.  I was going to cancel trazodone but she reported she wanted to be on both medications.  I discussed the risks and benefits of this including serotonin syndrome which can be life-threatening.  On low doses of  both medications I am doubtful of this but advised her she cannot start both medications at the same time because if any adverse effects occur we will not know which medication cause them.  She wanted to start trazodone 1st and if doing well on medication without any adverse effects in 2 weeks she can start Cymbalta.  I advised her to follow-up with her family doctor in 1 month for recheck if doing well and sooner if any problems he was advised them that she would like to establish care here with me.  I recommended she follow-up with me in 1 month for recheck and to establish if doing well and sooner if any problems.    Sunita was seen for a 40 min visit today and greater than half this time was spent counseling on the above.

## 2020-10-09 ENCOUNTER — HOSPITAL ENCOUNTER (OUTPATIENT)
Dept: RADIOLOGY | Facility: HOSPITAL | Age: 80
Discharge: HOME | End: 2020-10-09
Attending: NURSE PRACTITIONER
Payer: MEDICARE

## 2020-10-09 ENCOUNTER — PATIENT MESSAGE (OUTPATIENT)
Dept: CARDIOLOGY | Facility: CLINIC | Age: 80
End: 2020-10-09

## 2020-10-09 ENCOUNTER — TELEPHONE (OUTPATIENT)
Dept: FAMILY MEDICINE | Facility: CLINIC | Age: 80
End: 2020-10-09

## 2020-10-09 ENCOUNTER — OFFICE VISIT (OUTPATIENT)
Dept: FAMILY MEDICINE | Facility: CLINIC | Age: 80
End: 2020-10-09
Payer: MEDICARE

## 2020-10-09 VITALS
WEIGHT: 113.75 LBS | BODY MASS INDEX: 22.93 KG/M2 | SYSTOLIC BLOOD PRESSURE: 100 MMHG | HEART RATE: 95 BPM | DIASTOLIC BLOOD PRESSURE: 64 MMHG | OXYGEN SATURATION: 94 % | HEIGHT: 59 IN | TEMPERATURE: 97 F

## 2020-10-09 DIAGNOSIS — Z91.81 STATUS POST FALL: Primary | ICD-10-CM

## 2020-10-09 DIAGNOSIS — Z91.81 STATUS POST FALL: ICD-10-CM

## 2020-10-09 DIAGNOSIS — R55 SYNCOPE, UNSPECIFIED SYNCOPE TYPE: ICD-10-CM

## 2020-10-09 DIAGNOSIS — R42 DIZZINESS: ICD-10-CM

## 2020-10-09 DIAGNOSIS — R19.7 DIARRHEA, UNSPECIFIED TYPE: Primary | ICD-10-CM

## 2020-10-09 DIAGNOSIS — M54.6 ACUTE LEFT-SIDED THORACIC BACK PAIN: ICD-10-CM

## 2020-10-09 DIAGNOSIS — I10 ESSENTIAL HYPERTENSION: ICD-10-CM

## 2020-10-09 PROCEDURE — 70470 CT HEAD WITH AND WITHOUT: ICD-10-PCS | Mod: 26,,, | Performed by: RADIOLOGY

## 2020-10-09 PROCEDURE — 70140 XR FACIAL BONES LIMITED 1-2 VIEW: ICD-10-PCS | Mod: 26,,, | Performed by: RADIOLOGY

## 2020-10-09 PROCEDURE — 3078F PR MOST RECENT DIASTOLIC BLOOD PRESSURE < 80 MM HG: ICD-10-PCS | Mod: CPTII,S$GLB,, | Performed by: NURSE PRACTITIONER

## 2020-10-09 PROCEDURE — 96372 THER/PROPH/DIAG INJ SC/IM: CPT | Mod: S$GLB,,, | Performed by: NURSE PRACTITIONER

## 2020-10-09 PROCEDURE — 93880 US CAROTID BILATERAL: ICD-10-PCS | Mod: 26,,, | Performed by: RADIOLOGY

## 2020-10-09 PROCEDURE — 99999 PR PBB SHADOW E&M-EST. PATIENT-LVL IV: ICD-10-PCS | Mod: PBBFAC,,, | Performed by: NURSE PRACTITIONER

## 2020-10-09 PROCEDURE — 99999 PR PBB SHADOW E&M-EST. PATIENT-LVL IV: CPT | Mod: PBBFAC,,, | Performed by: NURSE PRACTITIONER

## 2020-10-09 PROCEDURE — 99214 OFFICE O/P EST MOD 30 MIN: CPT | Mod: 25,S$GLB,, | Performed by: NURSE PRACTITIONER

## 2020-10-09 PROCEDURE — 96372 PR INJECTION,THERAP/PROPH/DIAG2ST, IM OR SUBCUT: ICD-10-PCS | Mod: S$GLB,,, | Performed by: NURSE PRACTITIONER

## 2020-10-09 PROCEDURE — 1125F PR PAIN SEVERITY QUANTIFIED, PAIN PRESENT: ICD-10-PCS | Mod: S$GLB,,, | Performed by: NURSE PRACTITIONER

## 2020-10-09 PROCEDURE — 93010 EKG 12-LEAD: ICD-10-PCS | Mod: S$GLB,,, | Performed by: INTERNAL MEDICINE

## 2020-10-09 PROCEDURE — 3074F PR MOST RECENT SYSTOLIC BLOOD PRESSURE < 130 MM HG: ICD-10-PCS | Mod: CPTII,S$GLB,, | Performed by: NURSE PRACTITIONER

## 2020-10-09 PROCEDURE — 93010 ELECTROCARDIOGRAM REPORT: CPT | Mod: S$GLB,,, | Performed by: INTERNAL MEDICINE

## 2020-10-09 PROCEDURE — 3074F SYST BP LT 130 MM HG: CPT | Mod: CPTII,S$GLB,, | Performed by: NURSE PRACTITIONER

## 2020-10-09 PROCEDURE — 99214 PR OFFICE/OUTPT VISIT, EST, LEVL IV, 30-39 MIN: ICD-10-PCS | Mod: 25,S$GLB,, | Performed by: NURSE PRACTITIONER

## 2020-10-09 PROCEDURE — 1125F AMNT PAIN NOTED PAIN PRSNT: CPT | Mod: S$GLB,,, | Performed by: NURSE PRACTITIONER

## 2020-10-09 PROCEDURE — 93880 EXTRACRANIAL BILAT STUDY: CPT | Mod: TC

## 2020-10-09 PROCEDURE — 72070 X-RAY EXAM THORAC SPINE 2VWS: CPT | Mod: TC,FY

## 2020-10-09 PROCEDURE — 72070 X-RAY EXAM THORAC SPINE 2VWS: CPT | Mod: 26,,, | Performed by: RADIOLOGY

## 2020-10-09 PROCEDURE — 1101F PR PT FALLS ASSESS DOC 0-1 FALLS W/OUT INJ PAST YR: ICD-10-PCS | Mod: CPTII,S$GLB,, | Performed by: NURSE PRACTITIONER

## 2020-10-09 PROCEDURE — 1159F PR MEDICATION LIST DOCUMENTED IN MEDICAL RECORD: ICD-10-PCS | Mod: S$GLB,,, | Performed by: NURSE PRACTITIONER

## 2020-10-09 PROCEDURE — 70470 CT HEAD/BRAIN W/O & W/DYE: CPT | Mod: 26,,, | Performed by: RADIOLOGY

## 2020-10-09 PROCEDURE — 3078F DIAST BP <80 MM HG: CPT | Mod: CPTII,S$GLB,, | Performed by: NURSE PRACTITIONER

## 2020-10-09 PROCEDURE — 70140 X-RAY EXAM OF FACIAL BONES: CPT | Mod: 26,,, | Performed by: RADIOLOGY

## 2020-10-09 PROCEDURE — 1101F PT FALLS ASSESS-DOCD LE1/YR: CPT | Mod: CPTII,S$GLB,, | Performed by: NURSE PRACTITIONER

## 2020-10-09 PROCEDURE — 93880 EXTRACRANIAL BILAT STUDY: CPT | Mod: 26,,, | Performed by: RADIOLOGY

## 2020-10-09 PROCEDURE — 93005 EKG 12-LEAD: ICD-10-PCS | Mod: S$GLB,,, | Performed by: NURSE PRACTITIONER

## 2020-10-09 PROCEDURE — 93005 ELECTROCARDIOGRAM TRACING: CPT | Mod: S$GLB,,, | Performed by: NURSE PRACTITIONER

## 2020-10-09 PROCEDURE — 72070 XR THORACIC SPINE AP LATERAL: ICD-10-PCS | Mod: 26,,, | Performed by: RADIOLOGY

## 2020-10-09 PROCEDURE — 70140 X-RAY EXAM OF FACIAL BONES: CPT | Mod: TC,FY

## 2020-10-09 PROCEDURE — 1159F MED LIST DOCD IN RCRD: CPT | Mod: S$GLB,,, | Performed by: NURSE PRACTITIONER

## 2020-10-09 PROCEDURE — 70470 CT HEAD/BRAIN W/O & W/DYE: CPT | Mod: TC

## 2020-10-09 PROCEDURE — 25500020 PHARM REV CODE 255

## 2020-10-09 RX ORDER — BETAMETHASONE SODIUM PHOSPHATE AND BETAMETHASONE ACETATE 3; 3 MG/ML; MG/ML
6 INJECTION, SUSPENSION INTRA-ARTICULAR; INTRALESIONAL; INTRAMUSCULAR; SOFT TISSUE ONCE
Status: COMPLETED | OUTPATIENT
Start: 2020-10-09 | End: 2020-10-09

## 2020-10-09 RX ADMIN — IOHEXOL 75 ML: 350 INJECTION, SOLUTION INTRAVENOUS at 01:10

## 2020-10-09 RX ADMIN — BETAMETHASONE SODIUM PHOSPHATE AND BETAMETHASONE ACETATE 6 MG: 3; 3 INJECTION, SUSPENSION INTRA-ARTICULAR; INTRALESIONAL; INTRAMUSCULAR; SOFT TISSUE at 11:10

## 2020-10-09 NOTE — TELEPHONE ENCOUNTER
Called patient will her test results. She states that she forgot to mention at her visit that she has been having a lot of diarrhea.  She is on lomotil for her diarrhea, but it has not been working.  Wanted to know what else can she take.

## 2020-10-09 NOTE — PROGRESS NOTES
"Subjective:       Patient ID: Sunita Carlson is a 79 y.o. female.    Chief Complaint: Fall and Back Pain    HPI   Ms. Carlson is an 78 yo female who presents today with c/o back pain.  She reports that she fell this morning around 4 A.M.  She reports that she woke up to use the bathroom because she was feeling sick and dizzy (she notes a history of vertigo).  She reports that she went to lay on the floor in the bathroom but states she must have "blacked out" because the next thing she remembers is her back hurting, although she doesn't know how because she did hit her face.  She notes extreme dizziness and more nausea after waking up, back pain 10/10, and her face being sore.  She did take meclizine which helped with the dizziness.  Currently the back pain is most concerning and painful for her.  She denies any chest pain, SOB.    Vitals:    10/09/20 1016   BP: 100/64   Pulse: 95   Temp: 97.3 °F (36.3 °C)     Past Medical History:   Diagnosis Date    Arthritis     Cataract     Coronary artery disease     Hypertension     Insomnia     Neuropathy     Retinal detachment     Stomach ulcer      Review of Systems   Constitutional: Negative for chills, fatigue, fever and unexpected weight change.   HENT: Negative for congestion, hearing loss, nosebleeds, postnasal drip, sinus pressure, sinus pain and sore throat.    Eyes: Negative for pain, discharge, redness and visual disturbance.   Respiratory: Negative for cough, chest tightness and shortness of breath.    Cardiovascular: Negative for chest pain and palpitations.   Gastrointestinal: Positive for nausea. Negative for abdominal pain, constipation, diarrhea and vomiting.   Endocrine: Negative for cold intolerance and heat intolerance.   Musculoskeletal: Positive for back pain.   Neurological: Positive for dizziness, syncope and light-headedness. Negative for weakness and headaches.   Psychiatric/Behavioral: Negative for agitation and dysphoric mood. The patient " is not nervous/anxious.        Objective:      Physical Exam  Constitutional:       Appearance: She is well-developed.   HENT:      Head: Normocephalic and atraumatic.      Nose: Nose normal.   Eyes:      General: Lids are normal.      Conjunctiva/sclera: Conjunctivae normal.      Pupils: Pupils are equal, round, and reactive to light.   Neck:      Musculoskeletal: Full passive range of motion without pain.   Cardiovascular:      Rate and Rhythm: Normal rate and regular rhythm.   Pulmonary:      Effort: Pulmonary effort is normal. No respiratory distress.      Breath sounds: Normal breath sounds.   Skin:     General: Skin is warm and dry.   Neurological:      Mental Status: She is alert and oriented to person, place, and time.      Cranial Nerves: Cranial nerves are intact.      Sensory: Sensation is intact.      Motor: Motor function is intact. No weakness.      Coordination: Coordination is intact.      Gait: Gait is intact.   Psychiatric:         Speech: Speech normal.         Behavior: Behavior normal.         Assessment & Plan:       Status post fall  -     IN OFFICE EKG 12-LEAD (to Muse)  -     CBC auto differential; Future; Expected date: 10/09/2020  -     Comprehensive Metabolic Panel; Future; Expected date: 10/09/2020  -     X-Ray Thoracic Spine AP Lateral; Future; Expected date: 10/09/2020  -     X-Ray Facial Bones Limited 1-2 View; Future; Expected date: 10/09/2020  -     CT Head W Wo Contrast; Future; Expected date: 10/09/2020  -     betamethasone acetate-betamethasone sodium phosphate injection 6 mg  -     Echo Color Flow Doppler? Yes; Future  -     US Carotid Bilateral; Future; Expected date: 10/09/2020    Syncope, unspecified syncope type  -     IN OFFICE EKG 12-LEAD (to Muse)  -     CBC auto differential; Future; Expected date: 10/09/2020  -     Comprehensive Metabolic Panel; Future; Expected date: 10/09/2020  -     CT Head W Wo Contrast; Future; Expected date: 10/09/2020  -     Echo Color Flow  Doppler? Yes; Future  -     US Carotid Bilateral; Future; Expected date: 10/09/2020    Dizziness    Acute left-sided thoracic back pain  -     betamethasone acetate-betamethasone sodium phosphate injection 6 mg    Essential hypertension    Will follow up with results of above when received and plan accordingly  Patient given strict ED precautions        No follow-ups on file.

## 2020-10-12 ENCOUNTER — TELEPHONE (OUTPATIENT)
Dept: CARDIOLOGY | Facility: CLINIC | Age: 80
End: 2020-10-12

## 2020-10-12 NOTE — TELEPHONE ENCOUNTER
I had a phone conversation with patient's daughter. She was concerned about a dizzy episode leading to a fall last Friday. She had been having diarrhea for the past week. No chest pain, SOB or palpitations. I reviewed her TTE from 2.2020 (overall normal) and recent US carotid (moderate stenosis on the right and she is s/p left CEA). Her symptoms are unlikely to be cardiac in origin, possibly orthostatic hypotension from dehydration (had diarrhea for 1 week). I asked her to call me back if any other questions or concerning symptoms/signs.

## 2020-10-13 ENCOUNTER — TELEPHONE (OUTPATIENT)
Dept: GASTROENTEROLOGY | Facility: CLINIC | Age: 80
End: 2020-10-13

## 2020-10-13 ENCOUNTER — LAB VISIT (OUTPATIENT)
Dept: LAB | Facility: HOSPITAL | Age: 80
End: 2020-10-13
Attending: STUDENT IN AN ORGANIZED HEALTH CARE EDUCATION/TRAINING PROGRAM
Payer: MEDICARE

## 2020-10-13 ENCOUNTER — PATIENT MESSAGE (OUTPATIENT)
Dept: GASTROENTEROLOGY | Facility: CLINIC | Age: 80
End: 2020-10-13

## 2020-10-13 ENCOUNTER — OFFICE VISIT (OUTPATIENT)
Dept: FAMILY MEDICINE | Facility: CLINIC | Age: 80
End: 2020-10-13
Payer: MEDICARE

## 2020-10-13 VITALS
TEMPERATURE: 98 F | BODY MASS INDEX: 23.28 KG/M2 | SYSTOLIC BLOOD PRESSURE: 112 MMHG | HEART RATE: 95 BPM | DIASTOLIC BLOOD PRESSURE: 58 MMHG | HEIGHT: 59 IN | WEIGHT: 115.5 LBS | OXYGEN SATURATION: 98 %

## 2020-10-13 DIAGNOSIS — R19.7 DIARRHEA, UNSPECIFIED TYPE: ICD-10-CM

## 2020-10-13 DIAGNOSIS — D69.2 SENILE PURPURA: ICD-10-CM

## 2020-10-13 DIAGNOSIS — E86.0 DEHYDRATION, MILD: ICD-10-CM

## 2020-10-13 DIAGNOSIS — M51.36 DDD (DEGENERATIVE DISC DISEASE), LUMBAR: ICD-10-CM

## 2020-10-13 DIAGNOSIS — R42 DIZZY: ICD-10-CM

## 2020-10-13 DIAGNOSIS — R55 SYNCOPE, UNSPECIFIED SYNCOPE TYPE: ICD-10-CM

## 2020-10-13 DIAGNOSIS — I25.700 CORONARY ARTERY DISEASE INVOLVING CORONARY BYPASS GRAFT OF NATIVE HEART WITH UNSTABLE ANGINA PECTORIS: ICD-10-CM

## 2020-10-13 DIAGNOSIS — G89.4 CHRONIC PAIN SYNDROME: ICD-10-CM

## 2020-10-13 DIAGNOSIS — I65.21 ATHEROSCLEROSIS OF RIGHT CAROTID ARTERY: ICD-10-CM

## 2020-10-13 DIAGNOSIS — I10 ESSENTIAL HYPERTENSION: ICD-10-CM

## 2020-10-13 DIAGNOSIS — E86.1 HYPOTENSION DUE TO HYPOVOLEMIA: Primary | ICD-10-CM

## 2020-10-13 DIAGNOSIS — M15.9 PRIMARY OSTEOARTHRITIS INVOLVING MULTIPLE JOINTS: ICD-10-CM

## 2020-10-13 DIAGNOSIS — F41.1 GAD (GENERALIZED ANXIETY DISORDER): ICD-10-CM

## 2020-10-13 DIAGNOSIS — E78.2 MIXED HYPERLIPIDEMIA: ICD-10-CM

## 2020-10-13 DIAGNOSIS — G47.00 INSOMNIA, UNSPECIFIED TYPE: ICD-10-CM

## 2020-10-13 DIAGNOSIS — K21.9 GASTROESOPHAGEAL REFLUX DISEASE WITHOUT ESOPHAGITIS: ICD-10-CM

## 2020-10-13 PROCEDURE — 3074F SYST BP LT 130 MM HG: CPT | Mod: CPTII,S$GLB,, | Performed by: STUDENT IN AN ORGANIZED HEALTH CARE EDUCATION/TRAINING PROGRAM

## 2020-10-13 PROCEDURE — 99215 PR OFFICE/OUTPT VISIT, EST, LEVL V, 40-54 MIN: ICD-10-PCS | Mod: S$GLB,,, | Performed by: STUDENT IN AN ORGANIZED HEALTH CARE EDUCATION/TRAINING PROGRAM

## 2020-10-13 PROCEDURE — 99999 PR PBB SHADOW E&M-EST. PATIENT-LVL V: ICD-10-PCS | Mod: PBBFAC,,, | Performed by: STUDENT IN AN ORGANIZED HEALTH CARE EDUCATION/TRAINING PROGRAM

## 2020-10-13 PROCEDURE — 1125F PR PAIN SEVERITY QUANTIFIED, PAIN PRESENT: ICD-10-PCS | Mod: S$GLB,,, | Performed by: STUDENT IN AN ORGANIZED HEALTH CARE EDUCATION/TRAINING PROGRAM

## 2020-10-13 PROCEDURE — 1101F PT FALLS ASSESS-DOCD LE1/YR: CPT | Mod: CPTII,S$GLB,, | Performed by: STUDENT IN AN ORGANIZED HEALTH CARE EDUCATION/TRAINING PROGRAM

## 2020-10-13 PROCEDURE — 1101F PR PT FALLS ASSESS DOC 0-1 FALLS W/OUT INJ PAST YR: ICD-10-PCS | Mod: CPTII,S$GLB,, | Performed by: STUDENT IN AN ORGANIZED HEALTH CARE EDUCATION/TRAINING PROGRAM

## 2020-10-13 PROCEDURE — 99999 PR PBB SHADOW E&M-EST. PATIENT-LVL V: CPT | Mod: PBBFAC,,, | Performed by: STUDENT IN AN ORGANIZED HEALTH CARE EDUCATION/TRAINING PROGRAM

## 2020-10-13 PROCEDURE — 1159F PR MEDICATION LIST DOCUMENTED IN MEDICAL RECORD: ICD-10-PCS | Mod: S$GLB,,, | Performed by: STUDENT IN AN ORGANIZED HEALTH CARE EDUCATION/TRAINING PROGRAM

## 2020-10-13 PROCEDURE — 3078F PR MOST RECENT DIASTOLIC BLOOD PRESSURE < 80 MM HG: ICD-10-PCS | Mod: CPTII,S$GLB,, | Performed by: STUDENT IN AN ORGANIZED HEALTH CARE EDUCATION/TRAINING PROGRAM

## 2020-10-13 PROCEDURE — 1125F AMNT PAIN NOTED PAIN PRSNT: CPT | Mod: S$GLB,,, | Performed by: STUDENT IN AN ORGANIZED HEALTH CARE EDUCATION/TRAINING PROGRAM

## 2020-10-13 PROCEDURE — 99215 OFFICE O/P EST HI 40 MIN: CPT | Mod: S$GLB,,, | Performed by: STUDENT IN AN ORGANIZED HEALTH CARE EDUCATION/TRAINING PROGRAM

## 2020-10-13 PROCEDURE — 3078F DIAST BP <80 MM HG: CPT | Mod: CPTII,S$GLB,, | Performed by: STUDENT IN AN ORGANIZED HEALTH CARE EDUCATION/TRAINING PROGRAM

## 2020-10-13 PROCEDURE — 1159F MED LIST DOCD IN RCRD: CPT | Mod: S$GLB,,, | Performed by: STUDENT IN AN ORGANIZED HEALTH CARE EDUCATION/TRAINING PROGRAM

## 2020-10-13 PROCEDURE — 3074F PR MOST RECENT SYSTOLIC BLOOD PRESSURE < 130 MM HG: ICD-10-PCS | Mod: CPTII,S$GLB,, | Performed by: STUDENT IN AN ORGANIZED HEALTH CARE EDUCATION/TRAINING PROGRAM

## 2020-10-13 PROCEDURE — 36415 COLL VENOUS BLD VENIPUNCTURE: CPT | Mod: PO

## 2020-10-13 PROCEDURE — 80048 BASIC METABOLIC PNL TOTAL CA: CPT

## 2020-10-13 NOTE — PROGRESS NOTES
[facility] Note  Subjective:    Chief Complaint:   Weak     History of Present Illness:  79 y.o. female presents for multiple issues.      Hypotension secondary to hypovolemia-poorly controlled and patient had episode of diarrhea secondary to suspected duloxetine or gastritis and she was taking her lisinopril along with very low fluid intake thus leading her to weakness and a fall.  No focal neurological deficits or chest pain or shortness of breath and she did not take her blood pressure pill today and her blood pressure is soft with no bowel for.  She reports her diarrhea is improving she has episodes this intermittently.  She is scheduled to get an echo.  I advised her to discontinue lisinopril since her blood pressure is normal today and follow-up tomorrow for recheck and her medications must be adjusted since likely she is not drinking enough fluids and if she ever develops diarrhea to not take blood pressure medication.  Also she is a fall risk since she is taking benzodiazepines and opiates stent expenses time talking about this and and I highly recommended to taper down her dose.  She was open to this idea did not want to did start tapering today despite fall risk and mortality.  Her daughter was present agreed with this plan.  Will also reestablish with cardiologist's and neurologist's which they asked for.    Hypertension-controlled Normal pressure day without taking any medicine that will hold lisinopril and reassess   Hyperlipidemia-stable well controlled continue statin    GERD-stable continue PPI   Insomnia-taking tenezepam for this is only that works at the recommended tapering this off and starting Remeron   Intermittent diarrhea-patient reports she has had diarrhea for last 3 years as intermittent and her last episode could have been caused from duloxetine that they believe.  She has IBS and has episodes monthly.  No fever chills or abdominal pain today.  No dehydration today.   She has seen GI for this.  No blood or black in stool     She denies headache, unilateral weakness, slurred speech, facial droop, and new onset numbness. No fever, neck stiffness, or neck pain. She does not endorse seizures, confusion, morning vomiting, or weight loss. No jaw claudication, vision changes, temporal tenderness, or eye pain. She denies sudden onset, maximum intensity of pain at onset headache.     This is the extent of the patient's complaints at this present time.     She denies chest pain upon exertion, dyspnea, nausea, vomiting, diaphoresis, and syncope. No pleuritic chest pain, unilateral leg swelling, calf tenderness, or calf pain.     She denies having a family history of cancer.     The following portions of the patient's history were reviewed and updated as appropriate: allergies, current medications, past family history, past medical history, past social history, past surgical history and problem list.    Review of Systems [Negative unless checked off]  Gen ROS: []fever []chills []weight loss []malaise [x]fatigue   Neuro: [x]dizzy []numbness []LOC [x]weakness []headaches   Psych ROS: []hallucinate [] depression []anxiety []suicidal ideation    ENT ROS: []congestion []rhinorrhea []sore throat []neck pain []hearing loss   Eye ROS: []hazy vision [] diplopia []photophobia []eye pain    Pulm ROS: []cough []pleuritic pain []dyspnea []wheezing    [facility] : []chest pain []SOB on exertion []orthopnea []PND []leg swelling   GI ROS: []n/v []abd pain [x]diarrhea []constipation []blood/black stool   Uro ROS: []dysuria []frequency []flank pain [] trouble voiding []hematuria   MSK ROS: []myalgias [x]joint pain []neck pain []back pain [] falls   Derm ROS: []pruritis []rash []jaundice        Past Medical History:   Diagnosis Date    Arthritis     Cataract     Coronary artery disease     Hypertension     Insomnia     Neuropathy     Retinal detachment     Stomach ulcer      Past Surgical History:    Procedure Laterality Date    APPENDECTOMY      BLADDER SUSPENSION      BREAST BIOPSY      CARDIAC SURGERY      carotid endarterectomy      L    CATARACT EXTRACTION      CHOLECYSTECTOMY      HYSTERECTOMY      INJECTION OF ANESTHETIC AGENT AROUND GANGLION IMPAR N/A 2019    Procedure: BLOCK, GANGLION IMPAR;  Surgeon: Christian James MD;  Location: [facility] OR;  Service: Pain Management;  Laterality: N/A;    OOPHORECTOMY      SUPERFICIAL KERATECTOMY Right 10/27/2017    Dr. Morales    TONSILLECTOMY      TRANSFORAMINAL EPIDURAL INJECTION OF STEROID Left 2019    Procedure: Injection,steroid,epidural,transforaminal approach L4-5, L5-S1;  Surgeon: Christian James MD;  Location: [facility] OR;  Service: Pain Management;  Laterality: Left;    TRANSFORAMINAL EPIDURAL INJECTION OF STEROID Left 2020    Procedure: Injection,steroid,epidural,transforaminal approach;  Surgeon: Christian James MD;  Location: [facility] OR;  Service: Pain Management;  Laterality: Left;  L4-5, L5-S1    TRANSFORAMINAL EPIDURAL INJECTION OF STEROID Left 2020    Procedure: Injection,steroid,epidural,transforaminal approach;  Surgeon: Christian James MD;  Location: [facility] OR;  Service: Pain Management;  Laterality: Left;  L4-5 and L5-S1     Social History  Social History     Tobacco Use    Smoking status: Former Smoker     Years: 4.00     Types: Cigarettes     Quit date: 10/13/1962     Years since quittin.0    Smokeless tobacco: Never Used   Substance Use Topics    Alcohol use: No    Drug use: No     Family History   Problem Relation Age of Onset    Cancer Father     Macular degeneration Maternal Aunt     Cancer Maternal Aunt     Breast cancer Maternal Aunt     Macular degeneration Maternal Uncle     Diabetes Paternal Aunt     Cancer Paternal Aunt     Blindness Maternal Grandfather     Melanoma Neg Hx     Psoriasis Neg Hx     Lupus Neg Hx     Eczema Neg Hx      Review of patient's allergies indicates:   Allergen Reactions    Demerol  "[meperidine] Nausea And Vomiting    Hydrocodone Itching    Percocet [oxycodone-acetaminophen] Itching    Tramadol        Physical Examination  BP (!) 112/58   Pulse 95   Temp 98.1 °F (36.7 °C)   Ht 4' 11" (1.499 m)   Wt 52.4 kg (115 lb 8.3 oz)   LMP 02/06/2020   SpO2 98%   BMI 23.33 kg/m²   Wt Readings from Last 3 Encounters:   10/13/20 52.4 kg (115 lb 8.3 oz)   10/09/20 51.6 kg (113 lb 12.1 oz)   09/10/20 52.5 kg (115 lb 11.9 oz)     BP Readings from Last 3 Encounters:   10/13/20 (!) 112/58   10/09/20 100/64   09/10/20 (!) 114/58     Estimated body mass index is 23.33 kg/m² as calculated from the following:    Height as of this encounter: 4' 11" (1.499 m).    Weight as of this encounter: 52.4 kg (115 lb 8.3 oz).     General appearance: alert, cooperative, no distress  Eyes: pupils equal and reactive, extraocular eye movements intact   Ears: bilateral TM's and external ear canals normal   Nose: normal and patent, no erythema, discharge or polyps   Sinuses: Normal paranasal sinuses without tenderness   Throat: mucous membranes moist, pharynx normal without lesions   Neck: no thyromegaly, trachea midline  Lungs: clear to auscultation, no wheezes, rales or rhonchi, symmetric air entry, no dullness to percussion bilaterally.  Heart: normal rate, regular rhythm, normal S1, S2, no murmurs, rubs, clicks or gallops, no displacement of the PMI.  Abdomen: soft, nontender, nondistended, no masses or organomegaly No rigidity, rebound, or guarding.   Back: full range of motion, no tenderness, palpable spasm or pain on motion   Extremities: peripheral pulses normal, no pedal edema, no clubbing or cyanosis   Feet: warm, good capillary refill.  Neurological:alert, oriented, normal speech, no focal findings or movement disorder noted   Psychiatric: alert, oriented to person, place, and time  Integument: normal coloration and turgor, no rashes. Senile purpura    Neuro:               MS: alert, awake, oriented x3. Speech and " thought process intact.  CN: PERRLA. Visual fields intact. EOMI. Sensation to light touch in all trigeminal divisions intact. Able to closed eyes tightly and smile without asymmetry. Hearing intact. No uvula deviation. 5/5 shoulder shrugs. Tongue able to protrude bilaterally without difficulty.   Motor: 5/5 all ext strength.  Sensation: to light touch intact throughout.   Reflex: 2+ bilateral bicep tendon, patellar, and ankle reflexes.  Coordination: Intact finger-to-nose. Patient walked across room and back without assistance without any gait instability. Normal romberg test.     Data reviewed    Previous medical records reviewed and summarized above in HPI.     Laboratory    I have reviewed old labs below:    Lab Results   Component Value Date    WBC 12.76 (H) 10/09/2020    HGB 13.8 10/09/2020    HCT 42.5 10/09/2020    MCV 92 10/09/2020     10/09/2020     10/13/2020    K 4.0 10/13/2020    CL 98 10/13/2020    CALCIUM 10.0 10/13/2020    PHOS 3.7 02/07/2020    CO2 29 10/13/2020     10/13/2020    BUN 18 10/13/2020    CREATININE 0.8 10/13/2020    ANIONGAP 12 10/13/2020    ESTGFRAFRICA >60.0 10/13/2020    EGFRNONAA >60.0 10/13/2020    PROT 7.8 10/09/2020    ALBUMIN 4.5 10/09/2020    BILITOT 0.8 10/09/2020    ALKPHOS 121 10/09/2020    ALT 28 10/09/2020    AST 34 10/09/2020    INR 0.9 02/06/2020    CHOL 152 06/03/2020    TRIG 104 06/03/2020    HDL 70 06/03/2020    LDLCALC 61.2 (L) 06/03/2020    TSH 4.820 (H) 02/06/2020    HGBA1C 5.5 07/22/2019     Lab reviewed by me: Particular labs of significance that I will monitor, workup, or treat to improve are marked below.     []HGB low []A1c  high []LDL high []Cr    high []Na  high []K  high []AST high [x]TSH  high   []Plt low  []Gluc high  []Trig high []GFR low [x]Na  low []K  low []ALT high []TSH  low       Imaging/Tracing: I have reviewed the pertinent results/findings and my personal findings are below:     US right carotid shows plaque moderate      Assessment/Plan    Sunita Carlson is a 79 y.o. female who presents to clinic with:    1. Hypotension due to hypovolemia    2. Dehydration, mild    3. Essential hypertension    4. JENNA (generalized anxiety disorder)    5. Gastroesophageal reflux disease without esophagitis    6. Coronary artery disease involving coronary bypass graft of native heart with unstable angina pectoris    7. Mixed hyperlipidemia    8. DDD (degenerative disc disease), lumbar    9. Primary osteoarthritis involving multiple joints    10. Insomnia, unspecified type    11. Chronic pain syndrome    12. Atherosclerosis of right carotid artery    13. Dizzy    14. Diarrhea, unspecified type    15. Senile purpura         Diagnostic impression remarks      Hypotension secondary to hypovolemia-poorly controlled and patient had episode of diarrhea secondary to suspected duloxetine or gastritis and she was taking her lisinopril along with very low fluid intake thus leading her to weakness and a fall.  No focal neurological deficits or chest pain or shortness of breath and she did not take her blood pressure pill today and her blood pressure is soft with no bowel for.  She reports her diarrhea is improving she has episodes this intermittently.  She is scheduled to get an echo.  I advised her to discontinue lisinopril since her blood pressure is normal today and follow-up tomorrow for recheck and her medications must be adjusted since likely she is not drinking enough fluids and if she ever develops diarrhea to not take blood pressure medication.  Also she is a fall risk since she is taking benzodiazepines and opiates stent expenses time talking about this and and I highly recommended to taper down her dose.  She was open to this idea did not want to did start tapering today despite fall risk and mortality.  Her daughter was present agreed with this plan.  Will also reestablish with cardiologist's and neurologist's which they asked for.     Hypertension-controlled Normal pressure day without taking any medicine that will hold lisinopril and reassess   Hyperlipidemia-stable well controlled continue statin    GERD-stable continue PPI   Insomnia-taking tenezepam for this is only that works at the recommended tapering this off and starting Remeron   Intermittent diarrhea-patient reports she has had diarrhea for last 3 years as intermittent and her last episode could have been caused from duloxetine that they believe.  She has IBS and has episodes monthly.  No fever chills or abdominal pain today.  No dehydration today.  She has seen GI for this.  No blood or black in stool     At this point in time the patient is considered a low risk as determined by his history, physical, and absence of red flags. There is no headache or vomiting, LOC, unequal pupils, seizure, ataxia, orbital signs, ear discharge, or confusion after the fall. Patient acting appropriately. I do not suspect an intracranial hemorrhage since she does not endorse a sudden onset severe headache, thunderclap headache, or worse headache of her life. An ischemic process is low on the differential since she has no new unilateral weakness, slurred speech, facial droop, or new onset numbness. I do not suspect a carotid or vertebral artery dissection since the pain does not radiate to the neck and I did not appreciate Rachel syndrome. The patient has a non-focal neurological examination with history and I do not think further brain imaging or lumbar puncture is indicated at this time. I have a low suspicion of cord compression since he does not have saddle anesthesia, bowel or bladder incontinence, abnormal reflexes, weakness, or numbness in his arms or legs. Infection is low on my differential since he denies fever, chills, night sweats, IV drug use, dysuria, flank pain, and recent surgery. Based on the history and examination, I feel that the likelihood of a high risk condition requiring  emergent imaging is so low that no further testing in this regard is warranted at this point in time. Since the risk is so low and given that a CT scan would expose this person to a lot of radiation, I think the risks of doing the CT scan outweigh the benefits. I also specifically counseled the patient that based on evidence based medicine and after a long discussion about the risk and benefits they agreed to pursue my recommended plan.    The patient has family members that will observe him/her over the next 24 hours and that are competent to bring him/her back to the Emergency Department if concerning signs or symptoms develop. Instructions was given to the family to bring the patient back to the ED should any concerning signs such as excessive sleepiness, lethargy, confusion, unequal pupils, recurrent vomiting, seizure activity, loss of consciousness, or focal weakness develop.    At this point in time I have a low suspicion that her fall is due to cardiac ischemia since she does not endorse chest pain upon exertion, dyspnea, nausea, vomiting, diaphoresis, or radiation to the arm, jaw, or back. Her EKG also did not show acute ischemic changes. Aortic dissection is low on the differential since she denies a ripping or tearing sensation chest pain, chest pain that radiates to the back, sudden severe abdominal pain, there is no pulse deficit, and there is no pulsatile abdominal mass. A PE is unlikely since she has no pleuritic chest pain, unilateral leg swelling, calf tenderness, tachycardia, tachypnea or hypoxia.     Regarding syncope, although patient presents with a syncopal or near-syncopal episode, I have no suspicion that the event is secondary to an arrhythmia such as WPW, prolonged QT syndrome, or ventricular tachycardia. I have no suspicion for a seizure episode, intracranial hemorrhage, pulmonary embolus, myocardial infarction, sepsis, ectopic pregnancy, hemorrhagic shock, or hypoglycemia. Based on my  evaluation, there is no emergent medical condition. Nonetheless, syncope precautions were discussed with the patient and/or caretaker, specifically not to swim or bathe unattended, not to operate motor vehicles or other machinery, and to avoid heights or other areas where falls may occur until cleared by primary care physician. Patient is safe for discharge. I explained to patient possible reasons for a syncopal episode including: coughing very hard; straining while having a bowel movement; have been standing in one place for too long; experiencing emotional distress or fear; in severe pain; taking certain medications (anxiety, depression, high blood pressure, and allergies); drug or alcohol use; hyperventilation; hypoglycemia; seizures; dehydration; or standing up suddenly from a lying position. Encouraged pt to drink plenty of water and eat healthy diet.    I explained the risks and benefits and share decision making was made. She verbalized understanding and chose symptomatic treatment with close monitoring. Therefore, we decided to monitor for improvement and then proceed with close follow up if necessary. She was instructed that if her symptoms persists or worsen to immediately go to the ED and she agreed to this plan.       The patient is taking benzodiazepine therapy and she reports is dependent on them for functionality and can not perform ADLS without them.  I discussed extensively the fall risk and I recommended that this be tapered down.  She has already got a refill and with the fall risk I advised her the patient along with the daughter that I would recommend for them to cut down on the amount and they agree to this.  Regarding benzodiazepine, the risks were discussed including tolerance, addiction, overdose, over-sedation, drug interactions, falls, seizures, cognitive effects, and even death. I cautioned the patient that the medications that are being taken are dangerous and can result in death from  "overdose. They can also result in death if taken by someone else without medical supervision. I also warned the patient to not drive or operate heavy machinery while taking these medications. The patient agreed to teach a family member living in the same home on how and when to use it if the scenario of a drug overdose occurs. Shared decision making occurred and she agreed with this treatment plan.     She has no suicidal thoughts or intent and she has no weapons access and she is future oriented with support in the community and I emphasized that if she ever develops thoughts of hurting she to directly go to the emergency department which she agreed to and she is willing to have close follow up with me.     was reviewed today (10/14/2020) and no red flags appreciated    The addictive potential of the medications were discussed and she agrees to inform us or the PCP if any compulsion to taking his medications for any reason other than for pain arises. She agrees to safe-guard all medications from unintentional or intentional use or misuse by other persons who may potentially have access to their premises, including but not limited to significant others, children, parents, friends, and even strangers. She takes sole responsibility for any consequences that may occur from not doing so.    BMI Goal    Counseled patient on her ideal body weight, health consequences of being obese and current recommendations including weekly exercise and a heart healthy diet.  She is aware that ideal BMI < 25  Estimated body mass index is 23.33 kg/m² as calculated from the following:    Height as of this encounter: 4' 11" (1.499 m).    Weight as of this encounter: 52.4 kg (115 lb 8.3 oz).     She was counseled about the importance of healthy dietary habits as well as routine physical activity and exercise for better health outcomes. I also discussed the importance of cancer screening.     Medication Monitoring    In today's visit, " monitoring for drug toxicity was accomplished. Proper use of medications was also discussed.     Counseling    Hyperlipidemia Counseling    Eat Less Unhealthy Fat   -Cut back on saturated fats and trans (also called hydrogenated) fats. A diet thats high in these fats increases your bad cholesterol. Its not enough to just cut back on foods containing cholesterol.   -Eat about 2 servings of fish per week. Most fish contain omega-3 fatty acids. These help lower blood cholesterol.   -Eat more whole grains and soluble fiber (such as oat bran). These lower overall cholesterol.   -Counseled that most cholesterol is made in the liver and only a minority comes from diet.    Be Active   -Choose an activity you enjoy. Walking, swimming, and riding a bike are some good ways to be active.   -Start at a level where you feel comfortable. Increase your time and pace a little each week.   -Work up to 30 minutes on most days. You can break this up into three 10-minute periods.   -Remember, some activity is better than none.   -If you havent been exercising regularly, start slowly. Check with your doctor to make sure the exercise plan is right for you.     Take Medication As Directed: Many patients need medication to get their LDL levels to a safe level. Medication to lower cholesterol levels is effective and safe. (But taking medication is not a substitute for exercise or watching your diet!).    Anxiety Counseling  Regarding anxiety, I discussed signs and symptoms of anxiety with patient including: tachycardia, dysrhythmias, tachypnea, diaphoresis, trembling, dizziness, diarrhea, dry mouth, and difficulty swallowing.    Patient was encouraged to eat a well-balanced, healthy diet; get plenty of rest; exercise daily; limit caffeine and alcohol intake; participate in meditation; talk with family and/or friends about things that may be considered stressful; and keep a diary of feelings and stress triggers.    Patient was instructed  to take medications as prescribed and follow up with primary care provider for long term management.   I recommended that the patient return to the emergency department if they: feel lightheaded or too dizzy to stand up; develop feelings that they want to hurt themselves or someone else; or develop chest pain, tightness, or heaviness that radiates to the shoulders, arms, jaw, neck, or back.       HTN Counseling  Discussed sodium restriction, maintaining ideal body weight and regular exercise program as physiologic means to achieve blood pressure control.   The various types of antihypertensives are discussed fully.   See orders for this visit as documented in the electronic medical record.   Side effects explained in detail.   Continue home readings and see me for followup as scheduled.  Encouraged patient to call the clinic in approximately 1 week if her BP remained elevated on her current medical management regimen.    Pending results of her BP log may have to adjust her current medical management regimen  Continue to closely monitor  The patient indicates understanding of these issues and agrees with the plan.     Altogether 60 minutes were spent with Sunita, over half of which was spent in counseling on diagnosis, prognosis, and treatment options.I also counseled her on common and the most usual side effect of prescribed medications. Risk and benefits of the medication were also discussed. I reviewed previous notes to better coordinate patient's care. She test results and lifestyle changes were also discussed. All questions were answered, and patient voiced understanding.     I discussed imaging findings, diagnosis, possibilities, treatment options, medications, risks, and benefits. She had many questions regarding the options and long-term effects. All questions were answered. She expressed understanding after counseling regarding the diagnosis and recommendations. She was capable and demonstrated competence  with understanding of these options. Shared decision making was performed resulting in her choosing the current treatment plan.     I also discussed the importance of close follow up to discuss labs, change or modify her medications if needed, monitor side effects, and further evaluation of medical problems.     Additional workup planned: see labs ordered below.    See below for labs and meds ordered with associated diagnosis    1. Hypotension due to hypovolemia  - Ambulatory referral/consult to Cardiology; Future  - Basic Metabolic Panel; Future    2. Dehydration, mild    3. Essential hypertension    4. JENNA (generalized anxiety disorder)    5. Gastroesophageal reflux disease without esophagitis    6. Coronary artery disease involving coronary bypass graft of native heart with unstable angina pectoris    7. Mixed hyperlipidemia    8. DDD (degenerative disc disease), lumbar    9. Primary osteoarthritis involving multiple joints    10. Insomnia, unspecified type    11. Chronic pain syndrome    12. Atherosclerosis of right carotid artery    13. Dizzy  - Ambulatory referral/consult to Neurology; Future    14. Diarrhea, unspecified type  - CLOSTRIDIUM DIFFICILE; Future  - CLOSTRIDIUM DIFFICILE    15. Senile purpura       Medication List with Changes/Refills   Current Medications    ASPIRIN (ECOTRIN) 81 MG EC TABLET    Take 81 mg by mouth once daily.    ATORVASTATIN (LIPITOR) 40 MG TABLET    Take 1 tablet (40 mg total) by mouth once daily.    CHOLECALCIFEROL, VITAMIN D3, (VITAMIN D3) 2,000 UNIT TAB    Vitamin D3 50 mcg (2,000 unit) tablet   1 tablet every day by mouth    DEXLANSOPRAZOLE (DEXILANT) 30 MG CPDM    Take 60 mg by mouth.     DICLOFENAC SODIUM (VOLTAREN) 1 % GEL    Apply 2 g topically daily as needed.    DICYCLOMINE (BENTYL) 10 MG CAPSULE    2 (two) times daily as needed.     DIPHENOXYLATE-ATROPINE 2.5-0.025 MG (LOMOTIL) 2.5-0.025 MG PER TABLET    Take 1 tablet by mouth 4 (four) times daily as needed for  "Diarrhea.    NITROGLYCERIN (NITROSTAT) 0.4 MG SL TABLET    Place under the tongue.    OMEPRAZOLE (PRILOSEC) 40 MG CAPSULE    Take by mouth.     ONDANSETRON (ZOFRAN-ODT) 8 MG TBDL    every 6 (six) hours as needed.    Discontinued Medications    CHOLESTYRAMINE (QUESTRAN) 4 GRAM PACKET    Take 4 g by mouth 2 (two) times a day.    DULOXETINE (CYMBALTA) 20 MG CAPSULE    Take 1 capsule (20 mg total) by mouth once daily.    LISINOPRIL 10 MG TABLET    Take 2 tablets (20 mg total) by mouth once daily.    TRAZODONE (DESYREL) 50 MG TABLET    Take 1 tablet (50 mg total) by mouth every evening.     Modified Medications    No medications on file       Follow up in about 1 day (around 10/14/2020). for further workup and reassessment if labs and tests obtained are stable or sooner as needed. She was instructed to call the clinic or go to the emergency department if her symptoms do not improve, worsens, or if new symptoms develop. Patient knows to call any time if an emergency arises. Shared decision making occurred and she verbalized understanding in agreement with this plan.     Documentation entered by me for this encounter may have been done in part using speech-recognition technology. Although I have made an effort to ensure accuracy, "sound like" errors may exist and should be interpreted in context.     Ranjan Goncalves MD  10/14/2020     "

## 2020-10-14 ENCOUNTER — TELEPHONE (OUTPATIENT)
Dept: FAMILY MEDICINE | Facility: CLINIC | Age: 80
End: 2020-10-14

## 2020-10-14 ENCOUNTER — CLINICAL SUPPORT (OUTPATIENT)
Dept: FAMILY MEDICINE | Facility: CLINIC | Age: 80
End: 2020-10-14
Payer: MEDICARE

## 2020-10-14 ENCOUNTER — TELEPHONE (OUTPATIENT)
Dept: GASTROENTEROLOGY | Facility: CLINIC | Age: 80
End: 2020-10-14

## 2020-10-14 VITALS — DIASTOLIC BLOOD PRESSURE: 70 MMHG | SYSTOLIC BLOOD PRESSURE: 134 MMHG

## 2020-10-14 DIAGNOSIS — Z01.30 BP CHECK: Primary | ICD-10-CM

## 2020-10-14 DIAGNOSIS — M54.6 ACUTE LEFT-SIDED THORACIC BACK PAIN: Primary | ICD-10-CM

## 2020-10-14 LAB
ANION GAP SERPL CALC-SCNC: 12 MMOL/L (ref 8–16)
BUN SERPL-MCNC: 18 MG/DL (ref 8–23)
CALCIUM SERPL-MCNC: 10 MG/DL (ref 8.7–10.5)
CHLORIDE SERPL-SCNC: 98 MMOL/L (ref 95–110)
CO2 SERPL-SCNC: 29 MMOL/L (ref 23–29)
CREAT SERPL-MCNC: 0.8 MG/DL (ref 0.5–1.4)
EST. GFR  (AFRICAN AMERICAN): >60 ML/MIN/1.73 M^2
EST. GFR  (NON AFRICAN AMERICAN): >60 ML/MIN/1.73 M^2
GLUCOSE SERPL-MCNC: 105 MG/DL (ref 70–110)
POTASSIUM SERPL-SCNC: 4 MMOL/L (ref 3.5–5.1)
SODIUM SERPL-SCNC: 139 MMOL/L (ref 136–145)

## 2020-10-14 PROCEDURE — 99999 PR PBB SHADOW E&M-EST. PATIENT-LVL III: ICD-10-PCS | Mod: PBBFAC,,,

## 2020-10-14 PROCEDURE — 99999 PR PBB SHADOW E&M-EST. PATIENT-LVL III: CPT | Mod: PBBFAC,,,

## 2020-10-14 RX ORDER — LIDOCAINE 50 MG/G
1 PATCH TOPICAL DAILY
Qty: 15 PATCH | Refills: 2 | Status: SHIPPED | OUTPATIENT
Start: 2020-10-14 | End: 2021-08-24

## 2020-10-14 NOTE — PROGRESS NOTES
Results are unremarkable. Patient notified of results and to continue current treatment plan we discussed in clinic.     Counseling included discussion regarding imaging findings, diagnosis, possibilities, treatment options, medications, risks, and benefits. All questions were answered.      I discussed the importance of close follow up to discuss labs, change or modify medications if needed, monitor side effects, and further evaluation of medical problems.     Ranjan Goncalves MD  10/14/2020

## 2020-10-14 NOTE — PROGRESS NOTES
Sunita Carlson 79 y.o. female is here today for Blood Pressure check.       Review of patient's allergies indicates:   Allergen Reactions    Demerol [meperidine] Nausea And Vomiting    Hydrocodone Itching    Percocet [oxycodone-acetaminophen] Itching    Tramadol      Creatinine   Date Value Ref Range Status   10/13/2020 0.8 0.5 - 1.4 mg/dL Final     Sodium   Date Value Ref Range Status   10/13/2020 139 136 - 145 mmol/L Final     Potassium   Date Value Ref Range Status   10/13/2020 4.0 3.5 - 5.1 mmol/L Final   ]  Per Dr. Padgett instructions, patient did not take her blood pressure mediation lisinopril last night or this morning.     Current Outpatient Medications:     aspirin (ECOTRIN) 81 MG EC tablet, Take 81 mg by mouth once daily., Disp: , Rfl:     atorvastatin (LIPITOR) 40 MG tablet, Take 1 tablet (40 mg total) by mouth once daily., Disp: 90 tablet, Rfl: 3    cholecalciferol, vitamin D3, (VITAMIN D3) 2,000 unit Tab, Vitamin D3 50 mcg (2,000 unit) tablet  1 tablet every day by mouth, Disp: , Rfl:     dexlansoprazole (DEXILANT) 30 mg CpDM, Take 60 mg by mouth. , Disp: , Rfl:     diclofenac sodium (VOLTAREN) 1 % Gel, Apply 2 g topically daily as needed., Disp: , Rfl:     dicyclomine (BENTYL) 10 MG capsule, 2 (two) times daily as needed. , Disp: , Rfl:     diphenoxylate-atropine 2.5-0.025 mg (LOMOTIL) 2.5-0.025 mg per tablet, Take 1 tablet by mouth 4 (four) times daily as needed for Diarrhea., Disp: , Rfl:     lidocaine (LIDODERM) 5 %, Place 1 patch onto the skin once daily. Remove & Discard patch within 12 hours or as directed by MD, Disp: 15 patch, Rfl: 2    nitroGLYCERIN (NITROSTAT) 0.4 MG SL tablet, Place under the tongue., Disp: , Rfl:     omeprazole (PRILOSEC) 40 MG capsule, Take by mouth. , Disp: , Rfl:     ondansetron (ZOFRAN-ODT) 8 MG TbDL, every 6 (six) hours as needed. , Disp: , Rfl:     BP: 134/70 ,   .    Patient presents to clinic today for bp check. Patient brought two automatic  blood pressure cuffs with her into the clinic today (one of which is old and the other is new). Pt's home bp cuff instructions state to only check in left arm.  First reading with old machine was 148/76 (left arm), second reading was manual minutes later 116/68 (right arm).  After a little rest patient checked blood pressure for a third time with brand new automatic machine 144/72 (left arm).  Lastly after a 10 minute break I listened manually to bp in right arm 134/70 (left arm).  Both blood pressures that I listened to manually were WNL.  I believe that her home bp machine is providing elevated readings. I also believe bp increased from first manual to second due to the repetative rechecking of her bp.  Per Dr. Goncalves, patient was informed to hold lisinopril at this time and return to clinic in two weeks for another bp check. Appointment scheduled, understanding verbalized.

## 2020-10-14 NOTE — TELEPHONE ENCOUNTER
""Patient presents to clinic today for bp check. Patient brought two automatic blood pressure cuffs with her into the clinic today (one of which is old and the other is new). Pt's home bp cuff instructions state to only check in left arm.  First reading with old machine was 148/76 (left arm), second reading was manual minutes later 116/68 (right arm).  After a little rest patient checked blood pressure for a third time with brand new automatic machine 144/72 (left arm).  Lastly after a 10 minute break I listened manually to bp in left arm 134/70 (left arm).  Both blood pressures that I listened to manually were WNL.  I believe that her home bp machine is providing elevated readings. I also believe bp increased from first manual to second due to the repetative rechecking of her bp.  Per Dr. Goncalves, patient was informed to hold lisinopril at this time and return to clinic in two weeks for another bp check. Appointment scheduled, understanding verbalized. "    Just ANGIE  "

## 2020-10-14 NOTE — TELEPHONE ENCOUNTER
Patient refused appointment for GI clinic as per referral she does not want to schedule appointment

## 2020-10-15 ENCOUNTER — TELEPHONE (OUTPATIENT)
Dept: CARDIOLOGY | Facility: HOSPITAL | Age: 80
End: 2020-10-15

## 2020-10-15 NOTE — TELEPHONE ENCOUNTER
King Benites LPN         10/14/20 11:13 AM  Note     Patient refused appointment for GI clinic as per referral she does not want to schedule appointment

## 2020-10-16 ENCOUNTER — CLINICAL SUPPORT (OUTPATIENT)
Dept: CARDIOLOGY | Facility: HOSPITAL | Age: 80
End: 2020-10-16
Attending: NURSE PRACTITIONER
Payer: MEDICARE

## 2020-10-16 VITALS — WEIGHT: 115 LBS | HEIGHT: 59 IN | BODY MASS INDEX: 23.18 KG/M2

## 2020-10-16 DIAGNOSIS — Z91.81 STATUS POST FALL: ICD-10-CM

## 2020-10-16 DIAGNOSIS — R55 SYNCOPE, UNSPECIFIED SYNCOPE TYPE: ICD-10-CM

## 2020-10-16 PROCEDURE — 93306 TTE W/DOPPLER COMPLETE: CPT

## 2020-10-16 PROCEDURE — 93306 ECHO (CUPID ONLY): ICD-10-PCS | Mod: 26,,, | Performed by: INTERNAL MEDICINE

## 2020-10-16 PROCEDURE — 93306 TTE W/DOPPLER COMPLETE: CPT | Mod: 26,,, | Performed by: INTERNAL MEDICINE

## 2020-10-17 LAB
AORTIC ROOT ANNULUS: 2.33 CM
AORTIC VALVE CUSP SEPERATION: 1.65 CM
AV INDEX (PROSTH): 0.76
AV MEAN GRADIENT: 2 MMHG
AV PEAK GRADIENT: 4 MMHG
AV VALVE AREA: 2.4 CM2
AV VELOCITY RATIO: 73.98
BSA FOR ECHO PROCEDURE: 1.47 M2
CV ECHO LV RWT: 0.49 CM
DOP CALC AO PEAK VEL: 0.95 M/S
DOP CALC AO VTI: 18.14 CM
DOP CALC LVOT AREA: 3.2 CM2
DOP CALC LVOT DIAMETER: 2.01 CM
DOP CALC LVOT PEAK VEL: 70.28 M/S
DOP CALC LVOT STROKE VOLUME: 43.48 CM3
DOP CALCLVOT PEAK VEL VTI: 13.71 CM
E WAVE DECELERATION TIME: 217.87 MSEC
E/A RATIO: 0.62
E/E' RATIO: 10.33 M/S
ECHO LV POSTERIOR WALL: 0.93 CM (ref 0.6–1.1)
FRACTIONAL SHORTENING: 42 % (ref 28–44)
INTERVENTRICULAR SEPTUM: 0.92 CM (ref 0.6–1.1)
IVRT: 120.17 MSEC
LEFT INTERNAL DIMENSION IN SYSTOLE: 2.19 CM (ref 2.1–4)
LEFT VENTRICLE MASS INDEX: 71 G/M2
LEFT VENTRICULAR INTERNAL DIMENSION IN DIASTOLE: 3.77 CM (ref 3.5–6)
LEFT VENTRICULAR MASS: 103.7 G
LV LATERAL E/E' RATIO: 8.86 M/S
LV SEPTAL E/E' RATIO: 12.4 M/S
MV PEAK A VEL: 1 M/S
MV PEAK E VEL: 0.62 M/S
PISA TR MAX VEL: 2.09 M/S
PV PEAK VELOCITY: 79.44 CM/S
RIGHT VENTRICULAR END-DIASTOLIC DIMENSION: 284 CM
TDI LATERAL: 0.07 M/S
TDI SEPTAL: 0.05 M/S
TDI: 0.06 M/S
TR MAX PG: 17 MMHG

## 2020-10-20 ENCOUNTER — TELEPHONE (OUTPATIENT)
Dept: FAMILY MEDICINE | Facility: CLINIC | Age: 80
End: 2020-10-20

## 2020-10-20 ENCOUNTER — TELEPHONE (OUTPATIENT)
Dept: CARDIOLOGY | Facility: CLINIC | Age: 80
End: 2020-10-20

## 2020-10-20 ENCOUNTER — TELEPHONE (OUTPATIENT)
Dept: NEUROLOGY | Facility: CLINIC | Age: 80
End: 2020-10-20

## 2020-10-20 NOTE — TELEPHONE ENCOUNTER
Patient's daughter (Jailyn) notified Echo has not been resulted as of yet. Will reach out once results become available. Mrs Glaser verbalized understanding.           ----- Message from Nerissa Burgos sent at 10/20/2020  9:33 AM CDT -----  Regarding: Results  Contact: Jailyn  Type:  Patient Returning Call    Who Called:  Jailyn Wells [place] pt daughter  Does the patient know what this is regarding?:  please follow up regarding results from cardiogram  Best Call Back Number:  [phone]  Additional Information:  Thank you
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
## 2020-10-20 NOTE — TELEPHONE ENCOUNTER
Please refer to Echo results for the following documentation:    Call placed to Dr Saba's office for notification of patient's request for earlier appointment. Advised Dr Saba's nurse will contact patient's daughter (Jailyn) regarding scheduling.

## 2020-10-20 NOTE — TELEPHONE ENCOUNTER
Call back placed in response to scheduling appt for dizziness and fall. Pt sates it is easier for her to get to an appt on the [facility] rather than here. Number provided.

## 2020-10-20 NOTE — TELEPHONE ENCOUNTER
----- Message from Viky Esposito sent at 10/20/2020 11:38 AM CDT -----  Regarding: requesting call back  Contact: patient  Type: Needs Medical Advice    Who Called:  Patient    Best Call Back Number: [phone]    Additional Information:   Patient requesting call back in regards to a neurology referral.

## 2020-10-21 ENCOUNTER — TELEPHONE (OUTPATIENT)
Dept: FAMILY MEDICINE | Facility: CLINIC | Age: 80
End: 2020-10-21

## 2020-10-21 NOTE — TELEPHONE ENCOUNTER
----- Message from Princess EFRAÍN López sent at 10/21/2020  3:37 PM CDT -----  Contact: daughter  Type: Needs Medical Advice  Who Called:  Carolyn Miner  Best Call Back Number: [phone]    Additional Information: requesting to speak with Nurse Thompson. Please advise
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
## 2020-10-29 ENCOUNTER — TELEPHONE (OUTPATIENT)
Dept: FAMILY MEDICINE | Facility: CLINIC | Age: 80
End: 2020-10-29

## 2020-10-29 NOTE — TELEPHONE ENCOUNTER
----- Message from Naomy Cuba sent at 10/29/2020 10:13 AM CDT -----  Regarding: Pt Advice  Contact: Patients Daughter Jailyn  Type: Needs Medical Advice  Who Called:  Patients Daughter Jailyn  Symptoms (please be specific):  Back Pain Under L shoulder blade  How long has patient had these symptoms:  3 weeks since her fall at home  Best Call Back Number: [phone]  Additional Information: Patients Daughter Jailyn called and stated she has a few questions and concerns about her mother and her back still in severe pain. Patient daughter would please like a call back as soon as possible. Patients daughter is willing to see someone on the [facility].

## 2020-10-30 ENCOUNTER — HOSPITAL ENCOUNTER (OUTPATIENT)
Dept: RADIOLOGY | Facility: CLINIC | Age: 80
Discharge: HOME | End: 2020-10-30
Attending: STUDENT IN AN ORGANIZED HEALTH CARE EDUCATION/TRAINING PROGRAM
Payer: MEDICARE

## 2020-10-30 ENCOUNTER — TELEPHONE (OUTPATIENT)
Dept: FAMILY MEDICINE | Facility: CLINIC | Age: 80
End: 2020-10-30

## 2020-10-30 DIAGNOSIS — M25.512 ACUTE PAIN OF LEFT SHOULDER: ICD-10-CM

## 2020-10-30 DIAGNOSIS — M25.512 ACUTE PAIN OF LEFT SHOULDER: Primary | ICD-10-CM

## 2020-10-30 PROCEDURE — 73030 X-RAY EXAM OF SHOULDER: CPT | Mod: 26,LT,S$GLB, | Performed by: RADIOLOGY

## 2020-10-30 PROCEDURE — 73030 X-RAY EXAM OF SHOULDER: CPT | Mod: TC,FY,PO,LT

## 2020-10-30 PROCEDURE — 73030 XR SHOULDER COMPLETE 2 OR MORE VIEWS LEFT: ICD-10-PCS | Mod: 26,LT,S$GLB, | Performed by: RADIOLOGY

## 2020-10-30 RX ORDER — METHYLPREDNISOLONE 4 MG/1
TABLET ORAL
Qty: 1 PACKAGE | Refills: 0 | Status: SHIPPED | OUTPATIENT
Start: 2020-10-30 | End: 2020-12-07

## 2020-10-30 NOTE — TELEPHONE ENCOUNTER
----- Message from Donna Hankins sent at 10/30/2020  3:21 PM CDT -----  Type: Needs to schedule nurse visit appointment    Who Called:  Patient  Best Call Back Number: [phone] or [phone]  Additional Information:  Patient requesting to speak with nurse Edin to schedule nurse visit to check blood pressure/please call patient back to schedule or advise.

## 2020-10-31 ENCOUNTER — TELEPHONE (OUTPATIENT)
Dept: FAMILY MEDICINE | Facility: CLINIC | Age: 80
End: 2020-10-31

## 2020-10-31 NOTE — TELEPHONE ENCOUNTER
"Patient was called since she missed appointment about left shoulder pain. This left shoulder pain has been presents for the last 3 weeks since she fell. Pain is sharp constant that is worse with movement and moving limb overhead. Improved with NSAIDs and steroid shot she got weeks ago. No new falls and xray neg for fracture. She reports no cardiac symptoms listed below and reports "its not my chest or heart". Recommended tylenol and avoid NSAIDs due side effects and will send in steroids and recommended continue lidocaine patch and PT. I recommended if it does not improve she can go to the ED if needed. Suspect rotator cuff injury but unable to examine due to scheduling issues. She agreed with plan.     She denies chest pain upon exertion, dyspnea, nausea, vomiting, diaphoresis, and syncope. No radiation of pain to the arm, jaw, or back. She does not endorse a sudden severe chest or upper back pain that is described as a tearing, ripping or shearing sensation. She also denies chest pain that radiates to the neck or down the back.  No pleuritic chest pain, unilateral leg swelling, calf tenderness, or calf pain.      Documentation entered by me for this encounter may have been done in part using speech-recognition technology. Although I have made an effort to ensure accuracy, "sound like" errors may exist and should be interpreted in context.     Ranjan Goncalves MD  10/31/2020          "

## 2020-11-02 ENCOUNTER — CLINICAL SUPPORT (OUTPATIENT)
Dept: FAMILY MEDICINE | Facility: CLINIC | Age: 80
End: 2020-11-02
Payer: MEDICARE

## 2020-11-02 ENCOUNTER — TELEPHONE (OUTPATIENT)
Dept: FAMILY MEDICINE | Facility: CLINIC | Age: 80
End: 2020-11-02

## 2020-11-02 VITALS — SYSTOLIC BLOOD PRESSURE: 166 MMHG | DIASTOLIC BLOOD PRESSURE: 85 MMHG

## 2020-11-02 DIAGNOSIS — Z01.30 BP CHECK: Primary | ICD-10-CM

## 2020-11-02 DIAGNOSIS — I10 ESSENTIAL HYPERTENSION: Primary | ICD-10-CM

## 2020-11-02 PROCEDURE — 99999 PR PBB SHADOW E&M-EST. PATIENT-LVL III: CPT | Mod: PBBFAC,,,

## 2020-11-02 PROCEDURE — 99999 PR PBB SHADOW E&M-EST. PATIENT-LVL III: ICD-10-PCS | Mod: PBBFAC,,,

## 2020-11-02 RX ORDER — LISINOPRIL 10 MG/1
10 TABLET ORAL DAILY
Qty: 90 TABLET | Refills: 0 | Status: SHIPPED | OUTPATIENT
Start: 2020-11-02 | End: 2020-12-07

## 2020-11-02 NOTE — TELEPHONE ENCOUNTER
Informed pt message from provider. Pt verbalized understanding. Pt scheduled for nurse visit 11/9/2020 at 1:15.

## 2020-11-02 NOTE — TELEPHONE ENCOUNTER
----- Message from Aster Davis sent at 11/2/2020  3:44 PM CST -----  Regarding: return calls  Contact: Patient/[phone] (home)  Type:  Patient Returning Call    Who Called:  Patient/[phone] ([place])     Who Left Message for Patient:  Keshia  Does the patient know what this is regarding?:  Test results

## 2020-11-02 NOTE — TELEPHONE ENCOUNTER
Please call patient to start taking lisinopril 10 mg daily and to have a repeat blood pressure check in 1 week.

## 2020-11-02 NOTE — PROGRESS NOTES
Sunita Carlson 80 y.o. female is here today for Blood Pressure check.   History of HTN yes.    Review of patient's allergies indicates:   Allergen Reactions    Demerol [meperidine] Nausea And Vomiting    Hydrocodone Itching    Percocet [oxycodone-acetaminophen] Itching    Tramadol      Creatinine   Date Value Ref Range Status   10/13/2020 0.8 0.5 - 1.4 mg/dL Final     Sodium   Date Value Ref Range Status   10/13/2020 139 136 - 145 mmol/L Final     Potassium   Date Value Ref Range Status   10/13/2020 4.0 3.5 - 5.1 mmol/L Final   ]      Current Outpatient Medications:     aspirin (ECOTRIN) 81 MG EC tablet, Take 81 mg by mouth once daily., Disp: , Rfl:     atorvastatin (LIPITOR) 40 MG tablet, Take 1 tablet (40 mg total) by mouth once daily., Disp: 90 tablet, Rfl: 3    cholecalciferol, vitamin D3, (VITAMIN D3) 2,000 unit Tab, Vitamin D3 50 mcg (2,000 unit) tablet  1 tablet every day by mouth, Disp: , Rfl:     dexlansoprazole (DEXILANT) 30 mg CpDM, Take 60 mg by mouth. , Disp: , Rfl:     dicyclomine (BENTYL) 10 MG capsule, 2 (two) times daily as needed. , Disp: , Rfl:     diphenoxylate-atropine 2.5-0.025 mg (LOMOTIL) 2.5-0.025 mg per tablet, Take 1 tablet by mouth 4 (four) times daily as needed for Diarrhea., Disp: , Rfl:     lidocaine (LIDODERM) 5 %, Place 1 patch onto the skin once daily. Remove & Discard patch within 12 hours or as directed by MD, Disp: 15 patch, Rfl: 2    methylPREDNISolone (MEDROL DOSEPACK) 4 mg tablet, use as directed, Disp: 1 Package, Rfl: 0    diclofenac sodium (VOLTAREN) 1 % Gel, Apply 2 g topically daily as needed., Disp: , Rfl:     nitroGLYCERIN (NITROSTAT) 0.4 MG SL tablet, Place under the tongue., Disp: , Rfl:     omeprazole (PRILOSEC) 40 MG capsule, Take by mouth. , Disp: , Rfl:     ondansetron (ZOFRAN-ODT) 8 MG TbDL, every 6 (six) hours as needed. , Disp: , Rfl:     Patient is asymptomatic.     BP: (!) 166/85 ,   .home cuff    Pt presented to clinic for BP follow-up.  Initial BP was 168/78 after a minimum of 5 minutes rest, BP was 156/78. Pt denied SOB, nasuea, blurry vision, chest pain, and dizziness. Advised pt to continue to monitor and report to ER if symptoms occur. Pt verbalized understanding.    Dr. Goncalves  notified.

## 2020-11-02 NOTE — TELEPHONE ENCOUNTER
"Pt presented to clinic for BP follow-up. Initial BP was 168/78 after a minimum of 5 minutes rest, BP was 156/78. Pt denied SOB, nasuea, blurry vision, chest pain, and dizziness. Advised pt to continue to monitor and report to ER if symptoms occur. Pt verbalized understanding.      Pt was wondering when to follow up with PCP, pt stated " is almost out of blood pressure medication and sleeping pill". Pt was also questioning about could she get the digital cuff that will connect to our system.  "

## 2020-11-09 ENCOUNTER — CLINICAL SUPPORT (OUTPATIENT)
Dept: FAMILY MEDICINE | Facility: CLINIC | Age: 80
End: 2020-11-09
Payer: MEDICARE

## 2020-11-09 ENCOUNTER — TELEPHONE (OUTPATIENT)
Dept: FAMILY MEDICINE | Facility: CLINIC | Age: 80
End: 2020-11-09

## 2020-11-09 VITALS — DIASTOLIC BLOOD PRESSURE: 63 MMHG | SYSTOLIC BLOOD PRESSURE: 116 MMHG

## 2020-11-09 DIAGNOSIS — Z01.30 BP CHECK: Primary | ICD-10-CM

## 2020-11-09 DIAGNOSIS — G47.00 INSOMNIA, UNSPECIFIED TYPE: Primary | ICD-10-CM

## 2020-11-09 PROCEDURE — 99999 PR PBB SHADOW E&M-EST. PATIENT-LVL III: CPT | Mod: PBBFAC,,,

## 2020-11-09 PROCEDURE — 99999 PR PBB SHADOW E&M-EST. PATIENT-LVL III: ICD-10-PCS | Mod: PBBFAC,,,

## 2020-11-09 NOTE — PROGRESS NOTES
Sunita Carlson 80 y.o. female is here today for Blood Pressure check.   History of HTN yes.    Review of patient's allergies indicates:   Allergen Reactions    Demerol [meperidine] Nausea And Vomiting    Hydrocodone Itching    Percocet [oxycodone-acetaminophen] Itching    Tramadol      Creatinine   Date Value Ref Range Status   10/13/2020 0.8 0.5 - 1.4 mg/dL Final     Sodium   Date Value Ref Range Status   10/13/2020 139 136 - 145 mmol/L Final     Potassium   Date Value Ref Range Status   10/13/2020 4.0 3.5 - 5.1 mmol/L Final   ]  Patient verifies taking blood pressure medications on a regular basis at the same time of the day.     Current Outpatient Medications:     aspirin (ECOTRIN) 81 MG EC tablet, Take 81 mg by mouth once daily., Disp: , Rfl:     atorvastatin (LIPITOR) 40 MG tablet, Take 1 tablet (40 mg total) by mouth once daily., Disp: 90 tablet, Rfl: 3    cholecalciferol, vitamin D3, (VITAMIN D3) 2,000 unit Tab, Vitamin D3 50 mcg (2,000 unit) tablet  1 tablet every day by mouth, Disp: , Rfl:     dexlansoprazole (DEXILANT) 30 mg CpDM, Take 60 mg by mouth. , Disp: , Rfl:     diclofenac sodium (VOLTAREN) 1 % Gel, Apply 2 g topically daily as needed., Disp: , Rfl:     dicyclomine (BENTYL) 10 MG capsule, 2 (two) times daily as needed. , Disp: , Rfl:     diphenoxylate-atropine 2.5-0.025 mg (LOMOTIL) 2.5-0.025 mg per tablet, Take 1 tablet by mouth 4 (four) times daily as needed for Diarrhea., Disp: , Rfl:     lidocaine (LIDODERM) 5 %, Place 1 patch onto the skin once daily. Remove & Discard patch within 12 hours or as directed by MD, Disp: 15 patch, Rfl: 2    lisinopriL 10 MG tablet, Take 1 tablet (10 mg total) by mouth once daily., Disp: 90 tablet, Rfl: 0    methylPREDNISolone (MEDROL DOSEPACK) 4 mg tablet, use as directed, Disp: 1 Package, Rfl: 0    nitroGLYCERIN (NITROSTAT) 0.4 MG SL tablet, Place under the tongue., Disp: , Rfl:     omeprazole (PRILOSEC) 40 MG capsule, Take by mouth. , Disp: ,  Rfl:     ondansetron (ZOFRAN-ODT) 8 MG TbDL, every 6 (six) hours as needed. , Disp: , Rfl:   Does patient have record of home blood pressure readings yes. Readings have been averaging 133/71.    Patient is asymptomatic.       BP: 116/63 ,   .    Blood pressure reading was initially 118/54 reading after 15 minutes was 116/63

## 2020-11-09 NOTE — TELEPHONE ENCOUNTER
Pt wondering if refill for Temazepam 30 mg one by mouth each night, could be placed to help sleep. Was prescribed originally by Dr. Birch, previous primary care doctor. Please advise.

## 2020-11-10 RX ORDER — TEMAZEPAM 15 MG/1
15 CAPSULE ORAL NIGHTLY PRN
Qty: 30 CAPSULE | Refills: 0 | Status: SHIPPED | OUTPATIENT
Start: 2020-11-10 | End: 2020-12-10

## 2020-11-10 RX ORDER — MIRTAZAPINE 7.5 MG/1
7.5 TABLET, FILM COATED ORAL NIGHTLY
Qty: 30 TABLET | Refills: 2 | Status: SHIPPED | OUTPATIENT
Start: 2020-11-10 | End: 2021-01-06

## 2020-11-10 NOTE — TELEPHONE ENCOUNTER
Called patient had about insomnia anxiety which she is taking temazepam 30 mg nightly for.  His only thing she reports that helps and after discussion I have the recommended that we taper to lower dose and eventually taper off due to the side effects and replace with mirtazapine Remeron which she agreed to.  No recent falls or sedation will lower temazepam dose from 30 mg nightly to 15 mg nightly an add on mirtazapine for insomnia.  Will slowly taper off as tolerated new likely had gabapentin at night to treat neuropathy and help with sleep when discontinuing temazepam completely.    The patient is meeting the goals of benzodiazepine therapy and is dependent on them for functionality and can not perform ADLS without them. Regarding benzodiazepine, the risks were discussed including tolerance, addiction, overdose, over-sedation, drug interactions, falls, seizures, cognitive effects, and even death. I cautioned the patient that the medications that are being taken are dangerous and can result in death from overdose. They can also result in death if taken by someone else without medical supervision. I also warned the patient to not drive or operate heavy machinery while taking these medications. The patient agreed to teach a family member living in the same home on how and when to use it if the scenario of a drug overdose occurs. Shared decision making occurred and she agreed with this treatment plan.     Patient offered lower dose of benzodiazepine medication and we will procede.    She has no suicidal thoughts or intent and she has no weapons access and she is future oriented with support in the community and I emphasized that if she ever develops thoughts of hurting she to directly go to the emergency department which she agreed to and she is willing to have close follow up with me.     was reviewed today (11/10/2020) and determined to be appropriate for continued benzodiazepine therapy.    The addictive  "potential of the medications were discussed and she agrees to inform us or the PCP if any compulsion to taking his medications for any reason other than for pain arises. She agrees to safe-guard all medications from unintentional or intentional use or misuse by other persons who may potentially have access to their premises, including but not limited to significant others, children, parents, friends, and even strangers. She takes sole responsibility for any consequences that may occur from not doing so.    Documentation entered by me for this encounter may have been done in part using speech-recognition technology. Although I have made an effort to ensure accuracy, "sound like" errors may exist and should be interpreted in context.     Ranjan Goncalves MD  11/10/2020        "

## 2020-11-16 ENCOUNTER — TELEPHONE (OUTPATIENT)
Dept: NEUROLOGY | Facility: CLINIC | Age: 80
End: 2020-11-16

## 2020-11-20 DIAGNOSIS — R55 SYNCOPE AND COLLAPSE: Primary | ICD-10-CM

## 2020-11-25 ENCOUNTER — HOSPITAL ENCOUNTER (OUTPATIENT)
Dept: NEUROLOGY | Facility: HOSPITAL | Age: 80
Discharge: HOME | End: 2020-11-25
Attending: PSYCHIATRY & NEUROLOGY
Payer: MEDICARE

## 2020-11-25 DIAGNOSIS — R55 SYNCOPE AND COLLAPSE: ICD-10-CM

## 2020-11-25 PROCEDURE — 95819 PR EEG,W/AWAKE & ASLEEP RECORD: ICD-10-PCS | Mod: 26,,, | Performed by: PSYCHIATRY & NEUROLOGY

## 2020-11-25 PROCEDURE — 95819 EEG AWAKE AND ASLEEP: CPT

## 2020-11-25 PROCEDURE — 95819 EEG AWAKE AND ASLEEP: CPT | Mod: 26,,, | Performed by: PSYCHIATRY & NEUROLOGY

## 2020-11-27 NOTE — PROCEDURES
ELECTROENCEPHALOGRAM REPORT    DATE OF SERVICE:  11/25/2020  EEG NUMBER: ON   REQUESTED BY:  Dr. Cotton  LOCATION OF SERVICE:  Outpatient    METHODOLOGY   Electroencephalographic (EEG) recording is with electrodes placed according to the International 10-20 placement system.  Thirty two (32) channels of digital signal (sampling rate of 512/sec) including T1 and T2 was simultaneously recorded from the scalp and may include  EKG, EMG, and/or eye monitors.  Recording band pass was 0.1 to 512 hz.  Digital video recording of the patient is simultaneously recorded with the EEG.  The patient is instructed report clinical symptoms which may occur during the recording session.  EEG and video recording is stored and archived in digital format. Activation procedures which include photic stimulation, hyperventilation and instructing patients to perform simple task are done in selected patients.    The EEG is displayed on a monitor screen and can be reviewed using different montages.  Computer assisted analysis is employed to detect spike and electrographic seizure activity.   The entire record is submitted for computer analysis.  The entire recording is visually reviewed and the times identified by computer analysis as being spikes or seizures are reviewed again.  Compresses spectral analysis (CSA) is also performed on the activity recorded from each individual channel.  This is displayed as a power display of frequencies from 0 to 30 Hz over time.   The CSA is reviewed looking for asymmetries in power between homologous areas of the scalp and then compared with the original EEG recording.     Pijon software was also utilized in the review of this study.  This software suite analyzes the EEG recording in multiple domains.  Coherence and rhythmicity is computed to identify EEG sections which may contain organized seizures.  Each channel undergoes analysis to detect presence of spike and sharp waves which have special and  morphological characteristic of epileptic activity.  The routine EEG recording is converted from spacial into frequency domain.  This is then displayed comparing homologous areas to identify areas of significant asymmetry.  Algorithm to identify non-cortically generated artifact is used to separate eye movement, EMG and other artifact from the EEG    EEG FINGINGS  Waking state -     waking background was characterized by a fairly rhythmic 9 hertz alpha seen in the occipital, parietal and posterior temporal regions bilaterally.  A mixture of mid-range theta and low range beta was noted in the mid and frontal regions.  Occasionally he has mid-range theta was noted over the left anterior mid temporal region she however no spike or sharp wave activity was recorded.  Sleep state -   Sleep was characterized by attenuation of the posterior dominant waking rhythm which was replaced by generalized mixture of mid-range theta and mid-range beta frequencies intermixed.  Background was symmetrical without evidence of lateralized or focal changes and no spike or sharp wave activity was seen.  The patient did not pass beyond light sleep.  Activation procedures:   Photic Stimulation - Not performed  Hyperventilation - Not performed    Cognitive testing:   Not performed    Cardiac Monitor:   Single lead EKG was obtained and showed an recording which appeared to have some intermixed artifact.  To better evaluate cardiac function obtaining a standard EKG is recommended.    IMPRESSION:  Mildly abnormal EEG -   The waking background rhythm were normal but there was theta activity noted intermittently in the left temporal region which suggest a mild focal dysfunction.  The finding is somewhat nonspecific and is seen with increasing frequency after the 4th decade of life and is usually associated with subcortical ischemic vascular changes.  Other etiologies however cannot be excluded.  No epileptic activity was recorded.

## 2020-12-07 ENCOUNTER — OFFICE VISIT (OUTPATIENT)
Dept: CARDIOLOGY | Facility: CLINIC | Age: 80
End: 2020-12-07
Payer: MEDICARE

## 2020-12-07 VITALS
WEIGHT: 120 LBS | HEIGHT: 59 IN | OXYGEN SATURATION: 96 % | HEART RATE: 75 BPM | RESPIRATION RATE: 16 BRPM | BODY MASS INDEX: 24.19 KG/M2 | SYSTOLIC BLOOD PRESSURE: 158 MMHG | DIASTOLIC BLOOD PRESSURE: 72 MMHG

## 2020-12-07 DIAGNOSIS — E86.1 HYPOTENSION DUE TO HYPOVOLEMIA: ICD-10-CM

## 2020-12-07 DIAGNOSIS — E78.2 MIXED HYPERLIPIDEMIA: ICD-10-CM

## 2020-12-07 DIAGNOSIS — Z86.73 HISTORY OF STROKE: ICD-10-CM

## 2020-12-07 DIAGNOSIS — R55 SYNCOPE: ICD-10-CM

## 2020-12-07 DIAGNOSIS — Z95.1 HX OF CABG: ICD-10-CM

## 2020-12-07 DIAGNOSIS — R55 SYNCOPE AND COLLAPSE: Primary | ICD-10-CM

## 2020-12-07 DIAGNOSIS — I10 ESSENTIAL HYPERTENSION: ICD-10-CM

## 2020-12-07 PROCEDURE — 3077F SYST BP >= 140 MM HG: CPT | Mod: CPTII,S$GLB,, | Performed by: INTERNAL MEDICINE

## 2020-12-07 PROCEDURE — 3078F DIAST BP <80 MM HG: CPT | Mod: CPTII,S$GLB,, | Performed by: INTERNAL MEDICINE

## 2020-12-07 PROCEDURE — 1100F PTFALLS ASSESS-DOCD GE2>/YR: CPT | Mod: CPTII,S$GLB,, | Performed by: INTERNAL MEDICINE

## 2020-12-07 PROCEDURE — 1100F PR PT FALLS ASSESS DOC 2+ FALLS/FALL W/INJURY/YR: ICD-10-PCS | Mod: CPTII,S$GLB,, | Performed by: INTERNAL MEDICINE

## 2020-12-07 PROCEDURE — 1126F AMNT PAIN NOTED NONE PRSNT: CPT | Mod: S$GLB,,, | Performed by: INTERNAL MEDICINE

## 2020-12-07 PROCEDURE — 93000 EKG 12-LEAD: ICD-10-PCS | Mod: S$GLB,,, | Performed by: INTERNAL MEDICINE

## 2020-12-07 PROCEDURE — 99204 OFFICE O/P NEW MOD 45 MIN: CPT | Mod: 25,S$GLB,, | Performed by: INTERNAL MEDICINE

## 2020-12-07 PROCEDURE — 1159F MED LIST DOCD IN RCRD: CPT | Mod: S$GLB,,, | Performed by: INTERNAL MEDICINE

## 2020-12-07 PROCEDURE — 3288F FALL RISK ASSESSMENT DOCD: CPT | Mod: CPTII,S$GLB,, | Performed by: INTERNAL MEDICINE

## 2020-12-07 PROCEDURE — 99204 PR OFFICE/OUTPT VISIT, NEW, LEVL IV, 45-59 MIN: ICD-10-PCS | Mod: 25,S$GLB,, | Performed by: INTERNAL MEDICINE

## 2020-12-07 PROCEDURE — 3077F PR MOST RECENT SYSTOLIC BLOOD PRESSURE >= 140 MM HG: ICD-10-PCS | Mod: CPTII,S$GLB,, | Performed by: INTERNAL MEDICINE

## 2020-12-07 PROCEDURE — 93000 ELECTROCARDIOGRAM COMPLETE: CPT | Mod: S$GLB,,, | Performed by: INTERNAL MEDICINE

## 2020-12-07 PROCEDURE — 3288F PR FALLS RISK ASSESSMENT DOCUMENTED: ICD-10-PCS | Mod: CPTII,S$GLB,, | Performed by: INTERNAL MEDICINE

## 2020-12-07 PROCEDURE — 3078F PR MOST RECENT DIASTOLIC BLOOD PRESSURE < 80 MM HG: ICD-10-PCS | Mod: CPTII,S$GLB,, | Performed by: INTERNAL MEDICINE

## 2020-12-07 PROCEDURE — 1159F PR MEDICATION LIST DOCUMENTED IN MEDICAL RECORD: ICD-10-PCS | Mod: S$GLB,,, | Performed by: INTERNAL MEDICINE

## 2020-12-07 PROCEDURE — 1126F PR PAIN SEVERITY QUANTIFIED, NO PAIN PRESENT: ICD-10-PCS | Mod: S$GLB,,, | Performed by: INTERNAL MEDICINE

## 2020-12-07 RX ORDER — ONDANSETRON 4 MG/1
4 TABLET, FILM COATED ORAL ONCE
Qty: 2 TABLET | Refills: 0 | Status: CANCELLED | OUTPATIENT
Start: 2020-12-07 | End: 2020-12-07

## 2020-12-07 RX ORDER — BETAXOLOL 10 MG/1
10 TABLET, FILM COATED ORAL DAILY
Qty: 30 TABLET | Refills: 11 | Status: SHIPPED | OUTPATIENT
Start: 2020-12-07 | End: 2020-12-10 | Stop reason: SDUPTHER

## 2020-12-07 NOTE — ASSESSMENT & PLAN NOTE
Consider Abby Myoview  Consider Loop Placement  Patient denies any palpitations prior to the even  She does however get nauseated prior to the episodes.    Recent ECHO in February unremarkable

## 2020-12-07 NOTE — PROGRESS NOTES
Subjective:    Patient ID:  Sunita Carlson is a 80 y.o. female patient here for evaluation Pre Syncope, Hypertension, Dyslipidemia, Coronary Artery Disease, and Results (had an echo at [facility])      History of Present Illness:     Ms. Carlson is here today for cardiac evaluation. She passed out around 4 am in the morning. She went to the bathroom and passed out. This happened in February also after taking a nitroglycerin. She has a history of  CAD S/P CABG X 1 in 2010. She was seen by Dr. Weber previously. She saw Dr. Maddox back in February this year. She has a history of Left Carotid Endarectomy by Dr. Stacy as well. She denies any chest pain prior to the event in October. She is a former smoker, quit in 1962. She is here for evaluation. She was told in February that she had 3 blockages. I do not see this. Her EKG shows NSR HR 77.         Review of patient's allergies indicates:   Allergen Reactions    Demerol [meperidine] Nausea And Vomiting    Hydrocodone Itching    Percocet [oxycodone-acetaminophen] Itching    Tramadol        Past Medical History:   Diagnosis Date    Arthritis     Cataract     Coronary artery disease     Hypertension     Insomnia     Neuropathy     Retinal detachment     Stomach ulcer      Past Surgical History:   Procedure Laterality Date    APPENDECTOMY      BLADDER SUSPENSION      BREAST BIOPSY      CARDIAC SURGERY      carotid endarterectomy      L    CATARACT EXTRACTION      CHOLECYSTECTOMY      HYSTERECTOMY      INJECTION OF ANESTHETIC AGENT AROUND GANGLION IMPAR N/A 9/12/2019    Procedure: BLOCK, GANGLION IMPAR;  Surgeon: Christian James MD;  Location: [facility] OR;  Service: Pain Management;  Laterality: N/A;    OOPHORECTOMY      SUPERFICIAL KERATECTOMY Right 10/27/2017    Dr. Morales    TONSILLECTOMY      TRANSFORAMINAL EPIDURAL INJECTION OF STEROID Left 8/12/2019    Procedure: Injection,steroid,epidural,transforaminal approach L4-5, L5-S1;  Surgeon: Christian James MD;   Location: [facility] OR;  Service: Pain Management;  Laterality: Left;    TRANSFORAMINAL EPIDURAL INJECTION OF STEROID Left 2020    Procedure: Injection,steroid,epidural,transforaminal approach;  Surgeon: Christian James MD;  Location: [facility] OR;  Service: Pain Management;  Laterality: Left;  L4-5, L5-S1    TRANSFORAMINAL EPIDURAL INJECTION OF STEROID Left 2020    Procedure: Injection,steroid,epidural,transforaminal approach;  Surgeon: Christian James MD;  Location: [facility] OR;  Service: Pain Management;  Laterality: Left;  L4-5 and L5-S1     Social History     Tobacco Use    Smoking status: Former Smoker     Years: 4.00     Types: Cigarettes     Quit date: 10/13/1962     Years since quittin.1    Smokeless tobacco: Never Used   Substance Use Topics    Alcohol use: No     Frequency: Monthly or less     Drinks per session: 1 or 2     Binge frequency: Never    Drug use: No        Review of Systems   As noted in HPI in addition     Constitutional: Negative for chills, fatigue and fever.   Eyes: No double vision, No blurred vision  Neuro: No headaches, No dizziness  Respiratory: Negative for cough, shortness of breath and wheezing.    Cardiovascular: Negative for chest pain. Negative for palpitations and leg swelling.   Gastrointestinal: Negative for abdominal pain, No melena, diarrhea, nausea and vomiting.   Genitourinary: Negative for dysuria and frequency, Negative for hematuria  Skin: Negative for bruising, Negative for edema or discoloration noted.   Endocrine: Negative for polyphagia, Negative for heat intolerance, Negative for cold intolerance  Psychiatric: Negative for depression, Negative for anxiety, Negative for memory loss  Musculoskeletal: Negative for neck pain, Negative for muscle weakness, Negative for back pain          Objective        Vitals:    20 1624   BP: (!) 158/72   Pulse: 75   Resp: 16       LIPIDS - LAST 2   Lab Results   Component Value Date    CHOL 152 2020    CHOL 172  05/01/2020    HDL 70 06/03/2020    HDL 78 (H) 05/01/2020    LDLCALC 61.2 (L) 06/03/2020    LDLCALC 76.2 05/01/2020    TRIG 104 06/03/2020    TRIG 89 05/01/2020    CHOLHDL 46.1 06/03/2020    CHOLHDL 45.3 05/01/2020       CBC - LAST 2  Lab Results   Component Value Date    WBC 12.76 (H) 10/09/2020    WBC 5.91 05/01/2020    RBC 4.63 10/09/2020    RBC 4.60 05/01/2020    HGB 13.8 10/09/2020    HGB 14.1 05/01/2020    HCT 42.5 10/09/2020    HCT 43.4 05/01/2020    MCV 92 10/09/2020    MCV 94 05/01/2020    MCH 29.8 10/09/2020    MCH 30.7 05/01/2020    MCHC 32.5 10/09/2020    MCHC 32.5 05/01/2020    RDW 12.4 10/09/2020    RDW 12.1 05/01/2020     10/09/2020     05/01/2020    MPV 9.0 (L) 10/09/2020    MPV 8.9 (L) 05/01/2020    GRAN 11.0 (H) 10/09/2020    GRAN 86.6 (H) 10/09/2020    LYMPH 1.1 10/09/2020    LYMPH 8.9 (L) 10/09/2020    MONO 0.5 10/09/2020    MONO 3.8 (L) 10/09/2020    BASO 0.04 10/09/2020    BASO 0.05 05/01/2020    NRBC 0 10/09/2020    NRBC 0 05/01/2020       CHEMISTRY & LIVER FUNCTION - LAST 2  Lab Results   Component Value Date     10/13/2020     (L) 10/09/2020    K 4.0 10/13/2020    K 4.4 10/09/2020    CL 98 10/13/2020    CL 96 10/09/2020    CO2 29 10/13/2020    CO2 26 10/09/2020    ANIONGAP 12 10/13/2020    ANIONGAP 13 10/09/2020    BUN 18 10/13/2020    BUN 12 10/09/2020    CREATININE 0.8 10/13/2020    CREATININE 0.9 10/09/2020     10/13/2020     (H) 10/09/2020    CALCIUM 10.0 10/13/2020    CALCIUM 10.2 10/09/2020    MG 1.7 02/07/2020    MG 1.7 02/06/2020    ALBUMIN 4.5 10/09/2020    ALBUMIN 4.5 05/01/2020    PROT 7.8 10/09/2020    PROT 7.8 05/01/2020    ALKPHOS 121 10/09/2020    ALKPHOS 101 05/01/2020    ALT 28 10/09/2020    ALT 19 05/01/2020    AST 34 10/09/2020    AST 21 05/01/2020    BILITOT 0.8 10/09/2020    BILITOT 0.7 05/01/2020        CARDIAC PROFILE - LAST 2  Lab Results   Component Value Date    BNP <10 02/06/2020    BNP 31 11/26/2013    CPK 37 11/27/2013     CPK 35 11/27/2013    CPKMB 0.6 11/27/2013    CPKMB 0.7 11/27/2013    TROPONINI <0.006 02/06/2020    TROPONINI <0.006 11/27/2013        COAGULATION - LAST 2  Lab Results   Component Value Date    INR 0.9 02/06/2020    INR 1.0 11/26/2013       ENDOCRINE & PSA - LAST 2  Lab Results   Component Value Date    HGBA1C 5.5 07/22/2019    HGBA1C 5.4 06/25/2015    TSH 4.820 (H) 02/06/2020    TSH 1.223 07/22/2019        ECHOCARDIOGRAM RESULTS  Results for orders placed in visit on 10/16/20   Echo Color Flow Doppler? Yes    Narrative · There is left ventricular concentric remodeling.  · The left ventricle is normal in size with normal systolic function. The   estimated ejection fraction is 65%.  · Grade I diastolic dysfunction.  · Normal right ventricular systolic function.  · Mild left atrial enlargement.  · No pulmonary hypertension          CURRENT/PREVIOUS VISIT EKG  Results for orders placed or performed in visit on 10/09/20   IN OFFICE EKG 12-LEAD (to [place])    Collection Time: 10/09/20 10:46 AM    Narrative    Test Reason : Z91.81,R55,    Vent. Rate : 079 BPM     Atrial Rate : 079 BPM     P-R Int : 148 ms          QRS Dur : 064 ms      QT Int : 388 ms       P-R-T Axes : 056 048 085 degrees     QTc Int : 444 ms    Normal sinus rhythm  Possible Left atrial enlargement  Anteroseptal infarct ,age undetermined  Abnormal ECG  When compared with ECG of 06-FEB-2020 07:26,  Anteroseptal infarct is now suggested  Nonspecific T wave abnormality now evident in Anterior leads  Confirmed by Dave Sheppard MD ([id number]) on 10/12/2020 8:29:48 AM    Referred By: CORAL MARQUEZ           Confirmed By:Dave Sheppard MD             PHYSICAL EXAM    GENERAL: well built, well nourished, well-developed in no apparent distress alert and oriented.   HEENT: Normocephalic. Pupils normal and conjunctivae normal.  Mucous membranes normal, no cyanosis or icterus, trachea central,no pallor or icterus is noted..   NECK: No JVD. No bruit..   THYROID: Thyroid not  enlarged. No nodules present..   CARDIAC: Regular rate and rhythm. S1 is normal.S2 is normal.No gallops, clicks or murmurs noted at this time.  CHEST ANATOMY: normal.   LUNGS: Clear to auscultation. No wheezing or rhonchi..   ABDOMEN: Soft no masses or organomegaly.  No abdomen pulsations or bruits.  Normal bowel sounds. No pulsations and no masses felt, No guarding or rebound.   EXTREMITIES: No cyanosis, clubbing or edema noted at this time., no calf tenderness bilaterally.   PERIPHERAL VASCULAR SYSTEM: Good palpable distal pulses.   CENTRAL NERVOUS SYSTEM: No focal motor or sensory deficits noted.   SKIN: Skin without lesions, moist, well perfused.   MUSCLE STRENGTH & TONE: No noteable weakness, atrophy or abnormal movement.     I HAVE REVIEWED :    The vital signs, nurses notes, and all the pertinent radiology and labs.        Current Outpatient Medications   Medication Instructions    aspirin (ECOTRIN) 81 mg, Oral, Daily    atorvastatin (LIPITOR) 40 mg, Oral, Daily    betaxoloL (KERLONE) 10 mg, Oral, Daily    cholecalciferol, vitamin D3, (VITAMIN D3) 2,000 unit Tab Vitamin D3 50 mcg (2,000 unit) tablet   1 tablet every day by mouth    DEXILANT 60 mg, Oral    diclofenac sodium (VOLTAREN) 2 g, Topical (Top), Daily PRN    dicyclomine (BENTYL) 10 MG capsule 2 times daily PRN    diphenoxylate-atropine 2.5-0.025 mg (LOMOTIL) 2.5-0.025 mg per tablet 1 tablet, Oral, 4 times daily PRN    lidocaine (LIDODERM) 5 % 1 patch, Transdermal, Daily, Remove & Discard patch within 12 hours or as directed by MD    mirtazapine (REMERON) 7.5 mg, Oral, Nightly    nitroGLYCERIN (NITROSTAT) 0.4 MG SL tablet Sublingual    ondansetron (ZOFRAN-ODT) 8 MG TbDL Every 6 hours PRN    temazepam (RESTORIL) 15 mg, Oral, Nightly PRN          Assessment & Plan     Syncope and collapse  Consider Abby Myoview  Consider Loop Placement  Patient denies any palpitations prior to the even  She does however get nauseated prior to the episodes.     Recent ECHO in February unremarkable    Hx of CABG  Stable  Consider Abby     Essential hypertension  Suboptimal currently  Stop Lisinopril  Start Kerlone 5 mg every night    Mixed hyperlipidemia  Contineu Statin    History of stroke  Continue ASA  Continue Statin Therapy          No follow-ups on file.

## 2020-12-07 NOTE — LETTER
December 7, 2020      Ranjan Goncalves MD  [address]  [place] [address]           [facility] [place]  [address]  [place] [address]  Phone: [phone]  Fax: [phone]          Patient: Sunita Carlson   MR Number: [id number]   YOB: 1940   Date of Visit: 12/7/2020       Dear Dr. Ranjan Goncalves:    Thank you for referring Sunita Carlson to me for evaluation. Attached you will find relevant portions of my assessment and plan of care.    If you have questions, please do not hesitate to call me. I look forward to following Sunita Carlson along with you.    Sincerely,    Adin Saba MD    Enclosure  CC:  No Recipients    If you would like to receive this communication electronically, please contact [email or web address] or [phone] to request more information on [employer] Link access.    For providers and/or their staff who would like to refer a patient to [facility], please contact us through our one-stop-shop provider referral line, [facility], at [phone].    If you feel you have received this communication in error or would no longer like to receive these types of communications, please e-mail [email or web address]

## 2020-12-08 DIAGNOSIS — G47.00 INSOMNIA, UNSPECIFIED TYPE: ICD-10-CM

## 2020-12-08 RX ORDER — TEMAZEPAM 15 MG/1
15 CAPSULE ORAL NIGHTLY PRN
Qty: 30 CAPSULE | Refills: 0 | Status: CANCELLED | OUTPATIENT
Start: 2020-12-08 | End: 2021-01-07

## 2020-12-08 NOTE — TELEPHONE ENCOUNTER
----- Message from Lucía Campa sent at 12/8/2020  4:50 PM CST -----  Regarding: Refill  Contact: pt  Type:  RX Refill Request    Who Called:  patient  Refill or New Rx:  refill  RX Name and Strength:  temazepam (RESTORIL) 15 mg Cap  How is the patient currently taking it? (ex. 1XDay):  1 every night  Is this a 30 day or 90 day RX:  30, requesting 90    Preferred Pharmacy with phone number:    [id number] - [place] - [address] AT [facility] [address]  [address] [address]  Phone: [phone] Fax: [phone]    Local or Mail Order:  local  Ordering Provider:  Sacha Panda Call Back Number:  [phone] (home)     Additional Information:  please call to advise,pt states she is also having a problem with her index finger on her right hand. Please call

## 2020-12-09 ENCOUNTER — TELEPHONE (OUTPATIENT)
Dept: FAMILY MEDICINE | Facility: CLINIC | Age: 80
End: 2020-12-09

## 2020-12-09 ENCOUNTER — TELEPHONE (OUTPATIENT)
Dept: CARDIOLOGY | Facility: CLINIC | Age: 80
End: 2020-12-09

## 2020-12-09 DIAGNOSIS — R55 SYNCOPE AND COLLAPSE: Primary | ICD-10-CM

## 2020-12-09 DIAGNOSIS — Z01.818 PRE-OP EVALUATION: ICD-10-CM

## 2020-12-09 RX ORDER — TEMAZEPAM 7.5 MG/1
7.5 CAPSULE ORAL NIGHTLY PRN
Qty: 30 CAPSULE | Refills: 0 | Status: SHIPPED | OUTPATIENT
Start: 2020-12-09 | End: 2020-12-17 | Stop reason: SDUPTHER

## 2020-12-09 RX ORDER — GABAPENTIN 100 MG/1
100 CAPSULE ORAL NIGHTLY PRN
Qty: 30 CAPSULE | Refills: 1 | Status: SHIPPED | OUTPATIENT
Start: 2020-12-09 | End: 2021-01-06

## 2020-12-09 NOTE — TELEPHONE ENCOUNTER
Pt wants to continue benzos despite risks and will attempt to decrease benzo dose and supplement with gabapentin since this is safer.  was reviewed today (12/09/2020) and determined to be appropriate for continued benzodiazepine therapy.      The patient is meeting the goals of benzodiazepine therapy and is dependent on them for functionality and can not perform ADLS without them. Regarding benzodiazepine, the risks were discussed including tolerance, addiction, overdose, over-sedation, drug interactions, falls, seizures, cognitive effects, and even death. I cautioned the patient that the medications that are being taken are dangerous and can result in death from overdose. They can also result in death if taken by someone else without medical supervision. I also warned the patient to not drive or operate heavy machinery while taking these medications. The patient agreed to teach a family member living in the same home on how and when to use it if the scenario of a drug overdose occurs. Shared decision making occurred and she agreed with this treatment plan.     Patient offered lower dose of benzodiazepine medication.    She has no suicidal thoughts or intent and she has no weapons access and she is future oriented with support in the community and I emphasized that if she ever develops thoughts of hurting she to directly go to the emergency department which she agreed to and she is willing to have close follow up with me.    The addictive potential of the medications were discussed and she agrees to inform us or the PCP if any compulsion to taking his medications for any reason other than for pain arises. She agrees to safe-guard all medications from unintentional or intentional use or misuse by other persons who may potentially have access to their premises, including but not limited to significant others, children, parents, friends, and even strangers. She takes sole responsibility for any consequences that may  "occur from not doing so.    Documentation entered by me for this encounter may have been done in part using speech-recognition technology. Although I have made an effort to ensure accuracy, "sound like" errors may exist and should be interpreted in context.     Ranjan Goncalves MD  12/09/2020        "

## 2020-12-09 NOTE — TELEPHONE ENCOUNTER
----- Message from Eron Friedman sent at 12/9/2020 11:53 AM CST -----  Consused on why she got a call about loop thought yall were waiting to gracie that   [phone]

## 2020-12-09 NOTE — TELEPHONE ENCOUNTER
----- Message from Kolton French sent at 12/9/2020  2:44 PM CST -----  Regarding: lily authorization  Contact: patient  Patient want to speak with a nurse regarding lily authorization on temazepam 15mg patient request a call back today at [phone] (home)       Case number [id number]  [id number] - [place] - [address] AT [facility] [address]  [facility] [place] [address]  Phone: [phone] Fax: [phone]

## 2020-12-10 ENCOUNTER — HOSPITAL ENCOUNTER (OUTPATIENT)
Dept: CARDIOLOGY | Facility: CLINIC | Age: 80
Discharge: HOME | End: 2020-12-10
Attending: INTERNAL MEDICINE
Payer: MEDICARE

## 2020-12-10 ENCOUNTER — HOSPITAL ENCOUNTER (OUTPATIENT)
Dept: RADIOLOGY | Facility: CLINIC | Age: 80
Discharge: HOME | End: 2020-12-10
Attending: INTERNAL MEDICINE
Payer: MEDICARE

## 2020-12-10 DIAGNOSIS — R55 SYNCOPE AND COLLAPSE: ICD-10-CM

## 2020-12-10 DIAGNOSIS — E86.1 HYPOTENSION DUE TO HYPOVOLEMIA: ICD-10-CM

## 2020-12-10 PROCEDURE — 78452 HT MUSCLE IMAGE SPECT MULT: CPT | Mod: S$GLB,,, | Performed by: INTERNAL MEDICINE

## 2020-12-10 PROCEDURE — 78452 STRESS TEST WITH MYOCARDIAL PERFUSION (CUPID ONLY): ICD-10-PCS | Mod: S$GLB,,, | Performed by: INTERNAL MEDICINE

## 2020-12-10 PROCEDURE — 93015 STRESS TEST WITH MYOCARDIAL PERFUSION (CUPID ONLY): ICD-10-PCS | Mod: S$GLB,,, | Performed by: INTERNAL MEDICINE

## 2020-12-10 PROCEDURE — A9502 TC99M TETROFOSMIN: HCPCS | Mod: S$GLB,,, | Performed by: INTERNAL MEDICINE

## 2020-12-10 PROCEDURE — A9502 STRESS TEST WITH MYOCARDIAL PERFUSION (CUPID ONLY): ICD-10-PCS | Mod: S$GLB,,, | Performed by: INTERNAL MEDICINE

## 2020-12-10 PROCEDURE — 93015 CV STRESS TEST SUPVJ I&R: CPT | Mod: S$GLB,,, | Performed by: INTERNAL MEDICINE

## 2020-12-10 RX ORDER — REGADENOSON 0.08 MG/ML
0.4 INJECTION, SOLUTION INTRAVENOUS ONCE
Status: COMPLETED | OUTPATIENT
Start: 2020-12-10 | End: 2020-12-10

## 2020-12-10 RX ORDER — BETAXOLOL 10 MG/1
10 TABLET, FILM COATED ORAL DAILY
Qty: 30 TABLET | Refills: 11 | Status: SHIPPED | OUTPATIENT
Start: 2020-12-10 | End: 2020-12-11 | Stop reason: SDUPTHER

## 2020-12-10 RX ADMIN — REGADENOSON 0.4 MG: 0.08 INJECTION, SOLUTION INTRAVENOUS at 08:12

## 2020-12-10 NOTE — TELEPHONE ENCOUNTER
----- Message from Eron Friedman sent at 12/10/2020  2:50 PM CST -----  Regarding: clarification  betaxoloL (KERLONE) 10 mg Tab  [facility] [place] [phone] tracie Esquivel

## 2020-12-10 NOTE — TELEPHONE ENCOUNTER
Spoke with patient in person and explained why dr. gutiérrez wanted to do the loop soon, she is good with the plan and will proceed as scheduled

## 2020-12-11 ENCOUNTER — TELEPHONE (OUTPATIENT)
Dept: FAMILY MEDICINE | Facility: CLINIC | Age: 80
End: 2020-12-11

## 2020-12-11 LAB
CV PHARM DOSE: 0.4 MG
CV STRESS BASE HR: 76 BPM
DIASTOLIC BLOOD PRESSURE: 80 MMHG
EJECTION FRACTION- HIGH: 65 %
END DIASTOLIC INDEX-HIGH: 158 ML/M2
END DIASTOLIC INDEX-LOW: 94 ML/M2
END SYSTOLIC INDEX-HIGH: 71 ML/M2
END SYSTOLIC INDEX-LOW: 33 ML/M2
NUC STRESS DIASTOLIC VOLUME INDEX: 33
NUC STRESS EJECTION FRACTION: 86 %
NUC STRESS SYSTOLIC VOLUME INDEX: 5
OHS CV CPX 1 MINUTE RECOVERY HEART RATE: 114 BPM
OHS CV CPX 85 PERCENT MAX PREDICTED HEART RATE MALE: 114
OHS CV CPX MAX PREDICTED HEART RATE: 134
OHS CV CPX PATIENT IS FEMALE: 1
OHS CV CPX PATIENT IS MALE: 0
OHS CV CPX PEAK DIASTOLIC BLOOD PRESSURE: 80 MMHG
OHS CV CPX PEAK HEAR RATE: 114 BPM
OHS CV CPX PEAK RATE PRESSURE PRODUCT: NORMAL
OHS CV CPX PEAK SYSTOLIC BLOOD PRESSURE: 182 MMHG
OHS CV CPX PERCENT MAX PREDICTED HEART RATE ACHIEVED: 85
OHS CV CPX RATE PRESSURE PRODUCT PRESENTING: NORMAL
RETIRED EF AND QEF - SEE NOTES: 53 %
SYSTOLIC BLOOD PRESSURE: 138 MMHG

## 2020-12-11 RX ORDER — BETAXOLOL 10 MG/1
TABLET, FILM COATED ORAL
Qty: 45 TABLET | Refills: 1 | Status: SHIPPED | OUTPATIENT
Start: 2020-12-11 | End: 2020-12-18 | Stop reason: ALTCHOICE

## 2020-12-11 NOTE — TELEPHONE ENCOUNTER
Spoke with Robyn, discussed necessity of coming in for a refill on her medication for sleep. Mrs Carlson also stated that she had pain in her right had. Appointment scheduled with Jessica Crenshaw NP 12/14/2020 at 1:00 pm.

## 2020-12-11 NOTE — TELEPHONE ENCOUNTER
----- Message from Princess EFRAÍN López sent at 12/11/2020 11:09 AM CST -----  Contact: pt  Type: Needs Medical Advice  Who Called: pt   Best Call Back Number: [phone] (home)     Additional Information: requesting a call in regards to the instructions for the rx betaxoloL (KERLONE) 10 mg Tab. It states take a whole daily and underneath is states a 1/2 please advise.

## 2020-12-11 NOTE — TELEPHONE ENCOUNTER
----- Message from Heaven Bethea sent at 12/11/2020 11:36 AM CST -----  Regarding: PT  Contact: PT  PT called to make an appointment for today bc she needs a refill on her medications. She said she was told she missed an appointment but said she did go in to have her blood pressure taken and if she missed an appointment, its bc no on told her about it. I tried to schedule her but the next available appointment isn't until February. Please call back     Callback: [phone]

## 2020-12-12 ENCOUNTER — LAB VISIT (OUTPATIENT)
Dept: PRIMARY CARE CLINIC | Facility: CLINIC | Age: 80
End: 2020-12-12
Payer: MEDICARE

## 2020-12-12 ENCOUNTER — PATIENT MESSAGE (OUTPATIENT)
Dept: FAMILY MEDICINE | Facility: CLINIC | Age: 80
End: 2020-12-12

## 2020-12-12 DIAGNOSIS — R55 SYNCOPE AND COLLAPSE: ICD-10-CM

## 2020-12-12 DIAGNOSIS — Z01.818 PRE-OP EVALUATION: ICD-10-CM

## 2020-12-12 PROCEDURE — U0003 INFECTIOUS AGENT DETECTION BY NUCLEIC ACID (DNA OR RNA); SEVERE ACUTE RESPIRATORY SYNDROME CORONAVIRUS 2 (SARS-COV-2) (CORONAVIRUS DISEASE [COVID-19]), AMPLIFIED PROBE TECHNIQUE, MAKING USE OF HIGH THROUGHPUT TECHNOLOGIES AS DESCRIBED BY CMS-2020-01-R: HCPCS

## 2020-12-13 LAB — SARS-COV-2 RNA RESP QL NAA+PROBE: NOT DETECTED

## 2020-12-14 ENCOUNTER — HOSPITAL ENCOUNTER (OUTPATIENT)
Dept: RADIOLOGY | Facility: HOSPITAL | Age: 80
Discharge: HOME | End: 2020-12-14
Attending: NURSE PRACTITIONER
Payer: MEDICARE

## 2020-12-14 ENCOUNTER — OFFICE VISIT (OUTPATIENT)
Dept: FAMILY MEDICINE | Facility: CLINIC | Age: 80
End: 2020-12-14
Payer: MEDICARE

## 2020-12-14 VITALS
TEMPERATURE: 98 F | WEIGHT: 119.69 LBS | BODY MASS INDEX: 24.13 KG/M2 | HEIGHT: 59 IN | OXYGEN SATURATION: 97 % | DIASTOLIC BLOOD PRESSURE: 66 MMHG | SYSTOLIC BLOOD PRESSURE: 132 MMHG | HEART RATE: 90 BPM

## 2020-12-14 DIAGNOSIS — M79.641 RIGHT HAND PAIN: ICD-10-CM

## 2020-12-14 DIAGNOSIS — I10 ESSENTIAL HYPERTENSION: ICD-10-CM

## 2020-12-14 DIAGNOSIS — M79.641 RIGHT HAND PAIN: Primary | ICD-10-CM

## 2020-12-14 DIAGNOSIS — G47.00 INSOMNIA, UNSPECIFIED TYPE: ICD-10-CM

## 2020-12-14 PROCEDURE — 3075F SYST BP GE 130 - 139MM HG: CPT | Mod: CPTII,S$GLB,, | Performed by: NURSE PRACTITIONER

## 2020-12-14 PROCEDURE — 1159F PR MEDICATION LIST DOCUMENTED IN MEDICAL RECORD: ICD-10-PCS | Mod: S$GLB,,, | Performed by: NURSE PRACTITIONER

## 2020-12-14 PROCEDURE — 1125F PR PAIN SEVERITY QUANTIFIED, PAIN PRESENT: ICD-10-PCS | Mod: S$GLB,,, | Performed by: NURSE PRACTITIONER

## 2020-12-14 PROCEDURE — 3075F PR MOST RECENT SYSTOLIC BLOOD PRESS GE 130-139MM HG: ICD-10-PCS | Mod: CPTII,S$GLB,, | Performed by: NURSE PRACTITIONER

## 2020-12-14 PROCEDURE — 99999 PR PBB SHADOW E&M-EST. PATIENT-LVL V: ICD-10-PCS | Mod: PBBFAC,,, | Performed by: NURSE PRACTITIONER

## 2020-12-14 PROCEDURE — 1159F MED LIST DOCD IN RCRD: CPT | Mod: S$GLB,,, | Performed by: NURSE PRACTITIONER

## 2020-12-14 PROCEDURE — 73130 XR HAND COMPLETE 3 VIEW RIGHT: ICD-10-PCS | Mod: 26,RT,, | Performed by: RADIOLOGY

## 2020-12-14 PROCEDURE — 99214 OFFICE O/P EST MOD 30 MIN: CPT | Mod: S$GLB,,, | Performed by: NURSE PRACTITIONER

## 2020-12-14 PROCEDURE — 99214 PR OFFICE/OUTPT VISIT, EST, LEVL IV, 30-39 MIN: ICD-10-PCS | Mod: S$GLB,,, | Performed by: NURSE PRACTITIONER

## 2020-12-14 PROCEDURE — 73130 X-RAY EXAM OF HAND: CPT | Mod: TC,FY,RT

## 2020-12-14 PROCEDURE — 1125F AMNT PAIN NOTED PAIN PRSNT: CPT | Mod: S$GLB,,, | Performed by: NURSE PRACTITIONER

## 2020-12-14 PROCEDURE — 3078F DIAST BP <80 MM HG: CPT | Mod: CPTII,S$GLB,, | Performed by: NURSE PRACTITIONER

## 2020-12-14 PROCEDURE — 3078F PR MOST RECENT DIASTOLIC BLOOD PRESSURE < 80 MM HG: ICD-10-PCS | Mod: CPTII,S$GLB,, | Performed by: NURSE PRACTITIONER

## 2020-12-14 PROCEDURE — 73130 X-RAY EXAM OF HAND: CPT | Mod: 26,RT,, | Performed by: RADIOLOGY

## 2020-12-14 PROCEDURE — 99999 PR PBB SHADOW E&M-EST. PATIENT-LVL V: CPT | Mod: PBBFAC,,, | Performed by: NURSE PRACTITIONER

## 2020-12-14 RX ORDER — TEMAZEPAM 7.5 MG/1
15 CAPSULE ORAL NIGHTLY PRN
Qty: 30 CAPSULE | Refills: 2 | Status: CANCELLED | OUTPATIENT
Start: 2020-12-14 | End: 2021-01-13

## 2020-12-14 RX ORDER — METOPROLOL SUCCINATE 25 MG/1
25 TABLET, EXTENDED RELEASE ORAL DAILY
COMMUNITY
End: 2020-12-14 | Stop reason: SDUPTHER

## 2020-12-14 NOTE — PROGRESS NOTES
Patient ID: Sunita Carlson is a 80 y.o. female.    Chief Complaint:   Hand Pain    HPI   Ms. Carlson presents to clinic requesting a refill of temazepam. She also reports right hand pain for the past few weeks. Hand is tender to palpation and pain is worse with movement. Volteran gel reduces pain temporarily. Patient is concerned she fractured her hand while carrying clothes on numerous hangers.  Temazepam was refilled by Dr. Goncalves on 12/9/20 however a prior authorization was necessary and approved 12/10/20. Patient notified and verbalizes understanding.  Patient is new to me. Reviewed past medical and social history.    Past Medical History:   Diagnosis Date    Arthritis     Cataract     Coronary artery disease     Hypertension     Insomnia     Neuropathy     Retinal detachment     Stomach ulcer      Past Surgical History:   Procedure Laterality Date    APPENDECTOMY      BLADDER SUSPENSION      BREAST BIOPSY      CARDIAC SURGERY      carotid endarterectomy      L    CATARACT EXTRACTION      CHOLECYSTECTOMY      HYSTERECTOMY      INJECTION OF ANESTHETIC AGENT AROUND GANGLION IMPAR N/A 9/12/2019    Procedure: BLOCK, GANGLION IMPAR;  Surgeon: Christian James MD;  Location: [facility] OR;  Service: Pain Management;  Laterality: N/A;    OOPHORECTOMY      SUPERFICIAL KERATECTOMY Right 10/27/2017    Dr. Morales    TONSILLECTOMY      TRANSFORAMINAL EPIDURAL INJECTION OF STEROID Left 8/12/2019    Procedure: Injection,steroid,epidural,transforaminal approach L4-5, L5-S1;  Surgeon: Christian James MD;  Location: [facility] OR;  Service: Pain Management;  Laterality: Left;    TRANSFORAMINAL EPIDURAL INJECTION OF STEROID Left 1/16/2020    Procedure: Injection,steroid,epidural,transforaminal approach;  Surgeon: Christian James MD;  Location: [facility] OR;  Service: Pain Management;  Laterality: Left;  L4-5, L5-S1    TRANSFORAMINAL EPIDURAL INJECTION OF STEROID Left 6/30/2020    Procedure: Injection,steroid,epidural,transforaminal  approach;  Surgeon: Christian James MD;  Location: [facility] OR;  Service: Pain Management;  Laterality: Left;  L4-5 and L5-S1     Current Outpatient Medications on File Prior to Visit   Medication Sig Dispense Refill    aspirin (ECOTRIN) 81 MG EC tablet Take 81 mg by mouth once daily.      atorvastatin (LIPITOR) 40 MG tablet Take 1 tablet (40 mg total) by mouth once daily. 90 tablet 3    betaxoloL (KERLONE) 10 mg Tab Take 1/2 tab at bedtime 45 tablet 1    cholecalciferol, vitamin D3, (VITAMIN D3) 2,000 unit Tab Vitamin D3 50 mcg (2,000 unit) tablet   1 tablet every day by mouth      dexlansoprazole (DEXILANT) 30 mg CpDM Take 60 mg by mouth.       diclofenac sodium (VOLTAREN) 1 % Gel Apply 2 g topically daily as needed.      dicyclomine (BENTYL) 10 MG capsule 2 (two) times daily as needed.       diphenoxylate-atropine 2.5-0.025 mg (LOMOTIL) 2.5-0.025 mg per tablet Take 1 tablet by mouth 4 (four) times daily as needed for Diarrhea.      gabapentin (NEURONTIN) 100 MG capsule Take 1 capsule (100 mg total) by mouth nightly as needed (insomnia). 30 capsule 1    lidocaine (LIDODERM) 5 % Place 1 patch onto the skin once daily. Remove & Discard patch within 12 hours or as directed by MD 15 patch 2    nitroGLYCERIN (NITROSTAT) 0.4 MG SL tablet Place under the tongue.      ondansetron (ZOFRAN-ODT) 8 MG TbDL every 6 (six) hours as needed.       temazepam (RESTORIL) 7.5 MG Cap Take 1 capsule (7.5 mg total) by mouth nightly as needed. 30 capsule 0    mirtazapine (REMERON) 7.5 MG Tab Take 1 tablet (7.5 mg total) by mouth every evening. 30 tablet 2     No current facility-administered medications on file prior to visit.        Review of Systems   Constitutional: Negative for chills and fever.   HENT: Negative for congestion.    Respiratory: Negative for shortness of breath.    Cardiovascular: Negative for chest pain and palpitations.   Gastrointestinal: Positive for diarrhea. Negative for nausea and vomiting.    Musculoskeletal: Positive for arthralgias and myalgias.   Skin: Negative for rash.   All other systems reviewed and are negative.      Objective:      Physical Exam  Vitals signs reviewed.   Constitutional:       Appearance: Normal appearance. She is well-developed.   HENT:      Head: Normocephalic and atraumatic.   Eyes:      Conjunctiva/sclera: Conjunctivae normal.      Pupils: Pupils are equal, round, and reactive to light.   Neck:      Musculoskeletal: Normal range of motion and neck supple.   Cardiovascular:      Rate and Rhythm: Normal rate and regular rhythm.      Heart sounds: Normal heart sounds.   Pulmonary:      Effort: Pulmonary effort is normal.      Breath sounds: Normal breath sounds.   Abdominal:      General: Bowel sounds are normal.      Palpations: Abdomen is soft.   Musculoskeletal: Normal range of motion.      Right hand: She exhibits tenderness. She exhibits no swelling. Normal sensation noted. Normal strength noted.   Skin:     General: Skin is warm and dry.   Neurological:      Mental Status: She is alert and oriented to person, place, and time.   Psychiatric:         Mood and Affect: Mood normal.         Behavior: Behavior normal.         Assessment:       1. Right hand pain    2. Insomnia, unspecified type    3. Essential hypertension        Plan:       Sunita was seen today for hand pain.    Diagnoses and all orders for this visit:    Right hand pain  -     X-Ray Hand Complete Right; Future    Insomnia, unspecified type    Essential hypertension  The patient is asked to make an attempt to improve diet and exercise patterns to aid in medical management of this problem.    Other orders  -     Cancel: temazepam (RESTORIL) 7.5 MG Cap; Take 2 capsules (15 mg total) by mouth nightly as needed.    Patient education provided.  All questions and concerns addressed  RTC PRN and if symptoms worsen or fail to improve  Patient verbalizes understanding      Patient Instructions       Eating  Heart-Healthy Food: Using the DASH Plan    Eating for your heart doesnt have to be hard or boring. You just need to know how to make healthier choices. The DASH eating plan has been developed to help you do just that. DASH stands for Dietary Approaches to Stop Hypertension. It is a plan that has been proven to be healthier for your heart and to lower your risk for high blood pressure. It can also help lower your risk for cancer, heart disease, osteoporosis, and diabetes.  Choosing from each food group  Choose foods from each of the food groups below each day. Try to get the recommended number of servings for each food group. The serving numbers are based on a diet of 2,000 calories a day. Talk to your doctor if youre unsure about your calorie needs. Along with getting the correct servings, the DASH plan also recommends a sodium intake less than 2,300 mg per day.        Grains  Servings: 6 to 8 a day  A serving is:  · 1 slice bread  · 1 ounce dry cereal  · Half a cup cooked rice, pasta or cereal  Best choices: Whole grains and any grains high in fiber. Vegetables  Servings: 4 to 5 a day  A serving is:  · 1 cup raw leafy vegetable  · Half a cup cut-up raw or cooked vegetable  · Half a cup vegetable juice  Best choices: Fresh or frozen vegetables prepared without added salt or fat.   Fruits  Servings: 4 to 5 a day  A serving is:  · 1 medium fruit  · One-quarter cup dried fruit  · Half a cup fresh, frozen, or canned fruit  · Half a cup of 100% fruit juices  Best choices: A variety of fresh fruits of different colors. Whole fruits are a better choice than fruit juices. Low-fat or fat-free dairy  Servings: 2 to 3 a day  A serving is:  · 1 cup milk  · 1 cup yogurt  · One and a half ounces cheese  Best choices: Skim or 1% milk, low-fat or fat-free yogurt or buttermilk, and low-fat cheeses.         Lean meats, poultry, fish  Servings: 6 or fewer a day  A serving is:  · 1 ounce cooked meats, poultry, or fish  · 1 egg  Best  choices: Lean poultry and fish. Trim away visible fat. Broil, grill, roast, or boil instead of frying. Remove skin from poultry before eating. Limit how much red meat you eat.  Nuts, seeds, beans  Servings: 4 to 5 a week  A serving is:  · One-third cup nuts (one and a half ounces)  · 2 tablespoons nut butter or seeds  · Half a cup cooked dry beans or legumes  Best choices: Dry roasted nuts with no salt added, lentils, kidney beans, garbanzo beans, and whole thomason beans.   Fats and oils  Servings: 2 to 3 a day  A serving is:  · 1 teaspoon vegetable oil  · 1 teaspoon soft margarine  · 1 tablespoon mayonnaise  · 2 tablespoons salad dressing  Best choices: Nut and vegetable oils (nontropical vegetable oils), such as olive and canola oil. Sweets  Servings: 5 a week or fewer  A serving is:  · 1 tablespoon sugar, maple syrup, or honey  · 1 tablespoon jam or jelly  · 1 half-ounce jelly beans (about 15)  · 1 cup lemonade  Best choices: Dried fruit can be a satisfying sweet. Choose low-fat sweets. And watch your serving sizes!      For more on the DASH eating plan, visit:  www.nhlbi.nih.gov/health/health-topics/topics/dash   Date Last Reviewed: 6/1/2016  © 3589-5541 [employer]. [address] [place] [address], [place] [address]. All rights reserved. This information is not intended as a substitute for professional medical care. Always follow your healthcare professional's instructions.

## 2020-12-14 NOTE — PATIENT INSTRUCTIONS
Eating Heart-Healthy Food: Using the DASH Plan    Eating for your heart doesnt have to be hard or boring. You just need to know how to make healthier choices. The DASH eating plan has been developed to help you do just that. DASH stands for Dietary Approaches to Stop Hypertension. It is a plan that has been proven to be healthier for your heart and to lower your risk for high blood pressure. It can also help lower your risk for cancer, heart disease, osteoporosis, and diabetes.  Choosing from each food group  Choose foods from each of the food groups below each day. Try to get the recommended number of servings for each food group. The serving numbers are based on a diet of 2,000 calories a day. Talk to your doctor if youre unsure about your calorie needs. Along with getting the correct servings, the DASH plan also recommends a sodium intake less than 2,300 mg per day.        Grains  Servings: 6 to 8 a day  A serving is:  · 1 slice bread  · 1 ounce dry cereal  · Half a cup cooked rice, pasta or cereal  Best choices: Whole grains and any grains high in fiber. Vegetables  Servings: 4 to 5 a day  A serving is:  · 1 cup raw leafy vegetable  · Half a cup cut-up raw or cooked vegetable  · Half a cup vegetable juice  Best choices: Fresh or frozen vegetables prepared without added salt or fat.   Fruits  Servings: 4 to 5 a day  A serving is:  · 1 medium fruit  · One-quarter cup dried fruit  · Half a cup fresh, frozen, or canned fruit  · Half a cup of 100% fruit juices  Best choices: A variety of fresh fruits of different colors. Whole fruits are a better choice than fruit juices. Low-fat or fat-free dairy  Servings: 2 to 3 a day  A serving is:  · 1 cup milk  · 1 cup yogurt  · One and a half ounces cheese  Best choices: Skim or 1% milk, low-fat or fat-free yogurt or buttermilk, and low-fat cheeses.         Lean meats, poultry, fish  Servings: 6 or fewer a day  A serving is:  · 1 ounce cooked meats, poultry, or fish  · 1  egg  Best choices: Lean poultry and fish. Trim away visible fat. Broil, grill, roast, or boil instead of frying. Remove skin from poultry before eating. Limit how much red meat you eat.  Nuts, seeds, beans  Servings: 4 to 5 a week  A serving is:  · One-third cup nuts (one and a half ounces)  · 2 tablespoons nut butter or seeds  · Half a cup cooked dry beans or legumes  Best choices: Dry roasted nuts with no salt added, lentils, kidney beans, garbanzo beans, and whole thomason beans.   Fats and oils  Servings: 2 to 3 a day  A serving is:  · 1 teaspoon vegetable oil  · 1 teaspoon soft margarine  · 1 tablespoon mayonnaise  · 2 tablespoons salad dressing  Best choices: Nut and vegetable oils (nontropical vegetable oils), such as olive and canola oil. Sweets  Servings: 5 a week or fewer  A serving is:  · 1 tablespoon sugar, maple syrup, or honey  · 1 tablespoon jam or jelly  · 1 half-ounce jelly beans (about 15)  · 1 cup lemonade  Best choices: Dried fruit can be a satisfying sweet. Choose low-fat sweets. And watch your serving sizes!      For more on the DASH eating plan, visit:  www.nhlbi.nih.gov/health/health-topics/topics/dash   Date Last Reviewed: 6/1/2016  © 6341-8021 [employer]. [address] [place] [address], [place] [address]. All rights reserved. This information is not intended as a substitute for professional medical care. Always follow your healthcare professional's instructions.

## 2020-12-14 NOTE — LETTER
December 14, 2020      Adin Saba MD  [address]  [place] [address]  [facility] [place]  [place] [address]           [facility]  [address]  [facility] [address]  Phone: [phone]  Fax: [phone]          Patient: Sunita Carlson   MR Number: [id number]   YOB: 1940   Date of Visit: 12/14/2020       Dear Dr. Adin Saba:    Thank you for referring Sunita Carlson to me for evaluation. Attached you will find relevant portions of my assessment and plan of care.    If you have questions, please do not hesitate to call me. I look forward to following Sunita Carlson along with you.    Sincerely,    Jessica Crenshaw, DNP    Enclosure  CC:  No Recipients    If you would like to receive this communication electronically, please contact [email or web address] or [phone] to request more information on [employer] access.    For providers and/or their staff who would like to refer a patient to [facility], please contact us through our one-stop-shop provider referral line, [facility], at [phone].    If you feel you have received this communication in error or would no longer like to receive these types of communications, please e-mail [email or web address]

## 2020-12-14 NOTE — TELEPHONE ENCOUNTER
Betaxolol is expensive on her insurance plan, she has not picked it up yet. Is there something else we can try first?  She also has not stopped the lisinopril yet.        Had to move loop implant, insurance (people's) has not auth yet. She is aware we have moved the procedure date and time

## 2020-12-15 ENCOUNTER — TELEPHONE (OUTPATIENT)
Dept: FAMILY MEDICINE | Facility: CLINIC | Age: 80
End: 2020-12-15

## 2020-12-17 DIAGNOSIS — G47.00 INSOMNIA, UNSPECIFIED TYPE: ICD-10-CM

## 2020-12-17 NOTE — TELEPHONE ENCOUNTER
----- Message from Emilee Hernandez sent at 12/17/2020  3:48 PM CST -----  Regarding: advice  Contact: patient  Type: Needs Medical Advice  Who Called:  patient  Symptoms (please be specific):    How long has patient had these symptoms:    Pharmacy name and phone #:    [id number] - [place] - [address]  [address] [place] [address]  Phone: [phone] Fax: [phone]  Best Call Back Number: [phone] (home)   Additional Information: Patient states the temazepam (RESTORIL) 15 mg was not received by the pharmacy. Patient states she has been trying for over a week to get the prescription straight. Please call patient to advise. Thanks!

## 2020-12-18 ENCOUNTER — HOSPITAL ENCOUNTER (OUTPATIENT)
Facility: HOSPITAL | Age: 80
Discharge: HOME | End: 2020-12-18
Attending: INTERNAL MEDICINE | Admitting: INTERNAL MEDICINE
Payer: MEDICARE

## 2020-12-18 DIAGNOSIS — R55 SYNCOPE, UNSPECIFIED SYNCOPE TYPE: ICD-10-CM

## 2020-12-18 PROCEDURE — 33285 PR INSERTION,SUBQ CARDIAC RHYTHM MONITOR, W/PRGRMG: ICD-10-PCS | Mod: ,,, | Performed by: INTERNAL MEDICINE

## 2020-12-18 PROCEDURE — 33285 INSJ SUBQ CAR RHYTHM MNTR: CPT | Mod: ,,, | Performed by: INTERNAL MEDICINE

## 2020-12-18 PROCEDURE — 33285 INSJ SUBQ CAR RHYTHM MNTR: CPT | Performed by: INTERNAL MEDICINE

## 2020-12-18 PROCEDURE — C1764 EVENT RECORDER, CARDIAC: HCPCS | Performed by: INTERNAL MEDICINE

## 2020-12-18 DEVICE — IMPLANTABLE DEVICE: Type: IMPLANTABLE DEVICE | Site: CHEST  WALL | Status: FUNCTIONAL

## 2020-12-18 RX ORDER — METOPROLOL SUCCINATE 25 MG/1
25 TABLET, EXTENDED RELEASE ORAL DAILY
Qty: 45 TABLET | Refills: 3 | Status: SHIPPED | OUTPATIENT
Start: 2020-12-18 | End: 2021-01-04 | Stop reason: DRUGHIGH

## 2020-12-18 RX ORDER — TEMAZEPAM 7.5 MG/1
7.5 CAPSULE ORAL NIGHTLY PRN
Qty: 30 CAPSULE | Refills: 0 | Status: SHIPPED | OUTPATIENT
Start: 2020-12-18 | End: 2020-12-22 | Stop reason: SDUPTHER

## 2020-12-18 NOTE — DISCHARGE INSTRUCTIONS
Discharge Instructions for Loop Recorders:   You may shower in 24 hours after the procedure. Allow soapy water to gently run over chest incision, not directly on incision. Do not rub or scrub incision. No bathtubs or submerging in water until site is completely healed.    Keep site clean and dry at all times.    Inspect site daily for tenderness, discharge, or signs of infection.   Apply ice pack for 24 hours   Do not remove the steri strips, allow them to fall off on their own

## 2020-12-21 ENCOUNTER — TELEPHONE (OUTPATIENT)
Dept: CARDIOLOGY | Facility: CLINIC | Age: 80
End: 2020-12-21

## 2020-12-21 NOTE — TELEPHONE ENCOUNTER
----- Message from Hanna Broderick sent at 12/21/2020 11:53 AM CST -----  Regarding: follow up appt  call patient to schedule follow up from surgery [phone]

## 2020-12-22 DIAGNOSIS — G47.00 INSOMNIA, UNSPECIFIED TYPE: ICD-10-CM

## 2020-12-23 RX ORDER — TEMAZEPAM 7.5 MG/1
7.5 CAPSULE ORAL NIGHTLY PRN
Qty: 30 CAPSULE | Refills: 0 | Status: SHIPPED | OUTPATIENT
Start: 2021-01-01 | End: 2020-12-31 | Stop reason: SDUPTHER

## 2020-12-23 NOTE — TELEPHONE ENCOUNTER
Tapering down slowly on benzo and  reviewed. Recommended to try and discontinue due to side effect profile but patient wants to continue for now.     The patient is meeting the goals of benzodiazepine therapy and is dependent on them for functionality and can not perform ADLS without them. Regarding benzodiazepine, the risks were discussed including tolerance, addiction, overdose, over-sedation, drug interactions, falls, seizures, cognitive effects, and even death. I cautioned the patient that the medications that are being taken are dangerous and can result in death from overdose. They can also result in death if taken by someone else without medical supervision. I also warned the patient to not drive or operate heavy machinery while taking these medications. The patient agreed to teach a family member living in the same home on how and when to use it if the scenario of a drug overdose occurs. Shared decision making occurred and she agreed with this treatment plan.     Patient offered lower dose of benzodiazepine medication.    She has no suicidal thoughts or intent and she has no weapons access and she is future oriented with support in the community and I emphasized that if she ever develops thoughts of hurting she to directly go to the emergency department which she agreed to and she is willing to have close follow up with me.     was reviewed today (12/23/2020) and determined to be appropriate for continued benzodiazepine therapy.    The addictive potential of the medications were discussed and she agrees to inform us or the PCP if any compulsion to taking his medications for any reason other than for pain arises. She agrees to safe-guard all medications from unintentional or intentional use or misuse by other persons who may potentially have access to their premises, including but not limited to significant others, children, parents, friends, and even strangers. She takes sole responsibility for any  "consequences that may occur from not doing so.    Documentation entered by me for this encounter may have been done in part using speech-recognition technology. Although I have made an effort to ensure accuracy, "sound like" errors may exist and should be interpreted in context.     Ranjan Goncalves MD  12/23/2020        "

## 2020-12-28 ENCOUNTER — TELEPHONE (OUTPATIENT)
Dept: FAMILY MEDICINE | Facility: CLINIC | Age: 80
End: 2020-12-28

## 2020-12-28 NOTE — TELEPHONE ENCOUNTER
----- Message from Lucía Campa sent at 12/28/2020 11:05 AM CST -----  Regarding: Refill  Contact: patient  Type:  RX Refill Request    Who Called:  pt  Refill or New Rx:  refill  RX Name and Strength:  temazepam 15mg  How is the patient currently taking it? (ex. 1XDay):  n/a  Is this a 30 day or 90 day RX:  90    Preferred Pharmacy with phone number:    [id number] - [place]  [address] AT [facility] [address]  [facility] [place] [address]  Phone: [phone] Fax: [phone]    Local or Mail Order:  local  Ordering Provider:  Sacha Panda Call Back Number:  [phone] (home)     Additional Information:  please call to advise

## 2020-12-30 ENCOUNTER — TELEPHONE (OUTPATIENT)
Dept: FAMILY MEDICINE | Facility: CLINIC | Age: 80
End: 2020-12-30

## 2020-12-30 NOTE — TELEPHONE ENCOUNTER
Pt states that she knows her body she cannot take 7.5 mg that will not work. I advised her that Dr. Goncalves is trying to gradually taper the medication dosage. She states that she cannot just stop taking the medication. I advised her that she should keep her appointment with Dr. Simon to discuss. I advised her that the 7.5 mg of temazepam will not be avalible until 01/1/2021 .   ----- Message from Rory Cardoza sent at 12/30/2020 12:18 PM CST -----  Type:  Needs Medical Advice    Who Called: self  Reason:Needs a sooner appointment because she is getting ready to run out of her meds   Would the patient rather a call back or a response via [facility]? call  Best Call Back Number: [phone]  Additional Information: none

## 2020-12-31 DIAGNOSIS — G47.00 INSOMNIA, UNSPECIFIED TYPE: ICD-10-CM

## 2021-01-01 RX ORDER — TEMAZEPAM 7.5 MG/1
7.5 CAPSULE ORAL NIGHTLY PRN
Qty: 30 CAPSULE | Refills: 0 | Status: SHIPPED | OUTPATIENT
Start: 2021-01-01 | End: 2021-01-06

## 2021-01-04 ENCOUNTER — OFFICE VISIT (OUTPATIENT)
Dept: CARDIOLOGY | Facility: CLINIC | Age: 81
End: 2021-01-04
Payer: MEDICARE

## 2021-01-04 VITALS
HEART RATE: 59 BPM | WEIGHT: 122 LBS | DIASTOLIC BLOOD PRESSURE: 68 MMHG | HEIGHT: 59 IN | SYSTOLIC BLOOD PRESSURE: 146 MMHG | RESPIRATION RATE: 16 BRPM | OXYGEN SATURATION: 97 % | BODY MASS INDEX: 24.6 KG/M2

## 2021-01-04 DIAGNOSIS — Z95.1 HX OF CABG: ICD-10-CM

## 2021-01-04 DIAGNOSIS — R55 SYNCOPE, CARDIOGENIC: ICD-10-CM

## 2021-01-04 DIAGNOSIS — E78.2 MIXED HYPERLIPIDEMIA: ICD-10-CM

## 2021-01-04 DIAGNOSIS — I25.110 ATHEROSCLEROSIS OF NATIVE CORONARY ARTERY OF NATIVE HEART WITH UNSTABLE ANGINA PECTORIS: ICD-10-CM

## 2021-01-04 DIAGNOSIS — I10 ESSENTIAL HYPERTENSION: ICD-10-CM

## 2021-01-04 DIAGNOSIS — I25.700 CORONARY ARTERY DISEASE INVOLVING CORONARY BYPASS GRAFT OF NATIVE HEART WITH UNSTABLE ANGINA PECTORIS: ICD-10-CM

## 2021-01-04 PROCEDURE — 1101F PR PT FALLS ASSESS DOC 0-1 FALLS W/OUT INJ PAST YR: ICD-10-PCS | Mod: CPTII,S$GLB,, | Performed by: INTERNAL MEDICINE

## 2021-01-04 PROCEDURE — 3078F DIAST BP <80 MM HG: CPT | Mod: CPTII,S$GLB,, | Performed by: INTERNAL MEDICINE

## 2021-01-04 PROCEDURE — 99214 OFFICE O/P EST MOD 30 MIN: CPT | Mod: S$GLB,,, | Performed by: INTERNAL MEDICINE

## 2021-01-04 PROCEDURE — 1126F PR PAIN SEVERITY QUANTIFIED, NO PAIN PRESENT: ICD-10-PCS | Mod: S$GLB,,, | Performed by: INTERNAL MEDICINE

## 2021-01-04 PROCEDURE — 1126F AMNT PAIN NOTED NONE PRSNT: CPT | Mod: S$GLB,,, | Performed by: INTERNAL MEDICINE

## 2021-01-04 PROCEDURE — 3078F PR MOST RECENT DIASTOLIC BLOOD PRESSURE < 80 MM HG: ICD-10-PCS | Mod: CPTII,S$GLB,, | Performed by: INTERNAL MEDICINE

## 2021-01-04 PROCEDURE — 3288F PR FALLS RISK ASSESSMENT DOCUMENTED: ICD-10-PCS | Mod: CPTII,S$GLB,, | Performed by: INTERNAL MEDICINE

## 2021-01-04 PROCEDURE — 3077F PR MOST RECENT SYSTOLIC BLOOD PRESSURE >= 140 MM HG: ICD-10-PCS | Mod: CPTII,S$GLB,, | Performed by: INTERNAL MEDICINE

## 2021-01-04 PROCEDURE — 99214 PR OFFICE/OUTPT VISIT, EST, LEVL IV, 30-39 MIN: ICD-10-PCS | Mod: S$GLB,,, | Performed by: INTERNAL MEDICINE

## 2021-01-04 PROCEDURE — 1159F MED LIST DOCD IN RCRD: CPT | Mod: S$GLB,,, | Performed by: INTERNAL MEDICINE

## 2021-01-04 PROCEDURE — 3288F FALL RISK ASSESSMENT DOCD: CPT | Mod: CPTII,S$GLB,, | Performed by: INTERNAL MEDICINE

## 2021-01-04 PROCEDURE — 3077F SYST BP >= 140 MM HG: CPT | Mod: CPTII,S$GLB,, | Performed by: INTERNAL MEDICINE

## 2021-01-04 PROCEDURE — 1159F PR MEDICATION LIST DOCUMENTED IN MEDICAL RECORD: ICD-10-PCS | Mod: S$GLB,,, | Performed by: INTERNAL MEDICINE

## 2021-01-04 PROCEDURE — 1101F PT FALLS ASSESS-DOCD LE1/YR: CPT | Mod: CPTII,S$GLB,, | Performed by: INTERNAL MEDICINE

## 2021-01-04 RX ORDER — METOPROLOL SUCCINATE 25 MG/1
25 TABLET, EXTENDED RELEASE ORAL DAILY
Qty: 90 TABLET | Refills: 3 | Status: SHIPPED | OUTPATIENT
Start: 2021-01-04 | End: 2022-01-04

## 2021-01-06 ENCOUNTER — OFFICE VISIT (OUTPATIENT)
Dept: FAMILY MEDICINE | Facility: CLINIC | Age: 81
End: 2021-01-06
Payer: MEDICARE

## 2021-01-06 VITALS
RESPIRATION RATE: 17 BRPM | TEMPERATURE: 98 F | HEIGHT: 59 IN | SYSTOLIC BLOOD PRESSURE: 124 MMHG | HEART RATE: 75 BPM | WEIGHT: 122.38 LBS | DIASTOLIC BLOOD PRESSURE: 62 MMHG | BODY MASS INDEX: 24.67 KG/M2 | OXYGEN SATURATION: 97 %

## 2021-01-06 DIAGNOSIS — M54.16 LUMBAR RADICULITIS: ICD-10-CM

## 2021-01-06 DIAGNOSIS — F32.A ANXIETY AND DEPRESSION: ICD-10-CM

## 2021-01-06 DIAGNOSIS — G47.00 INSOMNIA, UNSPECIFIED TYPE: ICD-10-CM

## 2021-01-06 DIAGNOSIS — M53.3 COCCYDYNIA: ICD-10-CM

## 2021-01-06 DIAGNOSIS — I10 ESSENTIAL HYPERTENSION: Primary | ICD-10-CM

## 2021-01-06 DIAGNOSIS — F41.9 ANXIETY AND DEPRESSION: ICD-10-CM

## 2021-01-06 PROCEDURE — 99999 PR PBB SHADOW E&M-EST. PATIENT-LVL V: CPT | Mod: PBBFAC,,, | Performed by: PHYSICIAN ASSISTANT

## 2021-01-06 PROCEDURE — 99214 PR OFFICE/OUTPT VISIT, EST, LEVL IV, 30-39 MIN: ICD-10-PCS | Mod: S$GLB,,, | Performed by: PHYSICIAN ASSISTANT

## 2021-01-06 PROCEDURE — 99999 PR PBB SHADOW E&M-EST. PATIENT-LVL V: ICD-10-PCS | Mod: PBBFAC,,, | Performed by: PHYSICIAN ASSISTANT

## 2021-01-06 PROCEDURE — 3288F FALL RISK ASSESSMENT DOCD: CPT | Mod: CPTII,S$GLB,, | Performed by: PHYSICIAN ASSISTANT

## 2021-01-06 PROCEDURE — 99214 OFFICE O/P EST MOD 30 MIN: CPT | Mod: S$GLB,,, | Performed by: PHYSICIAN ASSISTANT

## 2021-01-06 PROCEDURE — 1126F PR PAIN SEVERITY QUANTIFIED, NO PAIN PRESENT: ICD-10-PCS | Mod: S$GLB,,, | Performed by: PHYSICIAN ASSISTANT

## 2021-01-06 PROCEDURE — 3078F DIAST BP <80 MM HG: CPT | Mod: CPTII,S$GLB,, | Performed by: PHYSICIAN ASSISTANT

## 2021-01-06 PROCEDURE — 1101F PT FALLS ASSESS-DOCD LE1/YR: CPT | Mod: CPTII,S$GLB,, | Performed by: PHYSICIAN ASSISTANT

## 2021-01-06 PROCEDURE — 1159F PR MEDICATION LIST DOCUMENTED IN MEDICAL RECORD: ICD-10-PCS | Mod: S$GLB,,, | Performed by: PHYSICIAN ASSISTANT

## 2021-01-06 PROCEDURE — 3078F PR MOST RECENT DIASTOLIC BLOOD PRESSURE < 80 MM HG: ICD-10-PCS | Mod: CPTII,S$GLB,, | Performed by: PHYSICIAN ASSISTANT

## 2021-01-06 PROCEDURE — 1101F PR PT FALLS ASSESS DOC 0-1 FALLS W/OUT INJ PAST YR: ICD-10-PCS | Mod: CPTII,S$GLB,, | Performed by: PHYSICIAN ASSISTANT

## 2021-01-06 PROCEDURE — 1126F AMNT PAIN NOTED NONE PRSNT: CPT | Mod: S$GLB,,, | Performed by: PHYSICIAN ASSISTANT

## 2021-01-06 PROCEDURE — 3074F SYST BP LT 130 MM HG: CPT | Mod: CPTII,S$GLB,, | Performed by: PHYSICIAN ASSISTANT

## 2021-01-06 PROCEDURE — 1159F MED LIST DOCD IN RCRD: CPT | Mod: S$GLB,,, | Performed by: PHYSICIAN ASSISTANT

## 2021-01-06 PROCEDURE — 3074F PR MOST RECENT SYSTOLIC BLOOD PRESSURE < 130 MM HG: ICD-10-PCS | Mod: CPTII,S$GLB,, | Performed by: PHYSICIAN ASSISTANT

## 2021-01-06 PROCEDURE — 3288F PR FALLS RISK ASSESSMENT DOCUMENTED: ICD-10-PCS | Mod: CPTII,S$GLB,, | Performed by: PHYSICIAN ASSISTANT

## 2021-01-06 RX ORDER — TEMAZEPAM 15 MG/1
15 CAPSULE ORAL NIGHTLY PRN
Qty: 90 CAPSULE | Refills: 0 | Status: SHIPPED | OUTPATIENT
Start: 2021-01-06 | End: 2021-04-06

## 2021-01-06 RX ORDER — MIRTAZAPINE 15 MG/1
15 TABLET, FILM COATED ORAL NIGHTLY
Qty: 90 TABLET | Refills: 0 | Status: SHIPPED | OUTPATIENT
Start: 2021-01-06 | End: 2021-03-15

## 2021-01-06 RX ORDER — GABAPENTIN 300 MG/1
600 CAPSULE ORAL NIGHTLY
Qty: 180 CAPSULE | Refills: 0 | Status: SHIPPED | OUTPATIENT
Start: 2021-01-06 | End: 2021-03-15

## 2021-01-13 ENCOUNTER — OFFICE VISIT (OUTPATIENT)
Dept: OTOLARYNGOLOGY | Facility: CLINIC | Age: 81
End: 2021-01-13
Payer: MEDICARE

## 2021-01-13 ENCOUNTER — IMMUNIZATION (OUTPATIENT)
Dept: PHARMACY | Facility: CLINIC | Age: 81
End: 2021-01-13
Payer: MEDICARE

## 2021-01-13 VITALS
SYSTOLIC BLOOD PRESSURE: 158 MMHG | BODY MASS INDEX: 24.71 KG/M2 | HEART RATE: 83 BPM | OXYGEN SATURATION: 98 % | DIASTOLIC BLOOD PRESSURE: 75 MMHG | HEIGHT: 59 IN | WEIGHT: 122.56 LBS

## 2021-01-13 DIAGNOSIS — Z23 NEED FOR VACCINATION: ICD-10-CM

## 2021-01-13 DIAGNOSIS — R04.0 EPISTAXIS: Primary | ICD-10-CM

## 2021-01-13 PROCEDURE — 3078F DIAST BP <80 MM HG: CPT | Mod: CPTII,S$GLB,, | Performed by: OTOLARYNGOLOGY

## 2021-01-13 PROCEDURE — 1126F AMNT PAIN NOTED NONE PRSNT: CPT | Mod: S$GLB,,, | Performed by: OTOLARYNGOLOGY

## 2021-01-13 PROCEDURE — 3288F PR FALLS RISK ASSESSMENT DOCUMENTED: ICD-10-PCS | Mod: CPTII,S$GLB,, | Performed by: OTOLARYNGOLOGY

## 2021-01-13 PROCEDURE — 1100F PR PT FALLS ASSESS DOC 2+ FALLS/FALL W/INJURY/YR: ICD-10-PCS | Mod: CPTII,S$GLB,, | Performed by: OTOLARYNGOLOGY

## 2021-01-13 PROCEDURE — 30901 CONTROL OF NOSEBLEED: CPT | Mod: RT,S$GLB,, | Performed by: OTOLARYNGOLOGY

## 2021-01-13 PROCEDURE — 1126F PR PAIN SEVERITY QUANTIFIED, NO PAIN PRESENT: ICD-10-PCS | Mod: S$GLB,,, | Performed by: OTOLARYNGOLOGY

## 2021-01-13 PROCEDURE — 99214 OFFICE O/P EST MOD 30 MIN: CPT | Mod: 25,S$GLB,, | Performed by: OTOLARYNGOLOGY

## 2021-01-13 PROCEDURE — 30901 PR CTRL 2SEBLEED,ANTER,SIMPLE: ICD-10-PCS | Mod: RT,S$GLB,, | Performed by: OTOLARYNGOLOGY

## 2021-01-13 PROCEDURE — 3077F SYST BP >= 140 MM HG: CPT | Mod: CPTII,S$GLB,, | Performed by: OTOLARYNGOLOGY

## 2021-01-13 PROCEDURE — 3077F PR MOST RECENT SYSTOLIC BLOOD PRESSURE >= 140 MM HG: ICD-10-PCS | Mod: CPTII,S$GLB,, | Performed by: OTOLARYNGOLOGY

## 2021-01-13 PROCEDURE — 99214 PR OFFICE/OUTPT VISIT, EST, LEVL IV, 30-39 MIN: ICD-10-PCS | Mod: 25,S$GLB,, | Performed by: OTOLARYNGOLOGY

## 2021-01-13 PROCEDURE — 3288F FALL RISK ASSESSMENT DOCD: CPT | Mod: CPTII,S$GLB,, | Performed by: OTOLARYNGOLOGY

## 2021-01-13 PROCEDURE — 1100F PTFALLS ASSESS-DOCD GE2>/YR: CPT | Mod: CPTII,S$GLB,, | Performed by: OTOLARYNGOLOGY

## 2021-01-13 PROCEDURE — 1159F PR MEDICATION LIST DOCUMENTED IN MEDICAL RECORD: ICD-10-PCS | Mod: S$GLB,,, | Performed by: OTOLARYNGOLOGY

## 2021-01-13 PROCEDURE — 3078F PR MOST RECENT DIASTOLIC BLOOD PRESSURE < 80 MM HG: ICD-10-PCS | Mod: CPTII,S$GLB,, | Performed by: OTOLARYNGOLOGY

## 2021-01-13 PROCEDURE — 1159F MED LIST DOCD IN RCRD: CPT | Mod: S$GLB,,, | Performed by: OTOLARYNGOLOGY

## 2021-01-13 RX ORDER — BETAXOLOL 10 MG/1
10 TABLET, FILM COATED ORAL DAILY
COMMUNITY
Start: 2020-12-18 | End: 2022-03-22

## 2021-01-15 ENCOUNTER — TELEPHONE (OUTPATIENT)
Dept: OPHTHALMOLOGY | Facility: CLINIC | Age: 81
End: 2021-01-15

## 2021-02-01 ENCOUNTER — HOSPITAL ENCOUNTER (OUTPATIENT)
Dept: CARDIOLOGY | Facility: CLINIC | Age: 81
Discharge: HOME | End: 2021-02-01
Attending: INTERNAL MEDICINE
Payer: MEDICARE

## 2021-02-01 DIAGNOSIS — R55 SYNCOPE, CARDIOGENIC: ICD-10-CM

## 2021-02-01 PROCEDURE — 93298 REM INTERROG DEV EVAL SCRMS: CPT | Mod: S$GLB,,, | Performed by: INTERNAL MEDICINE

## 2021-02-01 PROCEDURE — 93298 CARDIAC DEVICE CHECK - REMOTE: ICD-10-PCS | Mod: S$GLB,,, | Performed by: INTERNAL MEDICINE

## 2021-02-09 DIAGNOSIS — R55 SYNCOPE, CARDIOGENIC: Primary | ICD-10-CM

## 2021-02-10 ENCOUNTER — IMMUNIZATION (OUTPATIENT)
Dept: PHARMACY | Facility: CLINIC | Age: 81
End: 2021-02-10
Payer: MEDICARE

## 2021-02-10 DIAGNOSIS — Z23 NEED FOR VACCINATION: Primary | ICD-10-CM

## 2021-02-11 DIAGNOSIS — R55 SYNCOPE, CARDIOGENIC: Primary | ICD-10-CM

## 2021-03-03 ENCOUNTER — TELEPHONE (OUTPATIENT)
Dept: ORTHOPEDICS | Facility: CLINIC | Age: 81
End: 2021-03-03

## 2021-03-03 DIAGNOSIS — M25.561 ACUTE PAIN OF RIGHT KNEE: Primary | ICD-10-CM

## 2021-03-04 ENCOUNTER — HOSPITAL ENCOUNTER (OUTPATIENT)
Dept: RADIOLOGY | Facility: HOSPITAL | Age: 81
Discharge: HOME | End: 2021-03-04
Attending: ORTHOPAEDIC SURGERY
Payer: MEDICARE

## 2021-03-04 ENCOUNTER — PATIENT OUTREACH (OUTPATIENT)
Dept: ADMINISTRATIVE | Facility: OTHER | Age: 81
End: 2021-03-04

## 2021-03-04 ENCOUNTER — OFFICE VISIT (OUTPATIENT)
Dept: ORTHOPEDICS | Facility: CLINIC | Age: 81
End: 2021-03-04
Payer: MEDICARE

## 2021-03-04 VITALS — WEIGHT: 122 LBS | BODY MASS INDEX: 24.6 KG/M2 | RESPIRATION RATE: 18 BRPM | HEIGHT: 59 IN

## 2021-03-04 DIAGNOSIS — M25.561 ACUTE PAIN OF RIGHT KNEE: ICD-10-CM

## 2021-03-04 DIAGNOSIS — M17.10 ARTHRITIS OF KNEE: ICD-10-CM

## 2021-03-04 DIAGNOSIS — S83.241A ACUTE MEDIAL MENISCAL TEAR, RIGHT, INITIAL ENCOUNTER: ICD-10-CM

## 2021-03-04 DIAGNOSIS — M25.561 ACUTE PAIN OF RIGHT KNEE: Primary | ICD-10-CM

## 2021-03-04 PROCEDURE — 1125F AMNT PAIN NOTED PAIN PRSNT: CPT | Mod: S$GLB,,, | Performed by: ORTHOPAEDIC SURGERY

## 2021-03-04 PROCEDURE — 1159F PR MEDICATION LIST DOCUMENTED IN MEDICAL RECORD: ICD-10-PCS | Mod: S$GLB,,, | Performed by: ORTHOPAEDIC SURGERY

## 2021-03-04 PROCEDURE — 20610 LARGE JOINT ASPIRATION/INJECTION: R KNEE: ICD-10-PCS | Mod: RT,S$GLB,, | Performed by: ORTHOPAEDIC SURGERY

## 2021-03-04 PROCEDURE — 73564 X-RAY EXAM KNEE 4 OR MORE: CPT | Mod: TC,PN,RT

## 2021-03-04 PROCEDURE — 73562 XR KNEE ORTHO RIGHT WITH FLEXION: ICD-10-PCS | Mod: 26,LT,, | Performed by: RADIOLOGY

## 2021-03-04 PROCEDURE — 99999 PR PBB SHADOW E&M-EST. PATIENT-LVL III: CPT | Mod: PBBFAC,,, | Performed by: ORTHOPAEDIC SURGERY

## 2021-03-04 PROCEDURE — 1101F PT FALLS ASSESS-DOCD LE1/YR: CPT | Mod: CPTII,S$GLB,, | Performed by: ORTHOPAEDIC SURGERY

## 2021-03-04 PROCEDURE — 99203 OFFICE O/P NEW LOW 30 MIN: CPT | Mod: 25,S$GLB,, | Performed by: ORTHOPAEDIC SURGERY

## 2021-03-04 PROCEDURE — 99203 PR OFFICE/OUTPT VISIT, NEW, LEVL III, 30-44 MIN: ICD-10-PCS | Mod: 25,S$GLB,, | Performed by: ORTHOPAEDIC SURGERY

## 2021-03-04 PROCEDURE — 3288F FALL RISK ASSESSMENT DOCD: CPT | Mod: CPTII,S$GLB,, | Performed by: ORTHOPAEDIC SURGERY

## 2021-03-04 PROCEDURE — 99999 PR PBB SHADOW E&M-EST. PATIENT-LVL III: ICD-10-PCS | Mod: PBBFAC,,, | Performed by: ORTHOPAEDIC SURGERY

## 2021-03-04 PROCEDURE — 73564 XR KNEE ORTHO RIGHT WITH FLEXION: ICD-10-PCS | Mod: 26,RT,, | Performed by: RADIOLOGY

## 2021-03-04 PROCEDURE — 73564 X-RAY EXAM KNEE 4 OR MORE: CPT | Mod: 26,RT,, | Performed by: RADIOLOGY

## 2021-03-04 PROCEDURE — 20610 DRAIN/INJ JOINT/BURSA W/O US: CPT | Mod: RT,S$GLB,, | Performed by: ORTHOPAEDIC SURGERY

## 2021-03-04 PROCEDURE — 1159F MED LIST DOCD IN RCRD: CPT | Mod: S$GLB,,, | Performed by: ORTHOPAEDIC SURGERY

## 2021-03-04 PROCEDURE — 73562 X-RAY EXAM OF KNEE 3: CPT | Mod: 26,LT,, | Performed by: RADIOLOGY

## 2021-03-04 PROCEDURE — 1101F PR PT FALLS ASSESS DOC 0-1 FALLS W/OUT INJ PAST YR: ICD-10-PCS | Mod: CPTII,S$GLB,, | Performed by: ORTHOPAEDIC SURGERY

## 2021-03-04 PROCEDURE — 1125F PR PAIN SEVERITY QUANTIFIED, PAIN PRESENT: ICD-10-PCS | Mod: S$GLB,,, | Performed by: ORTHOPAEDIC SURGERY

## 2021-03-04 PROCEDURE — 3288F PR FALLS RISK ASSESSMENT DOCUMENTED: ICD-10-PCS | Mod: CPTII,S$GLB,, | Performed by: ORTHOPAEDIC SURGERY

## 2021-03-04 RX ADMIN — TRIAMCINOLONE ACETONIDE 40 MG: 40 INJECTION, SUSPENSION INTRA-ARTICULAR; INTRAMUSCULAR at 02:03

## 2021-03-08 RX ORDER — TRIAMCINOLONE ACETONIDE 40 MG/ML
40 INJECTION, SUSPENSION INTRA-ARTICULAR; INTRAMUSCULAR
Status: DISCONTINUED | OUTPATIENT
Start: 2021-03-04 | End: 2021-03-08 | Stop reason: HOSPADM

## 2021-03-18 ENCOUNTER — HOSPITAL ENCOUNTER (OUTPATIENT)
Dept: CARDIOLOGY | Facility: CLINIC | Age: 81
Discharge: HOME | End: 2021-03-18
Attending: INTERNAL MEDICINE
Payer: MEDICARE

## 2021-03-18 DIAGNOSIS — R55 SYNCOPE, CARDIOGENIC: ICD-10-CM

## 2021-03-18 DIAGNOSIS — Z95.1 HX OF CABG: ICD-10-CM

## 2021-03-18 PROCEDURE — 93298 CARDIAC DEVICE CHECK - REMOTE: ICD-10-PCS | Mod: S$GLB,,, | Performed by: INTERNAL MEDICINE

## 2021-03-18 PROCEDURE — 93298 REM INTERROG DEV EVAL SCRMS: CPT | Mod: S$GLB,,, | Performed by: INTERNAL MEDICINE

## 2021-03-25 DIAGNOSIS — R55 SYNCOPE, CARDIOGENIC: Primary | ICD-10-CM

## 2021-03-25 DIAGNOSIS — Z95.1 HX OF CABG: ICD-10-CM

## 2021-04-14 ENCOUNTER — TELEPHONE (OUTPATIENT)
Dept: SPINE | Facility: CLINIC | Age: 81
End: 2021-04-14

## 2021-04-14 DIAGNOSIS — M25.562 LEFT KNEE PAIN, UNSPECIFIED CHRONICITY: Primary | ICD-10-CM

## 2021-04-15 ENCOUNTER — HOSPITAL ENCOUNTER (OUTPATIENT)
Dept: RADIOLOGY | Facility: HOSPITAL | Age: 81
Discharge: HOME | End: 2021-04-15
Attending: ORTHOPAEDIC SURGERY
Payer: MEDICARE

## 2021-04-15 ENCOUNTER — OFFICE VISIT (OUTPATIENT)
Dept: ORTHOPEDICS | Facility: CLINIC | Age: 81
End: 2021-04-15
Payer: MEDICARE

## 2021-04-15 VITALS — BODY MASS INDEX: 24.6 KG/M2 | WEIGHT: 122 LBS | HEIGHT: 59 IN | RESPIRATION RATE: 17 BRPM

## 2021-04-15 DIAGNOSIS — M25.562 LEFT KNEE PAIN, UNSPECIFIED CHRONICITY: Primary | ICD-10-CM

## 2021-04-15 DIAGNOSIS — M17.10 ARTHRITIS OF KNEE: ICD-10-CM

## 2021-04-15 DIAGNOSIS — M25.562 LEFT KNEE PAIN, UNSPECIFIED CHRONICITY: ICD-10-CM

## 2021-04-15 PROCEDURE — 3288F FALL RISK ASSESSMENT DOCD: CPT | Mod: CPTII,S$GLB,, | Performed by: ORTHOPAEDIC SURGERY

## 2021-04-15 PROCEDURE — 1159F MED LIST DOCD IN RCRD: CPT | Mod: S$GLB,,, | Performed by: ORTHOPAEDIC SURGERY

## 2021-04-15 PROCEDURE — 73564 X-RAY EXAM KNEE 4 OR MORE: CPT | Mod: 26,LT,, | Performed by: RADIOLOGY

## 2021-04-15 PROCEDURE — 73562 X-RAY EXAM OF KNEE 3: CPT | Mod: 26,RT,, | Performed by: RADIOLOGY

## 2021-04-15 PROCEDURE — 20610 DRAIN/INJ JOINT/BURSA W/O US: CPT | Mod: LT,S$GLB,, | Performed by: ORTHOPAEDIC SURGERY

## 2021-04-15 PROCEDURE — 3288F PR FALLS RISK ASSESSMENT DOCUMENTED: ICD-10-PCS | Mod: CPTII,S$GLB,, | Performed by: ORTHOPAEDIC SURGERY

## 2021-04-15 PROCEDURE — 20610 LARGE JOINT ASPIRATION/INJECTION: L KNEE: ICD-10-PCS | Mod: LT,S$GLB,, | Performed by: ORTHOPAEDIC SURGERY

## 2021-04-15 PROCEDURE — 1159F PR MEDICATION LIST DOCUMENTED IN MEDICAL RECORD: ICD-10-PCS | Mod: S$GLB,,, | Performed by: ORTHOPAEDIC SURGERY

## 2021-04-15 PROCEDURE — 99213 PR OFFICE/OUTPT VISIT, EST, LEVL III, 20-29 MIN: ICD-10-PCS | Mod: 25,S$GLB,, | Performed by: ORTHOPAEDIC SURGERY

## 2021-04-15 PROCEDURE — 73562 XR KNEE ORTHO LEFT WITH FLEXION: ICD-10-PCS | Mod: 26,RT,, | Performed by: RADIOLOGY

## 2021-04-15 PROCEDURE — 99213 OFFICE O/P EST LOW 20 MIN: CPT | Mod: 25,S$GLB,, | Performed by: ORTHOPAEDIC SURGERY

## 2021-04-15 PROCEDURE — 1125F PR PAIN SEVERITY QUANTIFIED, PAIN PRESENT: ICD-10-PCS | Mod: S$GLB,,, | Performed by: ORTHOPAEDIC SURGERY

## 2021-04-15 PROCEDURE — 1101F PT FALLS ASSESS-DOCD LE1/YR: CPT | Mod: CPTII,S$GLB,, | Performed by: ORTHOPAEDIC SURGERY

## 2021-04-15 PROCEDURE — 99999 PR PBB SHADOW E&M-EST. PATIENT-LVL III: CPT | Mod: PBBFAC,,, | Performed by: ORTHOPAEDIC SURGERY

## 2021-04-15 PROCEDURE — 99999 PR PBB SHADOW E&M-EST. PATIENT-LVL III: ICD-10-PCS | Mod: PBBFAC,,, | Performed by: ORTHOPAEDIC SURGERY

## 2021-04-15 PROCEDURE — 73564 X-RAY EXAM KNEE 4 OR MORE: CPT | Mod: TC,PN,LT

## 2021-04-15 PROCEDURE — 1101F PR PT FALLS ASSESS DOC 0-1 FALLS W/OUT INJ PAST YR: ICD-10-PCS | Mod: CPTII,S$GLB,, | Performed by: ORTHOPAEDIC SURGERY

## 2021-04-15 PROCEDURE — 1125F AMNT PAIN NOTED PAIN PRSNT: CPT | Mod: S$GLB,,, | Performed by: ORTHOPAEDIC SURGERY

## 2021-04-15 PROCEDURE — 73564 XR KNEE ORTHO LEFT WITH FLEXION: ICD-10-PCS | Mod: 26,LT,, | Performed by: RADIOLOGY

## 2021-04-15 RX ADMIN — TRIAMCINOLONE ACETONIDE 40 MG: 40 INJECTION, SUSPENSION INTRA-ARTICULAR; INTRAMUSCULAR at 01:04

## 2021-04-16 ENCOUNTER — TELEPHONE (OUTPATIENT)
Dept: OPHTHALMOLOGY | Facility: CLINIC | Age: 81
End: 2021-04-16

## 2021-04-16 RX ORDER — TRIAMCINOLONE ACETONIDE 40 MG/ML
40 INJECTION, SUSPENSION INTRA-ARTICULAR; INTRAMUSCULAR
Status: DISCONTINUED | OUTPATIENT
Start: 2021-04-15 | End: 2021-04-16 | Stop reason: HOSPADM

## 2021-04-21 ENCOUNTER — OFFICE VISIT (OUTPATIENT)
Dept: OPHTHALMOLOGY | Facility: CLINIC | Age: 81
End: 2021-04-21
Payer: MEDICARE

## 2021-04-21 DIAGNOSIS — H18.421 BAND KERATOPATHY OF RIGHT EYE: Primary | ICD-10-CM

## 2021-04-21 DIAGNOSIS — H20.9 IRITIS: ICD-10-CM

## 2021-04-21 PROCEDURE — 92004 COMPRE OPH EXAM NEW PT 1/>: CPT | Mod: S$GLB,,, | Performed by: OPHTHALMOLOGY

## 2021-04-21 PROCEDURE — 3288F PR FALLS RISK ASSESSMENT DOCUMENTED: ICD-10-PCS | Mod: CPTII,S$GLB,, | Performed by: OPHTHALMOLOGY

## 2021-04-21 PROCEDURE — 1100F PTFALLS ASSESS-DOCD GE2>/YR: CPT | Mod: CPTII,S$GLB,, | Performed by: OPHTHALMOLOGY

## 2021-04-21 PROCEDURE — 99999 PR PBB SHADOW E&M-EST. PATIENT-LVL IV: ICD-10-PCS | Mod: PBBFAC,,, | Performed by: OPHTHALMOLOGY

## 2021-04-21 PROCEDURE — 1100F PR PT FALLS ASSESS DOC 2+ FALLS/FALL W/INJURY/YR: ICD-10-PCS | Mod: CPTII,S$GLB,, | Performed by: OPHTHALMOLOGY

## 2021-04-21 PROCEDURE — 1126F PR PAIN SEVERITY QUANTIFIED, NO PAIN PRESENT: ICD-10-PCS | Mod: S$GLB,,, | Performed by: OPHTHALMOLOGY

## 2021-04-21 PROCEDURE — 1126F AMNT PAIN NOTED NONE PRSNT: CPT | Mod: S$GLB,,, | Performed by: OPHTHALMOLOGY

## 2021-04-21 PROCEDURE — 99999 PR PBB SHADOW E&M-EST. PATIENT-LVL IV: CPT | Mod: PBBFAC,,, | Performed by: OPHTHALMOLOGY

## 2021-04-21 PROCEDURE — 3288F FALL RISK ASSESSMENT DOCD: CPT | Mod: CPTII,S$GLB,, | Performed by: OPHTHALMOLOGY

## 2021-04-21 PROCEDURE — 92004 PR EYE EXAM, NEW PATIENT,COMPREHESV: ICD-10-PCS | Mod: S$GLB,,, | Performed by: OPHTHALMOLOGY

## 2021-04-21 RX ORDER — DULOXETIN HYDROCHLORIDE 30 MG/1
30 CAPSULE, DELAYED RELEASE ORAL
COMMUNITY
Start: 2021-04-19 | End: 2021-08-24

## 2021-04-21 RX ORDER — TEMAZEPAM 30 MG/1
30 CAPSULE ORAL NIGHTLY PRN
Status: ON HOLD | COMMUNITY
Start: 2021-04-08 | End: 2022-10-19 | Stop reason: HOSPADM

## 2021-04-21 RX ORDER — PANTOPRAZOLE SODIUM 40 MG/1
40 TABLET, DELAYED RELEASE ORAL EVERY MORNING
COMMUNITY
Start: 2021-01-28

## 2021-04-21 RX ORDER — METOPROLOL SUCCINATE 50 MG/1
50 TABLET, EXTENDED RELEASE ORAL DAILY
COMMUNITY
Start: 2021-01-28 | End: 2023-10-26

## 2021-04-21 RX ORDER — ESCITALOPRAM OXALATE 10 MG/1
TABLET ORAL
COMMUNITY
Start: 2021-02-09 | End: 2022-03-22

## 2021-04-21 RX ORDER — NEOMYCIN SULFATE, POLYMYXIN B SULFATE AND DEXAMETHASONE 3.5; 10000; 1 MG/ML; [USP'U]/ML; MG/ML
1 SUSPENSION/ DROPS OPHTHALMIC 4 TIMES DAILY
Qty: 5 ML | Refills: 3 | Status: SHIPPED | OUTPATIENT
Start: 2021-04-21 | End: 2021-05-21

## 2021-04-21 RX ORDER — ROPINIROLE 0.5 MG/1
0.5 TABLET, FILM COATED ORAL NIGHTLY PRN
COMMUNITY
Start: 2021-02-24

## 2021-04-21 RX ORDER — MIRABEGRON 50 MG/1
1 TABLET, FILM COATED, EXTENDED RELEASE ORAL DAILY
COMMUNITY
Start: 2021-03-26 | End: 2023-10-26

## 2021-04-21 RX ORDER — AMLODIPINE BESYLATE 5 MG/1
5 TABLET ORAL DAILY
COMMUNITY
Start: 2021-04-19 | End: 2023-12-05

## 2021-04-21 RX ORDER — SOLIFENACIN SUCCINATE 10 MG/1
10 TABLET, FILM COATED ORAL
COMMUNITY
Start: 2021-01-06 | End: 2022-10-18

## 2021-04-21 RX ORDER — PHENOBARBITAL WITH BELLADONNA ALKALOIDS 16.2; .1037; .0194; .0065 MG/5ML; MG/5ML; MG/5ML; MG/5ML
5 ELIXIR ORAL
COMMUNITY
End: 2022-10-18

## 2021-04-22 ENCOUNTER — TELEPHONE (OUTPATIENT)
Dept: OPHTHALMOLOGY | Facility: CLINIC | Age: 81
End: 2021-04-22

## 2021-05-03 ENCOUNTER — HOSPITAL ENCOUNTER (OUTPATIENT)
Dept: CARDIOLOGY | Facility: CLINIC | Age: 81
Discharge: HOME | End: 2021-05-03
Attending: INTERNAL MEDICINE
Payer: MEDICARE

## 2021-05-03 DIAGNOSIS — Z95.1 HX OF CABG: ICD-10-CM

## 2021-05-03 DIAGNOSIS — R55 SYNCOPE, CARDIOGENIC: ICD-10-CM

## 2021-05-03 PROCEDURE — 93298 REM INTERROG DEV EVAL SCRMS: CPT | Mod: S$GLB,,, | Performed by: INTERNAL MEDICINE

## 2021-05-03 PROCEDURE — 93298 CARDIAC DEVICE CHECK - REMOTE: ICD-10-PCS | Mod: S$GLB,,, | Performed by: INTERNAL MEDICINE

## 2021-05-13 ENCOUNTER — HOSPITAL ENCOUNTER (OUTPATIENT)
Dept: RADIOLOGY | Facility: HOSPITAL | Age: 81
Discharge: HOME | End: 2021-05-13
Attending: INTERNAL MEDICINE
Payer: MEDICARE

## 2021-05-13 DIAGNOSIS — Z12.31 ENCOUNTER FOR SCREENING MAMMOGRAM FOR MALIGNANT NEOPLASM OF BREAST: ICD-10-CM

## 2021-05-13 PROCEDURE — 77067 SCR MAMMO BI INCL CAD: CPT | Mod: 26,,, | Performed by: RADIOLOGY

## 2021-05-13 PROCEDURE — 77063 BREAST TOMOSYNTHESIS BI: CPT | Mod: 26,,, | Performed by: RADIOLOGY

## 2021-05-13 PROCEDURE — 77067 SCR MAMMO BI INCL CAD: CPT | Mod: TC

## 2021-05-13 PROCEDURE — 77063 MAMMO DIGITAL SCREENING BILAT WITH TOMO: ICD-10-PCS | Mod: 26,,, | Performed by: RADIOLOGY

## 2021-05-13 PROCEDURE — 77067 MAMMO DIGITAL SCREENING BILAT WITH TOMO: ICD-10-PCS | Mod: 26,,, | Performed by: RADIOLOGY

## 2021-05-24 ENCOUNTER — HOSPITAL ENCOUNTER (OUTPATIENT)
Dept: RADIOLOGY | Facility: HOSPITAL | Age: 81
Discharge: HOME | End: 2021-05-24
Attending: INTERNAL MEDICINE
Payer: MEDICARE

## 2021-05-24 DIAGNOSIS — I65.21 OCCLUSION OF RIGHT CAROTID ARTERY: ICD-10-CM

## 2021-05-24 PROCEDURE — 93880 US CAROTID BILATERAL: ICD-10-PCS | Mod: 26,,, | Performed by: RADIOLOGY

## 2021-05-24 PROCEDURE — 93880 EXTRACRANIAL BILAT STUDY: CPT | Mod: TC

## 2021-05-24 PROCEDURE — 93880 EXTRACRANIAL BILAT STUDY: CPT | Mod: 26,,, | Performed by: RADIOLOGY

## 2021-06-11 ENCOUNTER — PROCEDURE VISIT (OUTPATIENT)
Dept: OPHTHALMOLOGY | Facility: CLINIC | Age: 81
End: 2021-06-11
Attending: OPHTHALMOLOGY
Payer: MEDICARE

## 2021-06-11 DIAGNOSIS — H18.421 BAND KERATOPATHY OF RIGHT EYE: Primary | ICD-10-CM

## 2021-06-11 PROCEDURE — 92014 COMPRE OPH EXAM EST PT 1/>: CPT | Mod: 57,S$GLB,, | Performed by: OPHTHALMOLOGY

## 2021-06-11 PROCEDURE — 65436 PR CURETTE/TREAT CORNEA,APPLY CHELATE: ICD-10-PCS | Mod: RT,S$GLB,, | Performed by: OPHTHALMOLOGY

## 2021-06-11 PROCEDURE — 92014 PR EYE EXAM, EST PATIENT,COMPREHESV: ICD-10-PCS | Mod: 57,S$GLB,, | Performed by: OPHTHALMOLOGY

## 2021-06-11 PROCEDURE — 65436 CURETTE/TREAT CORNEA: CPT | Mod: RT,S$GLB,, | Performed by: OPHTHALMOLOGY

## 2021-06-16 ENCOUNTER — HOSPITAL ENCOUNTER (OUTPATIENT)
Dept: CARDIOLOGY | Facility: CLINIC | Age: 81
Discharge: HOME | End: 2021-06-16
Attending: INTERNAL MEDICINE
Payer: MEDICARE

## 2021-06-16 ENCOUNTER — OFFICE VISIT (OUTPATIENT)
Dept: OPHTHALMOLOGY | Facility: CLINIC | Age: 81
End: 2021-06-16
Payer: MEDICARE

## 2021-06-16 DIAGNOSIS — H18.421 BAND KERATOPATHY OF RIGHT EYE: Primary | ICD-10-CM

## 2021-06-16 PROCEDURE — 93298 CARDIAC DEVICE CHECK - REMOTE: ICD-10-PCS | Mod: S$GLB,,, | Performed by: INTERNAL MEDICINE

## 2021-06-16 PROCEDURE — 1126F PR PAIN SEVERITY QUANTIFIED, NO PAIN PRESENT: ICD-10-PCS | Mod: S$GLB,,, | Performed by: OPHTHALMOLOGY

## 2021-06-16 PROCEDURE — 99999 PR PBB SHADOW E&M-EST. PATIENT-LVL IV: CPT | Mod: PBBFAC,,, | Performed by: OPHTHALMOLOGY

## 2021-06-16 PROCEDURE — 93298 REM INTERROG DEV EVAL SCRMS: CPT | Mod: S$GLB,,, | Performed by: INTERNAL MEDICINE

## 2021-06-16 PROCEDURE — 99024 POSTOP FOLLOW-UP VISIT: CPT | Mod: S$GLB,,, | Performed by: OPHTHALMOLOGY

## 2021-06-16 PROCEDURE — 1126F AMNT PAIN NOTED NONE PRSNT: CPT | Mod: S$GLB,,, | Performed by: OPHTHALMOLOGY

## 2021-06-16 PROCEDURE — 99024 PR POST-OP FOLLOW-UP VISIT: ICD-10-PCS | Mod: S$GLB,,, | Performed by: OPHTHALMOLOGY

## 2021-06-16 PROCEDURE — 99999 PR PBB SHADOW E&M-EST. PATIENT-LVL IV: ICD-10-PCS | Mod: PBBFAC,,, | Performed by: OPHTHALMOLOGY

## 2021-08-02 ENCOUNTER — HOSPITAL ENCOUNTER (OUTPATIENT)
Dept: CARDIOLOGY | Facility: CLINIC | Age: 81
Discharge: HOME | End: 2021-08-02
Attending: INTERNAL MEDICINE
Payer: MEDICARE

## 2021-08-02 DIAGNOSIS — Z95.1 HX OF CABG: ICD-10-CM

## 2021-08-02 DIAGNOSIS — R55 SYNCOPE, CARDIOGENIC: ICD-10-CM

## 2021-08-02 PROCEDURE — 93298 REM INTERROG DEV EVAL SCRMS: CPT | Mod: S$GLB,,, | Performed by: INTERNAL MEDICINE

## 2021-08-02 PROCEDURE — 93298 CARDIAC DEVICE CHECK - REMOTE: ICD-10-PCS | Mod: S$GLB,,, | Performed by: INTERNAL MEDICINE

## 2021-08-10 ENCOUNTER — PATIENT MESSAGE (OUTPATIENT)
Dept: FAMILY MEDICINE | Facility: CLINIC | Age: 81
End: 2021-08-10

## 2021-08-16 ENCOUNTER — TELEPHONE (OUTPATIENT)
Dept: OTOLARYNGOLOGY | Facility: CLINIC | Age: 81
End: 2021-08-16

## 2021-08-17 ENCOUNTER — OFFICE VISIT (OUTPATIENT)
Dept: OTOLARYNGOLOGY | Facility: CLINIC | Age: 81
End: 2021-08-17
Payer: MEDICARE

## 2021-08-17 DIAGNOSIS — R04.0 EPISTAXIS: Primary | ICD-10-CM

## 2021-08-17 DIAGNOSIS — H61.91 SKIN LESION OF RIGHT EAR: ICD-10-CM

## 2021-08-17 PROCEDURE — 99999 PR PBB SHADOW E&M-EST. PATIENT-LVL IV: CPT | Mod: PBBFAC,,, | Performed by: NURSE PRACTITIONER

## 2021-08-17 PROCEDURE — 1126F AMNT PAIN NOTED NONE PRSNT: CPT | Mod: CPTII,S$GLB,, | Performed by: NURSE PRACTITIONER

## 2021-08-17 PROCEDURE — 1159F PR MEDICATION LIST DOCUMENTED IN MEDICAL RECORD: ICD-10-PCS | Mod: CPTII,S$GLB,, | Performed by: NURSE PRACTITIONER

## 2021-08-17 PROCEDURE — 30901 PR CTRL 2SEBLEED,ANTER,SIMPLE: ICD-10-PCS | Mod: RT,S$GLB,, | Performed by: NURSE PRACTITIONER

## 2021-08-17 PROCEDURE — 1126F PR PAIN SEVERITY QUANTIFIED, NO PAIN PRESENT: ICD-10-PCS | Mod: CPTII,S$GLB,, | Performed by: NURSE PRACTITIONER

## 2021-08-17 PROCEDURE — 1159F MED LIST DOCD IN RCRD: CPT | Mod: CPTII,S$GLB,, | Performed by: NURSE PRACTITIONER

## 2021-08-17 PROCEDURE — 99214 PR OFFICE/OUTPT VISIT, EST, LEVL IV, 30-39 MIN: ICD-10-PCS | Mod: 25,S$GLB,, | Performed by: NURSE PRACTITIONER

## 2021-08-17 PROCEDURE — 1101F PT FALLS ASSESS-DOCD LE1/YR: CPT | Mod: CPTII,S$GLB,, | Performed by: NURSE PRACTITIONER

## 2021-08-17 PROCEDURE — 30901 CONTROL OF NOSEBLEED: CPT | Mod: RT,S$GLB,, | Performed by: NURSE PRACTITIONER

## 2021-08-17 PROCEDURE — 99214 OFFICE O/P EST MOD 30 MIN: CPT | Mod: 25,S$GLB,, | Performed by: NURSE PRACTITIONER

## 2021-08-17 PROCEDURE — 99999 PR PBB SHADOW E&M-EST. PATIENT-LVL IV: ICD-10-PCS | Mod: PBBFAC,,, | Performed by: NURSE PRACTITIONER

## 2021-08-17 PROCEDURE — 1101F PR PT FALLS ASSESS DOC 0-1 FALLS W/OUT INJ PAST YR: ICD-10-PCS | Mod: CPTII,S$GLB,, | Performed by: NURSE PRACTITIONER

## 2021-08-17 PROCEDURE — 3288F FALL RISK ASSESSMENT DOCD: CPT | Mod: CPTII,S$GLB,, | Performed by: NURSE PRACTITIONER

## 2021-08-17 PROCEDURE — 3288F PR FALLS RISK ASSESSMENT DOCUMENTED: ICD-10-PCS | Mod: CPTII,S$GLB,, | Performed by: NURSE PRACTITIONER

## 2021-08-24 ENCOUNTER — OFFICE VISIT (OUTPATIENT)
Dept: DERMATOLOGY | Facility: CLINIC | Age: 81
End: 2021-08-24
Payer: MEDICARE

## 2021-08-24 DIAGNOSIS — D48.5 NEOPLASM OF UNCERTAIN BEHAVIOR OF SKIN: Primary | ICD-10-CM

## 2021-08-24 PROCEDURE — 3288F PR FALLS RISK ASSESSMENT DOCUMENTED: ICD-10-PCS | Mod: CPTII,S$GLB,, | Performed by: STUDENT IN AN ORGANIZED HEALTH CARE EDUCATION/TRAINING PROGRAM

## 2021-08-24 PROCEDURE — 99499 UNLISTED E&M SERVICE: CPT | Mod: S$GLB,,, | Performed by: STUDENT IN AN ORGANIZED HEALTH CARE EDUCATION/TRAINING PROGRAM

## 2021-08-24 PROCEDURE — 99499 NO LOS: ICD-10-PCS | Mod: S$GLB,,, | Performed by: STUDENT IN AN ORGANIZED HEALTH CARE EDUCATION/TRAINING PROGRAM

## 2021-08-24 PROCEDURE — 3288F FALL RISK ASSESSMENT DOCD: CPT | Mod: CPTII,S$GLB,, | Performed by: STUDENT IN AN ORGANIZED HEALTH CARE EDUCATION/TRAINING PROGRAM

## 2021-08-24 PROCEDURE — 88305 TISSUE EXAM BY PATHOLOGIST: CPT | Performed by: PATHOLOGY

## 2021-08-24 PROCEDURE — 99999 PR PBB SHADOW E&M-EST. PATIENT-LVL IV: ICD-10-PCS | Mod: PBBFAC,,, | Performed by: STUDENT IN AN ORGANIZED HEALTH CARE EDUCATION/TRAINING PROGRAM

## 2021-08-24 PROCEDURE — 88305 TISSUE EXAM BY PATHOLOGIST: CPT | Mod: 26,,, | Performed by: PATHOLOGY

## 2021-08-24 PROCEDURE — 1101F PR PT FALLS ASSESS DOC 0-1 FALLS W/OUT INJ PAST YR: ICD-10-PCS | Mod: CPTII,S$GLB,, | Performed by: STUDENT IN AN ORGANIZED HEALTH CARE EDUCATION/TRAINING PROGRAM

## 2021-08-24 PROCEDURE — 1160F PR REVIEW ALL MEDS BY PRESCRIBER/CLIN PHARMACIST DOCUMENTED: ICD-10-PCS | Mod: CPTII,S$GLB,, | Performed by: STUDENT IN AN ORGANIZED HEALTH CARE EDUCATION/TRAINING PROGRAM

## 2021-08-24 PROCEDURE — 11102 TANGNTL BX SKIN SINGLE LES: CPT | Mod: S$GLB,,, | Performed by: STUDENT IN AN ORGANIZED HEALTH CARE EDUCATION/TRAINING PROGRAM

## 2021-08-24 PROCEDURE — 99999 PR PBB SHADOW E&M-EST. PATIENT-LVL IV: CPT | Mod: PBBFAC,,, | Performed by: STUDENT IN AN ORGANIZED HEALTH CARE EDUCATION/TRAINING PROGRAM

## 2021-08-24 PROCEDURE — 1101F PT FALLS ASSESS-DOCD LE1/YR: CPT | Mod: CPTII,S$GLB,, | Performed by: STUDENT IN AN ORGANIZED HEALTH CARE EDUCATION/TRAINING PROGRAM

## 2021-08-24 PROCEDURE — 1160F RVW MEDS BY RX/DR IN RCRD: CPT | Mod: CPTII,S$GLB,, | Performed by: STUDENT IN AN ORGANIZED HEALTH CARE EDUCATION/TRAINING PROGRAM

## 2021-08-24 PROCEDURE — 1126F AMNT PAIN NOTED NONE PRSNT: CPT | Mod: CPTII,S$GLB,, | Performed by: STUDENT IN AN ORGANIZED HEALTH CARE EDUCATION/TRAINING PROGRAM

## 2021-08-24 PROCEDURE — 88305 TISSUE EXAM BY PATHOLOGIST: ICD-10-PCS | Mod: 26,,, | Performed by: PATHOLOGY

## 2021-08-24 PROCEDURE — 1159F PR MEDICATION LIST DOCUMENTED IN MEDICAL RECORD: ICD-10-PCS | Mod: CPTII,S$GLB,, | Performed by: STUDENT IN AN ORGANIZED HEALTH CARE EDUCATION/TRAINING PROGRAM

## 2021-08-24 PROCEDURE — 1126F PR PAIN SEVERITY QUANTIFIED, NO PAIN PRESENT: ICD-10-PCS | Mod: CPTII,S$GLB,, | Performed by: STUDENT IN AN ORGANIZED HEALTH CARE EDUCATION/TRAINING PROGRAM

## 2021-08-24 PROCEDURE — 1159F MED LIST DOCD IN RCRD: CPT | Mod: CPTII,S$GLB,, | Performed by: STUDENT IN AN ORGANIZED HEALTH CARE EDUCATION/TRAINING PROGRAM

## 2021-08-24 PROCEDURE — 11102 PR TANGENTIAL BIOPSY, SKIN, SINGLE LESION: ICD-10-PCS | Mod: S$GLB,,, | Performed by: STUDENT IN AN ORGANIZED HEALTH CARE EDUCATION/TRAINING PROGRAM

## 2021-08-27 LAB
FINAL PATHOLOGIC DIAGNOSIS: NORMAL
GROSS: NORMAL
Lab: NORMAL
MICROSCOPIC EXAM: NORMAL

## 2021-09-01 ENCOUNTER — TELEPHONE (OUTPATIENT)
Dept: DERMATOLOGY | Facility: CLINIC | Age: 81
End: 2021-09-01

## 2021-09-07 ENCOUNTER — CLINICAL SUPPORT (OUTPATIENT)
Dept: DERMATOLOGY | Facility: CLINIC | Age: 81
End: 2021-09-07
Payer: MEDICARE

## 2021-09-07 DIAGNOSIS — Z48.02 VISIT FOR SUTURE REMOVAL: Primary | ICD-10-CM

## 2021-09-07 PROCEDURE — 99999 PR PBB SHADOW E&M-EST. PATIENT-LVL I: ICD-10-PCS | Mod: PBBFAC,,,

## 2021-09-07 PROCEDURE — 99999 PR PBB SHADOW E&M-EST. PATIENT-LVL I: CPT | Mod: PBBFAC,,,

## 2021-09-14 ENCOUNTER — HOSPITAL ENCOUNTER (OUTPATIENT)
Dept: CARDIOLOGY | Facility: CLINIC | Age: 81
Discharge: HOME | End: 2021-09-14
Attending: INTERNAL MEDICINE
Payer: MEDICARE

## 2021-09-14 DIAGNOSIS — R55 SYNCOPE, CARDIOGENIC: ICD-10-CM

## 2021-09-14 DIAGNOSIS — Z95.1 HX OF CABG: ICD-10-CM

## 2021-09-14 PROCEDURE — 93298 CARDIAC DEVICE CHECK - REMOTE: ICD-10-PCS | Mod: S$GLB,,, | Performed by: INTERNAL MEDICINE

## 2021-09-14 PROCEDURE — 93298 REM INTERROG DEV EVAL SCRMS: CPT | Mod: S$GLB,,, | Performed by: INTERNAL MEDICINE

## 2021-09-17 ENCOUNTER — TELEPHONE (OUTPATIENT)
Dept: DERMATOLOGY | Facility: CLINIC | Age: 81
End: 2021-09-17

## 2021-10-29 ENCOUNTER — HOSPITAL ENCOUNTER (OUTPATIENT)
Dept: CARDIOLOGY | Facility: CLINIC | Age: 81
Discharge: HOME | End: 2021-10-29
Attending: INTERNAL MEDICINE
Payer: MEDICARE

## 2021-10-29 DIAGNOSIS — R55 SYNCOPE, CARDIOGENIC: ICD-10-CM

## 2021-10-29 DIAGNOSIS — Z95.1 HX OF CABG: ICD-10-CM

## 2021-10-29 PROCEDURE — 93298 CARDIAC DEVICE CHECK - REMOTE: ICD-10-PCS | Mod: S$GLB,,, | Performed by: INTERNAL MEDICINE

## 2021-10-29 PROCEDURE — 93298 REM INTERROG DEV EVAL SCRMS: CPT | Mod: S$GLB,,, | Performed by: INTERNAL MEDICINE

## 2021-12-09 ENCOUNTER — TELEPHONE (OUTPATIENT)
Dept: ORTHOPEDICS | Facility: CLINIC | Age: 81
End: 2021-12-09
Payer: MEDICARE

## 2021-12-13 ENCOUNTER — HOSPITAL ENCOUNTER (OUTPATIENT)
Dept: RADIOLOGY | Facility: HOSPITAL | Age: 81
Discharge: HOME | End: 2021-12-13
Attending: ORTHOPAEDIC SURGERY
Payer: MEDICARE

## 2021-12-13 ENCOUNTER — OFFICE VISIT (OUTPATIENT)
Dept: ORTHOPEDICS | Facility: CLINIC | Age: 81
End: 2021-12-13
Payer: MEDICARE

## 2021-12-13 VITALS — WEIGHT: 122 LBS | BODY MASS INDEX: 24.6 KG/M2 | HEIGHT: 59 IN | RESPIRATION RATE: 18 BRPM

## 2021-12-13 DIAGNOSIS — M25.559 HIP PAIN: Primary | ICD-10-CM

## 2021-12-13 DIAGNOSIS — M17.10 ARTHRITIS OF KNEE: ICD-10-CM

## 2021-12-13 DIAGNOSIS — M25.559 HIP PAIN: ICD-10-CM

## 2021-12-13 DIAGNOSIS — M70.62 TROCHANTERIC BURSITIS, LEFT HIP: ICD-10-CM

## 2021-12-13 PROCEDURE — 73502 XR HIP WITH PELVIS WHEN PERFORMED, 2 OR 3 VIEWS LEFT: ICD-10-PCS | Mod: 26,LT,, | Performed by: RADIOLOGY

## 2021-12-13 PROCEDURE — 99999 PR PBB SHADOW E&M-EST. PATIENT-LVL IV: ICD-10-PCS | Mod: PBBFAC,,, | Performed by: ORTHOPAEDIC SURGERY

## 2021-12-13 PROCEDURE — 73502 X-RAY EXAM HIP UNI 2-3 VIEWS: CPT | Mod: 26,LT,, | Performed by: RADIOLOGY

## 2021-12-13 PROCEDURE — 99999 PR PBB SHADOW E&M-EST. PATIENT-LVL IV: CPT | Mod: PBBFAC,,, | Performed by: ORTHOPAEDIC SURGERY

## 2021-12-13 PROCEDURE — 20610 DRAIN/INJ JOINT/BURSA W/O US: CPT | Mod: 51,LT,S$GLB, | Performed by: ORTHOPAEDIC SURGERY

## 2021-12-13 PROCEDURE — 99213 PR OFFICE/OUTPT VISIT, EST, LEVL III, 20-29 MIN: ICD-10-PCS | Mod: 25,S$GLB,, | Performed by: ORTHOPAEDIC SURGERY

## 2021-12-13 PROCEDURE — 99213 OFFICE O/P EST LOW 20 MIN: CPT | Mod: 25,S$GLB,, | Performed by: ORTHOPAEDIC SURGERY

## 2021-12-13 PROCEDURE — 20610 DRAIN/INJ JOINT/BURSA W/O US: CPT | Mod: LT,S$GLB,, | Performed by: ORTHOPAEDIC SURGERY

## 2021-12-13 PROCEDURE — 73502 X-RAY EXAM HIP UNI 2-3 VIEWS: CPT | Mod: TC,PN,LT

## 2021-12-13 PROCEDURE — 20610 LARGE JOINT ASPIRATION/INJECTION: L KNEE: ICD-10-PCS | Mod: LT,S$GLB,, | Performed by: ORTHOPAEDIC SURGERY

## 2021-12-13 RX ORDER — TRIAMCINOLONE ACETONIDE 40 MG/ML
40 INJECTION, SUSPENSION INTRA-ARTICULAR; INTRAMUSCULAR
Status: DISCONTINUED | OUTPATIENT
Start: 2021-12-13 | End: 2021-12-13 | Stop reason: HOSPADM

## 2021-12-13 RX ADMIN — TRIAMCINOLONE ACETONIDE 40 MG: 40 INJECTION, SUSPENSION INTRA-ARTICULAR; INTRAMUSCULAR at 04:12

## 2022-02-14 ENCOUNTER — TELEPHONE (OUTPATIENT)
Dept: OTOLARYNGOLOGY | Facility: CLINIC | Age: 82
End: 2022-02-14
Payer: MEDICARE

## 2022-02-14 NOTE — TELEPHONE ENCOUNTER
Incoming call from pt via call center. Pt was calling back to confirm an appt on 02/14 @ 1100.   Pt's appt was rescheduled; attained verbal confirmation.

## 2022-02-14 NOTE — TELEPHONE ENCOUNTER
Contacted pt after receiving the following msg. Pt wanted to know if provider could do both crystal adjustment and nasal cauterization all on the same day. I explained to the pt that both things may happen on the same day, depending on how her appt goes (in terms of time-length). I did explain to her that sometimes we do schedule separate appts, since we our NP appt are only 30min.     Pt then requested to scheduled her two appts back to back so she can have everything done all in the same day. I once again explained to pt that, that typically won't do. I let her know that both her concerns may be resolved the same day, to which having two separate appts that same day will not make sense. I reassured pt that if everything could not be done at the time of her visit, we can scheduled her for a separate appt for another day.     Pt acknowledged, and then requested a sooner date, if one was available.  I offered pt 02/17 @ 1100; pt stated that she did not know if she could, but would check with her niece to see if she may bring her to her appt. Pt will get back to me with response.       ----- Message from Isai Palmer sent at 2/14/2022  3:02 PM CST -----  Contact: Self  Type: Needs Medical Advice  Who Called:  Patient  Best Call Back Number: [phone]   Additional Information:  Would like to know if NP can adjust her crystals or cauterize her nose.

## 2022-02-17 ENCOUNTER — OFFICE VISIT (OUTPATIENT)
Dept: OTOLARYNGOLOGY | Facility: CLINIC | Age: 82
End: 2022-02-17
Payer: MEDICARE

## 2022-02-17 VITALS — HEIGHT: 59 IN | WEIGHT: 121.69 LBS | BODY MASS INDEX: 24.53 KG/M2

## 2022-02-17 DIAGNOSIS — R44.0 AUDITORY HALLUCINATION: Primary | ICD-10-CM

## 2022-02-17 DIAGNOSIS — R04.0 EPISTAXIS: ICD-10-CM

## 2022-02-17 DIAGNOSIS — H81.13 BENIGN PAROXYSMAL POSITIONAL VERTIGO DUE TO BILATERAL VESTIBULAR DISORDER: ICD-10-CM

## 2022-02-17 DIAGNOSIS — H93.91 LESION OF RIGHT EAR: ICD-10-CM

## 2022-02-17 PROCEDURE — 1126F AMNT PAIN NOTED NONE PRSNT: CPT | Mod: CPTII,S$GLB,, | Performed by: PHYSICIAN ASSISTANT

## 2022-02-17 PROCEDURE — 1160F RVW MEDS BY RX/DR IN RCRD: CPT | Mod: CPTII,S$GLB,, | Performed by: PHYSICIAN ASSISTANT

## 2022-02-17 PROCEDURE — 99215 PR OFFICE/OUTPT VISIT, EST, LEVL V, 40-54 MIN: ICD-10-PCS | Mod: 25,S$GLB,, | Performed by: PHYSICIAN ASSISTANT

## 2022-02-17 PROCEDURE — 99215 OFFICE O/P EST HI 40 MIN: CPT | Mod: 25,S$GLB,, | Performed by: PHYSICIAN ASSISTANT

## 2022-02-17 PROCEDURE — 95992 CANALITH REPOSITIONING PROC: CPT | Mod: S$GLB,,, | Performed by: PHYSICIAN ASSISTANT

## 2022-02-17 PROCEDURE — 1159F MED LIST DOCD IN RCRD: CPT | Mod: CPTII,S$GLB,, | Performed by: PHYSICIAN ASSISTANT

## 2022-02-17 PROCEDURE — 30901 PR CTRL 2SEBLEED,ANTER,SIMPLE: ICD-10-PCS | Mod: RT,S$GLB,, | Performed by: PHYSICIAN ASSISTANT

## 2022-02-17 PROCEDURE — 30901 CONTROL OF NOSEBLEED: CPT | Mod: RT,S$GLB,, | Performed by: PHYSICIAN ASSISTANT

## 2022-02-17 PROCEDURE — 1126F PR PAIN SEVERITY QUANTIFIED, NO PAIN PRESENT: ICD-10-PCS | Mod: CPTII,S$GLB,, | Performed by: PHYSICIAN ASSISTANT

## 2022-02-17 PROCEDURE — 95992 PR CANALITH REPOSITIONING PROCEDURE, PER DAY: ICD-10-PCS | Mod: S$GLB,,, | Performed by: PHYSICIAN ASSISTANT

## 2022-02-17 PROCEDURE — 1159F PR MEDICATION LIST DOCUMENTED IN MEDICAL RECORD: ICD-10-PCS | Mod: CPTII,S$GLB,, | Performed by: PHYSICIAN ASSISTANT

## 2022-02-17 PROCEDURE — 1160F PR REVIEW ALL MEDS BY PRESCRIBER/CLIN PHARMACIST DOCUMENTED: ICD-10-PCS | Mod: CPTII,S$GLB,, | Performed by: PHYSICIAN ASSISTANT

## 2022-02-17 RX ORDER — MUPIROCIN 20 MG/G
OINTMENT TOPICAL
Qty: 30 G | Refills: 0 | Status: SHIPPED | OUTPATIENT
Start: 2022-02-17 | End: 2022-10-18

## 2022-02-17 NOTE — PROGRESS NOTES
[facility]    Subjective:      Patient: Sunita Carlson Patient PCP: Ranjan Goncalves MD         :  1940     Sex:  female      MRN:  [id number]          Date of Visit: 2022      Chief Complaint: Nosebleeds     Patient ID: Sunita Carlson is a 81 y.o. female who was self-referred for nosebleeds. Pt states that she has had right sided nose-bleeds every Thursday for the past 3-4 weeks. Pt has history of multiple nasal cauterizations in the past. Pt states she had last nosebleed yesterday that lasted for around 30 minutes. Pt is not currently having active nosebleed. Pt has been using cotton balls with afrin applied to stop the bleeding. There is not a prior history of nasal surgery. There is not a prior history of nasal trauma.  She does not currently use a nasal spray. There is not a family history to suggest a clotting disorder.  She does currently take a blood-thinning agent-ASA 81 mg once daily. Pt has had dizziness for around 2 months. Positional vertigo with left sided head movement that happen daily. Pt states that she hears people talking or music at times when there are no people around or music playing. Pt denies ear surgeries or ear trauma. Pt had falls and hit her head 3 years ago and had syncope, but has not had an episode since.     Pt right sided ear lesion has not healed. Previously biopsied by Kendrick Lam 2021 with pathology showing inflammatory changes and hyperkeratosis.     Review of Systems   Constitutional: Negative for chills and fever.   HENT: Positive for nosebleeds.    Neurological: Positive for dizziness.   Psychiatric/Behavioral: Negative for agitation, behavioral problems, confusion, decreased concentration and dysphoric mood.      Past Medical History:   Diagnosis Date    Arthritis     Cataract     Coronary artery disease     Hypertension     Insomnia     Neuropathy     Retinal detachment     Stomach ulcer      Family History   Problem Relation Age of Onset     Cancer Father     Macular degeneration Maternal Aunt     Cancer Maternal Aunt     Breast cancer Maternal Aunt     Macular degeneration Maternal Uncle     Diabetes Paternal Aunt     Cancer Paternal Aunt     Blindness Maternal Grandfather     Melanoma Neg Hx     Psoriasis Neg Hx     Lupus Neg Hx     Eczema Neg Hx      Past Surgical History:   Procedure Laterality Date    APPENDECTOMY      BLADDER SUSPENSION      BREAST BIOPSY      CARDIAC SURGERY      carotid endarterectomy      L    CATARACT EXTRACTION      CHOLECYSTECTOMY      HYSTERECTOMY      INJECTION OF ANESTHETIC AGENT AROUND GANGLION IMPAR N/A 9/12/2019    Procedure: BLOCK, GANGLION IMPAR;  Surgeon: Christian James MD;  Location: [facility] OR;  Service: Pain Management;  Laterality: N/A;    INSERTION OF IMPLANTABLE LOOP RECORDER N/A 12/18/2020    Procedure: INSERTION, IMPLANTABLE LOOP RECORDER;  Surgeon: Adin Saba MD;  Location: [facility] CATH/EP LAB;  Service: Cardiology;  Laterality: N/A;    OOPHORECTOMY      SUPERFICIAL KERATECTOMY Right 10/27/2017    Dr. Morales    TONSILLECTOMY      TRANSFORAMINAL EPIDURAL INJECTION OF STEROID Left 8/12/2019    Procedure: Injection,steroid,epidural,transforaminal approach L4-5, L5-S1;  Surgeon: Christian James MD;  Location: [facility] OR;  Service: Pain Management;  Laterality: Left;    TRANSFORAMINAL EPIDURAL INJECTION OF STEROID Left 1/16/2020    Procedure: Injection,steroid,epidural,transforaminal approach;  Surgeon: Christian James MD;  Location: [facility] OR;  Service: Pain Management;  Laterality: Left;  L4-5, L5-S1    TRANSFORAMINAL EPIDURAL INJECTION OF STEROID Left 6/30/2020    Procedure: Injection,steroid,epidural,transforaminal approach;  Surgeon: Christian James MD;  Location: [facility];  Service: Pain Management;  Laterality: Left;  L4-5 and L5-S1     Social History     Socioeconomic History    Marital status:    Tobacco Use    Smoking status: Former Smoker     Years: 4.00     Types: Cigarettes     Quit  date: 10/13/1962     Years since quittin.3    Smokeless tobacco: Never Used   Substance and Sexual Activity    Alcohol use: No    Drug use: No    Sexual activity: Yes     Birth control/protection: Surgical       Current Outpatient Medications:     amLODIPine (NORVASC) 5 MG tablet, Take 5 mg by mouth., Disp: , Rfl:     aspirin (ECOTRIN) 81 MG EC tablet, Take 81 mg by mouth once daily., Disp: , Rfl:     atorvastatin (LIPITOR) 40 MG tablet, Take 1 tablet (40 mg total) by mouth once daily., Disp: 90 tablet, Rfl: 3    betaxoloL (KERLONE) 10 mg Tab, Take 1 tablet by mouth once daily., Disp: , Rfl:     cholecalciferol, vitamin D3, (VITAMIN D3) 2,000 unit Tab, Vitamin D3 50 mcg (2,000 unit) tablet  1 tablet every day by mouth, Disp: , Rfl:     dexlansoprazole (DEXILANT) 30 mg CpDM, Take 60 mg by mouth. , Disp: , Rfl:     diclofenac sodium (VOLTAREN) 1 % Gel, Apply 2 g topically daily as needed., Disp: , Rfl:     dicyclomine (BENTYL) 10 MG capsule, 2 (two) times daily as needed. , Disp: , Rfl:     diphenoxylate-atropine 2.5-0.025 mg (LOMOTIL) 2.5-0.025 mg per tablet, Take 1 tablet by mouth 4 (four) times daily as needed for Diarrhea., Disp: , Rfl:     EScitalopram oxalate (LEXAPRO) 10 MG tablet, , Disp: , Rfl:     gabapentin (NEURONTIN) 300 MG capsule, TAKE 2 CAPSULES BY MOUTH IN THE EVENING, Disp: 180 capsule, Rfl: 3    metoprolol succinate (TOPROL-XL) 50 MG 24 hr tablet, Take 50 mg by mouth once daily., Disp: , Rfl:     mirtazapine (REMERON) 15 MG tablet, TAKE 1 TABLET BY MOUTH IN  THE EVENING, Disp: 90 tablet, Rfl: 3    mupirocin (BACTROBAN) 2 % ointment, Apply layer to right nostril with clean q-tip twice daily for 14 days., Disp: 30 g, Rfl: 0    MYRBETRIQ 50 mg Tb24, Take 1 tablet by mouth once daily., Disp: , Rfl:     nitroGLYCERIN (NITROSTAT) 0.4 MG SL tablet, Place under the tongue., Disp: , Rfl:     ondansetron (ZOFRAN-ODT) 8 MG TbDL, every 6 (six) hours as needed. , Disp: , Rfl:      pantoprazole (PROTONIX) 40 MG tablet, Take 40 mg by mouth every morning., Disp: , Rfl:     phenobarb-hyoscy-atropine-scop (DONNATAL) 16.2-0.1037 -0.0194 mg/5 mL Elix, 5 mLs., Disp: , Rfl:     rOPINIRole (REQUIP) 0.5 MG tablet, Take 0.5 mg by mouth nightly as needed., Disp: , Rfl:     sars-cov-2, covid-19, (MODERNA COVID-19) 100 mcg/0.5 ml injection, Inject into the muscle., Disp: 0.5 mL, Rfl: 0    solifenacin (VESICARE) 10 MG tablet, 10 mg., Disp: , Rfl:     temazepam (RESTORIL) 30 mg capsule, Take 30 mg by mouth nightly as needed., Disp: , Rfl:     Review of patient's allergies indicates:   Allergen Reactions    Demerol [meperidine] Nausea And Vomiting    Hydrocodone Itching    Oxycodone-acetaminophen Itching     All medications, allergies, and past history have been reviewed.    Objective:      Vitals:  Vitals - 1 value per visit 12/13/2021 2/17/2022 2/17/2022   SYSTOLIC - - -   DIASTOLIC - - -   Pulse - - -   Temp - - -   Resp 18 - -   SPO2 - - -   Weight (lb) 122 - 121.69   Weight (kg) 55.339 - 55.2   Height 59 - 59   BMI (Calculated) 24.6 - 24.6   VISIT REPORT - - -   Pain Score  - 0 -   Some recent data might be hidden       Body surface area is 1.52 meters squared.  Physical Exam  Constitutional:       General: She is not in acute distress.     Appearance: Normal appearance. She is not ill-appearing.   HENT:      Head: Normocephalic and atraumatic.      Right Ear: Tympanic membrane, ear canal and external ear normal.      Left Ear: Tympanic membrane, ear canal and external ear normal.      Nose: Nose normal.      Comments: Right anterior septal scabbing and irritation.     Mouth/Throat:      Lips: Pink. No lesions.      Mouth: Mucous membranes are moist. No oral lesions.      Dentition: No gum lesions.      Tongue: No lesions.      Palate: No lesions.      Pharynx: Oropharynx is clear. Uvula midline. No pharyngeal swelling, oropharyngeal exudate, posterior oropharyngeal erythema or uvula swelling.    Eyes:      General:         Right eye: No discharge.         Left eye: No discharge.      Extraocular Movements: Extraocular movements intact.      Conjunctiva/sclera: Conjunctivae normal.   Pulmonary:      Effort: Pulmonary effort is normal.   Neurological:      General: No focal deficit present.      Mental Status: She is alert and oriented to person, place, and time. Mental status is at baseline.   Psychiatric:         Mood and Affect: Mood normal.         Behavior: Behavior normal.         Thought Content: Thought content normal.         Judgment: Judgment normal.       Dagoberto Hallpike: L>R rotary nystagmus toward affected ear bilaterally.    Procedure:  right anterior septal nasal cauterization completed with use of 4%lidocain and 1/2% neosynephrine aerosolized into nostril. Silver nitrate used for cauterization. Pt tolerated procedure well.    Labs:  RBC   Date Value Ref Range Status   10/09/2020 4.63 4.00 - 5.40 M/uL Final     Hemoglobin   Date Value Ref Range Status   10/09/2020 13.8 12.0 - 16.0 g/dL Final     Hematocrit   Date Value Ref Range Status   10/09/2020 42.5 37.0 - 48.5 % Final     Platelets   Date Value Ref Range Status   10/09/2020 290 150 - 350 K/uL Final     Prothrombin Time   Date Value Ref Range Status   02/06/2020 9.6 9.0 - 12.5 sec Final     INR   Date Value Ref Range Status   02/06/2020 0.9 0.8 - 1.2 Final     Comment:     Coumadin Therapy:  2.0 - 3.0 for INR for all indicators except mechanical heart valves  and antiphospholipid syndromes which should use 2.5 - 3.5.       All lab results, imaging results, and data have been reviewed.    Assessment:        ICD-10-CM ICD-9-CM   1. Epistaxis  R04.0 784.7   2. Benign paroxysmal positional vertigo due to bilateral vestibular disorder  H81.13 386.11   3. Lesion of right ear  H93.91 388.9   4. Auditory hallucination  R44.0 780.1            Plan:      Epistaxis-right  -     mupirocin (BACTROBAN) 2 % ointment; Apply layer to right nostril with  clean q-tip twice daily for 14 days.  Dispense: 30 g; Refill: 0  - Follow up in office in 2 weeks for post right anterior nasal cauterization follow up.    Nose Bleed Instructions:  1) We had a discussion regarding the importance of nasal moisture, and the use of a nasal saline spray or gel into nose 4 to 6 times daily to keep moist.   2) Avoid blowing your nose for 2 weeks. You may use nasal saline rinses instead of blowing.    3) Bactroban (mupirocin) ointment in nostril twice daily for 2 weeks to right nostril.  4) Do not sleep or sit for long periods of time under a ceiling fan or other source of aggressive airflow.  5) Use a humidifier in bedroom or any room in your home you spend long periods of time.  6) Engage in only light activity. No strenuous activity. No heavy lifting or straining for 2 weeks.   7) No bending over at the hips. Keep nose above your heart at all times for one week.  8) Sneeze with an open mouth to reduce pressure from nose for 2 weeks.   9) Avoid foods or drinks hot in temperature for at least 48 hours then progress slowly.  10) Avoid hot steamy showers or baths for one week.  11) Do not blow nose for 2 weeks.  Sneeze with mouth open.    How to stop a Nosebleed:  1. Pinch the soft parts of the nose between your thumb and two fingers.  2. Hold it for 10 minutes without releasing (timed by a clock).  3. Keep head higher than the level of the heart, sit upright.  4. Positioned forward, chin tucked to chest to avoid swallowing any blood.    If Re-bleeding Occurs:  Spray nose two times on both sides with decongestant spray (such as Afrin® or Choco-Synephrine®) and pinch nose and position head forward again. You do not want to use afrin spray for more than 3 days. If you are not able to control your nosebleeds after 3 days or have a severe nosebleed, then follow up in office for re-evaluation. If you have a nosebleed that does not stop within 15 minutes of the use of afrin spray, then go to your  nearest urgent care or ED and follow up in office.      Benign paroxysmal positional vertigo due to bilateral vestibular disorder  -Left Epley maneuver perfomed in office due to L>R posterior canal BPPV. Pt is to follow up in 2 weeks for BPPV recheck. Pt is to follow post-epley maneuver instructions as seen in AVS. Pt is to start epley at home exercises in 2 days as shown in AVS.     Lesion of right ear  -Pt right sided ear lesion has not healed. Previously biopsied by Derm Dr. Lam 08/2021 with pathology showing inflammatory changes and hyperkeratosis. Pt was to follow up with derm, but did not. I have advised pt to follow up with Dr. Lam.      Auditory hallucination  -Pt states that she hears people talking or music at times when there are no people around or music playing. Referral placed to Neurology for further workup.

## 2022-02-17 NOTE — PATIENT INSTRUCTIONS
"Patient Instruction After Office Treatments (Epley or Semont maneuvers)    1. After the maneuver is performed wait 10 minutes before going home to avoid quick spins. It takes time for the debris to settle in the correct location (think of a snow globe). Avoid driving yourself home.        2. Sleep semi-recumbent for the next 24-48 hours.  · Sleep at a 45 degree angle (eg. Chair).  · Stay vertical during the day.  · Do not got to the dentist or hairdresser.       · No exercise for a week.    3. For 24-48 hours, avoid up and down head movements (this includes nodding) and avoid bending over or bending down. If you drop something and are unable to squat down to reach it, then have someone pick it up for you or leave it there. You may turn your head left and right slowly, but avoid sudden head movements left or right for 24-48 hours.  · Use a couple of pillows when you sleep for the next 24-48 hours.  · Avoid sleeping on the "bad" side.    4. Wait at least 2 days before doing the Plasencia-Daroff exercise or home epley maneuver unless otherwise instructed by your physician. If you are symptomatic, you may do the exercise 3 times to try to alleviate symptoms. If you are corrected today in office and are no longer having vertigo with head movements, then do the exercise once 2 times daily for 1 week prophylactically. Complete the exercises 3 times before bed that way if you are dizzy symptoms can resolve while sleeping. Repeat every night for a week.    5. At one week post-treatment, put yourself in the position that usually makes you dizzy under conditions in which you can't fall or hurt yourself. Let your doctor know how you did.      Helpful Websites and Links:    Home Epley Maneuver for BPPV (only do side of affected ear)    Video Link:  https://www.youtube.com/watch?v=llvUbxEoadQ  ([facility])          Nose Bleed Instructions:  1) We had a discussion regarding the importance of nasal moisture, and the use of a " nasal saline spray or gel into nose 4 to 6 times daily to keep moist.   2) Avoid blowing your nose for 2 weeks. You may use nasal saline rinses instead of blowing.    3) Bactroban (mupirocin) ointment in nostril twice daily for 2 weeks to right nostril.  4) Do not sleep or sit for long periods of time under a ceiling fan or other source of aggressive airflow.  5) Use a humidifier in bedroom or any room in your home you spend long periods of time.  6) Engage in only light activity. No strenuous activity. No heavy lifting or straining for 2 weeks.   7) No bending over at the hips. Keep nose above your heart at all times for one week.  8) Sneeze with an open mouth to reduce pressure from nose for 2 weeks.   9) Avoid foods or drinks hot in temperature for at least 48 hours then progress slowly.  10) Avoid hot steamy showers or baths for one week.  11) Do not blow nose for 2 weeks.  Sneeze with mouth open.    How to stop a Nosebleed:  1. Pinch the soft parts of the nose between your thumb and two fingers.  2. Hold it for 10 minutes without releasing (timed by a clock).  3. Keep head higher than the level of the heart, sit upright.  4. Positioned forward, chin tucked to chest to avoid swallowing any blood.        If Re-bleeding Occurs:  Spray nose two times on both sides with decongestant spray (such as Afrin® or Choco-Synephrine®) and pinch nose and position head forward again. You do not want to use afrin spray for more than 3 days. If you are not able to control your nosebleeds after 3 days or have a severe nosebleed, then follow up in office for re-evaluation. If you have a nosebleed that does not stop within 15 minutes of the use of afrin spray, then go to your nearest urgent care or ED and follow up in office.

## 2022-02-21 ENCOUNTER — OFFICE VISIT (OUTPATIENT)
Dept: PODIATRY | Facility: CLINIC | Age: 82
End: 2022-02-21
Payer: MEDICARE

## 2022-02-21 ENCOUNTER — OFFICE VISIT (OUTPATIENT)
Dept: DERMATOLOGY | Facility: CLINIC | Age: 82
End: 2022-02-21
Payer: MEDICARE

## 2022-02-21 VITALS — WEIGHT: 124 LBS | HEIGHT: 59 IN | BODY MASS INDEX: 25 KG/M2

## 2022-02-21 DIAGNOSIS — H61.001 CHONDRODERMATITIS NODULARIS CHRONICA HELICIS, RIGHT: Primary | ICD-10-CM

## 2022-02-21 DIAGNOSIS — L60.0 INGROWN NAIL: Primary | ICD-10-CM

## 2022-02-21 DIAGNOSIS — M79.674 PAIN IN TOES OF BOTH FEET: ICD-10-CM

## 2022-02-21 DIAGNOSIS — M79.675 PAIN IN TOES OF BOTH FEET: ICD-10-CM

## 2022-02-21 PROCEDURE — 1159F PR MEDICATION LIST DOCUMENTED IN MEDICAL RECORD: ICD-10-PCS | Mod: CPTII,S$GLB,, | Performed by: STUDENT IN AN ORGANIZED HEALTH CARE EDUCATION/TRAINING PROGRAM

## 2022-02-21 PROCEDURE — 3288F PR FALLS RISK ASSESSMENT DOCUMENTED: ICD-10-PCS | Mod: CPTII,S$GLB,, | Performed by: PODIATRIST

## 2022-02-21 PROCEDURE — 1160F PR REVIEW ALL MEDS BY PRESCRIBER/CLIN PHARMACIST DOCUMENTED: ICD-10-PCS | Mod: CPTII,S$GLB,, | Performed by: STUDENT IN AN ORGANIZED HEALTH CARE EDUCATION/TRAINING PROGRAM

## 2022-02-21 PROCEDURE — 1160F PR REVIEW ALL MEDS BY PRESCRIBER/CLIN PHARMACIST DOCUMENTED: ICD-10-PCS | Mod: CPTII,S$GLB,, | Performed by: PODIATRIST

## 2022-02-21 PROCEDURE — 1101F PT FALLS ASSESS-DOCD LE1/YR: CPT | Mod: CPTII,S$GLB,, | Performed by: STUDENT IN AN ORGANIZED HEALTH CARE EDUCATION/TRAINING PROGRAM

## 2022-02-21 PROCEDURE — 1125F PR PAIN SEVERITY QUANTIFIED, PAIN PRESENT: ICD-10-PCS | Mod: CPTII,S$GLB,, | Performed by: PODIATRIST

## 2022-02-21 PROCEDURE — 99203 PR OFFICE/OUTPT VISIT, NEW, LEVL III, 30-44 MIN: ICD-10-PCS | Mod: 25,S$GLB,, | Performed by: PODIATRIST

## 2022-02-21 PROCEDURE — 1101F PT FALLS ASSESS-DOCD LE1/YR: CPT | Mod: CPTII,S$GLB,, | Performed by: PODIATRIST

## 2022-02-21 PROCEDURE — 11900 PR INJECTION INTO SKIN LESIONS, UP TO 7: ICD-10-PCS | Mod: S$GLB,,, | Performed by: STUDENT IN AN ORGANIZED HEALTH CARE EDUCATION/TRAINING PROGRAM

## 2022-02-21 PROCEDURE — 99203 OFFICE O/P NEW LOW 30 MIN: CPT | Mod: 25,S$GLB,, | Performed by: PODIATRIST

## 2022-02-21 PROCEDURE — 1160F RVW MEDS BY RX/DR IN RCRD: CPT | Mod: CPTII,S$GLB,, | Performed by: PODIATRIST

## 2022-02-21 PROCEDURE — 99999 PR PBB SHADOW E&M-EST. PATIENT-LVL I: CPT | Mod: PBBFAC,,, | Performed by: STUDENT IN AN ORGANIZED HEALTH CARE EDUCATION/TRAINING PROGRAM

## 2022-02-21 PROCEDURE — 1101F PR PT FALLS ASSESS DOC 0-1 FALLS W/OUT INJ PAST YR: ICD-10-PCS | Mod: CPTII,S$GLB,, | Performed by: STUDENT IN AN ORGANIZED HEALTH CARE EDUCATION/TRAINING PROGRAM

## 2022-02-21 PROCEDURE — 99999 PR PBB SHADOW E&M-EST. PATIENT-LVL I: ICD-10-PCS | Mod: PBBFAC,,, | Performed by: STUDENT IN AN ORGANIZED HEALTH CARE EDUCATION/TRAINING PROGRAM

## 2022-02-21 PROCEDURE — 11750 EXCISION NAIL&NAIL MATRIX: CPT | Mod: 51,TA,S$GLB, | Performed by: PODIATRIST

## 2022-02-21 PROCEDURE — 11750 NAIL REMOVAL: ICD-10-PCS | Mod: 51,TA,S$GLB, | Performed by: PODIATRIST

## 2022-02-21 PROCEDURE — 3288F PR FALLS RISK ASSESSMENT DOCUMENTED: ICD-10-PCS | Mod: CPTII,S$GLB,, | Performed by: STUDENT IN AN ORGANIZED HEALTH CARE EDUCATION/TRAINING PROGRAM

## 2022-02-21 PROCEDURE — 99213 PR OFFICE/OUTPT VISIT, EST, LEVL III, 20-29 MIN: ICD-10-PCS | Mod: 25,S$GLB,, | Performed by: STUDENT IN AN ORGANIZED HEALTH CARE EDUCATION/TRAINING PROGRAM

## 2022-02-21 PROCEDURE — 1101F PR PT FALLS ASSESS DOC 0-1 FALLS W/OUT INJ PAST YR: ICD-10-PCS | Mod: CPTII,S$GLB,, | Performed by: PODIATRIST

## 2022-02-21 PROCEDURE — 11900 INJECT SKIN LESIONS </W 7: CPT | Mod: S$GLB,,, | Performed by: STUDENT IN AN ORGANIZED HEALTH CARE EDUCATION/TRAINING PROGRAM

## 2022-02-21 PROCEDURE — 1160F RVW MEDS BY RX/DR IN RCRD: CPT | Mod: CPTII,S$GLB,, | Performed by: STUDENT IN AN ORGANIZED HEALTH CARE EDUCATION/TRAINING PROGRAM

## 2022-02-21 PROCEDURE — 3288F FALL RISK ASSESSMENT DOCD: CPT | Mod: CPTII,S$GLB,, | Performed by: PODIATRIST

## 2022-02-21 PROCEDURE — 3288F FALL RISK ASSESSMENT DOCD: CPT | Mod: CPTII,S$GLB,, | Performed by: STUDENT IN AN ORGANIZED HEALTH CARE EDUCATION/TRAINING PROGRAM

## 2022-02-21 PROCEDURE — 1159F MED LIST DOCD IN RCRD: CPT | Mod: CPTII,S$GLB,, | Performed by: STUDENT IN AN ORGANIZED HEALTH CARE EDUCATION/TRAINING PROGRAM

## 2022-02-21 PROCEDURE — 1159F PR MEDICATION LIST DOCUMENTED IN MEDICAL RECORD: ICD-10-PCS | Mod: CPTII,S$GLB,, | Performed by: PODIATRIST

## 2022-02-21 PROCEDURE — 99213 OFFICE O/P EST LOW 20 MIN: CPT | Mod: 25,S$GLB,, | Performed by: STUDENT IN AN ORGANIZED HEALTH CARE EDUCATION/TRAINING PROGRAM

## 2022-02-21 PROCEDURE — 1159F MED LIST DOCD IN RCRD: CPT | Mod: CPTII,S$GLB,, | Performed by: PODIATRIST

## 2022-02-21 PROCEDURE — 1125F AMNT PAIN NOTED PAIN PRSNT: CPT | Mod: CPTII,S$GLB,, | Performed by: PODIATRIST

## 2022-02-21 RX ORDER — CLOBETASOL PROPIONATE 0.5 MG/G
OINTMENT TOPICAL 2 TIMES DAILY
Qty: 60 G | Refills: 0 | Status: SHIPPED | OUTPATIENT
Start: 2022-02-21 | End: 2022-10-18

## 2022-02-21 NOTE — PATIENT INSTRUCTIONS
Understanding Ingrown Toenails    An ingrown nail is the result of a nail growing into the skin that surrounds it. This often occurs at either edge of the big toe. Ingrown nails may be caused by improper trimming, inherited nail deformities, injuries, fungal infections, or pressure.    Symptoms  Ingrown nails may cause pain at the tip of the toe or all the way to the base of the toe. The pain is often worse while walking. An ingrown nail may also lead to infection, inflammation, or a more serious condition. If its infected, you might see pus or redness.    Evaluation  To determine the extent of your problem, your healthcare provider examines and possibly presses the painful area. If other problems are suspected, blood tests, cultures, and X-rays may be done as well.    Treatment  If the nail isnt infected, your healthcare provider may trim the corner of it to help relieve your symptoms. He or she may need to remove one side of your nail back to the cuticle. The base of the nail may then be treated with a chemical to keep the ingrown part from growing back. Severe infections or ingrown nails may require antibiotics and temporary or permanent removal of a portion of the nail. To prevent pain, a local anesthetic may be used in these procedures. This treatment is usually done at your healthcare provider's office.    Prevention  Many nail problems can be prevented by wearing the right shoes and trimming your nails properly. To help avoid infection, keep your feet clean and dry. If you have diabetes, talk with your healthcare provider before doing any foot self-care.  The right shoes: Get your feet measured (your size may change as you age). Wear shoes that are supportive and roomy enough for your toes to wiggle. Look for shoes made of natural materials such as leather, which allow your feet to breathe.  Proper trimming: To avoid problems, trim your toenails straight across without cutting down into the corners. If you  cant trim your own nails, ask your healthcare provider to do so for you.    Date Last Reviewed: 10/1/2016  © 9305-2334 [employer]. [address] [place] [address], [place] [address]. All rights reserved. This information is not intended as a substitute for professional medical care. Always follow your healthcare professional's instructions.     INSTRUCTIONS FOLLOWING CORRECTIVE NAIL SURGERY TODAY    Go directly home and elevate foot.  Restrict your activities the remainder of the day.  Apply ice pack if needed.  Keep bandages clean and dry.  You may notice some oozing coming through the bandages; this is normal.  Do not remove the dressing, apply additional dressing on top should you need to.  If bandage becomes saturated with blood, call us.  Local anesthesia will last 3-6 hours.  For discomfort, take Advil, Tylenol or pain medication if prescribed.  If you were given antibiotics, take them until they are all gone. It is important to finish the antibiotics even if the wound looks better. This ensures that the infection clears.      TOMORROW    When you take your shower, get dressing saturated then remove the dressing so that the dressing does not pull or stick to the toe and bathe as normal.  Soak in 2 Tablespoons of Epsom Salt daily for 1 hour  Dry area.  Apply Neosporin and gauze bandage.  No Band-Aid  Re-bandage twice daily for two weeks until next appointment.  If any problems arise, call the office. We do have a 24 hours answering service.    If you had a procedure with phenol, it is normal for your toe to drain and have redness for 4-6 weeks.  Any redness or streaks going up the foot is NOT normal.     Your post-operative appointment in two weeks is not included in today's charges.  You will be expected to pay any co-payment or deductible.

## 2022-02-21 NOTE — PROGRESS NOTES
"  [address] [place] [address]  Phone: [phone]   [phone]    Patient's PCP:Ranjan Goncalves MD  Referring Provider: Aaareferral Self    Subjective:      Chief Complaint:: Ingrown Toenail (bilateral)    ISAAC Carlson is a 81 y.o. female who presents with a complaint of bilateral ingrown great toenails lasting for left about a month and right about two weeks. Onset of symptoms went to get a pedicure and the girl cut it and it was painful that night and reports no trauma.  Current symptoms include pain, redness and swelling.  Aggravating factors are shoes. Symptoms have progressed. Treatment to date have included clipping and antibiotic cream.        Vitals:    02/21/22 1417   Weight: 56.2 kg (124 lb)   Height: 4' 11" (1.499 m)   PainSc:   9      Shoe Size: 5.5    Past Surgical History:   Procedure Laterality Date    APPENDECTOMY      BLADDER SUSPENSION      BREAST BIOPSY      CARDIAC SURGERY      carotid endarterectomy      L    CATARACT EXTRACTION      CHOLECYSTECTOMY      HYSTERECTOMY      INJECTION OF ANESTHETIC AGENT AROUND GANGLION IMPAR N/A 9/12/2019    Procedure: BLOCK, GANGLION IMPAR;  Surgeon: Christian James MD;  Location: [facility] OR;  Service: Pain Management;  Laterality: N/A;    INSERTION OF IMPLANTABLE LOOP RECORDER N/A 12/18/2020    Procedure: INSERTION, IMPLANTABLE LOOP RECORDER;  Surgeon: Adin Saba MD;  Location: [facility] CATH/EP LAB;  Service: Cardiology;  Laterality: N/A;    OOPHORECTOMY      SUPERFICIAL KERATECTOMY Right 10/27/2017    Dr. Morales    TONSILLECTOMY      TRANSFORAMINAL EPIDURAL INJECTION OF STEROID Left 8/12/2019    Procedure: Injection,steroid,epidural,transforaminal approach L4-5, L5-S1;  Surgeon: Christian James MD;  Location: [facility] OR;  Service: Pain Management;  Laterality: Left;    TRANSFORAMINAL EPIDURAL INJECTION OF STEROID Left 1/16/2020    Procedure: Injection,steroid,epidural,transforaminal approach;  Surgeon: Christian James MD;  " Location: [facility] OR;  Service: Pain Management;  Laterality: Left;  L4-5, L5-S1    TRANSFORAMINAL EPIDURAL INJECTION OF STEROID Left 6/30/2020    Procedure: Injection,steroid,epidural,transforaminal approach;  Surgeon: Christian James MD;  Location: [facility] OR;  Service: Pain Management;  Laterality: Left;  L4-5 and L5-S1     Past Medical History:   Diagnosis Date    Arthritis     Cataract     Coronary artery disease     Hypertension     Insomnia     Neuropathy     Retinal detachment     Stomach ulcer      Family History   Problem Relation Age of Onset    Cancer Father     Macular degeneration Maternal Aunt     Cancer Maternal Aunt     Breast cancer Maternal Aunt     Macular degeneration Maternal Uncle     Diabetes Paternal Aunt     Cancer Paternal Aunt     Blindness Maternal Grandfather     Melanoma Neg Hx     Psoriasis Neg Hx     Lupus Neg Hx     Eczema Neg Hx         Social History:   Marital Status:   Alcohol History:  reports no history of alcohol use.  Tobacco History:  reports that she quit smoking about 59 years ago. Her smoking use included cigarettes. She quit after 4.00 years of use. She has never used smokeless tobacco.  Drug History:  reports no history of drug use.    Review of patient's allergies indicates:   Allergen Reactions    Demerol [meperidine] Nausea And Vomiting    Oxycodone     Hydrocodone Itching    Oxycodone-acetaminophen Itching       Current Outpatient Medications   Medication Sig Dispense Refill    amLODIPine (NORVASC) 5 MG tablet Take 5 mg by mouth.      aspirin (ECOTRIN) 81 MG EC tablet Take 81 mg by mouth once daily.      betaxoloL (KERLONE) 10 mg Tab Take 1 tablet by mouth once daily.      cholecalciferol, vitamin D3, (VITAMIN D3) 2,000 unit Tab Vitamin D3 50 mcg (2,000 unit) tablet   1 tablet every day by mouth      clobetasol 0.05% (TEMOVATE) 0.05 % Oint Apply topically 2 (two) times daily. 60 g 0    dexlansoprazole (DEXILANT) 30 mg CpDM Take 60 mg by  mouth.       diclofenac sodium (VOLTAREN) 1 % Gel Apply 2 g topically daily as needed.      dicyclomine (BENTYL) 10 MG capsule 2 (two) times daily as needed.       diphenoxylate-atropine 2.5-0.025 mg (LOMOTIL) 2.5-0.025 mg per tablet Take 1 tablet by mouth 4 (four) times daily as needed for Diarrhea.      EScitalopram oxalate (LEXAPRO) 10 MG tablet       gabapentin (NEURONTIN) 300 MG capsule TAKE 2 CAPSULES BY MOUTH IN THE EVENING 180 capsule 3    metoprolol succinate (TOPROL-XL) 50 MG 24 hr tablet Take 50 mg by mouth once daily.      mirtazapine (REMERON) 15 MG tablet TAKE 1 TABLET BY MOUTH IN  THE EVENING 90 tablet 3    mupirocin (BACTROBAN) 2 % ointment Apply layer to right nostril with clean q-tip twice daily for 14 days. 30 g 0    MYRBETRIQ 50 mg Tb24 Take 1 tablet by mouth once daily.      nitroGLYCERIN (NITROSTAT) 0.4 MG SL tablet Place under the tongue.      ondansetron (ZOFRAN-ODT) 8 MG TbDL every 6 (six) hours as needed.       pantoprazole (PROTONIX) 40 MG tablet Take 40 mg by mouth every morning.      phenobarb-hyoscy-atropine-scop (DONNATAL) 16.2-0.1037 -0.0194 mg/5 mL Elix 5 mLs.      rOPINIRole (REQUIP) 0.5 MG tablet Take 0.5 mg by mouth nightly as needed.      sars-cov-2, covid-19, (MODERNA COVID-19) 100 mcg/0.5 ml injection Inject into the muscle. 0.5 mL 0    solifenacin (VESICARE) 10 MG tablet 10 mg.      temazepam (RESTORIL) 30 mg capsule Take 30 mg by mouth nightly as needed.      atorvastatin (LIPITOR) 40 MG tablet Take 1 tablet (40 mg total) by mouth once daily. 90 tablet 3     No current facility-administered medications for this visit.       Review of Systems   Constitutional: Negative for chills, fatigue, fever and unexpected weight change.   HENT: Negative for hearing loss and trouble swallowing.    Eyes: Negative for photophobia and visual disturbance.   Respiratory: Negative for cough, shortness of breath and wheezing.    Cardiovascular: Negative for chest pain,  palpitations and leg swelling.   Gastrointestinal: Negative for abdominal pain and nausea.   Genitourinary: Negative for dysuria and frequency.   Musculoskeletal: Positive for back pain. Negative for arthralgias, gait problem, joint swelling and myalgias.   Skin: Negative for rash and wound.   Neurological: Negative for seizures, weakness, numbness and headaches.   Hematological: Bruises/bleeds easily.         Objective:        Physical Exam:   Foot Exam    General  General Appearance: appears stated age and healthy   Orientation: alert and oriented to person, place, and time   Affect: appropriate   Gait: unimpaired       Right Foot/Ankle     Inspection and Palpation  Ecchymosis: none  Tenderness: (Medial and lateral border great toenail)  Swelling: none   Arch: normal  Skin Exam: no drainage and no erythema   Neurovascular  Dorsalis pedis: 2+  Posterior tibial: 2+  Capillary Refill: 2+  Varicose veins: not present  Saphenous nerve sensation: normal  Tibial nerve sensation: normal  Superficial peroneal nerve sensation: normal  Deep peroneal nerve sensation: normal  Sural nerve sensation: normal    Muscle Strength  Ankle dorsiflexion: 5  Ankle plantar flexion: 5  Ankle inversion: 5  Ankle eversion: 5  Great toe extension: 5  Great toe flexion: 5    Range of Motion    Normal right ankle ROM    Tests  Anterior drawer: negative   Talar tilt: negative   PT Tinel's sign: negative    Paresthesia: negative  Comments  Ingrowing medial and lateral border great toenail.  Tender to palpation.  No edema or erythema.  No purulence.    Left Foot/Ankle      Inspection and Palpation  Ecchymosis: none  Tenderness: (Medial and lateral border great toenail)  Swelling: (Medial border great toenail)  Arch: normal  Skin Exam: erythema;   Neurovascular  Dorsalis pedis: 2+  Posterior tibial: 2+  Capillary refill: 2+  Varicose veins: not present  Saphenous nerve sensation: normal  Tibial nerve sensation: normal  Superficial peroneal nerve  sensation: normal  Deep peroneal nerve sensation: normal  Sural nerve sensation: normal    Muscle Strength  Ankle dorsiflexion: 5  Ankle plantar flexion: 5  Ankle inversion: 5  Ankle eversion: 5  Great toe extension: 5  Great toe flexion: 5    Range of Motion    Normal left ankle ROM    Tests  Anterior drawer: negative   Talar tilt: negative   PT Tinel's sign: negative  Paresthesia: negative  Comments  Ingrowing medial and lateral border great toenail.  Tender to palpation.  Mild edema and erythema present along the medial border.  No purulence.    Physical Exam  Cardiovascular:      Pulses:           Dorsalis pedis pulses are 2+ on the right side and 2+ on the left side.        Posterior tibial pulses are 2+ on the right side and 2+ on the left side.   Feet:      Right foot:      Skin integrity: No erythema.      Left foot:      Skin integrity: Erythema present.         Imaging:  None           Assessment:       1. Ingrown nail    2. Pain in toes of both feet      Plan:   Ingrown nail  -     Nail Removal  -     Nail Removal    Pain in toes of both feet  -     Nail Removal  -     Nail Removal      Follow up if symptoms worsen or fail to improve.    Nail Removal    Date/Time: 2/21/2022 2:10 PM  Performed by: Robert Shannon DPM  Authorized by: Robert Shannon DPM     Consent Done?:  Yes (Written)  Time out: Immediately prior to the procedure a time out was called    Prep: patient was prepped and draped in usual sterile fashion    Location:     Location:  Right foot    Location detail:  Right big toe  Anesthesia:     Anesthesia:  Local infiltration    Local anesthetic:  Lidocaine 2% without epinephrine  Procedure Details:     Preparation:  Skin prepped with alcohol    Amount removed:  Partial    Side:  Bilateral    Wedge excision of skin of nail fold: No      Nail bed sutured?: No      Nail matrix removed:  Partial    Removal method:  Phenol and alcohol    Dressing applied:  4x4    Patient tolerance:  Patient tolerated  the procedure well with no immediate complications     Medial and lateral border  Nail Removal    Date/Time: 2/21/2022 2:10 PM  Performed by: Robert Shannon DPM  Authorized by: Robert Shannon DPM     Consent Done?:  Yes (Written)  Time out: Immediately prior to the procedure a time out was called    Prep: patient was prepped and draped in usual sterile fashion    Location:     Location:  Left foot    Location detail:  Left big toe  Anesthesia:     Anesthesia:  Local infiltration    Local anesthetic:  Lidocaine 2% without epinephrine  Procedure Details:     Preparation:  Skin prepped with alcohol    Amount removed:  Partial    Side:  Bilateral    Wedge excision of skin of nail fold: No      Nail bed sutured?: No      Nail matrix removed:  Partial    Removal method:  Phenol and alcohol    Dressing applied:  4x4    Patient tolerance:  Patient tolerated the procedure well with no immediate complications     Medial lateral border              Counseling:     I provided patient education verbally regarding:   Patient diagnosis, treatment options, as well as alternatives, risks, and benefits.     Ingrown toenail treatment options of no treatment, avulsion of nail border under local with regrowth of nail, chemical matrixectomy for attempted permanent correction of the problem. Patient was educated about daily dressing changes, soaks, and medications following removal of the nail.       This note was created using Dragon voice recognition software that occasionally misinterpreted phrases or words.

## 2022-02-21 NOTE — PROGRESS NOTES
Subjective:       Patient ID:  Sunita Carlson is a 81 y.o. female who presents for   Chief Complaint   Patient presents with    Spot     Right ear     LOV 8/24/21    Patient here today to recheck spot on right ear  Patient states the spot will not heal and sometimes painful  Spot was biopsied at last visit- [facility].    Has been sleeping on her back. Donut pillow hurts her neck.     Final Pathologic Diagnosis Skin, right antihelix, shave biopsy:   -CHONDRODERMATITIS NODULARIS HELICIS, consistent with   This lesion is benign.   Comment: Interp By Syed Fernandez M.D., Signed on 08/27/2021 at 10:59          Review of Systems   Constitutional: Negative for fever, chills and fatigue.   Respiratory: Negative for cough and shortness of breath.    Gastrointestinal: Negative for nausea and vomiting.   Skin: Positive for activity-related sunscreen use. Negative for itching, rash, dry skin and daily sunscreen use.   Hematologic/Lymphatic: Bruises/bleeds easily.        Objective:    Physical Exam   Constitutional: She appears well-developed and well-nourished.   Neurological: She is alert and oriented to person, place, and time.   Psychiatric: She has a normal mood and affect.   Skin:   Areas Examined (abnormalities noted in diagram):   Head / Face Inspection Performed              Diagram Legend     Erythematous scaling macule/papule c/w actinic keratosis       Vascular papule c/w angioma      Pigmented verrucoid papule/plaque c/w seborrheic keratosis      Yellow umbilicated papule c/w sebaceous hyperplasia      Irregularly shaped tan macule c/w lentigo     1-2 mm smooth white papules consistent with Milia      Movable subcutaneous cyst with punctum c/w epidermal inclusion cyst      Subcutaneous movable cyst c/w pilar cyst      Firm pink to brown papule c/w dermatofibroma      Pedunculated fleshy papule(s) c/w skin tag(s)      Evenly pigmented macule c/w junctional nevus     Mildly variegated pigmented, slightly  irregular-bordered macule c/w mildly atypical nevus      Flesh colored to evenly pigmented papule c/w intradermal nevus       Pink pearly papule/plaque c/w basal cell carcinoma      Erythematous hyperkeratotic cursted plaque c/w SCC      Surgical scar with no sign of skin cancer recurrence      Open and closed comedones      Inflammatory papules and pustules      Verrucoid papule consistent consistent with wart     Erythematous eczematous patches and plaques     Dystrophic onycholytic nail with subungual debris c/w onychomycosis     Umbilicated papule    Erythematous-base heme-crusted tan verrucoid plaque consistent with inflamed seborrheic keratosis     Erythematous Silvery Scaling Plaque c/w Psoriasis     See annotation      Assessment / Plan:        Chondrodermatitis nodularis chronica helicis, right  -     clobetasol 0.05% (TEMOVATE) 0.05 % Oint; Apply topically 2 (two) times daily.  Dispense: 60 g; Refill: 0  -Intralesional Kenalog 10 mg/cc (0.3 cc total) injected into 1 lesions on the right antihelix today after obtaining verbal consent including risk of surrounding hypopigmentation. Patient tolerated procedure well.    Units: 1  NDC for Kenalog 10mg/cc:  6116-6288-52  - difficult for her to use donut pillow, consider egg crate pillow and sleeping on left side  - trial of ILK, if not improving discussed referral to ENT for definitive excision.   - clobetasol ointment once daily under a bandaid.          No follow-ups on file.

## 2022-02-22 ENCOUNTER — TELEPHONE (OUTPATIENT)
Dept: PODIATRY | Facility: CLINIC | Age: 82
End: 2022-02-22
Payer: MEDICARE

## 2022-02-23 NOTE — TELEPHONE ENCOUNTER
----- Message from Fern Parry sent at 2/22/2022 10:47 AM CST -----  Regarding: Pt call  Please return patients call at [phone]. She thought we were prescribing a cream for her, but her pharmacy didn't receive anything.    Thank you,  Fern

## 2022-02-23 NOTE — TELEPHONE ENCOUNTER
Patient called and thought that we were sending in antibiotic cream for her toes. Patient was advised that she can buy neosporin over the counter and patient stated understanding.

## 2022-02-24 ENCOUNTER — TELEPHONE (OUTPATIENT)
Dept: OTOLARYNGOLOGY | Facility: CLINIC | Age: 82
End: 2022-02-24
Payer: MEDICARE

## 2022-02-24 ENCOUNTER — HOSPITAL ENCOUNTER (EMERGENCY)
Facility: HOSPITAL | Age: 82
Discharge: HOME | End: 2022-02-24
Attending: EMERGENCY MEDICINE
Payer: MEDICARE

## 2022-02-24 VITALS
BODY MASS INDEX: 24.24 KG/M2 | TEMPERATURE: 98 F | OXYGEN SATURATION: 99 % | WEIGHT: 120 LBS | SYSTOLIC BLOOD PRESSURE: 145 MMHG | DIASTOLIC BLOOD PRESSURE: 70 MMHG | RESPIRATION RATE: 18 BRPM | HEART RATE: 60 BPM

## 2022-02-24 DIAGNOSIS — R04.0 RIGHT-SIDED EPISTAXIS: Primary | ICD-10-CM

## 2022-02-24 LAB
BASOPHILS # BLD AUTO: 0.06 K/UL (ref 0–0.2)
BASOPHILS NFR BLD: 0.8 % (ref 0–1.9)
DIFFERENTIAL METHOD: NORMAL
EOSINOPHIL # BLD AUTO: 0.1 K/UL (ref 0–0.5)
EOSINOPHIL NFR BLD: 1.2 % (ref 0–8)
ERYTHROCYTE [DISTWIDTH] IN BLOOD BY AUTOMATED COUNT: 13.2 % (ref 11.5–14.5)
HCT VFR BLD AUTO: 40 % (ref 37–48.5)
HGB BLD-MCNC: 13.1 G/DL (ref 12–16)
IMM GRANULOCYTES # BLD AUTO: 0.02 K/UL (ref 0–0.04)
IMM GRANULOCYTES NFR BLD AUTO: 0.3 % (ref 0–0.5)
LYMPHOCYTES # BLD AUTO: 1.8 K/UL (ref 1–4.8)
LYMPHOCYTES NFR BLD: 23.3 % (ref 18–48)
MCH RBC QN AUTO: 30.5 PG (ref 27–31)
MCHC RBC AUTO-ENTMCNC: 32.8 G/DL (ref 32–36)
MCV RBC AUTO: 93 FL (ref 82–98)
MONOCYTES # BLD AUTO: 0.5 K/UL (ref 0.3–1)
MONOCYTES NFR BLD: 6.8 % (ref 4–15)
NEUTROPHILS # BLD AUTO: 5.3 K/UL (ref 1.8–7.7)
NEUTROPHILS NFR BLD: 67.6 % (ref 38–73)
NRBC BLD-RTO: 0 /100 WBC
PLATELET # BLD AUTO: 284 K/UL (ref 150–450)
PMV BLD AUTO: 9.3 FL (ref 9.2–12.9)
RBC # BLD AUTO: 4.3 M/UL (ref 4–5.4)
WBC # BLD AUTO: 7.82 K/UL (ref 3.9–12.7)

## 2022-02-24 PROCEDURE — 36415 COLL VENOUS BLD VENIPUNCTURE: CPT | Performed by: EMERGENCY MEDICINE

## 2022-02-24 PROCEDURE — 85025 COMPLETE CBC W/AUTO DIFF WBC: CPT | Performed by: EMERGENCY MEDICINE

## 2022-02-24 PROCEDURE — 99283 EMERGENCY DEPT VISIT LOW MDM: CPT | Mod: 25

## 2022-02-24 PROCEDURE — 25000003 PHARM REV CODE 250: Performed by: EMERGENCY MEDICINE

## 2022-02-24 RX ORDER — OXYMETAZOLINE HCL 0.05 %
1 SPRAY, NON-AEROSOL (ML) NASAL
Status: DISCONTINUED | OUTPATIENT
Start: 2022-02-24 | End: 2022-02-24 | Stop reason: HOSPADM

## 2022-02-24 NOTE — TELEPHONE ENCOUNTER
2nd attempt to contact pt per callback msg. No response; left voicemail for call back.       ----- Message from Juani Guallpa sent at 2/24/2022  2:54 PM CST -----  Type:  Sooner Apoointment Request    Caller is requesting a sooner appointment.  Caller declined first available appointment listed below.  Caller will not accept being placed on the waitlist and is requesting a message be sent to doctor.    Name of Caller:  [facility]  When is the first available appointment?  Pt needs to be seen tomorrow     Best Call Back Number:  [phone]  Additional Information:  Please call pt

## 2022-02-24 NOTE — ED NOTES
Pt identifiers checked and accurate with Sunita Carlson    Pt reports to ED with complaints of right sided nose bleed since 10 am today. Pt has had previous nosebleeds with craterization recently. Pt reports ENT follow up appointment

## 2022-02-24 NOTE — TELEPHONE ENCOUNTER
Attempted to contact pt regarding the following callback msg. No answer; left voicemail for call back.       ----- Message from Deven Gunn sent at 2/24/2022 11:28 AM CST -----  Type:  Same Day Appointment Request    Caller is requesting a same day appointment.  Caller declined first available appointment listed below.      Name of Caller:  Patient  When is the first available appointment?    Symptoms:  Nose bleed  Best Call Back Number: : [phone]   Additional Information:

## 2022-02-24 NOTE — ED PROVIDER NOTES
"Encounter Date: 2/24/2022    SCRIBE #1 NOTE: Lisa POWER, [place] scribing for, and in the presence of, Tani Ferrer MD.       History     Chief Complaint   Patient presents with    Epistaxis     Pt reports nose bleed 1050 am. Pt has removed nose clip in triage, no active bleeding seen in triage     Time seen by provider: 2:00 PM on 02/24/2022    Sunita Carlson is a 81 y.o. female who presents to the ED with an onset of intermittent nosebleeds for 1 month.  Patient reports having nosebleeds every Thursday for the past 3-4 weeks.  Current episode began at 10:50 am this morning and has resolved prior to arrival to the ED.  During today's episode, patient reports loosing multiple blood clots through her right nostril.  She was evaluated by an otolaryngologist 1 week ago and was diagnosed with Epistaxis and BPPV.  Patient states a Hx of multiple nasal cauterizations.  The patient denies having a "dry" nasal passages, picking her nose or any other symptoms at this time.  Patient has a PMHx of HTN, CAD, and neuropathy.  Personal Hx of stroke with residual right sided visual disturbances.  No pertinent HENT PSHx.    The history is provided by the patient and a relative.     Review of patient's allergies indicates:   Allergen Reactions    Demerol [meperidine] Nausea And Vomiting    Oxycodone     Hydrocodone Itching    Oxycodone-acetaminophen Itching     Past Medical History:   Diagnosis Date    Arthritis     Cataract     Coronary artery disease     Hypertension     Insomnia     Neuropathy     Retinal detachment     Stomach ulcer      Past Surgical History:   Procedure Laterality Date    APPENDECTOMY      BLADDER SUSPENSION      BREAST BIOPSY      CARDIAC SURGERY      carotid endarterectomy      L    CATARACT EXTRACTION      CHOLECYSTECTOMY      HYSTERECTOMY      INJECTION OF ANESTHETIC AGENT AROUND GANGLION IMPAR N/A 9/12/2019    Procedure: BLOCK, GANGLION IMPAR;  Surgeon: Christian James MD;  " Location: [facility] OR;  Service: Pain Management;  Laterality: N/A;    INSERTION OF IMPLANTABLE LOOP RECORDER N/A 2020    Procedure: INSERTION, IMPLANTABLE LOOP RECORDER;  Surgeon: Adin Saba MD;  Location: [facility] CATH/EP LAB;  Service: Cardiology;  Laterality: N/A;    OOPHORECTOMY      SUPERFICIAL KERATECTOMY Right 10/27/2017    Dr. Morales    TONSILLECTOMY      TRANSFORAMINAL EPIDURAL INJECTION OF STEROID Left 2019    Procedure: Injection,steroid,epidural,transforaminal approach L4-5, L5-S1;  Surgeon: Christian James MD;  Location: [facility] OR;  Service: Pain Management;  Laterality: Left;    TRANSFORAMINAL EPIDURAL INJECTION OF STEROID Left 2020    Procedure: Injection,steroid,epidural,transforaminal approach;  Surgeon: Christian James MD;  Location: [facility] OR;  Service: Pain Management;  Laterality: Left;  L4-5, L5-S1    TRANSFORAMINAL EPIDURAL INJECTION OF STEROID Left 2020    Procedure: Injection,steroid,epidural,transforaminal approach;  Surgeon: Christian James MD;  [facility]  Service: Pain Management;  Laterality: Left;  L4-5 and L5-S1     Family History   Problem Relation Age of Onset    Cancer Father     Macular degeneration Maternal Aunt     Cancer Maternal Aunt     Breast cancer Maternal Aunt     Macular degeneration Maternal Uncle     Diabetes Paternal Aunt     Cancer Paternal Aunt     Blindness Maternal Grandfather     Melanoma Neg Hx     Psoriasis Neg Hx     Lupus Neg Hx     Eczema Neg Hx      Social History     Tobacco Use    Smoking status: Former Smoker     Years: 4.00     Types: Cigarettes     Quit date: 10/13/1962     Years since quittin.4    Smokeless tobacco: Never Used   Substance Use Topics    Alcohol use: No    Drug use: No     Review of Systems   Constitutional: Negative for activity change and fever.   HENT: Positive for nosebleeds (intermittent). Negative for congestion.    Eyes: Negative for visual disturbance.   Respiratory: Negative for wheezing.     Cardiovascular: Negative for chest pain.   Gastrointestinal: Negative for abdominal pain.   Genitourinary: Negative for dysuria.   Musculoskeletal: Negative for joint swelling.   Skin: Negative for rash.   Neurological: Negative for syncope.   Hematological: Bruises/bleeds easily.   Psychiatric/Behavioral: Negative for confusion.       Physical Exam     Initial Vitals [02/24/22 1345]   BP Pulse Resp Temp SpO2   (!) 176/75 60 18 97.9 °F (36.6 °C) 98 %      MAP       --         Physical Exam    Nursing note and vitals reviewed.  Constitutional: She appears well-nourished.   HENT:   Head: Normocephalic and atraumatic.   Nose: Epistaxis (right side) is observed.   Right side bloody naris without visual scabbing or active bleeding.  Audible nasal congestion with a stable airway.     Eyes: Conjunctivae and EOM are normal.   Neck: Neck supple. No thyroid mass present.   Normal range of motion.  Cardiovascular: Normal rate, regular rhythm and normal heart sounds. Exam reveals no gallop and no friction rub.    No murmur heard.  Pulmonary/Chest: Breath sounds normal. She has no wheezes. She has no rhonchi. She has no rales.   Abdominal: Abdomen is soft. Bowel sounds are normal. There is no abdominal tenderness.   Musculoskeletal:      Cervical back: Normal range of motion and neck supple.     Neurological: She is alert and oriented to person, place, and time. She has normal strength. No cranial nerve deficit or sensory deficit.   Skin: Skin is warm and dry. No rash noted. No erythema.   Psychiatric: She has a normal mood and affect. Her speech is normal. Cognition and memory are normal.         ED Course   Procedures  Labs Reviewed   CBC W/ AUTO DIFFERENTIAL          Imaging Results    None          Medications - No data to display  Medical Decision Making:   History:   Old Medical Records: I decided to obtain old medical records.  Clinical Tests:   Lab Tests: Ordered and Reviewed  ED Management:  This patient was rapidly  assessed shortly after arrival.  Initial vital signs are stable.  The patient is alert and with stable airway.  Nose clip head artery within placed and bleeding was stopped.  CBC obtained secondary to the patient's reports of losing a lot of blood from the nosebleed at home.  It is within normal limits.  Patient was observed for multiple hours without recurrence of bleeding and she is educated about supportive care for suspected right-sided anterior epistaxis.  We spoke with the patient's ear nose and throat doctor's office who made an appointment for her tomorrow afternoon at [facility].  Patient and her daughter are strongly urged to keep this appointment.  They are asked to have her return to the emergency department immediately for any new, concerning, or worsening symptoms.  Patient and daughter were agreeable and she was discharged in stable condition.          Scribe Attestation:   Scribe #1: I performed the above scribed service and the documentation accurately describes the services I performed. I attest to the accuracy of the note.               I, Dr. Tani Ferrer, personally performed the services described in this documentation. All medical record entries made by the scribe were at my direction and in my presence.  I have reviewed the chart and agree that the record reflects my personal performance and is accurate and complete. Tani Ferrer MD.  5:38 PM 02/24/2022      Clinical Impression:   Final diagnoses:  [R04.0] Right-sided epistaxis (Primary)          ED Disposition Condition    Discharge Stable        ED Prescriptions     None        Follow-up Information     Follow up With Specialties Details Why Contact Info    Theo Tejeda PA-C Otolaryngology Schedule an appointment as soon as possible for a visit   [address] [facility]  [address] [place] [id number]  [phone]             Tani Ferrer MD  02/24/22 [id number]

## 2022-02-25 ENCOUNTER — OFFICE VISIT (OUTPATIENT)
Dept: OTOLARYNGOLOGY | Facility: CLINIC | Age: 82
End: 2022-02-25
Payer: MEDICARE

## 2022-02-25 VITALS — HEIGHT: 59 IN | WEIGHT: 122.38 LBS | BODY MASS INDEX: 24.67 KG/M2

## 2022-02-25 DIAGNOSIS — R04.0 EPISTAXIS: Primary | ICD-10-CM

## 2022-02-25 PROCEDURE — 1160F PR REVIEW ALL MEDS BY PRESCRIBER/CLIN PHARMACIST DOCUMENTED: ICD-10-PCS | Mod: CPTII,S$GLB,, | Performed by: PHYSICIAN ASSISTANT

## 2022-02-25 PROCEDURE — 1126F AMNT PAIN NOTED NONE PRSNT: CPT | Mod: CPTII,S$GLB,, | Performed by: PHYSICIAN ASSISTANT

## 2022-02-25 PROCEDURE — 99999 PR PBB SHADOW E&M-EST. PATIENT-LVL IV: ICD-10-PCS | Mod: PBBFAC,,, | Performed by: PHYSICIAN ASSISTANT

## 2022-02-25 PROCEDURE — 1159F PR MEDICATION LIST DOCUMENTED IN MEDICAL RECORD: ICD-10-PCS | Mod: CPTII,S$GLB,, | Performed by: PHYSICIAN ASSISTANT

## 2022-02-25 PROCEDURE — 99214 OFFICE O/P EST MOD 30 MIN: CPT | Mod: S$GLB,,, | Performed by: PHYSICIAN ASSISTANT

## 2022-02-25 PROCEDURE — 99999 PR PBB SHADOW E&M-EST. PATIENT-LVL IV: CPT | Mod: PBBFAC,,, | Performed by: PHYSICIAN ASSISTANT

## 2022-02-25 PROCEDURE — 1159F MED LIST DOCD IN RCRD: CPT | Mod: CPTII,S$GLB,, | Performed by: PHYSICIAN ASSISTANT

## 2022-02-25 PROCEDURE — 99214 PR OFFICE/OUTPT VISIT, EST, LEVL IV, 30-39 MIN: ICD-10-PCS | Mod: S$GLB,,, | Performed by: PHYSICIAN ASSISTANT

## 2022-02-25 PROCEDURE — 1160F RVW MEDS BY RX/DR IN RCRD: CPT | Mod: CPTII,S$GLB,, | Performed by: PHYSICIAN ASSISTANT

## 2022-02-25 PROCEDURE — 1126F PR PAIN SEVERITY QUANTIFIED, NO PAIN PRESENT: ICD-10-PCS | Mod: CPTII,S$GLB,, | Performed by: PHYSICIAN ASSISTANT

## 2022-02-25 NOTE — PROGRESS NOTES
[facility]    Subjective:      Patient: Sunita Carlson Patient PCP: Primary Doctor No         :  1940     Sex:  female      MRN:  [id number]          Date of Visit: 2022      Chief Complaint: Nosebleeds     Patient ID: Sunita Carlson is a 81 y.o. female who was self-referred for nosebleeds. Pt states that she has had right sided nose-bleeds every Thursday for the past 3-4 weeks. Pt has history of multiple nasal cauterizations in the past. Pt states she had last nosebleed yesterday that lasted for around 30 minutes. Pt is not currently having active nosebleed. Pt has been using cotton balls with afrin applied to stop the bleeding. There is not a prior history of nasal surgery. There is not a prior history of nasal trauma.  She does not currently use a nasal spray. There is not a family history to suggest a clotting disorder.  She does currently take a blood-thinning agent-ASA 81 mg once daily. Pt has had dizziness for around 2 months. Positional vertigo with left sided head movement that happen daily. Pt states that she hears people talking or music at times when there are no people around or music playing. Pt denies ear surgeries or ear trauma. Pt had falls and hit her head 3 years ago and had syncope, but has not had an episode since.     Interval History 2022:  Pt had right sided nasal cauterization at last office visit 2022. Pt states that she had a small nosebleed on  from the right side. Pt had right sided nosebleed that lasted for around 3-4 hours yesterday. Pt evaluated at ED 2022 for right sided nose bleed. Upon physical examination in ED, pt had right bloody nares without active bleeding. CBC completed yesterday is unremarkable for anemia and platelet count WNL. Pt denies fever/chills.     Review of Systems   Constitutional: Negative for chills and fever.   HENT: Positive for nosebleeds.       Past Medical History:   Diagnosis Date    Arthritis     Cataract      Coronary artery disease     Hypertension     Insomnia     Neuropathy     Retinal detachment     Stomach ulcer      Family History   Problem Relation Age of Onset    Cancer Father     Macular degeneration Maternal Aunt     Cancer Maternal Aunt     Breast cancer Maternal Aunt     Macular degeneration Maternal Uncle     Diabetes Paternal Aunt     Cancer Paternal Aunt     Blindness Maternal Grandfather     Melanoma Neg Hx     Psoriasis Neg Hx     Lupus Neg Hx     Eczema Neg Hx      Past Surgical History:   Procedure Laterality Date    APPENDECTOMY      BLADDER SUSPENSION      BREAST BIOPSY      CARDIAC SURGERY      carotid endarterectomy      L    CATARACT EXTRACTION      CHOLECYSTECTOMY      HYSTERECTOMY      INJECTION OF ANESTHETIC AGENT AROUND GANGLION IMPAR N/A 9/12/2019    Procedure: BLOCK, GANGLION IMPAR;  Surgeon: Christian James MD;  [facility];  Service: Pain Management;  Laterality: N/A;    INSERTION OF IMPLANTABLE LOOP RECORDER N/A 12/18/2020    Procedure: INSERTION, IMPLANTABLE LOOP RECORDER;  Surgeon: Adin Saba MD;  Location: [facility] CATH/EP LAB;  Service: Cardiology;  Laterality: N/A;    OOPHORECTOMY      SUPERFICIAL KERATECTOMY Right 10/27/2017    Dr. Morales    TONSILLECTOMY      TRANSFORAMINAL EPIDURAL INJECTION OF STEROID Left 8/12/2019    Procedure: Injection,steroid,epidural,transforaminal approach L4-5, L5-S1;  Surgeon: Christian James MD;  Location: [facility];  Service: Pain Management;  Laterality: Left;    TRANSFORAMINAL EPIDURAL INJECTION OF STEROID Left 1/16/2020    Procedure: Injection,steroid,epidural,transforaminal approach;  Surgeon: Christian James MD;  Location: [facility];  Service: Pain Management;  Laterality: Left;  L4-5, L5-S1    TRANSFORAMINAL EPIDURAL INJECTION OF STEROID Left 6/30/2020    Procedure: Injection,steroid,epidural,transforaminal approach;  Surgeon: Christian James MD;  Location: [facility];  Service: Pain Management;  Laterality: Left;  L4-5 and L5-S1      Social History     Socioeconomic History    Marital status:    Tobacco Use    Smoking status: Former Smoker     Years: 4.00     Types: Cigarettes     Quit date: 10/13/1962     Years since quittin.4    Smokeless tobacco: Never Used   Substance and Sexual Activity    Alcohol use: No    Drug use: No    Sexual activity: Yes     Birth control/protection: Surgical       Current Outpatient Medications:     amLODIPine (NORVASC) 5 MG tablet, Take 5 mg by mouth., Disp: , Rfl:     aspirin (ECOTRIN) 81 MG EC tablet, Take 81 mg by mouth once daily., Disp: , Rfl:     atorvastatin (LIPITOR) 40 MG tablet, Take 1 tablet (40 mg total) by mouth once daily., Disp: 90 tablet, Rfl: 3    betaxoloL (KERLONE) 10 mg Tab, Take 1 tablet by mouth once daily., Disp: , Rfl:     cholecalciferol, vitamin D3, (VITAMIN D3) 2,000 unit Tab, Vitamin D3 50 mcg (2,000 unit) tablet  1 tablet every day by mouth, Disp: , Rfl:     clobetasol 0.05% (TEMOVATE) 0.05 % Oint, Apply topically 2 (two) times daily., Disp: 60 g, Rfl: 0    dexlansoprazole (DEXILANT) 30 mg CpDM, Take 60 mg by mouth. , Disp: , Rfl:     diclofenac sodium (VOLTAREN) 1 % Gel, Apply 2 g topically daily as needed., Disp: , Rfl:     dicyclomine (BENTYL) 10 MG capsule, 2 (two) times daily as needed. , Disp: , Rfl:     diphenoxylate-atropine 2.5-0.025 mg (LOMOTIL) 2.5-0.025 mg per tablet, Take 1 tablet by mouth 4 (four) times daily as needed for Diarrhea., Disp: , Rfl:     EScitalopram oxalate (LEXAPRO) 10 MG tablet, , Disp: , Rfl:     gabapentin (NEURONTIN) 300 MG capsule, TAKE 2 CAPSULES BY MOUTH IN THE EVENING, Disp: 180 capsule, Rfl: 3    metoprolol succinate (TOPROL-XL) 50 MG 24 hr tablet, Take 50 mg by mouth once daily., Disp: , Rfl:     mirtazapine (REMERON) 15 MG tablet, TAKE 1 TABLET BY MOUTH IN  THE EVENING, Disp: 90 tablet, Rfl: 3    mupirocin (BACTROBAN) 2 % ointment, Apply layer to right nostril with clean q-tip twice daily for 14 days., Disp: 30  g, Rfl: 0    MYRBETRIQ 50 mg Tb24, Take 1 tablet by mouth once daily., Disp: , Rfl:     nitroGLYCERIN (NITROSTAT) 0.4 MG SL tablet, Place under the tongue., Disp: , Rfl:     ondansetron (ZOFRAN-ODT) 8 MG TbDL, every 6 (six) hours as needed. , Disp: , Rfl:     pantoprazole (PROTONIX) 40 MG tablet, Take 40 mg by mouth every morning., Disp: , Rfl:     phenobarb-hyoscy-atropine-scop (DONNATAL) 16.2-0.1037 -0.0194 mg/5 mL Elix, 5 mLs., Disp: , Rfl:     rOPINIRole (REQUIP) 0.5 MG tablet, Take 0.5 mg by mouth nightly as needed., Disp: , Rfl:     sars-cov-2, covid-19, (MODERNA COVID-19) 100 mcg/0.5 ml injection, Inject into the muscle., Disp: 0.5 mL, Rfl: 0    solifenacin (VESICARE) 10 MG tablet, 10 mg., Disp: , Rfl:     temazepam (RESTORIL) 30 mg capsule, Take 30 mg by mouth nightly as needed., Disp: , Rfl:   No current facility-administered medications for this visit.    Review of patient's allergies indicates:   Allergen Reactions    Demerol [meperidine] Nausea And Vomiting    Oxycodone     Hydrocodone Itching    Oxycodone-acetaminophen Itching     All medications, allergies, and past history have been reviewed.    Objective:      Vitals:  Vitals - 1 value per visit 2/24/2022 2/25/2022 2/25/2022   SYSTOLIC 145 - -   DIASTOLIC 70 - -   Pulse 60 - -   Temp - - -   Resp 18 - -   SPO2 99 - -   Weight (lb) - - 122.36   Weight (kg) - - 55.5   Height - - 59   BMI (Calculated) - - 24.7   VISIT REPORT - - -   Pain Score  - 0 -   Some recent data might be hidden       Body surface area is 1.52 meters squared.  Physical Exam  Constitutional:       General: She is not in acute distress.     Appearance: Normal appearance. She is not ill-appearing.   HENT:      Head: Normocephalic and atraumatic.      Right Ear: Tympanic membrane, ear canal and external ear normal.      Left Ear: Tympanic membrane, ear canal and external ear normal.      Nose: Nose normal.      Comments: Right anterior septal irritation and scabbing in  area of prior cauterization. Appearance that right cauterized area dried and peeled off.      Mouth/Throat:      Lips: Pink. No lesions.      Mouth: Mucous membranes are moist.   Eyes:      General:         Right eye: No discharge.         Left eye: No discharge.      Extraocular Movements: Extraocular movements intact.      Conjunctiva/sclera: Conjunctivae normal.   Pulmonary:      Effort: Pulmonary effort is normal.   Neurological:      General: No focal deficit present.      Mental Status: She is alert and oriented to person, place, and time. Mental status is at baseline.   Psychiatric:         Mood and Affect: Mood normal.         Behavior: Behavior normal.         Thought Content: Thought content normal.         Judgment: Judgment normal.     Procedure:    Endoscopic Nasal Cauterization    Indication:  Epistaxis     0 degree rigid nasal endoscopy was used to visualize and assist with cauterization. Informed consent was obtained prior to proceeding.  The right nasal cavity was decongested with topical 0.5% neopsynephrine and anesthetized with 4% lidocaine.  A rigid 0-degree endoscopic was introduced into the nasal cavity.  Bleeding was localized to the anterior nasal septum region and cauterized with silver nitrate followed by topical application of surgicel.  The instruments were utilized parallel and simultaneous to performance of endoscopy.     Labs:  RBC   Date Value Ref Range Status   02/24/2022 4.30 4.00 - 5.40 M/uL Final     Hemoglobin   Date Value Ref Range Status   02/24/2022 13.1 12.0 - 16.0 g/dL Final     Hematocrit   Date Value Ref Range Status   02/24/2022 40.0 37.0 - 48.5 % Final     Platelets   Date Value Ref Range Status   02/24/2022 284 150 - 450 K/uL Final     Prothrombin Time   Date Value Ref Range Status   02/06/2020 9.6 9.0 - 12.5 sec Final     INR   Date Value Ref Range Status   02/06/2020 0.9 0.8 - 1.2 Final     Comment:     Coumadin Therapy:  2.0 - 3.0 for INR for all indicators except  mechanical heart valves  and antiphospholipid syndromes which should use 2.5 - 3.5.       All lab results, imaging results, and data have been reviewed.    Assessment:        ICD-10-CM ICD-9-CM   1. Epistaxis-right R04.0 784.7            Plan:      Epistaxis-right  -     mupirocin (BACTROBAN) 2 % ointment; Apply layer to right nostril with clean q-tip twice daily for 14 days.  Dispense: 30 g; Refill: 0  - Follow up in office in 2 weeks for post right anterior nasal cauterization follow up.    Nose Bleed Instructions:  1) We had a discussion regarding the importance of nasal moisture, and the use of a nasal saline spray or gel into nose 4 to 6 times daily to keep moist.   2) Avoid blowing your nose for 2 weeks. You may use nasal saline rinses instead of blowing.    3) Bactroban (mupirocin) ointment in nostril twice daily for 2 weeks to right nostril.  4) Do not sleep or sit for long periods of time under a ceiling fan or other source of aggressive airflow.  5) Use a humidifier in bedroom or any room in your home you spend long periods of time.  6) Engage in only light activity. No strenuous activity. No heavy lifting or straining for 2 weeks.   7) No bending over at the hips. Keep nose above your heart at all times for one week.  8) Sneeze with an open mouth to reduce pressure from nose for 2 weeks.   9) Avoid foods or drinks hot in temperature for at least 48 hours then progress slowly.  10) Avoid hot steamy showers or baths for one week.  11) Do not blow nose for 2 weeks.  Sneeze with mouth open.

## 2022-03-11 ENCOUNTER — TELEPHONE (OUTPATIENT)
Dept: CARDIOLOGY | Facility: CLINIC | Age: 82
End: 2022-03-11
Payer: MEDICARE

## 2022-03-11 NOTE — TELEPHONE ENCOUNTER
She checked her bp and it was 100/53. She was not having any symptoms. Advised her to keep tracking her bp, she is going to call "[employer] and get them to go over doing a manual transmission. She will get me a bp log

## 2022-03-11 NOTE — TELEPHONE ENCOUNTER
----- Message from Lucía Campa sent at 3/11/2022  2:37 PM CST -----  Regarding: advice  Contact: pt  Type: Needs Medical Advice    Who Called:  pt  Symptoms (please be specific):  n/a  How long has patient had these symptoms:  n/a  Pharmacy name and phone #:  n/a  Best Call Back Number: [phone]    Additional Information: asking for a call regarding her BP

## 2022-03-14 ENCOUNTER — OFFICE VISIT (OUTPATIENT)
Dept: OTOLARYNGOLOGY | Facility: CLINIC | Age: 82
End: 2022-03-14
Payer: MEDICARE

## 2022-03-14 VITALS — HEIGHT: 59 IN | WEIGHT: 121.06 LBS | BODY MASS INDEX: 24.4 KG/M2 | TEMPERATURE: 99 F

## 2022-03-14 DIAGNOSIS — R04.0 EPISTAXIS: Primary | ICD-10-CM

## 2022-03-14 DIAGNOSIS — R42 DIZZINESS: ICD-10-CM

## 2022-03-14 PROCEDURE — 99213 PR OFFICE/OUTPT VISIT, EST, LEVL III, 20-29 MIN: ICD-10-PCS | Mod: S$GLB,,, | Performed by: PHYSICIAN ASSISTANT

## 2022-03-14 PROCEDURE — 1160F PR REVIEW ALL MEDS BY PRESCRIBER/CLIN PHARMACIST DOCUMENTED: ICD-10-PCS | Mod: CPTII,S$GLB,, | Performed by: PHYSICIAN ASSISTANT

## 2022-03-14 PROCEDURE — 99999 PR PBB SHADOW E&M-EST. PATIENT-LVL IV: CPT | Mod: PBBFAC,,, | Performed by: PHYSICIAN ASSISTANT

## 2022-03-14 PROCEDURE — 1126F PR PAIN SEVERITY QUANTIFIED, NO PAIN PRESENT: ICD-10-PCS | Mod: CPTII,S$GLB,, | Performed by: PHYSICIAN ASSISTANT

## 2022-03-14 PROCEDURE — 1126F AMNT PAIN NOTED NONE PRSNT: CPT | Mod: CPTII,S$GLB,, | Performed by: PHYSICIAN ASSISTANT

## 2022-03-14 PROCEDURE — 1159F PR MEDICATION LIST DOCUMENTED IN MEDICAL RECORD: ICD-10-PCS | Mod: CPTII,S$GLB,, | Performed by: PHYSICIAN ASSISTANT

## 2022-03-14 PROCEDURE — 99213 OFFICE O/P EST LOW 20 MIN: CPT | Mod: S$GLB,,, | Performed by: PHYSICIAN ASSISTANT

## 2022-03-14 PROCEDURE — 99999 PR PBB SHADOW E&M-EST. PATIENT-LVL IV: ICD-10-PCS | Mod: PBBFAC,,, | Performed by: PHYSICIAN ASSISTANT

## 2022-03-14 PROCEDURE — 1160F RVW MEDS BY RX/DR IN RCRD: CPT | Mod: CPTII,S$GLB,, | Performed by: PHYSICIAN ASSISTANT

## 2022-03-14 PROCEDURE — 1159F MED LIST DOCD IN RCRD: CPT | Mod: CPTII,S$GLB,, | Performed by: PHYSICIAN ASSISTANT

## 2022-03-14 NOTE — PATIENT INSTRUCTIONS
The earlier and more regularly the exercise regimen is carried out, the faster and more  complete will be your return to normal activity. You may find that your dizziness symptoms worsen for a few days after you start the exercises but do your best to persevere with them.      [place] Exercises  Complete 5 repetitions of each exercise, 3 times a day. You can expect dizziness to occur  when first beginning the exercises. Please be seated while doing them.    Eye Exercises  (Sitting in bed) Looking up, then down - at first, slowly, then quickly 20 times.   Focus on finger moving from 3 feet to 1 foot away from face and back again - 20 times.  Looking from one side to the other - at first slowly then quickly, 20 times.    Head Exercises  Bend head forward - then backward with eyes open -slowly, then quickly 20 times.   Turn head from one side to the other side - slowly, then quickly 20 times.   As dizziness improves, these head exercises should be done eyes closed.    Sitting  While sitting, shrug shoulders, 20 times.   Turn shoulders to the right, then left, 20 times.   Bend forward and  objects from ground and sit up, 20 times.    Standing  Change from sitting to standing and back again, 20 times, with eyes open.   Repeat with eyes closed.   Throw a small rubber ball from hand to hand above eye level. Throw ball from hand to hand under one knee.    Moving About  Walk across room with eyes open, then closed, 10 times.   Walk up and down a slope with eyes  open, then closed, 20 times.   Walk up and down steps with eyes open, then closed, 20 times.   Any game involving stooping or turning is good.

## 2022-03-14 NOTE — PROGRESS NOTES
[facility]    Subjective:      Patient: Sunita Carlson Patient PCP: Primary Doctor No         :  1940     Sex:  female      MRN:  [id number]          Date of Visit: 2022      Chief Complaint: Follow up nosebleeds     Patient ID: Sunita Carlson is a 81 y.o. female who was self-referred for nosebleeds. Pt states that she has had right sided nose-bleeds every Thursday for the past 3-4 weeks. Pt has history of multiple nasal cauterizations in the past. Pt states she had last nosebleed yesterday that lasted for around 30 minutes. Pt is not currently having active nosebleed. Pt has been using cotton balls with afrin applied to stop the bleeding. There is not a prior history of nasal surgery. There is not a prior history of nasal trauma.  She does not currently use a nasal spray. There is not a family history to suggest a clotting disorder.  She does currently take a blood-thinning agent-ASA 81 mg once daily. Pt has had dizziness for around 2 months. Positional vertigo with left sided head movement that happen daily. Pt states that she hears people talking or music at times when there are no people around or music playing. Pt denies ear surgeries or ear trauma. Pt had falls and hit her head 3 years ago and had syncope, but has not had an episode since.     Interval History 2022:  Pt had right sided nasal cauterization at last office visit 2022. Pt states that she had a small nosebleed on  from the right side. Pt had right sided nosebleed that lasted for around 3-4 hours yesterday. Pt evaluated at ED 2022 for right sided nose bleed. Upon physical examination in ED, pt had right bloody nares without active bleeding. CBC completed yesterday is unremarkable for anemia and platelet count WNL. Pt denies fever/chills.     Interval History 2022: Pt states that she had a mild nose bleed the  after her second right nasal cauterization, but that she has been doing well otherwise. Pt  states she has had some mild episodes of dizziness and has had motion sickness since she was a young girl. Pt states her BPPV has been doing well.    Review of Systems   Constitutional: Negative for chills and fever.   HENT: Negative for nosebleeds.    Neurological: Positive for dizziness.      Past Medical History:   Diagnosis Date    Arthritis     Cataract     Coronary artery disease     Hypertension     Insomnia     Neuropathy     Retinal detachment     Stomach ulcer      Family History   Problem Relation Age of Onset    Cancer Father     Macular degeneration Maternal Aunt     Cancer Maternal Aunt     Breast cancer Maternal Aunt     Macular degeneration Maternal Uncle     Diabetes Paternal Aunt     Cancer Paternal Aunt     Blindness Maternal Grandfather     Melanoma Neg Hx     Psoriasis Neg Hx     Lupus Neg Hx     Eczema Neg Hx      Past Surgical History:   Procedure Laterality Date    APPENDECTOMY      BLADDER SUSPENSION      BREAST BIOPSY      CARDIAC SURGERY      carotid endarterectomy      L    CATARACT EXTRACTION      CHOLECYSTECTOMY      HYSTERECTOMY      INJECTION OF ANESTHETIC AGENT AROUND GANGLION IMPAR N/A 9/12/2019    Procedure: BLOCK, GANGLION IMPAR;  Surgeon: Christian James MD;  Location: [facility] OR;  Service: Pain Management;  Laterality: N/A;    INSERTION OF IMPLANTABLE LOOP RECORDER N/A 12/18/2020    Procedure: INSERTION, IMPLANTABLE LOOP RECORDER;  Surgeon: Adin Saba MD;  Location: [facility] CATH[facility]EP LAB;  Service: Cardiology;  Laterality: N/A;    OOPHORECTOMY      SUPERFICIAL KERATECTOMY Right 10/27/2017    Dr. Morales    TONSILLECTOMY      TRANSFORAMINAL EPIDURAL INJECTION OF STEROID Left 8/12/2019    Procedure: Injection,steroid,epidural,transforaminal approach L4-5, L5-S1;  Surgeon: Christian James MD;  Location: [facility] OR;  Service: Pain Management;  Laterality: Left;    TRANSFORAMINAL EPIDURAL INJECTION OF STEROID Left 1/16/2020    Procedure:  Injection,steroid,epidural,transforaminal approach;  Surgeon: Christian James MD;  Location: [facility] OR;  Service: Pain Management;  Laterality: Left;  L4-5, L5-S1    TRANSFORAMINAL EPIDURAL INJECTION OF STEROID Left 2020    Procedure: Injection,steroid,epidural,transforaminal approach;  Surgeon: Christian James MD;  Location: [facility] OR;  Service: Pain Management;  Laterality: Left;  L4-5 and L5-S1     Social History     Socioeconomic History    Marital status:    Tobacco Use    Smoking status: Former Smoker     Years: 4.00     Types: Cigarettes     Quit date: 10/13/1962     Years since quittin.4    Smokeless tobacco: Never Used   Substance and Sexual Activity    Alcohol use: No    Drug use: No    Sexual activity: Yes     Birth control/protection: Surgical       Current Outpatient Medications:     amLODIPine (NORVASC) 5 MG tablet, Take 5 mg by mouth., Disp: , Rfl:     aspirin (ECOTRIN) 81 MG EC tablet, Take 81 mg by mouth once daily., Disp: , Rfl:     atorvastatin (LIPITOR) 40 MG tablet, Take 1 tablet (40 mg total) by mouth once daily., Disp: 90 tablet, Rfl: 3    betaxoloL (KERLONE) 10 mg Tab, Take 1 tablet by mouth once daily., Disp: , Rfl:     cholecalciferol, vitamin D3, (VITAMIN D3) 2,000 unit Tab, Vitamin D3 50 mcg (2,000 unit) tablet  1 tablet every day by mouth, Disp: , Rfl:     clobetasol 0.05% (TEMOVATE) 0.05 % Oint, Apply topically 2 (two) times daily., Disp: 60 g, Rfl: 0    dexlansoprazole (DEXILANT) 30 mg CpDM, Take 60 mg by mouth. , Disp: , Rfl:     diclofenac sodium (VOLTAREN) 1 % Gel, Apply 2 g topically daily as needed., Disp: , Rfl:     dicyclomine (BENTYL) 10 MG capsule, 2 (two) times daily as needed. , Disp: , Rfl:     diphenoxylate-atropine 2.5-0.025 mg (LOMOTIL) 2.5-0.025 mg per tablet, Take 1 tablet by mouth 4 (four) times daily as needed for Diarrhea., Disp: , Rfl:     EScitalopram oxalate (LEXAPRO) 10 MG tablet, , Disp: , Rfl:     gabapentin (NEURONTIN) 300 MG capsule,  TAKE 2 CAPSULES BY MOUTH IN THE EVENING, Disp: 180 capsule, Rfl: 3    metoprolol succinate (TOPROL-XL) 50 MG 24 hr tablet, Take 50 mg by mouth once daily., Disp: , Rfl:     mirtazapine (REMERON) 15 MG tablet, TAKE 1 TABLET BY MOUTH IN  THE EVENING, Disp: 90 tablet, Rfl: 3    mupirocin (BACTROBAN) 2 % ointment, Apply layer to right nostril with clean q-tip twice daily for 14 days., Disp: 30 g, Rfl: 0    MYRBETRIQ 50 mg Tb24, Take 1 tablet by mouth once daily., Disp: , Rfl:     nitroGLYCERIN (NITROSTAT) 0.4 MG SL tablet, Place under the tongue., Disp: , Rfl:     ondansetron (ZOFRAN-ODT) 8 MG TbDL, every 6 (six) hours as needed. , Disp: , Rfl:     pantoprazole (PROTONIX) 40 MG tablet, Take 40 mg by mouth every morning., Disp: , Rfl:     phenobarb-hyoscy-atropine-scop (DONNATAL) 16.2-0.1037 -0.0194 mg/5 mL Elix, 5 mLs., Disp: , Rfl:     rOPINIRole (REQUIP) 0.5 MG tablet, Take 0.5 mg by mouth nightly as needed., Disp: , Rfl:     sars-cov-2, covid-19, (MODERNA COVID-19) 100 mcg/0.5 ml injection, Inject into the muscle., Disp: 0.5 mL, Rfl: 0    solifenacin (VESICARE) 10 MG tablet, 10 mg., Disp: , Rfl:     temazepam (RESTORIL) 30 mg capsule, Take 30 mg by mouth nightly as needed., Disp: , Rfl:     Review of patient's allergies indicates:   Allergen Reactions    Demerol [meperidine] Nausea And Vomiting    Oxycodone     Hydrocodone Itching    Oxycodone-acetaminophen Itching     All medications, allergies, and past history have been reviewed.    Objective:      Vitals:  Vitals - 1 value per visit 2/25/2022 3/14/2022 3/14/2022   SYSTOLIC - - -   DIASTOLIC - - -   Pulse - - -   Temp - - 98.9   Resp - - -   SPO2 - - -   Weight (lb) 122.36 - 121.03   Weight (kg) 55.5 - 54.9   Height 59 - 59   BMI (Calculated) 24.7 - 24.4   VISIT REPORT - - -   Pain Score  - 0 -   Some recent data might be hidden       Body surface area is 1.51 meters squared.  Physical Exam  Constitutional:       General: She is not in acute  distress.     Appearance: Normal appearance. She is not ill-appearing.   HENT:      Head: Normocephalic and atraumatic.      Right Ear: Tympanic membrane, ear canal and external ear normal.      Left Ear: Tympanic membrane, ear canal and external ear normal.      Nose: Nose normal.      Comments: Well healed right nasal cauterization.     Mouth/Throat:      Lips: Pink. No lesions.      Mouth: Mucous membranes are moist. No oral lesions.      Tongue: No lesions.      Palate: No lesions.      Pharynx: Oropharynx is clear. Uvula midline. No pharyngeal swelling, oropharyngeal exudate, posterior oropharyngeal erythema or uvula swelling.   Eyes:      General:         Right eye: No discharge.         Left eye: No discharge.      Extraocular Movements: Extraocular movements intact.      Conjunctiva/sclera: Conjunctivae normal.   Pulmonary:      Effort: Pulmonary effort is normal.   Neurological:      General: No focal deficit present.      Mental Status: She is alert and oriented to person, place, and time. Mental status is at baseline.   Psychiatric:         Mood and Affect: Mood normal.         Behavior: Behavior normal.         Thought Content: Thought content normal.         Judgment: Judgment normal.     Dagoberto-Hallpike: Negative bilaterally    Labs:  RBC   Date Value Ref Range Status   02/24/2022 4.30 4.00 - 5.40 M/uL Final     Hemoglobin   Date Value Ref Range Status   02/24/2022 13.1 12.0 - 16.0 g/dL Final     Hematocrit   Date Value Ref Range Status   02/24/2022 40.0 37.0 - 48.5 % Final     Platelets   Date Value Ref Range Status   02/24/2022 284 150 - 450 K/uL Final     Prothrombin Time   Date Value Ref Range Status   02/06/2020 9.6 9.0 - 12.5 sec Final     INR   Date Value Ref Range Status   02/06/2020 0.9 0.8 - 1.2 Final     Comment:     Coumadin Therapy:  2.0 - 3.0 for INR for all indicators except mechanical heart valves  and antiphospholipid syndromes which should use 2.5 - 3.5.       All lab results, imaging  results, and data have been reviewed.    Assessment:        ICD-10-CM ICD-9-CM   1. Epistaxis-right  R04.0 784.7   2. Dizziness  R42 780.4           Plan:      Epistaxis-right  -Healed and controlled. Pt is to follow up in clinic if she has nosebleeds.    Dizziness  -Pt given cawthorne exercises to help with motion sickness. Pt advised to not do Cawthorne exercises if experiencing BPPV. If experiencing BPPV, then pt is to do at home epley maneuver and not do cawthorne exercises.    Pt is to follow up in clinic if she has ENT issues.

## 2022-03-21 ENCOUNTER — TELEPHONE (OUTPATIENT)
Dept: CARDIOLOGY | Facility: CLINIC | Age: 82
End: 2022-03-21
Payer: MEDICARE

## 2022-03-21 DIAGNOSIS — R00.2 PALPITATIONS: ICD-10-CM

## 2022-03-21 DIAGNOSIS — R06.02 SOB (SHORTNESS OF BREATH): ICD-10-CM

## 2022-03-21 DIAGNOSIS — R07.9 CHEST PAIN, UNSPECIFIED TYPE: ICD-10-CM

## 2022-03-21 NOTE — TELEPHONE ENCOUNTER
----- Message from Justin Acosta sent at 3/21/2022  3:49 PM CDT -----  Type: Needs Medical Advice  Who Called: Patient  Symptoms (please be specific):   How long has patient had these symptoms:    Pharmacy name and phone #:    Best Call Back Number: [phone]  Additional Information: Pt requesting a call back from provider office, pt prefer the nurse.concerning her heart monitor.

## 2022-03-21 NOTE — TELEPHONE ENCOUNTER
Spoke with pt, appointment made, also needs an appointment with [facility] for chest pains, sob. Advised pt to do a remote check as well. Also lab ordered entered. Pt verbalized understanding.  .

## 2022-03-22 ENCOUNTER — LAB VISIT (OUTPATIENT)
Dept: LAB | Facility: HOSPITAL | Age: 82
End: 2022-03-22
Attending: INTERNAL MEDICINE
Payer: MEDICARE

## 2022-03-22 ENCOUNTER — TELEPHONE (OUTPATIENT)
Dept: CARDIOLOGY | Facility: CLINIC | Age: 82
End: 2022-03-22
Payer: MEDICARE

## 2022-03-22 ENCOUNTER — OFFICE VISIT (OUTPATIENT)
Dept: CARDIOLOGY | Facility: CLINIC | Age: 82
End: 2022-03-22
Payer: MEDICARE

## 2022-03-22 VITALS
WEIGHT: 121 LBS | DIASTOLIC BLOOD PRESSURE: 78 MMHG | HEART RATE: 60 BPM | SYSTOLIC BLOOD PRESSURE: 122 MMHG | HEIGHT: 59 IN | BODY MASS INDEX: 24.39 KG/M2

## 2022-03-22 DIAGNOSIS — R07.9 CHEST PAIN, UNSPECIFIED TYPE: ICD-10-CM

## 2022-03-22 DIAGNOSIS — R00.2 PALPITATIONS: ICD-10-CM

## 2022-03-22 DIAGNOSIS — Z95.1 HX OF CABG: ICD-10-CM

## 2022-03-22 DIAGNOSIS — I20.89 STABLE ANGINA PECTORIS: Primary | ICD-10-CM

## 2022-03-22 DIAGNOSIS — R06.02 SOB (SHORTNESS OF BREATH): ICD-10-CM

## 2022-03-22 DIAGNOSIS — E78.2 MIXED HYPERLIPIDEMIA: ICD-10-CM

## 2022-03-22 DIAGNOSIS — I10 ESSENTIAL HYPERTENSION: ICD-10-CM

## 2022-03-22 LAB
ALBUMIN SERPL BCP-MCNC: 3.9 G/DL (ref 3.5–5.2)
ALP SERPL-CCNC: 74 U/L (ref 55–135)
ALT SERPL W/O P-5'-P-CCNC: 18 U/L (ref 10–44)
ANION GAP SERPL CALC-SCNC: 11 MMOL/L (ref 8–16)
AST SERPL-CCNC: 22 U/L (ref 10–40)
BASOPHILS # BLD AUTO: 0.07 K/UL (ref 0–0.2)
BASOPHILS NFR BLD: 1 % (ref 0–1.9)
BILIRUB SERPL-MCNC: 0.8 MG/DL (ref 0.1–1)
BNP SERPL-MCNC: 76 PG/ML (ref 0–99)
BUN SERPL-MCNC: 15 MG/DL (ref 8–23)
CALCIUM SERPL-MCNC: 9.5 MG/DL (ref 8.7–10.5)
CHLORIDE SERPL-SCNC: 104 MMOL/L (ref 95–110)
CO2 SERPL-SCNC: 26 MMOL/L (ref 23–29)
CREAT SERPL-MCNC: 0.6 MG/DL (ref 0.5–1.4)
DIFFERENTIAL METHOD: NORMAL
EOSINOPHIL # BLD AUTO: 0.1 K/UL (ref 0–0.5)
EOSINOPHIL NFR BLD: 2 % (ref 0–8)
ERYTHROCYTE [DISTWIDTH] IN BLOOD BY AUTOMATED COUNT: 12.7 % (ref 11.5–14.5)
EST. GFR  (AFRICAN AMERICAN): >60 ML/MIN/1.73 M^2
EST. GFR  (NON AFRICAN AMERICAN): >60 ML/MIN/1.73 M^2
GLUCOSE SERPL-MCNC: 96 MG/DL (ref 70–110)
HCT VFR BLD AUTO: 40.3 % (ref 37–48.5)
HGB BLD-MCNC: 13.1 G/DL (ref 12–16)
IMM GRANULOCYTES # BLD AUTO: 0.03 K/UL (ref 0–0.04)
IMM GRANULOCYTES NFR BLD AUTO: 0.4 % (ref 0–0.5)
LYMPHOCYTES # BLD AUTO: 2.1 K/UL (ref 1–4.8)
LYMPHOCYTES NFR BLD: 30 % (ref 18–48)
MAGNESIUM SERPL-MCNC: 1.7 MG/DL (ref 1.6–2.6)
MCH RBC QN AUTO: 29.6 PG (ref 27–31)
MCHC RBC AUTO-ENTMCNC: 32.5 G/DL (ref 32–36)
MCV RBC AUTO: 91 FL (ref 82–98)
MONOCYTES # BLD AUTO: 0.6 K/UL (ref 0.3–1)
MONOCYTES NFR BLD: 8 % (ref 4–15)
NEUTROPHILS # BLD AUTO: 4.1 K/UL (ref 1.8–7.7)
NEUTROPHILS NFR BLD: 58.6 % (ref 38–73)
NRBC BLD-RTO: 0 /100 WBC
PLATELET # BLD AUTO: 321 K/UL (ref 150–450)
PMV BLD AUTO: 9.4 FL (ref 9.2–12.9)
POTASSIUM SERPL-SCNC: 3.8 MMOL/L (ref 3.5–5.1)
POTASSIUM SERPL-SCNC: 3.8 MMOL/L (ref 3.5–5.1)
PROT SERPL-MCNC: 6.9 G/DL (ref 6–8.4)
RBC # BLD AUTO: 4.43 M/UL (ref 4–5.4)
SODIUM SERPL-SCNC: 141 MMOL/L (ref 136–145)
TSH SERPL DL<=0.005 MIU/L-ACNC: 3.05 UIU/ML (ref 0.34–5.6)
WBC # BLD AUTO: 6.97 K/UL (ref 3.9–12.7)

## 2022-03-22 PROCEDURE — 3074F PR MOST RECENT SYSTOLIC BLOOD PRESSURE < 130 MM HG: ICD-10-PCS | Mod: CPTII,S$GLB,, | Performed by: NURSE PRACTITIONER

## 2022-03-22 PROCEDURE — 1126F PR PAIN SEVERITY QUANTIFIED, NO PAIN PRESENT: ICD-10-PCS | Mod: CPTII,S$GLB,, | Performed by: NURSE PRACTITIONER

## 2022-03-22 PROCEDURE — 85025 COMPLETE CBC W/AUTO DIFF WBC: CPT | Performed by: INTERNAL MEDICINE

## 2022-03-22 PROCEDURE — 3288F FALL RISK ASSESSMENT DOCD: CPT | Mod: CPTII,S$GLB,, | Performed by: NURSE PRACTITIONER

## 2022-03-22 PROCEDURE — 1159F PR MEDICATION LIST DOCUMENTED IN MEDICAL RECORD: ICD-10-PCS | Mod: CPTII,S$GLB,, | Performed by: NURSE PRACTITIONER

## 2022-03-22 PROCEDURE — 36415 COLL VENOUS BLD VENIPUNCTURE: CPT | Performed by: INTERNAL MEDICINE

## 2022-03-22 PROCEDURE — 99214 PR OFFICE/OUTPT VISIT, EST, LEVL IV, 30-39 MIN: ICD-10-PCS | Mod: S$GLB,,, | Performed by: NURSE PRACTITIONER

## 2022-03-22 PROCEDURE — 1160F RVW MEDS BY RX/DR IN RCRD: CPT | Mod: CPTII,S$GLB,, | Performed by: NURSE PRACTITIONER

## 2022-03-22 PROCEDURE — 1126F AMNT PAIN NOTED NONE PRSNT: CPT | Mod: CPTII,S$GLB,, | Performed by: NURSE PRACTITIONER

## 2022-03-22 PROCEDURE — 93000 EKG 12-LEAD: ICD-10-PCS | Mod: S$GLB,,, | Performed by: INTERNAL MEDICINE

## 2022-03-22 PROCEDURE — 3078F DIAST BP <80 MM HG: CPT | Mod: CPTII,S$GLB,, | Performed by: NURSE PRACTITIONER

## 2022-03-22 PROCEDURE — 83880 ASSAY OF NATRIURETIC PEPTIDE: CPT | Performed by: INTERNAL MEDICINE

## 2022-03-22 PROCEDURE — 3074F SYST BP LT 130 MM HG: CPT | Mod: CPTII,S$GLB,, | Performed by: NURSE PRACTITIONER

## 2022-03-22 PROCEDURE — 1160F PR REVIEW ALL MEDS BY PRESCRIBER/CLIN PHARMACIST DOCUMENTED: ICD-10-PCS | Mod: CPTII,S$GLB,, | Performed by: NURSE PRACTITIONER

## 2022-03-22 PROCEDURE — 83735 ASSAY OF MAGNESIUM: CPT | Performed by: INTERNAL MEDICINE

## 2022-03-22 PROCEDURE — 1101F PT FALLS ASSESS-DOCD LE1/YR: CPT | Mod: CPTII,S$GLB,, | Performed by: NURSE PRACTITIONER

## 2022-03-22 PROCEDURE — 3288F PR FALLS RISK ASSESSMENT DOCUMENTED: ICD-10-PCS | Mod: CPTII,S$GLB,, | Performed by: NURSE PRACTITIONER

## 2022-03-22 PROCEDURE — 99214 OFFICE O/P EST MOD 30 MIN: CPT | Mod: S$GLB,,, | Performed by: NURSE PRACTITIONER

## 2022-03-22 PROCEDURE — 1101F PR PT FALLS ASSESS DOC 0-1 FALLS W/OUT INJ PAST YR: ICD-10-PCS | Mod: CPTII,S$GLB,, | Performed by: NURSE PRACTITIONER

## 2022-03-22 PROCEDURE — 84443 ASSAY THYROID STIM HORMONE: CPT | Performed by: INTERNAL MEDICINE

## 2022-03-22 PROCEDURE — 93000 ELECTROCARDIOGRAM COMPLETE: CPT | Mod: S$GLB,,, | Performed by: INTERNAL MEDICINE

## 2022-03-22 PROCEDURE — 3078F PR MOST RECENT DIASTOLIC BLOOD PRESSURE < 80 MM HG: ICD-10-PCS | Mod: CPTII,S$GLB,, | Performed by: NURSE PRACTITIONER

## 2022-03-22 PROCEDURE — 80053 COMPREHEN METABOLIC PANEL: CPT | Performed by: INTERNAL MEDICINE

## 2022-03-22 PROCEDURE — 1159F MED LIST DOCD IN RCRD: CPT | Mod: CPTII,S$GLB,, | Performed by: NURSE PRACTITIONER

## 2022-03-22 NOTE — TELEPHONE ENCOUNTER
03/22/2022  Spoke with patient and gave her instructions on her reveal monitor.  She stated that she has been having some increased heart rates and some chest discomfort.  She is scheduled to see My in May of 2022.  Will send a msg to Linda to see if they could see sooner.    Selin Mathews

## 2022-03-22 NOTE — PROGRESS NOTES
Subjective:    Patient ID:  Sunita Carlson is a 81 y.o. female patient here for evaluation Shortness of Breath (Chest pa), Chest Pain, Hyperlipidemia, Hypertension, and Coronary Artery Disease      History of Present Illness:       Ms. Carlson is here today for a follow-up visit.  She has been having chest pains on and off.  She gets short of breath when doing anything and has to sit for 5 minutes and then start again.  She has a history of coronary artery disease last stress test 2 years ago EKG does have some abnormal ST T wave abnormalities noted.      Review of patient's allergies indicates:   Allergen Reactions    Demerol [meperidine] Nausea And Vomiting    Oxycodone     Hydrocodone Itching    Oxycodone-acetaminophen Itching       Past Medical History:   Diagnosis Date    Arthritis     Cataract     Coronary artery disease     Hypertension     Insomnia     Neuropathy     Retinal detachment     Stomach ulcer      Past Surgical History:   Procedure Laterality Date    APPENDECTOMY      BLADDER SUSPENSION      BREAST BIOPSY      CARDIAC SURGERY      carotid endarterectomy      L    CATARACT EXTRACTION      CHOLECYSTECTOMY      HYSTERECTOMY      INJECTION OF ANESTHETIC AGENT AROUND GANGLION IMPAR N/A 9/12/2019    Procedure: BLOCK, GANGLION IMPAR;  Surgeon: Christian James MD;  Location: [facility] OR;  Service: Pain Management;  Laterality: N/A;    INSERTION OF IMPLANTABLE LOOP RECORDER N/A 12/18/2020    Procedure: INSERTION, IMPLANTABLE LOOP RECORDER;  Surgeon: Adin Saba MD;  Location: [facility] CATH/EP LAB;  Service: Cardiology;  Laterality: N/A;    OOPHORECTOMY      SUPERFICIAL KERATECTOMY Right 10/27/2017    Dr. Morales    TONSILLECTOMY      TRANSFORAMINAL EPIDURAL INJECTION OF STEROID Left 8/12/2019    Procedure: Injection,steroid,epidural,transforaminal approach L4-5, L5-S1;  Surgeon: Christian James MD;  Location: [facility] OR;  Service: Pain Management;  Laterality: Left;    TRANSFORAMINAL EPIDURAL  INJECTION OF STEROID Left 2020    Procedure: Injection,steroid,epidural,transforaminal approach;  Surgeon: Christian James MD;  Location: [facility] OR;  Service: Pain Management;  Laterality: Left;  L4-5, L5-S1    TRANSFORAMINAL EPIDURAL INJECTION OF STEROID Left 2020    Procedure: Injection,steroid,epidural,transforaminal approach;  Surgeon: Christian James MD;  Location: [facility] OR;  Service: Pain Management;  Laterality: Left;  L4-5 and L5-S1     Social History     Tobacco Use    Smoking status: Former Smoker     Years: 4.00     Types: Cigarettes     Quit date: 10/13/1962     Years since quittin.4    Smokeless tobacco: Never Used   Substance Use Topics    Alcohol use: No    Drug use: No        Review of Systems:    As noted in HPI in addition      REVIEW OF SYSTEMS  CARDIOVASCULAR: +CP  RESPIRATORY: +SOB with exertion  : No blood in the urine  GI: No Nausea, vomiting, constipation, diarrhea, blood, or reflux.  MUSCULOSKELETAL: No myalgias  NEURO: No lightheadedness or dizziness  EYES: No Double vision, blurry, vision or headache              Objective        Vitals:    22 1145   BP: 122/78   Pulse: 60       LIPIDS - LAST 2   Lab Results   Component Value Date    CHOL 152 2020    CHOL 172 2020    HDL 70 2020    HDL 78 (H) 2020    LDLCALC 61.2 (L) 2020    LDLCALC 76.2 2020    TRIG 104 2020    TRIG 89 2020    CHOLHDL 46.1 2020    CHOLHDL 45.3 2020       CBC - LAST 2  Lab Results   Component Value Date    WBC 7.82 2022    WBC 12.76 (H) 10/09/2020    RBC 4.30 2022    RBC 4.63 10/09/2020    HGB 13.1 2022    HGB 13.8 10/09/2020    HCT 40.0 2022    HCT 42.5 10/09/2020    MCV 93 2022    MCV 92 10/09/2020    MCH 30.5 2022    MCH 29.8 10/09/2020    MCHC 32.8 2022    MCHC 32.5 10/09/2020    RDW 13.2 2022    RDW 12.4 10/09/2020     2022     10/09/2020    MPV 9.3 2022    MPV 9.0 (L)  10/09/2020    GRAN 5.3 02/24/2022    GRAN 67.6 02/24/2022    LYMPH 1.8 02/24/2022    LYMPH 23.3 02/24/2022    MONO 0.5 02/24/2022    MONO 6.8 02/24/2022    BASO 0.06 02/24/2022    BASO 0.04 10/09/2020    NRBC 0 02/24/2022    NRBC 0 10/09/2020       CHEMISTRY & LIVER FUNCTION - LAST 2  Lab Results   Component Value Date     10/13/2020     (L) 10/09/2020    K 4.0 10/13/2020    K 4.4 10/09/2020    CL 98 10/13/2020    CL 96 10/09/2020    CO2 29 10/13/2020    CO2 26 10/09/2020    ANIONGAP 12 10/13/2020    ANIONGAP 13 10/09/2020    BUN 18 10/13/2020    BUN 12 10/09/2020    CREATININE 0.8 10/13/2020    CREATININE 0.9 10/09/2020     10/13/2020     (H) 10/09/2020    CALCIUM 10.0 10/13/2020    CALCIUM 10.2 10/09/2020    MG 1.7 02/07/2020    MG 1.7 02/06/2020    ALBUMIN 4.5 10/09/2020    ALBUMIN 4.5 05/01/2020    PROT 7.8 10/09/2020    PROT 7.8 05/01/2020    ALKPHOS 121 10/09/2020    ALKPHOS 101 05/01/2020    ALT 28 10/09/2020    ALT 19 05/01/2020    AST 34 10/09/2020    AST 21 05/01/2020    BILITOT 0.8 10/09/2020    BILITOT 0.7 05/01/2020        CARDIAC PROFILE - LAST 2  Lab Results   Component Value Date    BNP <10 02/06/2020    BNP 31 11/26/2013    CPK 37 11/27/2013    CPK 35 11/27/2013    CPKMB 0.6 11/27/2013    CPKMB 0.7 11/27/2013    TROPONINI <0.006 02/06/2020    TROPONINI <0.006 11/27/2013        COAGULATION - LAST 2  Lab Results   Component Value Date    INR 0.9 02/06/2020    INR 1.0 11/26/2013       ENDOCRINE & PSA - LAST 2  Lab Results   Component Value Date    HGBA1C 5.5 07/22/2019    HGBA1C 5.4 06/25/2015    TSH 4.820 (H) 02/06/2020    TSH 1.223 07/22/2019        ECHOCARDIOGRAM RESULTS  Results for orders placed in visit on 10/16/20    Echo Color Flow Doppler? Yes    Interpretation Summary  · There is left ventricular concentric remodeling.  · The left ventricle is normal in size with normal systolic function. The estimated ejection fraction is 65%.  · Grade I diastolic dysfunction.  ·  Normal right ventricular systolic function.  · Mild left atrial enlargement.  · No pulmonary hypertension      CURRENT/PREVIOUS VISIT EKG  Results for orders placed or performed in visit on 12/07/20   IN OFFICE EKG 12-LEAD (to [place])    Collection Time: 12/07/20  4:24 PM    Narrative    Test Reason : I95.89,E86.1,R55,R55,    Vent. Rate : 077 BPM     Atrial Rate : 077 BPM     P-R Int : 138 ms          QRS Dur : 074 ms      QT Int : 404 ms       P-R-T Axes : 059 014 054 degrees     QTc Int : 457 ms    Normal sinus rhythm  Normal ECG  When compared with ECG of 09-OCT-2020 10:46,  Criteria for Anteroseptal infarct are no longer Present  Nonspecific T wave abnormality no longer evident in Anterior leads  Confirmed by Adin Saba MD ([id number]) on 12/8/2020 10:48:40 PM    Referred By: IKE ABDI           Confirmed By:Adin Saba MD     No valid procedures specified.   Results for orders placed during the hospital encounter of 12/10/20    Nuclear Stress - Cardiology Interpreted    Interpretation Summary    Normal myocardial perfusion scan. There is no evidence of myocardial ischemia or infarction.    Gated perfusion images showed an ejection fraction of % at rest. Normal ejection fraction is greater than 53%.    Gated perfusion images showed an ejection fraction of 86% post stress. Normal ejection fraction is greater than 53%.    There is normal wall motion post stress.    LV cavity size is  and normal at stress.    The EKG portion of this study is negative for ischemia.    The patient reported chest pain during the stress test.    There were no arrhythmias during stress.        PHYSICAL EXAM  CONSTITUTIONAL: Well built, well nourished in no apparent distress  NECK: no carotid bruit, no JVD  LUNGS: CTA  CHEST WALL: no tenderness  HEART: regular rate and rhythm, S1, S2 normal, no murmur, click, rub or gallop   ABDOMEN: soft, non-tender; bowel sounds normal; no masses,  no organomegaly  EXTREMITIES:  Extremities normal, no edema, no calf tenderness noted  NEURO: AAO X 3    I HAVE REVIEWED :    The vital signs, nurses notes, and all the pertinent radiology and labs.        Current Outpatient Medications   Medication Instructions    amLODIPine (NORVASC) 5 mg, Oral    aspirin (ECOTRIN) 81 mg, Oral, Daily    atorvastatin (LIPITOR) 40 mg, Oral, Daily    cholecalciferol, vitamin D3, (VITAMIN D3) 2,000 unit Tab Vitamin D3 50 mcg (2,000 unit) tablet   1 tablet every day by mouth    clobetasol 0.05% (TEMOVATE) 0.05 % Oint Topical (Top), 2 times daily    DEXILANT 60 mg, Oral    diclofenac sodium (VOLTAREN) 2 g, Topical (Top), Daily PRN    dicyclomine (BENTYL) 10 MG capsule 2 times daily PRN    diphenoxylate-atropine 2.5-0.025 mg (LOMOTIL) 2.5-0.025 mg per tablet 1 tablet, Oral, 4 times daily PRN    metoprolol succinate (TOPROL-XL) 50 mg, Oral, Daily    mupirocin (BACTROBAN) 2 % ointment Apply layer to right nostril with clean q-tip twice daily for 14 days.    MYRBETRIQ 50 mg Tb24 1 tablet, Oral, Daily    nitroGLYCERIN (NITROSTAT) 0.4 MG SL tablet Sublingual    ondansetron (ZOFRAN-ODT) 8 MG TbDL Every 6 hours PRN    pantoprazole (PROTONIX) 40 mg, Oral, Every morning    phenobarb-hyoscy-atropine-scop (DONNATAL) 16.2-0.1037 -0.0194 mg/5 mL Elix 5 mLs    rOPINIRole (REQUIP) 0.5 mg, Oral, Nightly PRN    sars-cov-2, covid-19, (MODERNA COVID-19) 100 mcg/0.5 ml injection Intramuscular    solifenacin (VESICARE) 10 mg    temazepam (RESTORIL) 30 mg, Oral, Nightly PRN          Assessment & Plan     Essential hypertension  Currently stable continue same.    Mixed hyperlipidemia  Continue Lipitor 40 mg daily    Hx of CABG  Now with some anginal symptoms  Obtain Lexiscan Myoview    Stable angina pectoris  Continue metoprolol and amlodipine at present dosing  Obtain Lexiscan Myoview    SOB (shortness of breath)  With exertion needing to rest  Anginal equivalent?  Lexiscan Myoview and echocardiogram          No  follow-ups on file.

## 2022-03-24 ENCOUNTER — PES CALL (OUTPATIENT)
Dept: ADMINISTRATIVE | Facility: CLINIC | Age: 82
End: 2022-03-24
Payer: MEDICARE

## 2022-03-28 DIAGNOSIS — Z78.0 POST-MENOPAUSAL: ICD-10-CM

## 2022-03-28 DIAGNOSIS — M85.80 OSTEOPENIA, UNSPECIFIED LOCATION: Primary | ICD-10-CM

## 2022-03-29 DIAGNOSIS — R55 SYNCOPE AND COLLAPSE: Primary | ICD-10-CM

## 2022-04-06 ENCOUNTER — OFFICE VISIT (OUTPATIENT)
Dept: DERMATOLOGY | Facility: CLINIC | Age: 82
End: 2022-04-06
Payer: MEDICARE

## 2022-04-06 DIAGNOSIS — L82.0 SEBORRHEIC KERATOSES, INFLAMED: ICD-10-CM

## 2022-04-06 DIAGNOSIS — L73.8 SEBACEOUS HYPERPLASIA: ICD-10-CM

## 2022-04-06 DIAGNOSIS — H61.001 CHONDRODERMATITIS NODULARIS CHRONICA HELICIS, RIGHT: Primary | ICD-10-CM

## 2022-04-06 PROCEDURE — 3288F PR FALLS RISK ASSESSMENT DOCUMENTED: ICD-10-PCS | Mod: CPTII,S$GLB,, | Performed by: STUDENT IN AN ORGANIZED HEALTH CARE EDUCATION/TRAINING PROGRAM

## 2022-04-06 PROCEDURE — 99999 PR PBB SHADOW E&M-EST. PATIENT-LVL II: ICD-10-PCS | Mod: PBBFAC,,, | Performed by: STUDENT IN AN ORGANIZED HEALTH CARE EDUCATION/TRAINING PROGRAM

## 2022-04-06 PROCEDURE — 1159F PR MEDICATION LIST DOCUMENTED IN MEDICAL RECORD: ICD-10-PCS | Mod: CPTII,S$GLB,, | Performed by: STUDENT IN AN ORGANIZED HEALTH CARE EDUCATION/TRAINING PROGRAM

## 2022-04-06 PROCEDURE — 1160F RVW MEDS BY RX/DR IN RCRD: CPT | Mod: CPTII,S$GLB,, | Performed by: STUDENT IN AN ORGANIZED HEALTH CARE EDUCATION/TRAINING PROGRAM

## 2022-04-06 PROCEDURE — 1160F PR REVIEW ALL MEDS BY PRESCRIBER/CLIN PHARMACIST DOCUMENTED: ICD-10-PCS | Mod: CPTII,S$GLB,, | Performed by: STUDENT IN AN ORGANIZED HEALTH CARE EDUCATION/TRAINING PROGRAM

## 2022-04-06 PROCEDURE — 1126F AMNT PAIN NOTED NONE PRSNT: CPT | Mod: CPTII,S$GLB,, | Performed by: STUDENT IN AN ORGANIZED HEALTH CARE EDUCATION/TRAINING PROGRAM

## 2022-04-06 PROCEDURE — 99212 OFFICE O/P EST SF 10 MIN: CPT | Mod: 25,S$GLB,, | Performed by: STUDENT IN AN ORGANIZED HEALTH CARE EDUCATION/TRAINING PROGRAM

## 2022-04-06 PROCEDURE — 99999 PR PBB SHADOW E&M-EST. PATIENT-LVL II: CPT | Mod: PBBFAC,,, | Performed by: STUDENT IN AN ORGANIZED HEALTH CARE EDUCATION/TRAINING PROGRAM

## 2022-04-06 PROCEDURE — 99212 PR OFFICE/OUTPT VISIT, EST, LEVL II, 10-19 MIN: ICD-10-PCS | Mod: 25,S$GLB,, | Performed by: STUDENT IN AN ORGANIZED HEALTH CARE EDUCATION/TRAINING PROGRAM

## 2022-04-06 PROCEDURE — 1101F PR PT FALLS ASSESS DOC 0-1 FALLS W/OUT INJ PAST YR: ICD-10-PCS | Mod: CPTII,S$GLB,, | Performed by: STUDENT IN AN ORGANIZED HEALTH CARE EDUCATION/TRAINING PROGRAM

## 2022-04-06 PROCEDURE — 3288F FALL RISK ASSESSMENT DOCD: CPT | Mod: CPTII,S$GLB,, | Performed by: STUDENT IN AN ORGANIZED HEALTH CARE EDUCATION/TRAINING PROGRAM

## 2022-04-06 PROCEDURE — 1101F PT FALLS ASSESS-DOCD LE1/YR: CPT | Mod: CPTII,S$GLB,, | Performed by: STUDENT IN AN ORGANIZED HEALTH CARE EDUCATION/TRAINING PROGRAM

## 2022-04-06 PROCEDURE — 11900 PR INJECTION INTO SKIN LESIONS, UP TO 7: ICD-10-PCS | Mod: S$GLB,,, | Performed by: STUDENT IN AN ORGANIZED HEALTH CARE EDUCATION/TRAINING PROGRAM

## 2022-04-06 PROCEDURE — 1159F MED LIST DOCD IN RCRD: CPT | Mod: CPTII,S$GLB,, | Performed by: STUDENT IN AN ORGANIZED HEALTH CARE EDUCATION/TRAINING PROGRAM

## 2022-04-06 PROCEDURE — 11900 INJECT SKIN LESIONS </W 7: CPT | Mod: S$GLB,,, | Performed by: STUDENT IN AN ORGANIZED HEALTH CARE EDUCATION/TRAINING PROGRAM

## 2022-04-06 PROCEDURE — 1126F PR PAIN SEVERITY QUANTIFIED, NO PAIN PRESENT: ICD-10-PCS | Mod: CPTII,S$GLB,, | Performed by: STUDENT IN AN ORGANIZED HEALTH CARE EDUCATION/TRAINING PROGRAM

## 2022-04-06 NOTE — PROGRESS NOTES
Subjective:       Patient ID:  Sunita Carlson is a 81 y.o. female who presents for   Chief Complaint   Patient presents with    Spot     Right and left ear     LOV 2/21/22    Patient here today to recheck spot on right ear  Patient states the spot will not heal and is still painful. It is painful intermittently.   Spot was biopsied at last visit- [facility].     Has been sleeping on her back. Donut pillow hurts her neck.      Also c/o of new spot on left ear x few days  She states it is not bothersome     Final Pathologic Diagnosis Skin, right antihelix, shave biopsy:   -CHONDRODERMATITIS NODULARIS HELICIS, consistent with   This lesion is benign.   Comment: Interp By Syed Fernandez M.D., Signed on 08/27/2021 at 10:59      Review of Systems   Constitutional: Negative for fever, chills and fatigue.   Respiratory: Negative for cough and shortness of breath.    Gastrointestinal: Negative for nausea and vomiting.        Objective:    Physical Exam   Constitutional: She appears well-developed and well-nourished.   Neurological: She is alert and oriented to person, place, and time.   Psychiatric: She has a normal mood and affect.   Skin:   Areas Examined (abnormalities noted in diagram):   Head / Face Inspection Performed              Diagram Legend     Erythematous scaling macule/papule c/w actinic keratosis       Vascular papule c/w angioma      Pigmented verrucoid papule/plaque c/w seborrheic keratosis      Yellow umbilicated papule c/w sebaceous hyperplasia      Irregularly shaped tan macule c/w lentigo     1-2 mm smooth white papules consistent with Milia      Movable subcutaneous cyst with punctum c/w epidermal inclusion cyst      Subcutaneous movable cyst c/w pilar cyst      Firm pink to brown papule c/w dermatofibroma      Pedunculated fleshy papule(s) c/w skin tag(s)      Evenly pigmented macule c/w junctional nevus     Mildly variegated pigmented, slightly irregular-bordered macule c/w mildly atypical nevus       Flesh colored to evenly pigmented papule c/w intradermal nevus       Pink pearly papule/plaque c/w basal cell carcinoma      Erythematous hyperkeratotic cursted plaque c/w SCC      Surgical scar with no sign of skin cancer recurrence      Open and closed comedones      Inflammatory papules and pustules      Verrucoid papule consistent consistent with wart     Erythematous eczematous patches and plaques     Dystrophic onycholytic nail with subungual debris c/w onychomycosis     Umbilicated papule    Erythematous-base heme-crusted tan verrucoid plaque consistent with inflamed seborrheic keratosis     Erythematous Silvery Scaling Plaque c/w Psoriasis     See annotation          Assessment / Plan:        Chondrodermatitis nodularis chronica helicis, right  Discussed referral to ENT for E&S vs. Second round ILK- would like ILK  - reviewed photos- has improved s/p biopsy/ILK  - can stop clobetasol  - continue softer pillow  -Intralesional Kenalog 10mg/cc (0.2 cc total) injected into 1 lesions on the right ear today after obtaining verbal consent including risk of surrounding hypopigmentation. Patient tolerated procedure well.    Units: 1  NDC for Kenalog 10mg/cc:  5964-4281-58    Sebaceous hyperplasia  This is a common condition representing benign enlargement of the sebaceous lobule. It typically occurs in adulthood. Reassurance given to patient.     Seborrheic keratoses, inflamed  These are benign inherited growths without a malignant potential. Reassurance given to patient. No treatment is necessary.   Discussed risks and benefits of LN2           No follow-ups on file.

## 2022-04-08 ENCOUNTER — TELEPHONE (OUTPATIENT)
Dept: CARDIOLOGY | Facility: HOSPITAL | Age: 82
End: 2022-04-08

## 2022-04-11 ENCOUNTER — CLINICAL SUPPORT (OUTPATIENT)
Dept: CARDIOLOGY | Facility: HOSPITAL | Age: 82
End: 2022-04-11
Attending: NURSE PRACTITIONER
Payer: MEDICARE

## 2022-04-11 ENCOUNTER — TELEPHONE (OUTPATIENT)
Dept: CARDIOLOGY | Facility: CLINIC | Age: 82
End: 2022-04-11
Payer: MEDICARE

## 2022-04-11 ENCOUNTER — HOSPITAL ENCOUNTER (OUTPATIENT)
Dept: RADIOLOGY | Facility: HOSPITAL | Age: 82
Discharge: HOME | End: 2022-04-11
Attending: NURSE PRACTITIONER
Payer: MEDICARE

## 2022-04-11 DIAGNOSIS — I20.89 STABLE ANGINA PECTORIS: ICD-10-CM

## 2022-04-11 DIAGNOSIS — R07.9 CHEST PAIN, UNSPECIFIED TYPE: ICD-10-CM

## 2022-04-11 LAB
AV INDEX (PROSTH): 0.55
AV MEAN GRADIENT: 5 MMHG
AV VALVE AREA: 1.56 CM2
CV ECHO LV RWT: 0.51 CM
CV PHARM DOSE: 0.4 MG
CV STRESS BASE HR: 64 BPM
DIASTOLIC BLOOD PRESSURE: 81 MMHG
DOP CALC AO VTI: 32.91 CM
DOP CALC LVOT AREA: 2.8 CM2
DOP CALC LVOT DIAMETER: 1.9 CM
DOP CALC LVOT PEAK VEL: 76.27 M/S
DOP CALC LVOT STROKE VOLUME: 51.35 CM3
DOP CALCLVOT PEAK VEL VTI: 18.12 CM
E WAVE DECELERATION TIME: 186.47 MSEC
E/A RATIO: 0.93
E/E' RATIO: 18.4 M/S
ECHO LV POSTERIOR WALL: 0.9 CM (ref 0.6–1.1)
EJECTION FRACTION: 66 %
FRACTIONAL SHORTENING: 30 % (ref 28–44)
INTERVENTRICULAR SEPTUM: 0.92 CM (ref 0.6–1.1)
LEFT ATRIUM SIZE: 2.88 CM
LEFT INTERNAL DIMENSION IN SYSTOLE: 2.45 CM (ref 2.1–4)
LEFT VENTRICULAR INTERNAL DIMENSION IN DIASTOLE: 3.52 CM (ref 3.5–6)
LEFT VENTRICULAR MASS: 91 G
LV LATERAL E/E' RATIO: 18.4 M/S
LV SEPTAL E/E' RATIO: 18.4 M/S
MV PEAK A VEL: 0.99 M/S
MV PEAK E VEL: 0.92 M/S
OHS CV CPX 1 MINUTE RECOVERY HEART RATE: 93 BPM
OHS CV CPX 85 PERCENT MAX PREDICTED HEART RATE MALE: 113
OHS CV CPX MAX PREDICTED HEART RATE: 133
OHS CV CPX PATIENT IS FEMALE: 1
OHS CV CPX PATIENT IS MALE: 0
OHS CV CPX PEAK DIASTOLIC BLOOD PRESSURE: 76 MMHG
OHS CV CPX PEAK HEAR RATE: 100 BPM
OHS CV CPX PEAK RATE PRESSURE PRODUCT: NORMAL
OHS CV CPX PEAK SYSTOLIC BLOOD PRESSURE: 166 MMHG
OHS CV CPX PERCENT MAX PREDICTED HEART RATE ACHIEVED: 75
OHS CV CPX RATE PRESSURE PRODUCT PRESENTING: 8320
PISA TR MAX VEL: 2.63 M/S
RA PRESSURE: 3 MMHG
RIGHT VENTRICULAR END-DIASTOLIC DIMENSION: 215 CM
SYSTOLIC BLOOD PRESSURE: 130 MMHG
TDI LATERAL: 0.05 M/S
TDI SEPTAL: 0.05 M/S
TDI: 0.05 M/S
TR MAX PG: 28 MMHG
TRICUSPID ANNULAR PLANE SYSTOLIC EXCURSION: 136 CM
TV REST PULMONARY ARTERY PRESSURE: 31 MMHG

## 2022-04-11 PROCEDURE — 63600175 PHARM REV CODE 636 W HCPCS: Performed by: NURSE PRACTITIONER

## 2022-04-11 PROCEDURE — 93018 CV STRESS TEST I&R ONLY: CPT | Mod: ,,, | Performed by: INTERNAL MEDICINE

## 2022-04-11 PROCEDURE — 93018 NUCLEAR STRESS TEST (CUPID ONLY): ICD-10-PCS | Mod: ,,, | Performed by: INTERNAL MEDICINE

## 2022-04-11 PROCEDURE — 93017 CV STRESS TEST TRACING ONLY: CPT

## 2022-04-11 PROCEDURE — 93306 ECHO (CUPID ONLY): ICD-10-PCS | Mod: 26,,, | Performed by: INTERNAL MEDICINE

## 2022-04-11 PROCEDURE — A9502 TC99M TETROFOSMIN: HCPCS

## 2022-04-11 PROCEDURE — 93306 TTE W/DOPPLER COMPLETE: CPT | Mod: 26,,, | Performed by: INTERNAL MEDICINE

## 2022-04-11 PROCEDURE — 93306 TTE W/DOPPLER COMPLETE: CPT

## 2022-04-11 PROCEDURE — 93016 CV STRESS TEST SUPVJ ONLY: CPT | Mod: ,,, | Performed by: INTERNAL MEDICINE

## 2022-04-11 PROCEDURE — 93016 NUCLEAR STRESS TEST (CUPID ONLY): ICD-10-PCS | Mod: ,,, | Performed by: INTERNAL MEDICINE

## 2022-04-11 RX ORDER — REGADENOSON 0.08 MG/ML
0.4 INJECTION, SOLUTION INTRAVENOUS
Status: COMPLETED | OUTPATIENT
Start: 2022-04-11 | End: 2022-04-11

## 2022-04-11 RX ORDER — ONDANSETRON 2 MG/ML
4 INJECTION INTRAMUSCULAR; INTRAVENOUS ONCE
Status: COMPLETED | OUTPATIENT
Start: 2022-04-11 | End: 2022-04-11

## 2022-04-11 RX ORDER — ONDANSETRON 2 MG/ML
4 INJECTION INTRAMUSCULAR; INTRAVENOUS
Status: DISPENSED | OUTPATIENT
Start: 2022-04-11

## 2022-04-11 RX ADMIN — ONDANSETRON 4 MG: 2 INJECTION INTRAMUSCULAR; INTRAVENOUS at 09:04

## 2022-04-11 RX ADMIN — REGADENOSON 0.4 MG: 0.08 INJECTION, SOLUTION INTRAVENOUS at 10:04

## 2022-04-11 NOTE — TELEPHONE ENCOUNTER
Spoke with pt, states everything was taken care of in the hospital today. Pt verbalized understanding.

## 2022-04-11 NOTE — TELEPHONE ENCOUNTER
----- Message from Kenia Chang MA sent at 4/11/2022  8:04 AM CDT -----  Contact: [id number]  Type: Needs Medical Advice    Who Called:[phone]  Best Call Back Number: [phone]  Inquiry/Question: Would you kindly call [phone] regarding upcoming procedure and nausea medication pt requesting  Thank you~

## 2022-04-21 ENCOUNTER — OFFICE VISIT (OUTPATIENT)
Dept: NEUROLOGY | Facility: CLINIC | Age: 82
End: 2022-04-21
Payer: MEDICARE

## 2022-04-21 VITALS
WEIGHT: 120.5 LBS | SYSTOLIC BLOOD PRESSURE: 150 MMHG | RESPIRATION RATE: 18 BRPM | DIASTOLIC BLOOD PRESSURE: 68 MMHG | BODY MASS INDEX: 24.33 KG/M2 | HEART RATE: 62 BPM

## 2022-04-21 DIAGNOSIS — G62.9 POLYNEUROPATHY: ICD-10-CM

## 2022-04-21 DIAGNOSIS — R44.0 AUDITORY HALLUCINATION: Primary | ICD-10-CM

## 2022-04-21 DIAGNOSIS — G56.20 ULNAR NEUROPATHY, UNSPECIFIED LATERALITY: ICD-10-CM

## 2022-04-21 PROCEDURE — 1159F MED LIST DOCD IN RCRD: CPT | Mod: CPTII,S$GLB,, | Performed by: PSYCHIATRY & NEUROLOGY

## 2022-04-21 PROCEDURE — 3288F PR FALLS RISK ASSESSMENT DOCUMENTED: ICD-10-PCS | Mod: CPTII,S$GLB,, | Performed by: PSYCHIATRY & NEUROLOGY

## 2022-04-21 PROCEDURE — 99999 PR PBB SHADOW E&M-EST. PATIENT-LVL IV: ICD-10-PCS | Mod: PBBFAC,,, | Performed by: PSYCHIATRY & NEUROLOGY

## 2022-04-21 PROCEDURE — 3077F PR MOST RECENT SYSTOLIC BLOOD PRESSURE >= 140 MM HG: ICD-10-PCS | Mod: CPTII,S$GLB,, | Performed by: PSYCHIATRY & NEUROLOGY

## 2022-04-21 PROCEDURE — 3077F SYST BP >= 140 MM HG: CPT | Mod: CPTII,S$GLB,, | Performed by: PSYCHIATRY & NEUROLOGY

## 2022-04-21 PROCEDURE — 1101F PR PT FALLS ASSESS DOC 0-1 FALLS W/OUT INJ PAST YR: ICD-10-PCS | Mod: CPTII,S$GLB,, | Performed by: PSYCHIATRY & NEUROLOGY

## 2022-04-21 PROCEDURE — 99999 PR PBB SHADOW E&M-EST. PATIENT-LVL IV: CPT | Mod: PBBFAC,,, | Performed by: PSYCHIATRY & NEUROLOGY

## 2022-04-21 PROCEDURE — 1126F PR PAIN SEVERITY QUANTIFIED, NO PAIN PRESENT: ICD-10-PCS | Mod: CPTII,S$GLB,, | Performed by: PSYCHIATRY & NEUROLOGY

## 2022-04-21 PROCEDURE — 3078F PR MOST RECENT DIASTOLIC BLOOD PRESSURE < 80 MM HG: ICD-10-PCS | Mod: CPTII,S$GLB,, | Performed by: PSYCHIATRY & NEUROLOGY

## 2022-04-21 PROCEDURE — 3288F FALL RISK ASSESSMENT DOCD: CPT | Mod: CPTII,S$GLB,, | Performed by: PSYCHIATRY & NEUROLOGY

## 2022-04-21 PROCEDURE — 1159F PR MEDICATION LIST DOCUMENTED IN MEDICAL RECORD: ICD-10-PCS | Mod: CPTII,S$GLB,, | Performed by: PSYCHIATRY & NEUROLOGY

## 2022-04-21 PROCEDURE — 99204 PR OFFICE/OUTPT VISIT, NEW, LEVL IV, 45-59 MIN: ICD-10-PCS | Mod: S$GLB,,, | Performed by: PSYCHIATRY & NEUROLOGY

## 2022-04-21 PROCEDURE — 99204 OFFICE O/P NEW MOD 45 MIN: CPT | Mod: S$GLB,,, | Performed by: PSYCHIATRY & NEUROLOGY

## 2022-04-21 PROCEDURE — 3078F DIAST BP <80 MM HG: CPT | Mod: CPTII,S$GLB,, | Performed by: PSYCHIATRY & NEUROLOGY

## 2022-04-21 PROCEDURE — 1126F AMNT PAIN NOTED NONE PRSNT: CPT | Mod: CPTII,S$GLB,, | Performed by: PSYCHIATRY & NEUROLOGY

## 2022-04-21 PROCEDURE — 1101F PT FALLS ASSESS-DOCD LE1/YR: CPT | Mod: CPTII,S$GLB,, | Performed by: PSYCHIATRY & NEUROLOGY

## 2022-04-21 NOTE — PROGRESS NOTES
Date of service:  4/21/2022    Chief complaint:  Auditory hallucinations    History of present illness:  The patient is a 81 y.o. female referred for evaluation of auditory hallucinations. This began about 6 years ago and seems to worsen.  The hallucinations are characterized as music.  It is not a particular song or even a particular set of songs that the patient recognizes.  They are severe in intensity.  These only begin when the patient is asleep.  They will continue if she wakes up.  They abruptly terminate when she lifts her head off the pilow.  She may also have associated buzzing in the ears.  The patient is unable to describe the frequency of the events.  She is states the most recent event was about a week ago.    She also reports numbness in the three fingers on the ulnar aspect of the right hand.  This began a couple of months. She characterizes it as decreased sensation. She notes no clear exacerbating or relieving factors.  She reports no associated symptoms.      She also indicates a history of peripheral neuropathy for which she follows with Dr. Casey in [place].    Past Medical History:   Diagnosis Date    Arthritis     Cataract     Coronary artery disease     Hypertension     Insomnia     Neuropathy     Retinal detachment     Stomach ulcer        Past Surgical History:   Procedure Laterality Date    APPENDECTOMY      BLADDER SUSPENSION      BREAST BIOPSY      CARDIAC SURGERY      carotid endarterectomy      L    CATARACT EXTRACTION      CHOLECYSTECTOMY      HYSTERECTOMY      INJECTION OF ANESTHETIC AGENT AROUND GANGLION IMPAR N/A 9/12/2019    Procedure: BLOCK, GANGLION IMPAR;  Surgeon: Christian James MD;  Location: [facility] OR;  Service: Pain Management;  Laterality: N/A;    INSERTION OF IMPLANTABLE LOOP RECORDER N/A 12/18/2020    Procedure: INSERTION, IMPLANTABLE LOOP RECORDER;  Surgeon: Adin Saba MD;  Location: [facility] CATH/EP LAB;  Service: Cardiology;  Laterality: N/A;     OOPHORECTOMY      SUPERFICIAL KERATECTOMY Right 10/27/2017    Dr. Morales    TONSILLECTOMY      TRANSFORAMINAL EPIDURAL INJECTION OF STEROID Left 2019    Procedure: Injection,steroid,epidural,transforaminal approach L4-5, L5-S1;  Surgeon: Christian James MD;  [facility]: [facility];  Service: Pain Management;  Laterality: Left;    TRANSFORAMINAL EPIDURAL INJECTION OF STEROID Left 2020    Procedure: Injection,steroid,epidural,transforaminal approach;  Surgeon: Christian James MD;  Location: [facility];  Service: Pain Management;  Laterality: Left;  L4-5, L5-S1    TRANSFORAMINAL EPIDURAL INJECTION OF STEROID Left 2020    Procedure: Injection,steroid,epidural,transforaminal approach;  Surgeon: Christian James MD;  Location: [facility] OR;  Service: Pain Management;  Laterality: Left;  L4-5 and L5-S1       Family History   Problem Relation Age of Onset    Cancer Father     Macular degeneration Maternal Aunt     Cancer Maternal Aunt     Breast cancer Maternal Aunt     Macular degeneration Maternal Uncle     Diabetes Paternal Aunt     Cancer Paternal Aunt     Blindness Maternal Grandfather     Melanoma Neg Hx     Psoriasis Neg Hx     Lupus Neg Hx     Eczema Neg Hx        Social History     Socioeconomic History    Marital status:    Tobacco Use    Smoking status: Former Smoker     Years: 4.00     Types: Cigarettes     Quit date: 10/13/1962     Years since quittin.5    Smokeless tobacco: Never Used   Substance and Sexual Activity    Alcohol use: No    Drug use: No    Sexual activity: Yes     Birth control/protection: Surgical       Review of patient's allergies indicates:   Allergen Reactions    Demerol [meperidine] Nausea And Vomiting    Oxycodone     Hydrocodone Itching    Oxycodone-acetaminophen Itching        Review of Systems  The patient's ROS is noncontributory except as noted above.     Physical exam:  BP (!) 150/68 (BP Location: Left arm, Patient Position: Sitting, BP Method: Medium  (Automatic))   Pulse 62   Resp 18   Wt 54.6 kg (120 lb 7.7 oz)   LMP 02/06/2020   BMI 24.33 kg/m²   General: Well developed, well nourished.  No acute distress.  ENT: Mucus membranes moist.  Atraumatic external nose and ears.  Lymphatic: No apparent lymphadenopathy.  Cardiovascular: Regular rate and rhythm.  Pulmonary: No increased work of breathing.  Abdomen/GI: No guarding.  Musculoskeletal: No obvious joint deformities, moves all extremities well.    Neurological exam:  Mental status: Awake and alert.  Oriented x4.  Speech fluent and appropriate.  Recent and remote memory appear to be intact.  Fund of knowledge normal.  Cranial nerves: Pupils equal round and reactive to light, extraocular movements intact, facial strength and sensation intact bilaterally, palate and tongue midline, hearing grossly intact bilaterally.  Motor: 5 out of 5 strength throughout the upper and lower extremities bilaterally. Normal bulk and tone.  Sensation: Intact to light touch and temperature bilaterally.  DTR: 2+ at the knees and biceps bilaterally.  Coordination: Finger-nose-finger testing intact bilaterally.  Gait: Normal gait. Good tandem.    Data base:  Notes of the referring physician were reviewed.  Briefly summarized, these indicate the presence of auditory hallucinations and a desire for neurology consultation.    Labs:  Recent labs from 3/22 include a CMP, CBC, and TSH that were all largely unremarkable.    Assessment and plan:  The patient is a 81 y.o. female referred for evaluation of auditory hallucinations.  The etiology is unclear to me.  While focal seizures, typically temporal lobe in origin, can produce auditory hallucinations, the fact that the hallucination is not stereotyped does make me question this as a possibility.  We will obtain MRI brain and EEG.  If there is no evidence of epilepsy, we may consider psychiatry referral vs trial of an antipsychotic.    The patient reports a history of neuropathy.  The  workup she has had is presently unclear.  We will request records related to this.    Finally, the patient does appear to have an ulnar neuropathy.  I offered EMG.  She declines.  We discussed conservative interventions, including keeping pressure off the elbow.

## 2022-04-27 ENCOUNTER — HOSPITAL ENCOUNTER (OUTPATIENT)
Dept: NEUROLOGY | Facility: HOSPITAL | Age: 82
Discharge: HOME | End: 2022-04-27
Attending: PSYCHIATRY & NEUROLOGY
Payer: MEDICARE

## 2022-04-27 DIAGNOSIS — R44.0 AUDITORY HALLUCINATION: ICD-10-CM

## 2022-04-27 PROCEDURE — 95816 PR EEG,W/AWAKE & DROWSY RECORD: ICD-10-PCS | Mod: 26,,, | Performed by: PSYCHIATRY & NEUROLOGY

## 2022-04-27 PROCEDURE — 95816 EEG AWAKE AND DROWSY: CPT | Mod: 26,,, | Performed by: PSYCHIATRY & NEUROLOGY

## 2022-04-27 PROCEDURE — 95819 EEG AWAKE AND ASLEEP: CPT

## 2022-04-28 NOTE — PROCEDURES
Procedures   EEG REPORT      Sunita Carlson  [id number]  1940    DATE OF SERVICE: 4/27/2022    ON     METHODOLOGY      Extended electroencephalographic recording is made while the patient is ambulatory and continuing normal daily activities.  Electrodes are placed according to the International 10-20 placement system and included T1 and T2 electrode placement.  Twenty four (24) channels of digital signal (sampling rate of 512/sec) was simultaneously recorded from the scalp including EKG and eye monitors.  Recording band pass was 0.1 to 100 hz and all data was stored digitally on the recorder.  The patient is instructed to press an event button when clinical symptoms occur and write the symptoms into a diary. Activation procedures which include photic stimulation, hyperventilation and instructing patients to perform simple task are done in selected patients.        The EEG is displayed on a monitor screen and can be reformatted into different montages for evaluation.  The entire recoding is submitted for computer assisted analysis to detect spike and electrographic seizure activity.  The entire recording is visually reviewed and the times identified by computer analysis as being spikes or seizures are reviewed again.  Compresses spectral analysis (CSA) is also performed on the activity recorded from each individual channel.  This is displayed as a power display of frequencies from 0 to 30 Hz over time.   The CSA analysis is done and displayed continuously.  This is reviewed for asymmetries in power between homologous areas of the scalp and for presence of changes in power which canbe seen when seizures occur.  Sections of suspected abnormalities on the CSA is then compared with the original EEG recording.  .     Spondo software was also utilized in the review of this study.  This software suite analyzes the EEG recording in multiple domains.  Coherence and rhythmicity is computed to identify EEG sections  which may contain organized seizures.  Each channel undergoes analysis to detect presence of spike and sharp waves which have special and morphological characteristic of epileptic activity.  The routine EEG recording is converted from spacial into frequency domain.  This is then displayed comparing homologous areas to identify areas of significant asymmetry.  Algorithm to identify non-cortically generated artifact is used to separate eye movement, EMG and other artifact from the EEG     Recording Times    A total of 00:29:39 hours of EEG was recorded.      EEG FINDINGS:  Background activity:   The background rhythm was characterized by alpha and anterior dominant beta activity with a 10Hz posterior dominant alpha rhythm at 30-70 microvolts.   Symmetry and continuity: the background was continuous and symmetric     Sleep:   No sleep transients were seen.    Activation procedures:   Photic stimulation was performed with no abnormalities seen    Abnormal activity:   No epileptiform discharges, periodic discharges, lateralized rhythmic delta activity or electrographic seizures were seen.    IMPRESSION:   Normal EEG of the waking state      Rashad Santiago MD  Neurology-Epilepsy.  [facility].

## 2022-04-30 ENCOUNTER — HOSPITAL ENCOUNTER (EMERGENCY)
Facility: HOSPITAL | Age: 82
Discharge: HOME | End: 2022-04-30
Attending: EMERGENCY MEDICINE
Payer: MEDICARE

## 2022-04-30 VITALS
SYSTOLIC BLOOD PRESSURE: 132 MMHG | HEART RATE: 70 BPM | TEMPERATURE: 98 F | OXYGEN SATURATION: 98 % | DIASTOLIC BLOOD PRESSURE: 74 MMHG | BODY MASS INDEX: 24.19 KG/M2 | RESPIRATION RATE: 17 BRPM | HEIGHT: 59 IN | WEIGHT: 120 LBS
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
## 2022-04-30 DIAGNOSIS — S16.1XXA STRAIN OF NECK MUSCLE, INITIAL ENCOUNTER: Primary | ICD-10-CM

## 2022-04-30 PROCEDURE — 99284 EMERGENCY DEPT VISIT MOD MDM: CPT | Mod: 25
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
## 2022-04-30 NOTE — ED NOTES
AAOx4, ABC's intact, NADN. D/C instructions in pt possession along with belongings. No other needs at this time. Pt ambulatory to ED Lobby without assistance, steady gait. Left with her niece.

## 2022-05-06 ENCOUNTER — HOSPITAL ENCOUNTER (OUTPATIENT)
Dept: RADIOLOGY | Facility: HOSPITAL | Age: 82
Discharge: HOME | End: 2022-05-06
Attending: PSYCHIATRY & NEUROLOGY
Payer: MEDICARE

## 2022-05-06 ENCOUNTER — TELEPHONE (OUTPATIENT)
Dept: NEUROLOGY | Facility: CLINIC | Age: 82
End: 2022-05-06
Payer: MEDICARE

## 2022-05-06 DIAGNOSIS — R44.0 AUDITORY HALLUCINATION: ICD-10-CM

## 2022-05-06 PROCEDURE — 70551 MRI BRAIN STEM W/O DYE: CPT | Mod: TC

## 2022-05-06 PROCEDURE — 70551 MRI BRAIN WITHOUT CONTRAST: ICD-10-PCS | Mod: 26,,, | Performed by: RADIOLOGY

## 2022-05-06 PROCEDURE — 70551 MRI BRAIN STEM W/O DYE: CPT | Mod: 26,,, | Performed by: RADIOLOGY

## 2022-05-06 NOTE — TELEPHONE ENCOUNTER
----- Message from Jeffy Maldonado sent at 5/6/2022  4:02 PM CDT -----  Regarding: ret call  Type:  Patient Returning Call    Who Called:  Sunita    Who Left Message for Patient:  Jerica    Does the patient know what this is regarding?:  results    Best Call Back Number:  [phone]    Additional Information:

## 2022-05-06 NOTE — TELEPHONE ENCOUNTER
----- Message from Jeffy Culp Jr., MD sent at 5/6/2022  2:20 PM CDT -----  Below are the results of your MRI from your recent visit:    Your results are not worrisome.  We will discuss this further at your follow up visit.

## 2022-05-06 NOTE — TELEPHONE ENCOUNTER
Spoke to pt- advised her of MRI results. Pt states that she does not want to see Janet Simpson NP on 6/6/22--that she only wants to see Dr. Culp. Appt with Janet TAYLOR Cancelled. Scheduled appt with Dr. Culp for 7/5/22 @ 2:40pm.

## 2022-05-19 ENCOUNTER — HOSPITAL ENCOUNTER (OUTPATIENT)
Dept: RADIOLOGY | Facility: HOSPITAL | Age: 82
Discharge: HOME | End: 2022-05-19
Attending: INTERNAL MEDICINE
Payer: MEDICARE

## 2022-05-19 DIAGNOSIS — Z12.31 ENCOUNTER FOR SCREENING MAMMOGRAM FOR MALIGNANT NEOPLASM OF BREAST: ICD-10-CM

## 2022-05-19 PROCEDURE — 77067 MAMMO DIGITAL SCREENING BILAT WITH TOMO: ICD-10-PCS | Mod: 26,,, | Performed by: RADIOLOGY

## 2022-05-19 PROCEDURE — 77067 SCR MAMMO BI INCL CAD: CPT | Mod: 26,,, | Performed by: RADIOLOGY

## 2022-05-19 PROCEDURE — 77063 BREAST TOMOSYNTHESIS BI: CPT | Mod: 26,,, | Performed by: RADIOLOGY

## 2022-05-19 PROCEDURE — 77063 MAMMO DIGITAL SCREENING BILAT WITH TOMO: ICD-10-PCS | Mod: 26,,, | Performed by: RADIOLOGY

## 2022-05-19 PROCEDURE — 77063 BREAST TOMOSYNTHESIS BI: CPT | Mod: TC

## 2022-05-31 ENCOUNTER — TELEPHONE (OUTPATIENT)
Dept: DERMATOLOGY | Facility: CLINIC | Age: 82
End: 2022-05-31
Payer: MEDICARE

## 2022-05-31 ENCOUNTER — TELEPHONE (OUTPATIENT)
Dept: ORTHOPEDICS | Facility: CLINIC | Age: 82
End: 2022-05-31
Payer: MEDICARE

## 2022-05-31 NOTE — TELEPHONE ENCOUNTER
----- Message from Cyndee Richmond MA sent at 5/31/2022 10:46 AM CDT -----  Contact: Sunita Robyn  Patient would like to be seen sooner [phone]

## 2022-05-31 NOTE — TELEPHONE ENCOUNTER
----- Message from Delfina Lam MD sent at 5/31/2022 12:22 PM CDT -----  Contact: Patient  Please refer her to Dr. Olman Argueta.   ----- Message -----  From: Richmond Bardales LPN  Sent: 5/31/2022  11:43 AM CDT  To: Delfina Lam MD      ----- Message -----  From: Melba Valle  Sent: 5/31/2022  10:39 AM CDT  To: Genaro Hodges Staff    Type:  Needs Medical Advice    Who Called:  Patient       Would the patient rather a call back or a response via [facility]?  Call    Best Call Back Number:  [phone] (home) [phone] (work)    Additional Information:  patient states her ear did not heal and to call back and Dr Lam would recommend her to see a surgeon     Please call with a name of  to recommend

## 2022-06-01 ENCOUNTER — TELEPHONE (OUTPATIENT)
Dept: FAMILY MEDICINE | Facility: CLINIC | Age: 82
End: 2022-06-01
Payer: MEDICARE

## 2022-06-08 ENCOUNTER — HOSPITAL ENCOUNTER (OUTPATIENT)
Dept: RADIOLOGY | Facility: CLINIC | Age: 82
Discharge: HOME | End: 2022-06-08
Attending: INTERNAL MEDICINE
Payer: MEDICARE

## 2022-06-08 ENCOUNTER — TELEPHONE (OUTPATIENT)
Dept: OBSTETRICS AND GYNECOLOGY | Facility: CLINIC | Age: 82
End: 2022-06-08
Payer: MEDICARE

## 2022-06-08 DIAGNOSIS — M85.80 OSTEOPENIA, UNSPECIFIED LOCATION: ICD-10-CM

## 2022-06-08 DIAGNOSIS — Z78.0 POST-MENOPAUSAL: ICD-10-CM

## 2022-06-08 PROCEDURE — 77080 DXA BONE DENSITY AXIAL: CPT | Mod: TC,PO

## 2022-06-08 PROCEDURE — 77080 DXA BONE DENSITY AXIAL: CPT | Mod: 26,,, | Performed by: RADIOLOGY

## 2022-06-08 PROCEDURE — 77080 DEXA BONE DENSITY SPINE HIP: ICD-10-PCS | Mod: 26,,, | Performed by: RADIOLOGY

## 2022-06-08 NOTE — TELEPHONE ENCOUNTER
----- Message from Jaelyn Rice, Patient Care Assistant sent at 6/8/2022  9:09 AM CDT -----  Regarding: appointment  Contact: pt  Type:  Sooner Appointment Request    Caller is requesting a sooner appointment.  Caller declined first available appointment listed below.  Caller will not accept being placed on the waitlist and is requesting a message be sent to doctor.    Name of Caller:  pt   When is the first available appointment?  6/29/22  Symptoms:  Pain in vaginal area  Best Call Back Number:  [phone] (home) [phone] (work)

## 2022-06-08 NOTE — TELEPHONE ENCOUNTER
Attempted to contact with no answer. Left message on voicemail to contact office in regards to message for sooner appt.

## 2022-06-15 DIAGNOSIS — M17.10 ARTHRITIS OF KNEE: Primary | ICD-10-CM

## 2022-06-20 ENCOUNTER — HOSPITAL ENCOUNTER (OUTPATIENT)
Dept: RADIOLOGY | Facility: HOSPITAL | Age: 82
Discharge: HOME | End: 2022-06-20
Attending: ORTHOPAEDIC SURGERY
Payer: MEDICARE

## 2022-06-20 ENCOUNTER — OFFICE VISIT (OUTPATIENT)
Dept: ORTHOPEDICS | Facility: CLINIC | Age: 82
End: 2022-06-20
Payer: MEDICARE

## 2022-06-20 VITALS — HEIGHT: 59 IN | WEIGHT: 120 LBS | BODY MASS INDEX: 24.19 KG/M2 | RESPIRATION RATE: 18 BRPM

## 2022-06-20 DIAGNOSIS — M17.10 ARTHRITIS OF KNEE: ICD-10-CM

## 2022-06-20 DIAGNOSIS — M17.10 ARTHRITIS OF KNEE: Primary | ICD-10-CM

## 2022-06-20 PROCEDURE — 1101F PT FALLS ASSESS-DOCD LE1/YR: CPT | Mod: CPTII,S$GLB,, | Performed by: ORTHOPAEDIC SURGERY

## 2022-06-20 PROCEDURE — 73564 XR KNEE ORTHO BILAT WITH FLEXION: ICD-10-PCS | Mod: 26,50,, | Performed by: RADIOLOGY

## 2022-06-20 PROCEDURE — 99213 OFFICE O/P EST LOW 20 MIN: CPT | Mod: 25,S$GLB,, | Performed by: ORTHOPAEDIC SURGERY

## 2022-06-20 PROCEDURE — 20610 LARGE JOINT ASPIRATION/INJECTION: BILATERAL KNEE: ICD-10-PCS | Mod: 50,S$GLB,, | Performed by: ORTHOPAEDIC SURGERY

## 2022-06-20 PROCEDURE — 73564 X-RAY EXAM KNEE 4 OR MORE: CPT | Mod: 26,50,, | Performed by: RADIOLOGY

## 2022-06-20 PROCEDURE — 3288F PR FALLS RISK ASSESSMENT DOCUMENTED: ICD-10-PCS | Mod: CPTII,S$GLB,, | Performed by: ORTHOPAEDIC SURGERY

## 2022-06-20 PROCEDURE — 73564 X-RAY EXAM KNEE 4 OR MORE: CPT | Mod: TC,50,PN

## 2022-06-20 PROCEDURE — 1125F AMNT PAIN NOTED PAIN PRSNT: CPT | Mod: CPTII,S$GLB,, | Performed by: ORTHOPAEDIC SURGERY

## 2022-06-20 PROCEDURE — 1160F RVW MEDS BY RX/DR IN RCRD: CPT | Mod: CPTII,S$GLB,, | Performed by: ORTHOPAEDIC SURGERY

## 2022-06-20 PROCEDURE — 99999 PR PBB SHADOW E&M-EST. PATIENT-LVL III: CPT | Mod: PBBFAC,,, | Performed by: ORTHOPAEDIC SURGERY

## 2022-06-20 PROCEDURE — 1159F PR MEDICATION LIST DOCUMENTED IN MEDICAL RECORD: ICD-10-PCS | Mod: CPTII,S$GLB,, | Performed by: ORTHOPAEDIC SURGERY

## 2022-06-20 PROCEDURE — 1159F MED LIST DOCD IN RCRD: CPT | Mod: CPTII,S$GLB,, | Performed by: ORTHOPAEDIC SURGERY

## 2022-06-20 PROCEDURE — 1101F PR PT FALLS ASSESS DOC 0-1 FALLS W/OUT INJ PAST YR: ICD-10-PCS | Mod: CPTII,S$GLB,, | Performed by: ORTHOPAEDIC SURGERY

## 2022-06-20 PROCEDURE — 1160F PR REVIEW ALL MEDS BY PRESCRIBER/CLIN PHARMACIST DOCUMENTED: ICD-10-PCS | Mod: CPTII,S$GLB,, | Performed by: ORTHOPAEDIC SURGERY

## 2022-06-20 PROCEDURE — 3288F FALL RISK ASSESSMENT DOCD: CPT | Mod: CPTII,S$GLB,, | Performed by: ORTHOPAEDIC SURGERY

## 2022-06-20 PROCEDURE — 99213 PR OFFICE/OUTPT VISIT, EST, LEVL III, 20-29 MIN: ICD-10-PCS | Mod: 25,S$GLB,, | Performed by: ORTHOPAEDIC SURGERY

## 2022-06-20 PROCEDURE — 99999 PR PBB SHADOW E&M-EST. PATIENT-LVL III: ICD-10-PCS | Mod: PBBFAC,,, | Performed by: ORTHOPAEDIC SURGERY

## 2022-06-20 PROCEDURE — 20610 DRAIN/INJ JOINT/BURSA W/O US: CPT | Mod: 50,S$GLB,, | Performed by: ORTHOPAEDIC SURGERY

## 2022-06-20 PROCEDURE — 1125F PR PAIN SEVERITY QUANTIFIED, PAIN PRESENT: ICD-10-PCS | Mod: CPTII,S$GLB,, | Performed by: ORTHOPAEDIC SURGERY

## 2022-06-20 RX ORDER — TRIAMCINOLONE ACETONIDE 40 MG/ML
40 INJECTION, SUSPENSION INTRA-ARTICULAR; INTRAMUSCULAR
Status: DISCONTINUED | OUTPATIENT
Start: 2022-06-20 | End: 2022-06-20 | Stop reason: HOSPADM

## 2022-06-20 RX ADMIN — TRIAMCINOLONE ACETONIDE 40 MG: 40 INJECTION, SUSPENSION INTRA-ARTICULAR; INTRAMUSCULAR at 03:06

## 2022-06-20 NOTE — PROGRESS NOTES
Past Medical History:   Diagnosis Date    Arthritis     Cataract     Coronary artery disease     Hypertension     Insomnia     Neuropathy     Retinal detachment     Stomach ulcer        Past Surgical History:   Procedure Laterality Date    APPENDECTOMY      BLADDER SUSPENSION      BREAST BIOPSY      CARDIAC SURGERY      carotid endarterectomy      L    CATARACT EXTRACTION      CHOLECYSTECTOMY      HYSTERECTOMY      INJECTION OF ANESTHETIC AGENT AROUND GANGLION IMPAR N/A 9/12/2019    Procedure: BLOCK, GANGLION IMPAR;  Surgeon: Christian James MD;  Location: [facility];  Service: Pain Management;  Laterality: N/A;    INSERTION OF IMPLANTABLE LOOP RECORDER N/A 12/18/2020    Procedure: INSERTION, IMPLANTABLE LOOP RECORDER;  Surgeon: Adin Saba MD;  Location: [facility] CATH/EP LAB;  Service: Cardiology;  Laterality: N/A;    OOPHORECTOMY      SUPERFICIAL KERATECTOMY Right 10/27/2017    Dr. Morales    TONSILLECTOMY      TRANSFORAMINAL EPIDURAL INJECTION OF STEROID Left 8/12/2019    Procedure: Injection,steroid,epidural,transforaminal approach L4-5, L5-S1;  Surgeon: Christian James MD;  [facility];  Service: Pain Management;  Laterality: Left;    TRANSFORAMINAL EPIDURAL INJECTION OF STEROID Left 1/16/2020    Procedure: Injection,steroid,epidural,transforaminal approach;  Surgeon: Christian James MD;  Location: [facility];  Service: Pain Management;  Laterality: Left;  L4-5, L5-S1    TRANSFORAMINAL EPIDURAL INJECTION OF STEROID Left 6/30/2020    Procedure: Injection,steroid,epidural,transforaminal approach;  Surgeon: Christian James MD;  Location: [facility];  Service: Pain Management;  Laterality: Left;  L4-5 and L5-S1       Current Outpatient Medications   Medication Sig    aspirin (ECOTRIN) 81 MG EC tablet Take 81 mg by mouth once daily.    cholecalciferol, vitamin D3, (VITAMIN D3) 2,000 unit Tab Vitamin D3 50 mcg (2,000 unit) tablet   1 tablet every day by mouth    clobetasol 0.05% (TEMOVATE) 0.05 % Oint Apply  topically 2 (two) times daily.    dexlansoprazole (DEXILANT) 30 mg CpDM Take 60 mg by mouth.     diclofenac sodium (VOLTAREN) 1 % Gel Apply 2 g topically daily as needed.    dicyclomine (BENTYL) 10 MG capsule 2 (two) times daily as needed.     diphenoxylate-atropine 2.5-0.025 mg (LOMOTIL) 2.5-0.025 mg per tablet Take 1 tablet by mouth 4 (four) times daily as needed for Diarrhea.    metoprolol succinate (TOPROL-XL) 50 MG 24 hr tablet Take 50 mg by mouth once daily.    mupirocin (BACTROBAN) 2 % ointment Apply layer to right nostril with clean q-tip twice daily for 14 days.    MYRBETRIQ 50 mg Tb24 Take 1 tablet by mouth once daily.    nitroGLYCERIN (NITROSTAT) 0.4 MG SL tablet Place under the tongue.    ondansetron (ZOFRAN-ODT) 8 MG TbDL every 6 (six) hours as needed.     pantoprazole (PROTONIX) 40 MG tablet Take 40 mg by mouth every morning.    phenobarb-hyoscy-atropine-scop (DONNATAL) 16.2-0.1037 -0.0194 mg/5 mL Elix 5 mLs.    rOPINIRole (REQUIP) 0.5 MG tablet Take 0.5 mg by mouth nightly as needed.    sars-cov-2, covid-19, (MODERNA COVID-19) 100 mcg/0.5 ml injection Inject into the muscle.    solifenacin (VESICARE) 10 MG tablet 10 mg.    temazepam (RESTORIL) 30 mg capsule Take 30 mg by mouth nightly as needed.    amLODIPine (NORVASC) 5 MG tablet Take 5 mg by mouth.    atorvastatin (LIPITOR) 40 MG tablet Take 1 tablet (40 mg total) by mouth once daily.     Current Facility-Administered Medications   Medication    ondansetron injection 4 mg       Review of patient's allergies indicates:   Allergen Reactions    Demerol [meperidine] Nausea And Vomiting    Oxycodone     Hydrocodone Itching    Oxycodone-acetaminophen Itching       Family History   Problem Relation Age of Onset    Cancer Father     Macular degeneration Maternal Aunt     Cancer Maternal Aunt     Breast cancer Maternal Aunt     Macular degeneration Maternal Uncle     Diabetes Paternal Aunt     Cancer Paternal Aunt     Blindness  Maternal Grandfather     Melanoma Neg Hx     Psoriasis Neg Hx     Lupus Neg Hx     Eczema Neg Hx        Social History     Socioeconomic History    Marital status:    Tobacco Use    Smoking status: Former Smoker     Years: 4.00     Types: Cigarettes     Quit date: 10/13/1962     Years since quittin.7    Smokeless tobacco: Never Used   Substance and Sexual Activity    Alcohol use: No    Drug use: No    Sexual activity: Yes     Birth control/protection: Surgical       Chief Complaint:   Chief Complaint   Patient presents with    Knee Pain     B knee pain        History of present illness:  This is an 81-year-old female seen for left knee pain.  I saw her previously and have done injections in both of her knees.  She would like to repeat those today.  Pain is a 7/10.  Hurting a lot over last 2-3 weeks.      Review of Systems:  Musculoskeletal:  See HPI    Physical Examination:    Vital Signs:    Vitals:    22 1508   Resp: 18       Body mass index is 24.24 kg/m².    This a well-developed, well nourished patient in no acute distress.  They are alert and oriented and cooperative to examination.  Pt. walks without an antalgic gait.      Examination of the left knee shows no rashes or erythema. There are no masses ecchymosis or effusion. Patient has full range of motion from 0-130°. Patient is nontender to palpation over lateral joint line and moderately tender to palpation over the medial joint line. Patient has a - Lachman exam, - anterior drawer exam, and - posterior drawer exam. - Jose A's exam. Knee is stable to varus and valgus stress. 5 out of 5 motor strength. Palpable distal pulses. Intact light touch sensation. Negative Patellofemoral crepitus    Examination of the right knee shows no rashes or erythema. There are no masses ecchymosis or effusion. Patient has full range of motion from 0-130°. Patient is nontender to palpation over lateral joint line and nontender to palpation over  the medial joint line. Patient has a - Lachman exam, - anterior drawer exam, and - posterior drawer exam. - Jose A's exam. Knee is stable to varus and valgus stress. 5 out of 5 motor strength. Palpable distal pulses. Intact light touch sensation. Negative Patellofemoral crepitus        X-rays:  X-rays of both knees are ordered and reviewed which show a small joint effusion.  Patient has some mild medial compartment narrowing bilaterally.     Assessment::  Left knee arthritis    Plan:  Reviewed the findings with her today.  We discussed treatment options.  Patient elected to try cortisone injection to start.  Recommended her modifying her activity some.  Next step would be an MRI if not improving.    This note was created using M Modal voice recognition software that occasionally misinterpreted phrases or words.    Consult note is delivered via Epic messaging service.

## 2022-06-20 NOTE — PROCEDURES
Large Joint Aspiration/Injection: bilateral knee    Date/Time: 6/20/2022 3:00 PM  Performed by: Andrez Mazariegos MD  Authorized by: Andrez Mazariegos MD     Consent Done?:  Yes (Verbal)  Indications:  Pain  Site marked: the procedure site was marked    Timeout: prior to procedure the correct patient, procedure, and site was verified    Prep: patient was prepped and draped in usual sterile fashion      Local anesthesia used?: Yes    Anesthesia:  Local infiltration  Local anesthetic:  Bupivacaine 0.25% without epinephrine and lidocaine 2% without epinephrine  Anesthetic total (ml):  6      Details:  Needle Size:  22 G  Ultrasonic Guidance for needle placement?: No    Approach:  Anterolateral  Location:  Knee  Laterality:  Bilateral  Site:  Bilateral knee  Medications (Right):  40 mg triamcinolone acetonide 40 mg/mL  Medications (Left):  40 mg triamcinolone acetonide 40 mg/mL  Patient tolerance:  Patient tolerated the procedure well with no immediate complications

## 2022-08-25 ENCOUNTER — TELEPHONE (OUTPATIENT)
Dept: CARDIOLOGY | Facility: CLINIC | Age: 82
End: 2022-08-25
Payer: MEDICARE

## 2022-10-17 ENCOUNTER — TELEPHONE (OUTPATIENT)
Dept: CARDIOLOGY | Facility: CLINIC | Age: 82
End: 2022-10-17
Payer: MEDICARE

## 2022-10-17 NOTE — TELEPHONE ENCOUNTER
----- Message from Kolton French sent at 10/17/2022  1:51 PM CDT -----  Regarding: appointment  Contact: patient  Patient want to speak with a nurse regarding fitting her in for dizzy and shoulder pain on tomorrow, please call back at [phone] (home) [phone] (work)     Case number [id number]

## 2022-10-18 ENCOUNTER — HOSPITAL ENCOUNTER (OUTPATIENT)
Facility: HOSPITAL | Age: 82
Discharge: HOME | End: 2022-10-19
Attending: EMERGENCY MEDICINE | Admitting: STUDENT IN AN ORGANIZED HEALTH CARE EDUCATION/TRAINING PROGRAM
Payer: MEDICARE

## 2022-10-18 DIAGNOSIS — U07.1 COVID-19 VIRUS DETECTED: Primary | ICD-10-CM

## 2022-10-18 DIAGNOSIS — R07.9 CHEST PAIN: ICD-10-CM

## 2022-10-18 LAB
ALBUMIN SERPL BCP-MCNC: 4 G/DL (ref 3.5–5.2)
ALP SERPL-CCNC: 82 U/L (ref 55–135)
ALT SERPL W/O P-5'-P-CCNC: 17 U/L (ref 10–44)
ANION GAP SERPL CALC-SCNC: 7 MMOL/L (ref 8–16)
AST SERPL-CCNC: 23 U/L (ref 10–40)
BASOPHILS # BLD AUTO: 0.05 K/UL (ref 0–0.2)
BASOPHILS NFR BLD: 0.8 % (ref 0–1.9)
BILIRUB SERPL-MCNC: 0.9 MG/DL (ref 0.1–1)
BNP SERPL-MCNC: 181 PG/ML (ref 0–99)
BUN SERPL-MCNC: 11 MG/DL (ref 8–23)
CALCIUM SERPL-MCNC: 9.1 MG/DL (ref 8.7–10.5)
CHLORIDE SERPL-SCNC: 104 MMOL/L (ref 95–110)
CO2 SERPL-SCNC: 28 MMOL/L (ref 23–29)
CREAT SERPL-MCNC: 0.7 MG/DL (ref 0.5–1.4)
DIFFERENTIAL METHOD: NORMAL
EOSINOPHIL # BLD AUTO: 0.1 K/UL (ref 0–0.5)
EOSINOPHIL NFR BLD: 1.4 % (ref 0–8)
ERYTHROCYTE [DISTWIDTH] IN BLOOD BY AUTOMATED COUNT: 12.3 % (ref 11.5–14.5)
EST. GFR  (NO RACE VARIABLE): >60 ML/MIN/1.73 M^2
GLUCOSE SERPL-MCNC: 94 MG/DL (ref 70–110)
HCT VFR BLD AUTO: 43.2 % (ref 37–48.5)
HGB BLD-MCNC: 14.6 G/DL (ref 12–16)
IMM GRANULOCYTES # BLD AUTO: 0.02 K/UL (ref 0–0.04)
IMM GRANULOCYTES NFR BLD AUTO: 0.3 % (ref 0–0.5)
LYMPHOCYTES # BLD AUTO: 2 K/UL (ref 1–4.8)
LYMPHOCYTES NFR BLD: 31.7 % (ref 18–48)
MAGNESIUM SERPL-MCNC: 1.9 MG/DL (ref 1.6–2.6)
MCH RBC QN AUTO: 30 PG (ref 27–31)
MCHC RBC AUTO-ENTMCNC: 33.8 G/DL (ref 32–36)
MCV RBC AUTO: 89 FL (ref 82–98)
MONOCYTES # BLD AUTO: 0.5 K/UL (ref 0.3–1)
MONOCYTES NFR BLD: 7.9 % (ref 4–15)
NEUTROPHILS # BLD AUTO: 3.6 K/UL (ref 1.8–7.7)
NEUTROPHILS NFR BLD: 57.9 % (ref 38–73)
NRBC BLD-RTO: 0 /100 WBC
PLATELET # BLD AUTO: 267 K/UL (ref 150–450)
PMV BLD AUTO: 9.3 FL (ref 9.2–12.9)
POTASSIUM SERPL-SCNC: 3.9 MMOL/L (ref 3.5–5.1)
PROT SERPL-MCNC: 7.1 G/DL (ref 6–8.4)
RBC # BLD AUTO: 4.86 M/UL (ref 4–5.4)
SARS-COV-2 RDRP RESP QL NAA+PROBE: POSITIVE
SODIUM SERPL-SCNC: 139 MMOL/L (ref 136–145)
TROPONIN I SERPL DL<=0.01 NG/ML-MCNC: <0.03 NG/ML
WBC # BLD AUTO: 6.24 K/UL (ref 3.9–12.7)

## 2022-10-18 PROCEDURE — 85025 COMPLETE CBC W/AUTO DIFF WBC: CPT | Performed by: EMERGENCY MEDICINE

## 2022-10-18 PROCEDURE — 93005 ELECTROCARDIOGRAM TRACING: CPT | Performed by: GENERAL PRACTICE

## 2022-10-18 PROCEDURE — G0378 HOSPITAL OBSERVATION PER HR: HCPCS

## 2022-10-18 PROCEDURE — 80053 COMPREHEN METABOLIC PANEL: CPT | Performed by: EMERGENCY MEDICINE

## 2022-10-18 PROCEDURE — 99285 EMERGENCY DEPT VISIT HI MDM: CPT | Mod: 25

## 2022-10-18 PROCEDURE — 83735 ASSAY OF MAGNESIUM: CPT | Performed by: EMERGENCY MEDICINE

## 2022-10-18 PROCEDURE — U0002 COVID-19 LAB TEST NON-CDC: HCPCS | Performed by: EMERGENCY MEDICINE

## 2022-10-18 PROCEDURE — 93010 ELECTROCARDIOGRAM REPORT: CPT | Mod: ,,, | Performed by: GENERAL PRACTICE

## 2022-10-18 PROCEDURE — 25000003 PHARM REV CODE 250: Performed by: EMERGENCY MEDICINE

## 2022-10-18 PROCEDURE — 25000003 PHARM REV CODE 250: Performed by: STUDENT IN AN ORGANIZED HEALTH CARE EDUCATION/TRAINING PROGRAM

## 2022-10-18 PROCEDURE — 84484 ASSAY OF TROPONIN QUANT: CPT | Performed by: EMERGENCY MEDICINE

## 2022-10-18 PROCEDURE — 83880 ASSAY OF NATRIURETIC PEPTIDE: CPT | Performed by: EMERGENCY MEDICINE

## 2022-10-18 PROCEDURE — 93010 EKG 12-LEAD: ICD-10-PCS | Mod: ,,, | Performed by: GENERAL PRACTICE

## 2022-10-18 RX ORDER — ASPIRIN 81 MG/1
81 TABLET ORAL DAILY
Status: DISCONTINUED | OUTPATIENT
Start: 2022-10-18 | End: 2022-10-19 | Stop reason: HOSPADM

## 2022-10-18 RX ORDER — ASPIRIN 325 MG
325 TABLET ORAL
Status: COMPLETED | OUTPATIENT
Start: 2022-10-18 | End: 2022-10-18

## 2022-10-18 RX ORDER — MORPHINE SULFATE 4 MG/ML
4 INJECTION, SOLUTION INTRAMUSCULAR; INTRAVENOUS EVERY 4 HOURS PRN
Status: DISCONTINUED | OUTPATIENT
Start: 2022-10-18 | End: 2022-10-19 | Stop reason: HOSPADM

## 2022-10-18 RX ORDER — METOPROLOL SUCCINATE 50 MG/1
50 TABLET, EXTENDED RELEASE ORAL DAILY
Status: DISCONTINUED | OUTPATIENT
Start: 2022-10-18 | End: 2022-10-19 | Stop reason: HOSPADM

## 2022-10-18 RX ORDER — AMLODIPINE BESYLATE 5 MG/1
5 TABLET ORAL DAILY
Status: DISCONTINUED | OUTPATIENT
Start: 2022-10-18 | End: 2022-10-19 | Stop reason: HOSPADM

## 2022-10-18 RX ORDER — CHOLECALCIFEROL (VITAMIN D3) 50 MCG
1 TABLET ORAL DAILY
Status: DISCONTINUED | OUTPATIENT
Start: 2022-10-18 | End: 2022-10-19 | Stop reason: HOSPADM

## 2022-10-18 RX ORDER — ATORVASTATIN CALCIUM 20 MG/1
20 TABLET, FILM COATED ORAL DAILY
Status: DISCONTINUED | OUTPATIENT
Start: 2022-10-18 | End: 2022-10-19 | Stop reason: HOSPADM

## 2022-10-18 RX ORDER — HYDROCODONE BITARTRATE AND ACETAMINOPHEN 5; 325 MG/1; MG/1
1 TABLET ORAL EVERY 4 HOURS PRN
Status: DISCONTINUED | OUTPATIENT
Start: 2022-10-18 | End: 2022-10-19 | Stop reason: HOSPADM

## 2022-10-18 RX ORDER — ACETAMINOPHEN 325 MG/1
650 TABLET ORAL EVERY 8 HOURS PRN
Status: DISCONTINUED | OUTPATIENT
Start: 2022-10-18 | End: 2022-10-19 | Stop reason: HOSPADM

## 2022-10-18 RX ORDER — ONDANSETRON 2 MG/ML
4 INJECTION INTRAMUSCULAR; INTRAVENOUS EVERY 8 HOURS PRN
Status: DISCONTINUED | OUTPATIENT
Start: 2022-10-18 | End: 2022-10-19 | Stop reason: HOSPADM

## 2022-10-18 RX ORDER — ALPRAZOLAM 0.25 MG/1
0.25 TABLET ORAL DAILY PRN
COMMUNITY
Start: 2022-09-12

## 2022-10-18 RX ORDER — PROCHLORPERAZINE EDISYLATE 5 MG/ML
5 INJECTION INTRAMUSCULAR; INTRAVENOUS EVERY 6 HOURS PRN
Status: DISCONTINUED | OUTPATIENT
Start: 2022-10-18 | End: 2022-10-19 | Stop reason: HOSPADM

## 2022-10-18 RX ORDER — ASPIRIN 325 MG
325 TABLET ORAL
Status: DISCONTINUED | OUTPATIENT
Start: 2022-10-18 | End: 2022-10-18

## 2022-10-18 RX ORDER — PANTOPRAZOLE SODIUM 40 MG/1
40 TABLET, DELAYED RELEASE ORAL EVERY MORNING
Status: DISCONTINUED | OUTPATIENT
Start: 2022-10-18 | End: 2022-10-19 | Stop reason: HOSPADM

## 2022-10-18 RX ORDER — ATORVASTATIN CALCIUM 20 MG/1
20 TABLET, FILM COATED ORAL DAILY
COMMUNITY
Start: 2022-09-19

## 2022-10-18 RX ORDER — ALPRAZOLAM 0.25 MG/1
0.25 TABLET ORAL DAILY PRN
Status: DISCONTINUED | OUTPATIENT
Start: 2022-10-18 | End: 2022-10-19 | Stop reason: HOSPADM

## 2022-10-18 RX ORDER — SODIUM CHLORIDE 0.9 % (FLUSH) 0.9 %
10 SYRINGE (ML) INJECTION EVERY 6 HOURS PRN
Status: DISCONTINUED | OUTPATIENT
Start: 2022-10-18 | End: 2022-10-19 | Stop reason: HOSPADM

## 2022-10-18 RX ORDER — TALC
6 POWDER (GRAM) TOPICAL NIGHTLY PRN
Status: DISCONTINUED | OUTPATIENT
Start: 2022-10-18 | End: 2022-10-19 | Stop reason: HOSPADM

## 2022-10-18 RX ORDER — POLYETHYLENE GLYCOL 3350 17 G/17G
17 POWDER, FOR SOLUTION ORAL DAILY
Status: DISCONTINUED | OUTPATIENT
Start: 2022-10-19 | End: 2022-10-19 | Stop reason: HOSPADM

## 2022-10-18 RX ADMIN — PANTOPRAZOLE SODIUM 40 MG: 40 TABLET, DELAYED RELEASE ORAL at 08:10

## 2022-10-18 RX ADMIN — ACETAMINOPHEN 650 MG: 325 TABLET ORAL at 11:10

## 2022-10-18 RX ADMIN — ATORVASTATIN CALCIUM 20 MG: 20 TABLET, FILM COATED ORAL at 08:10

## 2022-10-18 RX ADMIN — Medication 2000 UNITS: at 08:10

## 2022-10-18 RX ADMIN — AMLODIPINE BESYLATE 5 MG: 5 TABLET ORAL at 08:10

## 2022-10-18 RX ADMIN — METOPROLOL SUCCINATE 50 MG: 50 TABLET, FILM COATED, EXTENDED RELEASE ORAL at 08:10

## 2022-10-18 RX ADMIN — ASPIRIN 81 MG: 81 TABLET, COATED ORAL at 08:10

## 2022-10-18 RX ADMIN — ASPIRIN 325 MG ORAL TABLET 325 MG: 325 PILL ORAL at 02:10

## 2022-10-18 RX ADMIN — ALPRAZOLAM 0.25 MG: 0.25 TABLET ORAL at 10:10

## 2022-10-18 NOTE — ASSESSMENT & PLAN NOTE
Patient with atypical chest pain features but is moderate to high risk with her history  - repeat troponin for Delta  - EKG without acute ischemic changes  - continue ASA and statin  - will consult her cardiologist  - NPO at midnight  - stress test in the morning  - further evaluation pending stress test results

## 2022-10-18 NOTE — SUBJECTIVE & OBJECTIVE
Past Medical History:   Diagnosis Date    Arthritis     Cataract     Coronary artery disease     Hypertension     Insomnia     Neuropathy     Retinal detachment     Stomach ulcer        Past Surgical History:   Procedure Laterality Date    APPENDECTOMY      BLADDER SUSPENSION      BREAST BIOPSY      CARDIAC SURGERY      carotid endarterectomy      L    CATARACT EXTRACTION      CHOLECYSTECTOMY      HYSTERECTOMY      INJECTION OF ANESTHETIC AGENT AROUND GANGLION IMPAR N/A 9/12/2019    Procedure: BLOCK, GANGLION IMPAR;  Surgeon: Christian James MD;  Location: [facility];  Service: Pain Management;  Laterality: N/A;    INSERTION OF IMPLANTABLE LOOP RECORDER N/A 12/18/2020    Procedure: INSERTION, IMPLANTABLE LOOP RECORDER;  Surgeon: Adin Saba MD;  Location: [facility] CATH/EP LAB;  Service: Cardiology;  Laterality: N/A;    OOPHORECTOMY      SUPERFICIAL KERATECTOMY Right 10/27/2017    Dr. Morales    TONSILLECTOMY      TRANSFORAMINAL EPIDURAL INJECTION OF STEROID Left 8/12/2019    Procedure: Injection,steroid,epidural,transforaminal approach L4-5, L5-S1;  Surgeon: Christian James MD;  Location: [facility] OR;  Service: Pain Management;  Laterality: Left;    TRANSFORAMINAL EPIDURAL INJECTION OF STEROID Left 1/16/2020    Procedure: Injection,steroid,epidural,transforaminal approach;  Surgeon: Christian James MD;  Location: [facility];  Service: Pain Management;  Laterality: Left;  L4-5, L5-S1    TRANSFORAMINAL EPIDURAL INJECTION OF STEROID Left 6/30/2020    Procedure: Injection,steroid,epidural,transforaminal approach;  Surgeon: Christian James MD;  Location: [facility];  Service: Pain Management;  Laterality: Left;  L4-5 and L5-S1       Review of patient's allergies indicates:   Allergen Reactions    Demerol [meperidine] Nausea And Vomiting    Oxycodone     Hydrocodone Itching    Oxycodone-acetaminophen Itching       Current Facility-Administered Medications on File Prior to Encounter   Medication    ondansetron injection 4 mg     Current Outpatient  Medications on File Prior to Encounter   Medication Sig    ALPRAZolam (XANAX) 0.25 MG tablet Take 0.25 mg by mouth daily as needed.    amLODIPine (NORVASC) 5 MG tablet Take 5 mg by mouth once daily.    aspirin (ECOTRIN) 81 MG EC tablet Take 81 mg by mouth once daily.    atorvastatin (LIPITOR) 20 MG tablet Take 20 mg by mouth once daily.    cholecalciferol, vitamin D3, (VITAMIN D3) 2,000 unit Tab Take 1 tablet by mouth once daily.    metoprolol succinate (TOPROL-XL) 50 MG 24 hr tablet Take 50 mg by mouth once daily.    MYRBETRIQ 50 mg Tb24 Take 1 tablet by mouth once daily.    pantoprazole (PROTONIX) 40 MG tablet Take 40 mg by mouth every morning.    rOPINIRole (REQUIP) 0.5 MG tablet Take 0.5 mg by mouth nightly as needed.    temazepam (RESTORIL) 30 mg capsule Take 30 mg by mouth nightly as needed.    diclofenac sodium (VOLTAREN) 1 % Gel Apply 2 g topically daily as needed.    dicyclomine (BENTYL) 10 MG capsule 2 (two) times daily as needed.     diphenoxylate-atropine 2.5-0.025 mg (LOMOTIL) 2.5-0.025 mg per tablet Take 1 tablet by mouth 4 (four) times daily as needed for Diarrhea.    nitroGLYCERIN (NITROSTAT) 0.4 MG SL tablet Place under the tongue.    ondansetron (ZOFRAN-ODT) 8 MG TbDL every 6 (six) hours as needed.     [DISCONTINUED] clobetasol 0.05% (TEMOVATE) 0.05 % Oint Apply topically 2 (two) times daily.    [DISCONTINUED] dexlansoprazole (DEXILANT) 30 mg CpDM Take 60 mg by mouth.     [DISCONTINUED] mupirocin (BACTROBAN) 2 % ointment Apply layer to right nostril with clean q-tip twice daily for 14 days.    [DISCONTINUED] phenobarb-hyoscy-atropine-scop (DONNATAL) 16.2-0.1037 -0.0194 mg/5 mL Elix 5 mLs.    [DISCONTINUED] sars-cov-2, covid-19, (MODERNA COVID-19) 100 mcg/0.5 ml injection Inject into the muscle.    [DISCONTINUED] solifenacin (VESICARE) 10 MG tablet 10 mg.     Family History       Problem Relation (Age of Onset)    Blindness Maternal Grandfather    Breast cancer Maternal Aunt    Cancer Father,  Maternal Aunt, Paternal Aunt    Diabetes Paternal Aunt    Macular degeneration Maternal Aunt, Maternal Uncle          Tobacco Use    Smoking status: Former     Years: 4.00     Types: Cigarettes     Quit date: 10/13/1962     Years since quittin.0    Smokeless tobacco: Never   Substance and Sexual Activity    Alcohol use: No    Drug use: No    Sexual activity: Yes     Birth control/protection: Surgical     Review of Systems   All other systems reviewed and are negative.  Per HPI otherwise 12 point review systems is negative  Objective:     Vital Signs (Most Recent):  Temp: 97.9 °F (36.6 °C) (10/18/22 1208)  Pulse: 61 (10/18/22 1448)  Resp: 17 (10/18/22 1208)  BP: (!) 153/70 (10/18/22 1448)  SpO2: 100 % (10/18/22 1448)   Vital Signs (24h Range):  Temp:  [97.9 °F (36.6 °C)] 97.9 °F (36.6 °C)  Pulse:  [60-64] 61  Resp:  [17] 17  SpO2:  [98 %-100 %] 100 %  BP: (143-164)/(63-81) 153/70     Weight: 52.2 kg (115 lb)  Body mass index is 23.23 kg/m².    Physical Exam  Constitutional:       Appearance: Normal appearance. She is normal weight.   HENT:      Head: Normocephalic and atraumatic.      Nose: Nose normal.      Mouth/Throat:      Mouth: Mucous membranes are moist.   Eyes:      Conjunctiva/sclera: Conjunctivae normal.   Cardiovascular:      Rate and Rhythm: Normal rate and regular rhythm.      Pulses: Normal pulses.      Heart sounds: Normal heart sounds. No murmur heard.    No friction rub. No gallop.   Pulmonary:      Effort: Pulmonary effort is normal.      Breath sounds: Normal breath sounds. No wheezing or rales.   Abdominal:      General: Abdomen is flat. Bowel sounds are normal. There is no distension.      Palpations: Abdomen is soft.      Tenderness: There is no abdominal tenderness. There is no guarding.   Musculoskeletal:         General: No swelling. Normal range of motion.      Cervical back: Normal range of motion and neck supple.   Skin:     General: Skin is warm and dry.   Neurological:      General:  No focal deficit present.      Mental Status: She is alert.   Psychiatric:         Mood and Affect: Mood normal.         Thought Content: Thought content normal.         Judgment: Judgment normal.           Significant Labs: All pertinent labs within the past 24 hours have been reviewed.    Significant Imaging: I have reviewed all pertinent imaging results/findings within the past 24 hours.

## 2022-10-18 NOTE — HPI
Patient is an 81-year-old female with a history of CAD status post CABG, chronic fatigue and chronic nausea, hyperlipidemia.  She is presenting with several weeks of fatigue and intermittent chest discomfort with chest pressure.  The symptoms occur at rest and are not brought on by exertion.  They go way without any intervention.  She has not had any fever or chills.  She has not had sick contacts that she is aware of.  She has no other changes to her medication regimen.    In the ED:  Vital signs are stable, blood work largely unremarkable except for mildly elevated BNP, initial troponin is negative, COVID-19 test is positive, EKG without acute ischemic changes.  Chest x-ray without any acute changes.  The ED discussed with her cardiologist Dr. Saba who recommended admission for observation and stress test in the morning.

## 2022-10-18 NOTE — ED PROVIDER NOTES
Encounter Date: 10/18/2022       History     Chief Complaint   Patient presents with    Fatigue     General fatigue, intermittent mild chest pain while sitting x 2 weeks. Left shoulder pain. Pt reports moving.  recently  from MI after complaining of shoulder pain.      81-year-old female with a history of hypertension hyperlipidemia coronary artery disease status post cardiac stents who presents complaining of malaise fatigue and chest pain.  The patient reports that over the past several weeks she has been having intermittent episodes of squeezing type chest pain.  Symptoms are intermittent in nature but have been in for decreasing in frequency and severity.  She awoke 2 nights ago with severe pain to the left shoulder with shortness of breath and nausea but no chest pain at that time.  She was concerned because prior to her 's death 3 years ago his primary symptom was shoulder pain.  She denies any injury to the shoulder the shoulder was not painful to move at that time.  She has not had any recurrence of the shoulder pain.  She has however been having intermittent episodes of midsternal chest pressure/tightness.  She has also had significant fatigue and dyspnea exertion.  She denies fever.  She denies any sinus congestion sore throat headaches or visual changes.  She denies any focal weakness numbness tingling or paresthesias but has just had generalized weakness and fatigue.  She denies abdominal pain vomiting or gastrointestinal bleeding.  She denies any current chest pain.  She denies any other problems or complaints.      Review of patient's allergies indicates:   Allergen Reactions    Demerol [meperidine] Nausea And Vomiting    Oxycodone     Hydrocodone Itching    Oxycodone-acetaminophen Itching     Past Medical History:   Diagnosis Date    Arthritis     Cataract     Coronary artery disease     Hypertension     Insomnia     Neuropathy     Retinal detachment     Stomach ulcer      Past  Surgical History:   Procedure Laterality Date    APPENDECTOMY      BLADDER SUSPENSION      BREAST BIOPSY      CARDIAC SURGERY      carotid endarterectomy      L    CATARACT EXTRACTION      CHOLECYSTECTOMY      HYSTERECTOMY      INJECTION OF ANESTHETIC AGENT AROUND GANGLION IMPAR N/A 9/12/2019    Procedure: BLOCK, GANGLION IMPAR;  Surgeon: Christian James MD;  Location: [facility] OR;  Service: Pain Management;  Laterality: N/A;    INSERTION OF IMPLANTABLE LOOP RECORDER N/A 12/18/2020    Procedure: INSERTION, IMPLANTABLE LOOP RECORDER;  Surgeon: Adin Saba MD;  Location: [facility] CATH/EP LAB;  Service: Cardiology;  Laterality: N/A;    OOPHORECTOMY      SUPERFICIAL KERATECTOMY Right 10/27/2017    Dr. Morales    TONSILLECTOMY      TRANSFORAMINAL EPIDURAL INJECTION OF STEROID Left 8/12/2019    Procedure: Injection,steroid,epidural,transforaminal approach L4-5, L5-S1;  Surgeon: Christian James MD;  Location: [facility] OR;  Service: Pain Management;  Laterality: Left;    TRANSFORAMINAL EPIDURAL INJECTION OF STEROID Left 1/16/2020    Procedure: Injection,steroid,epidural,transforaminal approach;  Surgeon: Christian James MD;  Location: [facility] OR;  Service: Pain Management;  Laterality: Left;  L4-5, L5-S1    TRANSFORAMINAL EPIDURAL INJECTION OF STEROID Left 6/30/2020    Procedure: Injection,steroid,epidural,transforaminal approach;  Surgeon: Christian James MD;  Location: [facility];  Service: Pain Management;  Laterality: Left;  L4-5 and L5-S1     Family History   Problem Relation Age of Onset    Cancer Father     Macular degeneration Maternal Aunt     Cancer Maternal Aunt     Breast cancer Maternal Aunt     Macular degeneration Maternal Uncle     Diabetes Paternal Aunt     Cancer Paternal Aunt     Blindness Maternal Grandfather     Melanoma Neg Hx     Psoriasis Neg Hx     Lupus Neg Hx     Eczema Neg Hx      Social History     Tobacco Use    Smoking status: Former     Years: 4.00     Types: Cigarettes     Quit date: 10/13/1962     Years since quitting:  60.0    Smokeless tobacco: Never   Substance Use Topics    Alcohol use: No    Drug use: No     Review of Systems   Constitutional:  Positive for activity change and fatigue. Negative for appetite change, chills, diaphoresis and fever.   HENT: Negative.  Negative for congestion, dental problem, ear pain, rhinorrhea, sinus pressure, sinus pain, sore throat and trouble swallowing.    Eyes: Negative.  Negative for photophobia, pain, redness and visual disturbance.   Respiratory:  Positive for shortness of breath. Negative for cough, chest tightness and wheezing.    Cardiovascular:  Positive for chest pain. Negative for palpitations and leg swelling.   Gastrointestinal: Negative.  Negative for abdominal distention, abdominal pain, anal bleeding, blood in stool, constipation, diarrhea, nausea and vomiting.   Endocrine: Negative.    Genitourinary: Negative.  Negative for decreased urine volume, difficulty urinating, dysuria, flank pain, frequency, hematuria, pelvic pain and urgency.   Musculoskeletal: Negative.  Negative for back pain, gait problem, joint swelling, myalgias, neck pain and neck stiffness. Arthralgias: One episode of shoulder pain as per HPI, no current shoulder pain..  Skin: Negative.  Negative for color change, pallor and rash.   Neurological: Negative.  Negative for dizziness, tremors, seizures, syncope, facial asymmetry, speech difficulty, weakness, light-headedness, numbness and headaches.   Hematological: Negative.  Does not bruise/bleed easily.   Psychiatric/Behavioral: Negative.  Negative for confusion.    All other systems reviewed and are negative.    Physical Exam     Initial Vitals [10/18/22 1208]   BP Pulse Resp Temp SpO2   (!) 164/81 64 17 97.9 °F (36.6 °C) 99 %      MAP       --         Physical Exam    Nursing note and vitals reviewed.  Constitutional: She is not diaphoretic. She is active and cooperative.  Non-toxic appearance. She does not have a sickly appearance. She does not appear ill.  No distress.   HENT:   Head: Normocephalic and atraumatic.   Right Ear: Tympanic membrane normal.   Left Ear: Tympanic membrane normal.   Nose: Nose normal.   Mouth/Throat: Uvula is midline, oropharynx is clear and moist and mucous membranes are normal. No oral lesions. No uvula swelling. No oropharyngeal exudate, posterior oropharyngeal edema or posterior oropharyngeal erythema.   Eyes: Conjunctivae, EOM and lids are normal. Pupils are equal, round, and reactive to light. No scleral icterus.   Neck: Trachea normal and phonation normal. Neck supple. No thyroid mass and no thyromegaly present. No stridor present. No JVD present.   Normal range of motion.   Full passive range of motion without pain.     Cardiovascular:  Normal rate, regular rhythm, normal heart sounds, intact distal pulses and normal pulses.     Exam reveals no gallop and no friction rub.       No murmur heard.  Pulmonary/Chest: Effort normal and breath sounds normal. No accessory muscle usage or stridor. No tachypnea. No respiratory distress. She has no wheezes. She has no rhonchi. She has no rales.   Abdominal: Abdomen is soft. Bowel sounds are normal. She exhibits no distension, no pulsatile midline mass and no mass. There is no abdominal tenderness. There is no rigidity and no guarding.   Musculoskeletal:         General: No tenderness or edema. Normal range of motion.      Right hand: Normal. Normal range of motion. Normal strength. Normal sensation. Normal capillary refill. Normal pulse.      Left hand: Normal. Normal range of motion. Normal strength. Normal sensation. Normal capillary refill. Normal pulse.      Cervical back: Normal, full passive range of motion without pain, normal range of motion and neck supple. No edema, erythema, rigidity or bony tenderness. No spinous process tenderness or muscular tenderness. Normal range of motion.      Thoracic back: Normal. No bony tenderness. Normal range of motion.      Lumbar back: Normal. No bony  tenderness. Normal range of motion.      Right foot: Normal. Normal range of motion and normal capillary refill. No tenderness or bony tenderness. Normal pulse.      Left foot: Normal. Normal range of motion and normal capillary refill. No tenderness or bony tenderness. Normal pulse.      Comments: Pulses are 2+ throughout, cap refill is less than 2 sec throughout, extremities are nontender throughout with full range of motion. There is no spinal tenderness to palpation.     Neurological: She is alert and oriented to person, place, and time. She has normal strength. She displays normal reflexes. No cranial nerve deficit or sensory deficit. GCS score is 15. GCS eye subscore is 4. GCS verbal subscore is 5. GCS motor subscore is 6.   No focal deficits.   Skin: Skin is warm, dry and intact. Capillary refill takes less than 2 seconds. No ecchymosis, no petechiae and no rash noted. No erythema. No pallor.   Psychiatric: She has a normal mood and affect. Her speech is normal and behavior is normal. Judgment and thought content normal. Cognition and memory are normal.       ED Course   Procedures  Labs Reviewed   COMPREHENSIVE METABOLIC PANEL - Abnormal; Notable for the following components:       Result Value    Anion Gap 7 (*)     All other components within normal limits   B-TYPE NATRIURETIC PEPTIDE - Abnormal; Notable for the following components:     (*)     All other components within normal limits   SARS-COV-2 RNA AMPLIFICATION, QUAL - Abnormal; Notable for the following components:    SARS-CoV-2 RNA, Amplification, Qual Positive (*)     All other components within normal limits   CBC W/ AUTO DIFFERENTIAL   TROPONIN I   MAGNESIUM        ECG Results              EKG 12-lead (In process)  Result time 10/18/22 12:48:44      In process by Interface, Lab In [facility] (10/18/22 12:48:44)                   Narrative:    Test Reason : R07.9,    Vent. Rate : 059 BPM     Atrial Rate : 059 BPM     P-R Int : 144 ms           QRS Dur : 068 ms      QT Int : 436 ms       P-R-T Axes : 116 020 114 degrees     QTc Int : 431 ms    Sinus bradycardia  Low voltage QRS  Cannot rule out Anterior infarct (cited on or before 09-OCT-2020)  Abnormal ECG  When compared with ECG of 22-MAR-2022 11:56,  No significant change was found    Referred By: [email or web address]   SELF           Confirmed By:                                   Imaging Results              X-Ray Chest AP Portable (Final result)  Result time 10/18/22 13:31:33      Final result by Rigoberto Mata MD (10/18/22 13:31:33)                   Narrative:    Chest single view    CLINICAL DATA: Chest pain    FINDINGS: AP view is compared to a previous CTA chest from February 2020 and chest radiograph from 2017.    Heart size is normal. There has been previous median sternotomy. Asymmetric density at the cardiophrenic angle on the right is stable and shown on previous CT to reflect prominent epicardial fat pad. There are no infiltrates or effusions. No acute osseous abnormality is identified. Healed left-sided rib fractures are demonstrated.    Loop recorder device is noted.    IMPRESSION:  1. Chronic findings as noted above. No acute radiographic abnormalities.    Electronically signed by:  Rigoberto Mata MD  10/18/2022 1:31 PM CDT Workstation: [id number]                                     Medications   aspirin tablet 325 mg (325 mg Oral Given 10/18/22 1450)     Medical Decision Making:   Clinical Tests:   Lab Tests: Reviewed  Radiological Study: Reviewed  Medical Tests: Reviewed  ED Management:  Resent negative nuclear scan in April however patient with escalating symptoms and known history of coronary artery disease.  I have discussed the case in detail with Dr. Saba on-call for Cardiology.  He does recommend admission for cardiac testing.  Will discuss with hospitalist for admission.  Patient found to be COVID positive.  She is not have fever.  She does not have any constitutional symptoms  other than malaise and fatigue.                        Clinical Impression:   Final diagnoses:  [R07.9] Chest pain  [U07.1] COVID-19 virus detected (Primary)        ED Disposition Condition    Admit Stable                Nicole Scales MD  10/18/22 [id number]

## 2022-10-19 ENCOUNTER — TELEPHONE (OUTPATIENT)
Dept: CARDIOLOGY | Facility: CLINIC | Age: 82
End: 2022-10-19
Payer: MEDICARE

## 2022-10-19 VITALS
HEART RATE: 62 BPM | DIASTOLIC BLOOD PRESSURE: 82 MMHG | SYSTOLIC BLOOD PRESSURE: 189 MMHG | TEMPERATURE: 98 F | BODY MASS INDEX: 23.24 KG/M2 | OXYGEN SATURATION: 98 % | WEIGHT: 115.31 LBS | HEIGHT: 59 IN | RESPIRATION RATE: 18 BRPM

## 2022-10-19 DIAGNOSIS — U07.1 COVID-19 VIRUS DETECTED: ICD-10-CM

## 2022-10-19 LAB
ANION GAP SERPL CALC-SCNC: 7 MMOL/L (ref 8–16)
BASOPHILS # BLD AUTO: 0.04 K/UL (ref 0–0.2)
BASOPHILS NFR BLD: 0.6 % (ref 0–1.9)
BUN SERPL-MCNC: 8 MG/DL (ref 8–23)
CALCIUM SERPL-MCNC: 9 MG/DL (ref 8.7–10.5)
CHLORIDE SERPL-SCNC: 107 MMOL/L (ref 95–110)
CHOLEST SERPL-MCNC: 123 MG/DL (ref 120–199)
CHOLEST/HDLC SERPL: 2.5 {RATIO} (ref 2–5)
CO2 SERPL-SCNC: 26 MMOL/L (ref 23–29)
CREAT SERPL-MCNC: 0.3 MG/DL (ref 0.5–1.4)
DIFFERENTIAL METHOD: NORMAL
EOSINOPHIL # BLD AUTO: 0.2 K/UL (ref 0–0.5)
EOSINOPHIL NFR BLD: 2.4 % (ref 0–8)
ERYTHROCYTE [DISTWIDTH] IN BLOOD BY AUTOMATED COUNT: 12.2 % (ref 11.5–14.5)
EST. GFR  (NO RACE VARIABLE): >60 ML/MIN/1.73 M^2
GLUCOSE SERPL-MCNC: 93 MG/DL (ref 70–110)
HCT VFR BLD AUTO: 39.7 % (ref 37–48.5)
HDLC SERPL-MCNC: 49 MG/DL (ref 40–75)
HDLC SERPL: 39.8 % (ref 20–50)
HGB BLD-MCNC: 13.6 G/DL (ref 12–16)
IMM GRANULOCYTES # BLD AUTO: 0.01 K/UL (ref 0–0.04)
IMM GRANULOCYTES NFR BLD AUTO: 0.2 % (ref 0–0.5)
LDLC SERPL CALC-MCNC: 59 MG/DL (ref 63–159)
LYMPHOCYTES # BLD AUTO: 2.8 K/UL (ref 1–4.8)
LYMPHOCYTES NFR BLD: 43.9 % (ref 18–48)
MCH RBC QN AUTO: 30 PG (ref 27–31)
MCHC RBC AUTO-ENTMCNC: 34.3 G/DL (ref 32–36)
MCV RBC AUTO: 88 FL (ref 82–98)
MONOCYTES # BLD AUTO: 0.6 K/UL (ref 0.3–1)
MONOCYTES NFR BLD: 9.7 % (ref 4–15)
NEUTROPHILS # BLD AUTO: 2.7 K/UL (ref 1.8–7.7)
NEUTROPHILS NFR BLD: 43.2 % (ref 38–73)
NONHDLC SERPL-MCNC: 74 MG/DL
NRBC BLD-RTO: 0 /100 WBC
PLATELET # BLD AUTO: 247 K/UL (ref 150–450)
PMV BLD AUTO: 9.4 FL (ref 9.2–12.9)
POTASSIUM SERPL-SCNC: 3.3 MMOL/L (ref 3.5–5.1)
RBC # BLD AUTO: 4.53 M/UL (ref 4–5.4)
SODIUM SERPL-SCNC: 140 MMOL/L (ref 136–145)
TRIGL SERPL-MCNC: 75 MG/DL (ref 30–150)
TROPONIN I SERPL DL<=0.01 NG/ML-MCNC: <0.03 NG/ML
TSH SERPL DL<=0.005 MIU/L-ACNC: 3.02 UIU/ML (ref 0.34–5.6)
WBC # BLD AUTO: 6.27 K/UL (ref 3.9–12.7)

## 2022-10-19 PROCEDURE — 80048 BASIC METABOLIC PNL TOTAL CA: CPT | Performed by: STUDENT IN AN ORGANIZED HEALTH CARE EDUCATION/TRAINING PROGRAM

## 2022-10-19 PROCEDURE — 93005 ELECTROCARDIOGRAM TRACING: CPT | Performed by: INTERNAL MEDICINE

## 2022-10-19 PROCEDURE — 93010 EKG 12-LEAD: ICD-10-PCS | Mod: ,,, | Performed by: INTERNAL MEDICINE

## 2022-10-19 PROCEDURE — 84484 ASSAY OF TROPONIN QUANT: CPT | Performed by: STUDENT IN AN ORGANIZED HEALTH CARE EDUCATION/TRAINING PROGRAM

## 2022-10-19 PROCEDURE — 93010 ELECTROCARDIOGRAM REPORT: CPT | Mod: ,,, | Performed by: INTERNAL MEDICINE

## 2022-10-19 PROCEDURE — G0378 HOSPITAL OBSERVATION PER HR: HCPCS

## 2022-10-19 PROCEDURE — 85025 COMPLETE CBC W/AUTO DIFF WBC: CPT | Performed by: STUDENT IN AN ORGANIZED HEALTH CARE EDUCATION/TRAINING PROGRAM

## 2022-10-19 PROCEDURE — 36415 COLL VENOUS BLD VENIPUNCTURE: CPT | Performed by: STUDENT IN AN ORGANIZED HEALTH CARE EDUCATION/TRAINING PROGRAM

## 2022-10-19 PROCEDURE — 25000003 PHARM REV CODE 250: Performed by: STUDENT IN AN ORGANIZED HEALTH CARE EDUCATION/TRAINING PROGRAM

## 2022-10-19 PROCEDURE — 80061 LIPID PANEL: CPT | Performed by: STUDENT IN AN ORGANIZED HEALTH CARE EDUCATION/TRAINING PROGRAM

## 2022-10-19 PROCEDURE — 84443 ASSAY THYROID STIM HORMONE: CPT | Performed by: STUDENT IN AN ORGANIZED HEALTH CARE EDUCATION/TRAINING PROGRAM

## 2022-10-19 RX ADMIN — ASPIRIN 81 MG: 81 TABLET, COATED ORAL at 09:10

## 2022-10-19 RX ADMIN — METOPROLOL SUCCINATE 50 MG: 50 TABLET, FILM COATED, EXTENDED RELEASE ORAL at 09:10

## 2022-10-19 RX ADMIN — AMLODIPINE BESYLATE 5 MG: 5 TABLET ORAL at 09:10

## 2022-10-19 RX ADMIN — Medication 2000 UNITS: at 09:10

## 2022-10-19 RX ADMIN — PANTOPRAZOLE SODIUM 40 MG: 40 TABLET, DELAYED RELEASE ORAL at 09:10

## 2022-10-19 RX ADMIN — POLYETHYLENE GLYCOL 3350 17 G: 17 POWDER, FOR SOLUTION ORAL at 09:10

## 2022-10-19 RX ADMIN — ATORVASTATIN CALCIUM 20 MG: 20 TABLET, FILM COATED ORAL at 09:10

## 2022-10-19 NOTE — HOSPITAL COURSE
[age] year female with history of CABG in the past was admitted with chest pain.  She was having generalized weakness and some COVID symptoms and she was tested for positive covid .  EKG without acute ischemic changes and chest x-ray negative and all enzymes are negative.  Stress test was planned but because of COVID positivity , it was not able to do as per hospital and later patient decided to get discharged and discharged stable condition.  Her chest pain resolved at the time of discharge and she does not need any treatment for COVID-19 and she is at room air.

## 2022-10-19 NOTE — PLAN OF CARE
ADAMS explained to patient- pt v/u and signed form. ADAMS uploaded into media manager.        10/19/22 0930   ADAMS Message   Medicare Outpatient and Observation Notification regarding financial responsibility Given to patient/caregiver;Explained to patient/caregiver;Signed/date by patient/caregiver   Date ADAMS was signed 10/19/22   Time ADAMS was signed [id number]

## 2022-10-19 NOTE — DISCHARGE SUMMARY
[facility]  Discharge Summary      Patient Name: Sunita Carlson  MRN: [id number]  Patient Class: OP- Observation  Admission Date: 10/18/2022  Hospital Length of Stay: 0 days  Discharge Date and Time:  10/19/2022 2:10 PM  Attending Physician: No att. providers found   Discharging Provider: Mich Hernandez MD  Primary Care Provider: Linsey Nelson MD      HPI:   Patient is an 81-year-old female with a history of CAD status post CABG, chronic fatigue and chronic nausea, hyperlipidemia.  She is presenting with several weeks of fatigue and intermittent chest discomfort with chest pressure.  The symptoms occur at rest and are not brought on by exertion.  They go way without any intervention.  She has not had any fever or chills.  She has not had sick contacts that she is aware of.  She has no other changes to her medication regimen.    In the ED:  Vital signs are stable, blood work largely unremarkable except for mildly elevated BNP, initial troponin is negative, COVID-19 test is positive, EKG without acute ischemic changes.  Chest x-ray without any acute changes.  The ED discussed with her cardiologist Dr. Saba who recommended admission for observation and stress test in the morning.      * No surgery found *      Hospital Course:   [age] year female with history of CABG in the past was admitted with chest pain.  She was having generalized weakness and some COVID symptoms and she was tested for positive covid .  EKG without acute ischemic changes and chest x-ray negative and all enzymes are negative.  Stress test was planned but because of COVID positivity , it was not able to do as per hospital and later patient decided to get discharged and discharged stable condition.  Her chest pain resolved at the time of discharge and she does not need any treatment for COVID-19 and she is at room air.       Goals of Care Treatment Preferences:  Code Status: Full Code      Consults:    Consults (From admission, onward)        Status Ordering Provider     Inpatient consult to Cardiology  Once        Provider:  (Not yet assigned)    Acknowledged AXEL CRUZ     Inpatient consult to Hospitalist  Once        Provider:  Axel Cruz MD    Acknowledged AXEL CRUZ     Inpatient consult to Cardiology  Once        Provider:  Miguel Angel Saba MD    Acknowledged AXEL CRUZ          No new Assessment & Plan notes have been filed under this hospital service since the last note was generated.  Service: Hospital Medicine    Final Active Diagnoses:    Diagnosis Date Noted POA    PRINCIPAL PROBLEM:  Chest pain [R07.9] 11/26/2013 Yes      Problems Resolved During this Admission:       Discharged Condition: good    Disposition: Home or Self Care    Follow Up:   Follow-up Information     Miguel Angel Saba MD Follow up in 1 month(s).    Specialties: Interventional Cardiology, Cardiology, Cardiovascular Disease  Why: or CELSO Pepe      In Basket message sent to Dr. Lopez staff.  Office should contact you with follow up appointment date and time.  Contact information:  [address] [place], [place] [id number] [phone]                       Patient Instructions:      Diet Cardiac     Activity as tolerated       Significant Diagnostic Studies: Labs:   CMP   Recent Labs   Lab 10/18/22  1307 10/19/22  0509    140   K 3.9 3.3*    107   CO2 28 26   GLU 94 93   BUN 11 8   CREATININE 0.7 0.3*   CALCIUM 9.1 9.0   PROT 7.1  --    ALBUMIN 4.0  --    BILITOT 0.9  --    ALKPHOS 82  --    AST 23  --    ALT 17  --    ANIONGAP 7* 7*    and CBC   Recent Labs   Lab 10/18/22  1307 10/19/22  0509   WBC 6.24 6.27   HGB 14.6 13.6   HCT 43.2 39.7    247       Pending Diagnostic Studies:     Procedure Component Value Units Date/Time    EKG 12-lead [[id number]] Collected: 10/19/22 0019    Order Status: Sent Lab Status: In process Updated: 10/19/22 0537    Narrative:      Test Reason  : R07.9,    Vent. Rate : 053 BPM     Atrial Rate : 053 BPM     P-R Int : 156 ms          QRS Dur : 068 ms      QT Int : 472 ms       P-R-T Axes : 059 033 061 degrees     QTc Int : 442 ms    Sinus bradycardia  Otherwise normal ECG  When compared with ECG of 18-OCT-2022 12:35,  Sinus rhythm has replaced Ectopic atrial rhythm    Referred By: [email or web address]   SELF           Confirmed By:          Medications:  Reconciled Home Medications:      Medication List      CONTINUE taking these medications    ALPRAZolam 0.25 MG tablet  Commonly known as: XANAX  Take 0.25 mg by mouth daily as needed.     amLODIPine 5 MG tablet  Commonly known as: NORVASC  Take 5 mg by mouth once daily.     aspirin 81 MG EC tablet  Commonly known as: ECOTRIN  Take 81 mg by mouth once daily.     * atorvastatin 40 MG tablet  Commonly known as: LIPITOR  Take 1 tablet (40 mg total) by mouth once daily.     * atorvastatin 20 MG tablet  Commonly known as: LIPITOR  Take 20 mg by mouth once daily.     cholecalciferol (vitamin D3) 50 mcg (2,000 unit) Tab  Commonly known as: VITAMIN D3  Take 1 tablet by mouth once daily.     diclofenac sodium 1 % Gel  Commonly known as: VOLTAREN  Apply 2 g topically daily as needed.     dicyclomine 10 MG capsule  Commonly known as: BENTYL  2 (two) times daily as needed.     diphenoxylate-atropine 2.5-0.025 mg 2.5-0.025 mg per tablet  Commonly known as: LOMOTIL  Take 1 tablet by mouth 4 (four) times daily as needed for Diarrhea.     metoprolol succinate 50 MG 24 hr tablet  Commonly known as: TOPROL-XL  Take 50 mg by mouth once daily.     MYRBETRIQ 50 mg Tb24  Generic drug: mirabegron  Take 1 tablet by mouth once daily.     nitroGLYCERIN 0.4 MG SL tablet  Commonly known as: NITROSTAT  Place under the tongue.     ondansetron 8 MG Tbdl  Commonly known as: ZOFRAN-ODT  every 6 (six) hours as needed.     pantoprazole 40 MG tablet  Commonly known as: PROTONIX  Take 40 mg by mouth every morning.     rOPINIRole 0.5 MG tablet  Commonly  known as: REQUIP  Take 0.5 mg by mouth nightly as needed.         * This list has 2 medication(s) that are the same as other medications prescribed for you. Read the directions carefully, and ask your doctor or other care provider to review them with you.            STOP taking these medications    temazepam 30 mg capsule  Commonly known as: RESTORIL            Indwelling Lines/Drains at time of discharge:   Lines/Drains/Airways     None               General: Patient resting comfortably in no acute distress. Appears as stated age. Calm  Eyes: EOM intact. No conjunctivae injection. No scleral icterus.  ENT: Hearing grossly intact. No discharge from ears. No nasal discharge.   CVS: RRR. No LE edema BL.  Lungs: CTA BL, no wheezing or crackles. Good breath sounds. No accessory muscle use. No acute respiratory distress  Neuro: non focal , Follows commands. Responds appropriately  Time spent on the discharge of patient: 22 minutes         Mich Hernandez MD  Department of Hospital Medicine  [facility]

## 2022-10-19 NOTE — NURSING
Patient's troponin resulted 0.030, unchanged from previous result. Patient currently lying in bed asleep. Will continue to monitor.

## 2022-10-19 NOTE — TELEPHONE ENCOUNTER
----- Message from Maricruz To RN sent at 10/19/2022 10:46 AM CDT -----  Good morning,   Patient discharging this morning from [facility].  Patient was admitted for chest pain.  She needs a follow up with Dr. Lopez in one month.  Please contact patient with appointment date and time.    Maricruz To RN Case Manager

## 2022-10-19 NOTE — SIGNIFICANT EVENT
Patient reported back pain with a rate of 10/10. Patient first complained of pain on right side of back, now reports pain is radiating across whole back. Patient stated that back pain started earlier in ER and does not experience back pain at home. Patient requested tylenol to be given for pain. MD Lacho notified. Instructed to do EKG and draw troponin if seems cardiac. MD Lacho also consented for tylenol to be given for pain. 650 mg administered at 2351. EKG completed. Sinus gary, HR 53 bpm. Normal ECG. Troponin pending now. Vitals Temp 97.5, Pulse 66, resp 16, o2 97 room air, /86. While completing EKG patient then stated mild pain in chest with a rate of 3 upon rest. Will continue to monitor patient.

## 2022-10-19 NOTE — NURSING
Patient arrived to floor via wheel chair with daughter at bedside. Patient is stable, alert and oriented x 4, and ambulatory. Temp 97.5, pulse 64, resp 18, O2 98% room air, and /75. Patient reports no pain or distress. Will continue to monitor patient.

## 2022-10-19 NOTE — PLAN OF CARE
[facility]  Initial Discharge Assessment       Primary Care Provider: Linsey Nelson MD    Admission Diagnosis: COVID-19 virus detected [U07.1]    Admission Date: 10/18/2022    Discharge assessment completed via telephone as patient is covid positive. Information verified as correct on facesheet. Denies HH/HD/DME at home/coumadin. Discharge plan is home. Family to provide transport on discharge. No discharge needs anticipated at this time. CM following     Discharge Barriers Identified: None    Payor: [employer]ICARE / Plan: [employer] 65 / Product Type: Medicare Advantage /     Extended Emergency Contact Information  Primary Emergency Contact: Brayan Mukherjee  Address: [facility] [place] [address] [facility]  Home Phone: [phone]  Mobile Phone: [phone]  Relation: Son  Preferred language: English   needed? No  Secondary Emergency Contact: Christian Carlson  Mobile Phone: [phone]  Relation: Son  Preferred language: English   needed? No    Discharge Plan A: Home  Discharge Plan B: Home with family      Initial Assessment (most recent)       Adult Discharge Assessment - 10/19/22 0930          Discharge Assessment    Assessment Type Discharge Planning Assessment     Confirmed/corrected address, phone number and insurance Yes     Confirmed Demographics Correct on Facesheet     Source of Information patient     Does patient/caregiver understand observation status Yes     Communicated MERCEDES with patient/caregiver Yes     Reason For Admission chest pain     Lives With alone     Facility Arrived From: home     Do you expect to return to your current living situation? Yes     Do you have help at home or someone to help you manage your care at home? No     Prior to hospitilization cognitive status: Alert/Oriented     Current cognitive status: Alert/Oriented     Walking or Climbing Stairs Difficulty none      Dressing/Bathing Difficulty none     Equipment Currently Used at Home none     Readmission within 30 days? No     Patient currently being followed by outpatient case management? No     Do you currently have service(s) that help you manage your care at home? No     Do you have prescription coverage? Yes     Coverage PHN     Do you have any problems affording any of your prescribed medications? No     Is the patient taking medications as prescribed? yes     Who is going to help you get home at discharge? independent in care/multiple children help     How do you get to doctors appointments? car, drives self     Are you on dialysis? No     Do you take coumadin? No     Discharge Plan A Home     Discharge Plan B Home with family     DME Needed Upon Discharge  none     Discharge Plan discussed with: Patient     Discharge Barriers Identified None        Physical Activity    On average, how many days per week do you engage in moderate to strenuous exercise (like a brisk walk)? 0 days     On average, how many minutes do you engage in exercise at this level? 0 min        Financial Resource Strain    How hard is it for you to pay for the very basics like food, housing, medical care, and heating? Not very hard        Housing Stability    In the last 12 months, was there a time when you were not able to pay the mortgage or rent on time? No     In the last 12 months, how many places have you lived? 1     In the last 12 months, was there a time when you did not have a steady place to sleep or slept in a shelter (including now)? No        Transportation Needs    In the past 12 months, has lack of transportation kept you from medical appointments or from getting medications? No     In the past 12 months, has lack of transportation kept you from meetings, work, or from getting things needed for daily living? No        Food Insecurity    Within the past 12 months, you worried that your food would run out before you got the money to buy  more. Never true     Within the past 12 months, the food you bought just didn't last and you didn't have money to get more. Never true        Social Connections    In a typical week, how many times do you talk on the phone with family, friends, or neighbors? More than three times a week     How often do you get together with friends or relatives? Twice a week     How often do you attend Hindu or Quaker services? Never     Do you belong to any clubs or organizations such as Hindu groups, unions, fraternal or athletic groups, or school groups? No     How often do you attend meetings of the clubs or organizations you belong to? Never     Are you , , , , never , or living with a partner?         Alcohol Use    Q1: How often do you have a drink containing alcohol? Monthly or less     Q2: How many drinks containing alcohol do you have on a typical day when you are drinking? 1 or 2     Q3: How often do you have six or more drinks on one occasion? Never

## 2022-10-20 NOTE — TELEPHONE ENCOUNTER
Spoke with pt, only wants to see Dr DIANNA Saba. Requested appointment with My to be cancelled. Pt confirmed.

## 2022-10-24 ENCOUNTER — TELEPHONE (OUTPATIENT)
Dept: CARDIOLOGY | Facility: CLINIC | Age: 82
End: 2022-10-24
Payer: MEDICARE

## 2022-10-24 NOTE — TELEPHONE ENCOUNTER
----- Message from Kolton French sent at 10/24/2022 10:46 AM CDT -----  Regarding: stress test/ER follow up  Contact: patient  Patient want to speak with a nurse regarding scheduling ER follow up and stress test, please call back at [phone] (home)     Case number [id number]

## 2022-10-24 NOTE — TELEPHONE ENCOUNTER
Spoke with pt, only wants to see Dr Saba not a nurse practitioner. Pt will be put on a wait list. Pt verbalized understanding.

## 2022-11-14 ENCOUNTER — TELEPHONE (OUTPATIENT)
Dept: OPHTHALMOLOGY | Facility: CLINIC | Age: 82
End: 2022-11-14
Payer: MEDICARE

## 2022-11-14 DIAGNOSIS — M25.562 LEFT KNEE PAIN, UNSPECIFIED CHRONICITY: Primary | ICD-10-CM

## 2022-11-14 NOTE — TELEPHONE ENCOUNTER
----- Message from Krissy King sent at 11/14/2022 11:45 AM CST -----  Contact: Sunita @[phone]  Pt needs a call back regarding a sooner appt due to having eye pain. Please give her a call back to further assist

## 2022-11-16 ENCOUNTER — OFFICE VISIT (OUTPATIENT)
Dept: OPHTHALMOLOGY | Facility: CLINIC | Age: 82
End: 2022-11-16
Payer: MEDICARE

## 2022-11-16 DIAGNOSIS — H18.421 BAND KERATOPATHY OF RIGHT EYE: Primary | ICD-10-CM

## 2022-11-16 PROCEDURE — 1101F PT FALLS ASSESS-DOCD LE1/YR: CPT | Mod: CPTII,S$GLB,, | Performed by: OPHTHALMOLOGY

## 2022-11-16 PROCEDURE — 3288F PR FALLS RISK ASSESSMENT DOCUMENTED: ICD-10-PCS | Mod: CPTII,S$GLB,, | Performed by: OPHTHALMOLOGY

## 2022-11-16 PROCEDURE — 99999 PR PBB SHADOW E&M-EST. PATIENT-LVL III: ICD-10-PCS | Mod: PBBFAC,,, | Performed by: OPHTHALMOLOGY

## 2022-11-16 PROCEDURE — 1126F PR PAIN SEVERITY QUANTIFIED, NO PAIN PRESENT: ICD-10-PCS | Mod: CPTII,S$GLB,, | Performed by: OPHTHALMOLOGY

## 2022-11-16 PROCEDURE — 3288F FALL RISK ASSESSMENT DOCD: CPT | Mod: CPTII,S$GLB,, | Performed by: OPHTHALMOLOGY

## 2022-11-16 PROCEDURE — 1101F PR PT FALLS ASSESS DOC 0-1 FALLS W/OUT INJ PAST YR: ICD-10-PCS | Mod: CPTII,S$GLB,, | Performed by: OPHTHALMOLOGY

## 2022-11-16 PROCEDURE — 92014 PR EYE EXAM, EST PATIENT,COMPREHESV: ICD-10-PCS | Mod: S$GLB,,, | Performed by: OPHTHALMOLOGY

## 2022-11-16 PROCEDURE — 92014 COMPRE OPH EXAM EST PT 1/>: CPT | Mod: S$GLB,,, | Performed by: OPHTHALMOLOGY

## 2022-11-16 PROCEDURE — 99999 PR PBB SHADOW E&M-EST. PATIENT-LVL III: CPT | Mod: PBBFAC,,, | Performed by: OPHTHALMOLOGY

## 2022-11-16 PROCEDURE — 1126F AMNT PAIN NOTED NONE PRSNT: CPT | Mod: CPTII,S$GLB,, | Performed by: OPHTHALMOLOGY

## 2022-11-16 NOTE — PROGRESS NOTES
HPI    Patient here today for yearly cornea eval. C/o pain OD, blurry VA OS,   floaters without flashes OS.    AT ou prn  Last edited by Lexi Melendez on 11/16/2022 11:35 AM.            Assessment /Plan     For exam results, see Encounter Report.    Band keratopathy of right eye      Recurrent Band K OD NLP  Hx EDTA x2 last 2020, may consider re tx later  Use Gel Drops

## 2022-11-27 ENCOUNTER — HOSPITAL ENCOUNTER (OUTPATIENT)
Dept: RADIOLOGY | Facility: HOSPITAL | Age: 82
Discharge: HOME | End: 2022-11-27
Attending: ORTHOPAEDIC SURGERY
Payer: MEDICARE

## 2022-11-27 DIAGNOSIS — M25.562 LEFT KNEE PAIN, UNSPECIFIED CHRONICITY: ICD-10-CM

## 2022-11-27 PROCEDURE — 73721 MRI KNEE WITHOUT CONTRAST LEFT: ICD-10-PCS | Mod: 26,LT,, | Performed by: RADIOLOGY

## 2022-11-27 PROCEDURE — 73721 MRI JNT OF LWR EXTRE W/O DYE: CPT | Mod: TC,LT

## 2022-11-27 PROCEDURE — 73721 MRI JNT OF LWR EXTRE W/O DYE: CPT | Mod: 26,LT,, | Performed by: RADIOLOGY

## 2022-11-28 ENCOUNTER — OFFICE VISIT (OUTPATIENT)
Dept: ORTHOPEDICS | Facility: CLINIC | Age: 82
End: 2022-11-28
Payer: MEDICARE

## 2022-11-28 VITALS — RESPIRATION RATE: 18 BRPM | WEIGHT: 115 LBS | BODY MASS INDEX: 23.18 KG/M2 | HEIGHT: 59 IN

## 2022-11-28 DIAGNOSIS — M25.562 LEFT KNEE PAIN, UNSPECIFIED CHRONICITY: Primary | ICD-10-CM

## 2022-11-28 PROCEDURE — 20610 DRAIN/INJ JOINT/BURSA W/O US: CPT | Mod: LT,S$GLB,, | Performed by: ORTHOPAEDIC SURGERY

## 2022-11-28 PROCEDURE — 3288F FALL RISK ASSESSMENT DOCD: CPT | Mod: CPTII,S$GLB,, | Performed by: ORTHOPAEDIC SURGERY

## 2022-11-28 PROCEDURE — 1101F PR PT FALLS ASSESS DOC 0-1 FALLS W/OUT INJ PAST YR: ICD-10-PCS | Mod: CPTII,S$GLB,, | Performed by: ORTHOPAEDIC SURGERY

## 2022-11-28 PROCEDURE — 99999 PR PBB SHADOW E&M-EST. PATIENT-LVL II: CPT | Mod: PBBFAC,,, | Performed by: ORTHOPAEDIC SURGERY

## 2022-11-28 PROCEDURE — 1160F PR REVIEW ALL MEDS BY PRESCRIBER/CLIN PHARMACIST DOCUMENTED: ICD-10-PCS | Mod: CPTII,S$GLB,, | Performed by: ORTHOPAEDIC SURGERY

## 2022-11-28 PROCEDURE — 99999 PR PBB SHADOW E&M-EST. PATIENT-LVL II: ICD-10-PCS | Mod: PBBFAC,,, | Performed by: ORTHOPAEDIC SURGERY

## 2022-11-28 PROCEDURE — 1159F MED LIST DOCD IN RCRD: CPT | Mod: CPTII,S$GLB,, | Performed by: ORTHOPAEDIC SURGERY

## 2022-11-28 PROCEDURE — 99213 OFFICE O/P EST LOW 20 MIN: CPT | Mod: 25,S$GLB,, | Performed by: ORTHOPAEDIC SURGERY

## 2022-11-28 PROCEDURE — 3288F PR FALLS RISK ASSESSMENT DOCUMENTED: ICD-10-PCS | Mod: CPTII,S$GLB,, | Performed by: ORTHOPAEDIC SURGERY

## 2022-11-28 PROCEDURE — 1159F PR MEDICATION LIST DOCUMENTED IN MEDICAL RECORD: ICD-10-PCS | Mod: CPTII,S$GLB,, | Performed by: ORTHOPAEDIC SURGERY

## 2022-11-28 PROCEDURE — 1160F RVW MEDS BY RX/DR IN RCRD: CPT | Mod: CPTII,S$GLB,, | Performed by: ORTHOPAEDIC SURGERY

## 2022-11-28 PROCEDURE — 1101F PT FALLS ASSESS-DOCD LE1/YR: CPT | Mod: CPTII,S$GLB,, | Performed by: ORTHOPAEDIC SURGERY

## 2022-11-28 PROCEDURE — 99213 PR OFFICE/OUTPT VISIT, EST, LEVL III, 20-29 MIN: ICD-10-PCS | Mod: 25,S$GLB,, | Performed by: ORTHOPAEDIC SURGERY

## 2022-11-28 PROCEDURE — 20610 LARGE JOINT ASPIRATION/INJECTION: L KNEE: ICD-10-PCS | Mod: LT,S$GLB,, | Performed by: ORTHOPAEDIC SURGERY

## 2022-11-28 PROCEDURE — 1125F PR PAIN SEVERITY QUANTIFIED, PAIN PRESENT: ICD-10-PCS | Mod: CPTII,S$GLB,, | Performed by: ORTHOPAEDIC SURGERY

## 2022-11-28 PROCEDURE — 1125F AMNT PAIN NOTED PAIN PRSNT: CPT | Mod: CPTII,S$GLB,, | Performed by: ORTHOPAEDIC SURGERY

## 2022-11-28 RX ORDER — TRIAMCINOLONE ACETONIDE 40 MG/ML
40 INJECTION, SUSPENSION INTRA-ARTICULAR; INTRAMUSCULAR
Status: DISCONTINUED | OUTPATIENT
Start: 2022-11-28 | End: 2022-11-28 | Stop reason: HOSPADM

## 2022-11-28 RX ADMIN — TRIAMCINOLONE ACETONIDE 40 MG: 40 INJECTION, SUSPENSION INTRA-ARTICULAR; INTRAMUSCULAR at 02:11

## 2022-11-28 NOTE — PROCEDURES
Large Joint Aspiration/Injection: L knee    Date/Time: 11/28/2022 2:15 PM  Performed by: Andrez Mazariegos MD  Authorized by: Andrez Mazariegos MD     Consent Done?:  Yes (Verbal)  Indications:  Pain  Site marked: the procedure site was marked    Timeout: prior to procedure the correct patient, procedure, and site was verified    Local anesthetic:  Lidocaine 1% without epinephrine and bupivacaine 0.25% without epinephrine  Anesthetic total (ml):  6      Details:  Needle Size:  20 G  Approach:  Anterolateral  Location:  Knee  Site:  L knee  Medications:  40 mg triamcinolone acetonide 40 mg/mL  Patient tolerance:  Patient tolerated the procedure well with no immediate complications

## 2022-11-28 NOTE — PROGRESS NOTES
Past Medical History:   Diagnosis Date    Arthritis     Cataract     Coronary artery disease     Hypertension     Insomnia     Neuropathy     Retinal detachment     Stomach ulcer        Past Surgical History:   Procedure Laterality Date    APPENDECTOMY      BLADDER SUSPENSION      BREAST BIOPSY      CARDIAC SURGERY      carotid endarterectomy      L    CATARACT EXTRACTION      CHOLECYSTECTOMY      HYSTERECTOMY      INJECTION OF ANESTHETIC AGENT AROUND GANGLION IMPAR N/A 9/12/2019    Procedure: BLOCK, GANGLION IMPAR;  Surgeon: Christian James MD;  Location: [facility] OR;  Service: Pain Management;  Laterality: N/A;    INSERTION OF IMPLANTABLE LOOP RECORDER N/A 12/18/2020    Procedure: INSERTION, IMPLANTABLE LOOP RECORDER;  Surgeon: Adin Saba MD;  Location: [facility] CATH/EP LAB;  Service: Cardiology;  Laterality: N/A;    OOPHORECTOMY      SUPERFICIAL KERATECTOMY Right 10/27/2017    Dr. Morales    TONSILLECTOMY      TRANSFORAMINAL EPIDURAL INJECTION OF STEROID Left 8/12/2019    Procedure: Injection,steroid,epidural,transforaminal approach L4-5, L5-S1;  Surgeon: Christian James MD;  Location: [facility] OR;  Service: Pain Management;  Laterality: Left;    TRANSFORAMINAL EPIDURAL INJECTION OF STEROID Left 1/16/2020    Procedure: Injection,steroid,epidural,transforaminal approach;  Surgeon: Christian James MD;  Location: [facility];  Service: Pain Management;  Laterality: Left;  L4-5, L5-S1    TRANSFORAMINAL EPIDURAL INJECTION OF STEROID Left 6/30/2020    Procedure: Injection,steroid,epidural,transforaminal approach;  Surgeon: Christian James MD;  Location: [facility];  Service: Pain Management;  Laterality: Left;  L4-5 and L5-S1       Current Outpatient Medications   Medication Sig    ALPRAZolam (XANAX) 0.25 MG tablet Take 0.25 mg by mouth daily as needed.    amLODIPine (NORVASC) 5 MG tablet Take 5 mg by mouth once daily.    aspirin (ECOTRIN) 81 MG EC tablet Take 81 mg by mouth once daily.    atorvastatin (LIPITOR) 20 MG tablet Take 20 mg by mouth once  daily.    cholecalciferol, vitamin D3, (VITAMIN D3) 2,000 unit Tab Take 1 tablet by mouth once daily.    diclofenac sodium (VOLTAREN) 1 % Gel Apply 2 g topically daily as needed.    dicyclomine (BENTYL) 10 MG capsule 2 (two) times daily as needed.     diphenoxylate-atropine 2.5-0.025 mg (LOMOTIL) 2.5-0.025 mg per tablet Take 1 tablet by mouth 4 (four) times daily as needed for Diarrhea.    metoprolol succinate (TOPROL-XL) 50 MG 24 hr tablet Take 50 mg by mouth once daily.    MYRBETRIQ 50 mg Tb24 Take 1 tablet by mouth once daily.    nitroGLYCERIN (NITROSTAT) 0.4 MG SL tablet Place under the tongue.    ondansetron (ZOFRAN-ODT) 8 MG TbDL every 6 (six) hours as needed.     pantoprazole (PROTONIX) 40 MG tablet Take 40 mg by mouth every morning.    rOPINIRole (REQUIP) 0.5 MG tablet Take 0.5 mg by mouth nightly as needed.     Current Facility-Administered Medications   Medication    ondansetron injection 4 mg       Review of patient's allergies indicates:   Allergen Reactions    Demerol [meperidine] Nausea And Vomiting    Oxycodone     Hydrocodone Itching    Oxycodone-acetaminophen Itching       Family History   Problem Relation Age of Onset    Cancer Father     Macular degeneration Maternal Aunt     Cancer Maternal Aunt     Breast cancer Maternal Aunt     Macular degeneration Maternal Uncle     Diabetes Paternal Aunt     Cancer Paternal Aunt     Blindness Maternal Grandfather     Melanoma Neg Hx     Psoriasis Neg Hx     Lupus Neg Hx     Eczema Neg Hx        Social History     Socioeconomic History    Marital status:    Tobacco Use    Smoking status: Former     Years: 4.00     Types: Cigarettes     Quit date: 10/13/1962     Years since quittin.1    Smokeless tobacco: Never   Substance and Sexual Activity    Alcohol use: No    Drug use: No    Sexual activity: Yes     Birth control/protection: Surgical     Social Determinants of Health     Financial Resource Strain: Low Risk     Difficulty of Paying Living  Expenses: Not very hard   Food Insecurity: No Food Insecurity    Worried About Running Out of Food in the Last Year: Never true    Ran Out of Food in the Last Year: Never true   Transportation Needs: No Transportation Needs    Lack of Transportation (Medical): No    Lack of Transportation (Non-Medical): No   Physical Activity: Inactive    Days of Exercise per Week: 0 days    Minutes of Exercise per Session: 0 min   Social Connections: Socially Isolated    Frequency of Communication with Friends and Family: More than three times a week    Frequency of Social Gatherings with Friends and Family: Twice a week    Attends Amish Services: Never    Active Member of Clubs or Organizations: No    Attends Club or Organization Meetings: Never    Marital Status:    Housing Stability: Low Risk     Unable to Pay for Housing in the Last Year: No    Number of Places Lived in the Last Year: 1    Unstable Housing in the Last Year: No       Chief Complaint:   Chief Complaint   Patient presents with    Left Hip - Pain       History of present illness:  This is an 82-year-old female seen for left knee pain.  I saw her previously and have done injections in both of her knees.  She had a flare up of her left knee pain about 2 weeks ago.  We got an MRI of her left knee which shows a very small medial meniscal tear.  Knee is back to her baseline.  Pain is a 5/10.      Review of Systems:  Musculoskeletal:  See HPI    Physical Examination:    Vital Signs:    Vitals:    11/28/22 1417   Resp: 18       Body mass index is 23.23 kg/m².    This a well-developed, well nourished patient in no acute distress.  They are alert and oriented and cooperative to examination.  Pt. walks without an antalgic gait.      Examination of the left knee shows no rashes or erythema. There are no masses ecchymosis or effusion. Patient has full range of motion from 0-130°. Patient is nontender to palpation over lateral joint line and moderately tender to  palpation over the medial joint line. Patient has a - Lachman exam, - anterior drawer exam, and - posterior drawer exam. - Jose A's exam. Knee is stable to varus and valgus stress. 5 out of 5 motor strength. Palpable distal pulses. Intact light touch sensation. Negative Patellofemoral crepitus    X-rays:  X-rays of both knees are reviewed which show a small joint effusion.  Patient has some mild medial compartment narrowing bilaterally.    MRI of the left knee is reviewed and interpreted:Suspected small tear of the posterior horn of the medial meniscus.  Mild interstitial tearing of the ACL.  Small ganglion cyst at its tibial attachment.  Minimal joint effusion.     Assessment::  Left medial meniscal tear    Plan:  Reviewed the findings with her today.  We discussed treatment options.  Patient agreed to do another injection to help her through the holidays.  Follow-up as needed.    This note was created using M [employer] voice recognition software that occasionally misinterpreted phrases or words.    Consult note is delivered via Epic messaging service.

## 2022-12-01 DIAGNOSIS — K58.9 IBS (IRRITABLE BOWEL SYNDROME): Primary | ICD-10-CM

## 2022-12-05 ENCOUNTER — TELEPHONE (OUTPATIENT)
Dept: ORTHOPEDICS | Facility: CLINIC | Age: 82
End: 2022-12-05
Payer: MEDICARE

## 2022-12-05 NOTE — TELEPHONE ENCOUNTER
----- Message from Kathy Valencia MA sent at 12/5/2022  3:20 PM CST -----  Contact: pt  Pt states she spoke with Dr Mazariegos regarding lump on her back ,   Needs to know how to go to   Call back

## 2022-12-06 ENCOUNTER — TELEPHONE (OUTPATIENT)
Dept: SPINE | Facility: CLINIC | Age: 82
End: 2022-12-06
Payer: MEDICARE

## 2022-12-06 ENCOUNTER — HOSPITAL ENCOUNTER (OUTPATIENT)
Dept: RADIOLOGY | Facility: HOSPITAL | Age: 82
Discharge: HOME | End: 2022-12-06
Attending: INTERNAL MEDICINE
Payer: MEDICARE

## 2022-12-06 DIAGNOSIS — K58.9 IBS (IRRITABLE BOWEL SYNDROME): ICD-10-CM

## 2022-12-06 PROCEDURE — 74018 RADEX ABDOMEN 1 VIEW: CPT | Mod: 26,,, | Performed by: RADIOLOGY

## 2022-12-06 PROCEDURE — 74018 XR ABDOMEN AP 1 VIEW: ICD-10-PCS | Mod: 26,,, | Performed by: RADIOLOGY

## 2022-12-06 PROCEDURE — 74018 RADEX ABDOMEN 1 VIEW: CPT | Mod: TC,FY

## 2022-12-07 ENCOUNTER — TELEPHONE (OUTPATIENT)
Dept: DERMATOLOGY | Facility: CLINIC | Age: 82
End: 2022-12-07
Payer: MEDICARE

## 2022-12-07 NOTE — TELEPHONE ENCOUNTER
----- Message from Morena Crenshaw sent at 12/7/2022  1:59 PM CST -----  Regarding: New Pt  Contact: PT@[phone]  Pt is requesting a call back regarding scheduling appt for Painful Cyst located on Back .  Attempt to schedule pt Next available March 13, 2023 pt states she needs to be seen sooner please call to discuss .

## 2022-12-07 NOTE — TELEPHONE ENCOUNTER
Returned patients call.  Unable to leave message due to voicemail says she is unavailable at this time and to call back at a later time.  Will await patient to call back.

## 2022-12-23 ENCOUNTER — TELEPHONE (OUTPATIENT)
Dept: DERMATOLOGY | Facility: CLINIC | Age: 82
End: 2022-12-23
Payer: MEDICARE

## 2022-12-23 NOTE — TELEPHONE ENCOUNTER
----- Message from Samia Galan LPN sent at 12/19/2022  4:27 PM CST -----  Regarding: FW: Appt Access  Contact: self [phone]    ----- Message -----  From: Jaz Torre  Sent: 12/19/2022   4:13 PM CST  To: Ayana Corona Staff  Subject: Appt Access                                      Pt is calling to r/s appt for 12/22/22, next avail is 04/2023, pt would like to be seen sooner.  Please call.

## 2023-04-06 ENCOUNTER — TELEPHONE (OUTPATIENT)
Dept: ORTHOPEDICS | Facility: CLINIC | Age: 83
End: 2023-04-06
Payer: MEDICARE

## 2023-04-06 NOTE — TELEPHONE ENCOUNTER
----- Message from Kathy Valencia MA sent at 4/6/2023 10:21 AM CDT -----  Contact: pt  Knee pain, wants to be seen today   (Back pain also)  Call back

## 2023-04-10 ENCOUNTER — OFFICE VISIT (OUTPATIENT)
Dept: ORTHOPEDICS | Facility: CLINIC | Age: 83
End: 2023-04-10
Payer: MEDICARE

## 2023-04-10 DIAGNOSIS — M25.562 LEFT KNEE PAIN, UNSPECIFIED CHRONICITY: Primary | ICD-10-CM

## 2023-04-10 DIAGNOSIS — M17.10 ARTHRITIS OF KNEE: ICD-10-CM

## 2023-04-10 DIAGNOSIS — M25.552 PAIN OF LEFT HIP: ICD-10-CM

## 2023-04-10 DIAGNOSIS — M70.62 TROCHANTERIC BURSITIS, LEFT HIP: ICD-10-CM

## 2023-04-10 PROCEDURE — 99214 OFFICE O/P EST MOD 30 MIN: CPT | Mod: 25,S$GLB,, | Performed by: ORTHOPAEDIC SURGERY

## 2023-04-10 PROCEDURE — 1159F PR MEDICATION LIST DOCUMENTED IN MEDICAL RECORD: ICD-10-PCS | Mod: CPTII,S$GLB,, | Performed by: ORTHOPAEDIC SURGERY

## 2023-04-10 PROCEDURE — 1160F PR REVIEW ALL MEDS BY PRESCRIBER/CLIN PHARMACIST DOCUMENTED: ICD-10-PCS | Mod: CPTII,S$GLB,, | Performed by: ORTHOPAEDIC SURGERY

## 2023-04-10 PROCEDURE — 20610 DRAIN/INJ JOINT/BURSA W/O US: CPT | Mod: 50,S$GLB,, | Performed by: ORTHOPAEDIC SURGERY

## 2023-04-10 PROCEDURE — 99999 PR PBB SHADOW E&M-EST. PATIENT-LVL I: ICD-10-PCS | Mod: PBBFAC,,, | Performed by: ORTHOPAEDIC SURGERY

## 2023-04-10 PROCEDURE — 99214 PR OFFICE/OUTPT VISIT, EST, LEVL IV, 30-39 MIN: ICD-10-PCS | Mod: 25,S$GLB,, | Performed by: ORTHOPAEDIC SURGERY

## 2023-04-10 PROCEDURE — 1159F MED LIST DOCD IN RCRD: CPT | Mod: CPTII,S$GLB,, | Performed by: ORTHOPAEDIC SURGERY

## 2023-04-10 PROCEDURE — 1160F RVW MEDS BY RX/DR IN RCRD: CPT | Mod: CPTII,S$GLB,, | Performed by: ORTHOPAEDIC SURGERY

## 2023-04-10 PROCEDURE — 20610 LARGE JOINT ASPIRATION/INJECTION: BILATERAL KNEE: ICD-10-PCS | Mod: 50,S$GLB,, | Performed by: ORTHOPAEDIC SURGERY

## 2023-04-10 PROCEDURE — 99999 PR PBB SHADOW E&M-EST. PATIENT-LVL I: CPT | Mod: PBBFAC,,, | Performed by: ORTHOPAEDIC SURGERY

## 2023-04-10 RX ORDER — TRIAMCINOLONE ACETONIDE 40 MG/ML
40 INJECTION, SUSPENSION INTRA-ARTICULAR; INTRAMUSCULAR
Status: DISCONTINUED | OUTPATIENT
Start: 2023-04-10 | End: 2023-04-10 | Stop reason: HOSPADM

## 2023-04-10 RX ADMIN — TRIAMCINOLONE ACETONIDE 40 MG: 40 INJECTION, SUSPENSION INTRA-ARTICULAR; INTRAMUSCULAR at 03:04

## 2023-04-10 NOTE — PROGRESS NOTES
Past Medical History:   Diagnosis Date    Arthritis     Cataract     Coronary artery disease     Hypertension     Insomnia     Neuropathy     Retinal detachment     Stomach ulcer        Past Surgical History:   Procedure Laterality Date    APPENDECTOMY      BLADDER SUSPENSION      BREAST BIOPSY      CARDIAC SURGERY      carotid endarterectomy      L    CATARACT EXTRACTION      CHOLECYSTECTOMY      HYSTERECTOMY      INJECTION OF ANESTHETIC AGENT AROUND GANGLION IMPAR N/A 9/12/2019    Procedure: BLOCK, GANGLION IMPAR;  Surgeon: Christian James MD;  Location: [facility] OR;  Service: Pain Management;  Laterality: N/A;    INSERTION OF IMPLANTABLE LOOP RECORDER N/A 12/18/2020    Procedure: INSERTION, IMPLANTABLE LOOP RECORDER;  Surgeon: Adin Saba MD;  Location: [facility] CATH/EP LAB;  Service: Cardiology;  Laterality: N/A;    OOPHORECTOMY      SUPERFICIAL KERATECTOMY Right 10/27/2017    Dr. Morales    TONSILLECTOMY      TRANSFORAMINAL EPIDURAL INJECTION OF STEROID Left 8/12/2019    Procedure: Injection,steroid,epidural,transforaminal approach L4-5, L5-S1;  Surgeon: Christian James MD;  Location: [facility] OR;  Service: Pain Management;  Laterality: Left;    TRANSFORAMINAL EPIDURAL INJECTION OF STEROID Left 1/16/2020    Procedure: Injection,steroid,epidural,transforaminal approach;  Surgeon: Christian James MD;  Location: [facility];  Service: Pain Management;  Laterality: Left;  L4-5, L5-S1    TRANSFORAMINAL EPIDURAL INJECTION OF STEROID Left 6/30/2020    Procedure: Injection,steroid,epidural,transforaminal approach;  Surgeon: Christian James MD;  Location: [facility];  Service: Pain Management;  Laterality: Left;  L4-5 and L5-S1       Current Outpatient Medications   Medication Sig    ALPRAZolam (XANAX) 0.25 MG tablet Take 0.25 mg by mouth daily as needed.    amLODIPine (NORVASC) 5 MG tablet Take 5 mg by mouth once daily.    aspirin (ECOTRIN) 81 MG EC tablet Take 81 mg by mouth once daily.    atorvastatin (LIPITOR) 20 MG tablet Take 20 mg by mouth once  daily.    cholecalciferol, vitamin D3, (VITAMIN D3) 2,000 unit Tab Take 1 tablet by mouth once daily.    diclofenac sodium (VOLTAREN) 1 % Gel Apply 2 g topically daily as needed.    dicyclomine (BENTYL) 10 MG capsule 2 (two) times daily as needed.     diphenoxylate-atropine 2.5-0.025 mg (LOMOTIL) 2.5-0.025 mg per tablet Take 1 tablet by mouth 4 (four) times daily as needed for Diarrhea.    metoprolol succinate (TOPROL-XL) 50 MG 24 hr tablet Take 50 mg by mouth once daily.    MYRBETRIQ 50 mg Tb24 Take 1 tablet by mouth once daily.    nitroGLYCERIN (NITROSTAT) 0.4 MG SL tablet Place under the tongue.    ondansetron (ZOFRAN-ODT) 8 MG TbDL every 6 (six) hours as needed.     pantoprazole (PROTONIX) 40 MG tablet Take 40 mg by mouth every morning.    rOPINIRole (REQUIP) 0.5 MG tablet Take 0.5 mg by mouth nightly as needed.     Current Facility-Administered Medications   Medication    ondansetron injection 4 mg       Review of patient's allergies indicates:   Allergen Reactions    Demerol [meperidine] Nausea And Vomiting    Oxycodone     Hydrocodone Itching    Oxycodone-acetaminophen Itching       Family History   Problem Relation Age of Onset    Cancer Father     Macular degeneration Maternal Aunt     Cancer Maternal Aunt     Breast cancer Maternal Aunt     Macular degeneration Maternal Uncle     Diabetes Paternal Aunt     Cancer Paternal Aunt     Blindness Maternal Grandfather     Melanoma Neg Hx     Psoriasis Neg Hx     Lupus Neg Hx     Eczema Neg Hx        Social History     Socioeconomic History    Marital status:    Tobacco Use    Smoking status: Former     Years: 4.00     Types: Cigarettes     Quit date: 10/13/1962     Years since quittin.5    Smokeless tobacco: Never   Substance and Sexual Activity    Alcohol use: No    Drug use: No    Sexual activity: Yes     Birth control/protection: Surgical     Social Determinants of Health     Financial Resource Strain: Low Risk     Difficulty of Paying Living  Expenses: Not very hard   Food Insecurity: No Food Insecurity    Worried About Running Out of Food in the Last Year: Never true    Ran Out of Food in the Last Year: Never true   Transportation Needs: No Transportation Needs    Lack of Transportation (Medical): No    Lack of Transportation (Non-Medical): No   Physical Activity: Inactive    Days of Exercise per Week: 0 days    Minutes of Exercise per Session: 0 min   Social Connections: Socially Isolated    Frequency of Communication with Friends and Family: More than three times a week    Frequency of Social Gatherings with Friends and Family: Twice a week    Attends Anabaptism Services: Never    Active Member of Clubs or Organizations: No    Attends Club or Organization Meetings: Never    Marital Status:    Housing Stability: Low Risk     Unable to Pay for Housing in the Last Year: No    Number of Places Lived in the Last Year: 1    Unstable Housing in the Last Year: No       Chief Complaint:   No chief complaint on file.      History of present illness:  This is an 82-year-old female seen for left knee pain.  I saw her previously and have done injections in both of her knees.  We got an MRI of her left knee which shows a very small medial meniscal tear.  Both knees are hurting.  Also having pain in her back and hip area.      Review of Systems:  Musculoskeletal:  See HPI    Physical Examination:    Vital Signs:    There were no vitals filed for this visit.      There is no height or weight on file to calculate BMI.    This a well-developed, well nourished patient in no acute distress.  They are alert and oriented and cooperative to examination.  Pt. walks without an antalgic gait.      Examination of the left knee shows no rashes or erythema. There are no masses ecchymosis or effusion. Patient has full range of motion from 0-130°. Patient is nontender to palpation over lateral joint line and moderately tender to palpation over the medial joint line.. Knee is  stable to varus and valgus stress. 5 out of 5 motor strength. Palpable distal pulses. Intact light touch sensation. Negative Patellofemoral crepitus    X-rays:  X-rays of both knees are reviewed which show a small joint effusion.  Patient has some mild medial compartment narrowing bilaterally.    MRI of the left knee is reviewed and interpreted:Suspected small tear of the posterior horn of the medial meniscus.  Mild interstitial tearing of the ACL.  Small ganglion cyst at its tibial attachment.  Minimal joint effusion.     Assessment::  Bilateral knee arthritis    Plan:  Reviewed the findings with her today.  We discussed treatment options.  I agreed to inject both of her knees today.  Hopefully this is enough steroid to help out with her back.  Recommended follow-up with Dr. James if it does not help.    This note was created using Gumm[employer] voice recognition software that occasionally misinterpreted phrases or words.    Consult note is delivered via Epic messaging service.

## 2023-04-10 NOTE — PROCEDURES
Large Joint Aspiration/Injection: bilateral knee    Date/Time: 4/10/2023 3:30 PM  Performed by: Andrez Mazariegos MD  Authorized by: Andrez Mazariegos MD     Consent Done?:  Yes (Verbal)  Indications:  Pain  Site marked: the procedure site was marked    Timeout: prior to procedure the correct patient, procedure, and site was verified    Prep: patient was prepped and draped in usual sterile fashion      Local anesthesia used?: Yes    Anesthesia:  Local infiltration  Local anesthetic:  Bupivacaine 0.25% without epinephrine and lidocaine 2% without epinephrine  Anesthetic total (ml):  6      Details:  Needle Size:  22 G  Ultrasonic Guidance for needle placement?: No    Approach:  Anterolateral  Location:  Knee  Laterality:  Bilateral  Site:  Bilateral knee  Medications (Right):  40 mg triamcinolone acetonide 40 mg/mL  Medications (Left):  40 mg triamcinolone acetonide 40 mg/mL  Patient tolerance:  Patient tolerated the procedure well with no immediate complications

## 2023-06-15 ENCOUNTER — HOSPITAL ENCOUNTER (OUTPATIENT)
Dept: RADIOLOGY | Facility: HOSPITAL | Age: 83
Discharge: HOME | End: 2023-06-15
Attending: INTERNAL MEDICINE
Payer: MEDICARE

## 2023-06-15 DIAGNOSIS — Z12.31 ENCOUNTER FOR SCREENING MAMMOGRAM FOR MALIGNANT NEOPLASM OF BREAST: ICD-10-CM

## 2023-06-15 PROCEDURE — 77063 MAMMO DIGITAL SCREENING BILAT WITH TOMO: ICD-10-PCS | Mod: 26,,, | Performed by: RADIOLOGY

## 2023-06-15 PROCEDURE — 77067 MAMMO DIGITAL SCREENING BILAT WITH TOMO: ICD-10-PCS | Mod: 26,,, | Performed by: RADIOLOGY

## 2023-06-15 PROCEDURE — 77067 SCR MAMMO BI INCL CAD: CPT | Mod: TC

## 2023-06-15 PROCEDURE — 77063 BREAST TOMOSYNTHESIS BI: CPT | Mod: 26,,, | Performed by: RADIOLOGY

## 2023-06-15 PROCEDURE — 77067 SCR MAMMO BI INCL CAD: CPT | Mod: 26,,, | Performed by: RADIOLOGY

## 2023-10-09 ENCOUNTER — TELEPHONE (OUTPATIENT)
Dept: NEUROLOGY | Facility: CLINIC | Age: 83
End: 2023-10-09
Payer: MEDICARE

## 2023-10-09 NOTE — TELEPHONE ENCOUNTER
Spoke with patient regarding scheduling an appointment with Janet Simpson NP for tremors. Patient is schedule dot November 17 at 11 am.

## 2023-10-09 NOTE — TELEPHONE ENCOUNTER
----- Message from Austyn Barfield sent at 10/9/2023  2:38 PM CDT -----  Regarding: juan david  Type:  Appointment Request    Caller is requesting an appointment.     Name of Caller:  Pt    Symptoms:  Juan David    Would the patient rather a call back or a response via [facility]? Call back    Best Call Back Number:  [phone]      Additional Information:  book it won't let me change it to a new provider used to see Dr Culp.  Sts he isn't sure if its a medication issue or what.   Please advise -- Thank you

## 2023-10-26 ENCOUNTER — OFFICE VISIT (OUTPATIENT)
Dept: UROLOGY | Facility: CLINIC | Age: 83
End: 2023-10-26
Payer: MEDICARE

## 2023-10-26 ENCOUNTER — OFFICE VISIT (OUTPATIENT)
Dept: OBSTETRICS AND GYNECOLOGY | Facility: CLINIC | Age: 83
End: 2023-10-26
Payer: MEDICARE

## 2023-10-26 VITALS
HEART RATE: 64 BPM | DIASTOLIC BLOOD PRESSURE: 67 MMHG | WEIGHT: 119.94 LBS | HEIGHT: 59 IN | SYSTOLIC BLOOD PRESSURE: 138 MMHG | BODY MASS INDEX: 24.18 KG/M2

## 2023-10-26 VITALS
HEIGHT: 59 IN | BODY MASS INDEX: 23.79 KG/M2 | HEART RATE: 65 BPM | WEIGHT: 118 LBS | DIASTOLIC BLOOD PRESSURE: 61 MMHG | SYSTOLIC BLOOD PRESSURE: 113 MMHG

## 2023-10-26 DIAGNOSIS — N30.10 INTERSTITIAL CYSTITIS: ICD-10-CM

## 2023-10-26 DIAGNOSIS — Z90.710 S/P VAGINAL HYSTERECTOMY: ICD-10-CM

## 2023-10-26 DIAGNOSIS — N90.89 VULVAR LESION: Primary | ICD-10-CM

## 2023-10-26 DIAGNOSIS — N32.81 OAB (OVERACTIVE BLADDER): ICD-10-CM

## 2023-10-26 DIAGNOSIS — N39.498 OTHER URINARY INCONTINENCE: Primary | ICD-10-CM

## 2023-10-26 DIAGNOSIS — N95.2 VAGINAL ATROPHY: ICD-10-CM

## 2023-10-26 PROCEDURE — 1160F RVW MEDS BY RX/DR IN RCRD: CPT | Mod: CPTII,S$GLB,, | Performed by: STUDENT IN AN ORGANIZED HEALTH CARE EDUCATION/TRAINING PROGRAM

## 2023-10-26 PROCEDURE — 1159F PR MEDICATION LIST DOCUMENTED IN MEDICAL RECORD: ICD-10-PCS | Mod: CPTII,S$GLB,, | Performed by: STUDENT IN AN ORGANIZED HEALTH CARE EDUCATION/TRAINING PROGRAM

## 2023-10-26 PROCEDURE — 1126F AMNT PAIN NOTED NONE PRSNT: CPT | Mod: CPTII,S$GLB,, | Performed by: STUDENT IN AN ORGANIZED HEALTH CARE EDUCATION/TRAINING PROGRAM

## 2023-10-26 PROCEDURE — 99999 PR PBB SHADOW E&M-EST. PATIENT-LVL IV: CPT | Mod: PBBFAC,,, | Performed by: STUDENT IN AN ORGANIZED HEALTH CARE EDUCATION/TRAINING PROGRAM

## 2023-10-26 PROCEDURE — 1101F PT FALLS ASSESS-DOCD LE1/YR: CPT | Mod: CPTII,S$GLB,, | Performed by: UROLOGY

## 2023-10-26 PROCEDURE — 99999 PR PBB SHADOW E&M-EST. PATIENT-LVL III: ICD-10-PCS | Mod: PBBFAC,,, | Performed by: UROLOGY

## 2023-10-26 PROCEDURE — 99999 PR PBB SHADOW E&M-EST. PATIENT-LVL IV: ICD-10-PCS | Mod: PBBFAC,,, | Performed by: STUDENT IN AN ORGANIZED HEALTH CARE EDUCATION/TRAINING PROGRAM

## 2023-10-26 PROCEDURE — 1159F MED LIST DOCD IN RCRD: CPT | Mod: CPTII,S$GLB,, | Performed by: STUDENT IN AN ORGANIZED HEALTH CARE EDUCATION/TRAINING PROGRAM

## 2023-10-26 PROCEDURE — 99204 PR OFFICE/OUTPT VISIT, NEW, LEVL IV, 45-59 MIN: ICD-10-PCS | Mod: S$GLB,,, | Performed by: UROLOGY

## 2023-10-26 PROCEDURE — 3288F PR FALLS RISK ASSESSMENT DOCUMENTED: ICD-10-PCS | Mod: CPTII,S$GLB,, | Performed by: UROLOGY

## 2023-10-26 PROCEDURE — 3288F FALL RISK ASSESSMENT DOCD: CPT | Mod: CPTII,S$GLB,, | Performed by: UROLOGY

## 2023-10-26 PROCEDURE — 1101F PR PT FALLS ASSESS DOC 0-1 FALLS W/OUT INJ PAST YR: ICD-10-PCS | Mod: CPTII,S$GLB,, | Performed by: STUDENT IN AN ORGANIZED HEALTH CARE EDUCATION/TRAINING PROGRAM

## 2023-10-26 PROCEDURE — 1159F PR MEDICATION LIST DOCUMENTED IN MEDICAL RECORD: ICD-10-PCS | Mod: CPTII,S$GLB,, | Performed by: UROLOGY

## 2023-10-26 PROCEDURE — 3074F PR MOST RECENT SYSTOLIC BLOOD PRESSURE < 130 MM HG: ICD-10-PCS | Mod: CPTII,S$GLB,, | Performed by: UROLOGY

## 2023-10-26 PROCEDURE — 1126F PR PAIN SEVERITY QUANTIFIED, NO PAIN PRESENT: ICD-10-PCS | Mod: CPTII,S$GLB,, | Performed by: UROLOGY

## 2023-10-26 PROCEDURE — 1160F PR REVIEW ALL MEDS BY PRESCRIBER/CLIN PHARMACIST DOCUMENTED: ICD-10-PCS | Mod: CPTII,S$GLB,, | Performed by: UROLOGY

## 2023-10-26 PROCEDURE — 3288F FALL RISK ASSESSMENT DOCD: CPT | Mod: CPTII,S$GLB,, | Performed by: STUDENT IN AN ORGANIZED HEALTH CARE EDUCATION/TRAINING PROGRAM

## 2023-10-26 PROCEDURE — 1101F PR PT FALLS ASSESS DOC 0-1 FALLS W/OUT INJ PAST YR: ICD-10-PCS | Mod: CPTII,S$GLB,, | Performed by: UROLOGY

## 2023-10-26 PROCEDURE — 99204 PR OFFICE/OUTPT VISIT, NEW, LEVL IV, 45-59 MIN: ICD-10-PCS | Mod: S$GLB,,, | Performed by: STUDENT IN AN ORGANIZED HEALTH CARE EDUCATION/TRAINING PROGRAM

## 2023-10-26 PROCEDURE — 3078F PR MOST RECENT DIASTOLIC BLOOD PRESSURE < 80 MM HG: ICD-10-PCS | Mod: CPTII,S$GLB,, | Performed by: UROLOGY

## 2023-10-26 PROCEDURE — 1126F AMNT PAIN NOTED NONE PRSNT: CPT | Mod: CPTII,S$GLB,, | Performed by: UROLOGY

## 2023-10-26 PROCEDURE — 99204 OFFICE O/P NEW MOD 45 MIN: CPT | Mod: S$GLB,,, | Performed by: STUDENT IN AN ORGANIZED HEALTH CARE EDUCATION/TRAINING PROGRAM

## 2023-10-26 PROCEDURE — 99999 PR PBB SHADOW E&M-EST. PATIENT-LVL III: CPT | Mod: PBBFAC,,, | Performed by: UROLOGY

## 2023-10-26 PROCEDURE — 3078F DIAST BP <80 MM HG: CPT | Mod: CPTII,S$GLB,, | Performed by: UROLOGY

## 2023-10-26 PROCEDURE — 3074F SYST BP LT 130 MM HG: CPT | Mod: CPTII,S$GLB,, | Performed by: UROLOGY

## 2023-10-26 PROCEDURE — 3075F PR MOST RECENT SYSTOLIC BLOOD PRESS GE 130-139MM HG: ICD-10-PCS | Mod: CPTII,S$GLB,, | Performed by: STUDENT IN AN ORGANIZED HEALTH CARE EDUCATION/TRAINING PROGRAM

## 2023-10-26 PROCEDURE — 99204 OFFICE O/P NEW MOD 45 MIN: CPT | Mod: S$GLB,,, | Performed by: UROLOGY

## 2023-10-26 PROCEDURE — 1160F PR REVIEW ALL MEDS BY PRESCRIBER/CLIN PHARMACIST DOCUMENTED: ICD-10-PCS | Mod: CPTII,S$GLB,, | Performed by: STUDENT IN AN ORGANIZED HEALTH CARE EDUCATION/TRAINING PROGRAM

## 2023-10-26 PROCEDURE — 3078F PR MOST RECENT DIASTOLIC BLOOD PRESSURE < 80 MM HG: ICD-10-PCS | Mod: CPTII,S$GLB,, | Performed by: STUDENT IN AN ORGANIZED HEALTH CARE EDUCATION/TRAINING PROGRAM

## 2023-10-26 PROCEDURE — 3288F PR FALLS RISK ASSESSMENT DOCUMENTED: ICD-10-PCS | Mod: CPTII,S$GLB,, | Performed by: STUDENT IN AN ORGANIZED HEALTH CARE EDUCATION/TRAINING PROGRAM

## 2023-10-26 PROCEDURE — 3075F SYST BP GE 130 - 139MM HG: CPT | Mod: CPTII,S$GLB,, | Performed by: STUDENT IN AN ORGANIZED HEALTH CARE EDUCATION/TRAINING PROGRAM

## 2023-10-26 PROCEDURE — 1160F RVW MEDS BY RX/DR IN RCRD: CPT | Mod: CPTII,S$GLB,, | Performed by: UROLOGY

## 2023-10-26 PROCEDURE — 3078F DIAST BP <80 MM HG: CPT | Mod: CPTII,S$GLB,, | Performed by: STUDENT IN AN ORGANIZED HEALTH CARE EDUCATION/TRAINING PROGRAM

## 2023-10-26 PROCEDURE — 1126F PR PAIN SEVERITY QUANTIFIED, NO PAIN PRESENT: ICD-10-PCS | Mod: CPTII,S$GLB,, | Performed by: STUDENT IN AN ORGANIZED HEALTH CARE EDUCATION/TRAINING PROGRAM

## 2023-10-26 PROCEDURE — 1101F PT FALLS ASSESS-DOCD LE1/YR: CPT | Mod: CPTII,S$GLB,, | Performed by: STUDENT IN AN ORGANIZED HEALTH CARE EDUCATION/TRAINING PROGRAM

## 2023-10-26 PROCEDURE — 1159F MED LIST DOCD IN RCRD: CPT | Mod: CPTII,S$GLB,, | Performed by: UROLOGY

## 2023-10-26 RX ORDER — TEMAZEPAM 30 MG/1
30 CAPSULE ORAL NIGHTLY
COMMUNITY
Start: 2023-10-14

## 2023-10-26 RX ORDER — SERTRALINE HYDROCHLORIDE 100 MG/1
100 TABLET, FILM COATED ORAL EVERY MORNING
COMMUNITY
Start: 2023-08-22 | End: 2023-12-05

## 2023-10-26 RX ORDER — PROPRANOLOL HYDROCHLORIDE 60 MG/1
60 CAPSULE, EXTENDED RELEASE ORAL EVERY MORNING
COMMUNITY
Start: 2023-09-27

## 2023-10-26 RX ORDER — MIRABEGRON 50 MG/1
50 TABLET, FILM COATED, EXTENDED RELEASE ORAL EVERY MORNING
Qty: 90 TABLET | Refills: 0 | Status: SHIPPED | OUTPATIENT
Start: 2023-10-26 | End: 2024-01-24

## 2023-10-26 NOTE — PROGRESS NOTES
[facility] Note - [place]  Staff: MD Liss  PCP: Linsey Bear MD  Date of Service: 10/26/2023      Subjective:     HPI: Sunita Carlson is a 82 y.o. female     Initial consult by me in clinic on 10/26/23 for bladder pain:  She is a patient of Dr. Waite and has been for the past few years but has not seen him in 3 years.  Before Dr. CHRISTINA Waite she would seen previous urologist for the same issue.  Sounds like she is had initially incontinence and drop bladder requiring a bladder lift 40 years ago.  Then eventually was treated for recurrent UTIs.  And then eventually for what sounds like bladder pain treated with urethral dilations every few years done in clinic.  She states that her symptoms of what sounds like a bladder pain/IC flare include lower abdominal pain and throbbing and pelvic pain that in the past have improved with urethral dilation done in clinic.   She also has a vaginal lump and vaginal pain she is seeing gyn for this afternoon.   She also sounds like she had some bloody urine in the past or at least blood in her urine when she wiped.  She denies any smoking history.  She maybe thinks that she would a kidney stone years ago.  Today her urine is negative.  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  Denies any stress incontinence.  .    Not currently on any overactive bladder medication.  It looks like she was written for both myrbetriq and VESIcare.    It sounds like she took the VESIcare but it caused her some vision problems so she had to discontinue them.    And then it does not sound like she took the myrbetriq and she is not completely sure why  Wearing 2 pads day for urinary and bowel incontinence.   Pvr by scan: 37    Urine history: family history of kidney, bladder or prostate cancer:No, personal or family  history of kidney stones: No,tobacco use: No, anticoagulation: No  10/26/23 Neg  2/6/20  Tr bld  3/1/19  1+bld  8/23/18 MO, void: tr bld  4/2/18  1+bld  6/25/15 Tr bld  11/26/13 1+bld    REVIEW OF SYSTEMS:  Negative except for as stated above    Past Medical History:   Diagnosis Date    Arthritis     Cataract     Coronary artery disease     Hypertension     Insomnia     Neuropathy     Retinal detachment     Stomach ulcer        Past Surgical History:   Procedure Laterality Date    APPENDECTOMY      BLADDER SUSPENSION      BREAST BIOPSY      CARDIAC SURGERY      carotid endarterectomy      L    CATARACT EXTRACTION      CHOLECYSTECTOMY      HYSTERECTOMY      INJECTION OF ANESTHETIC AGENT AROUND GANGLION IMPAR N/A 9/12/2019    Procedure: BLOCK, GANGLION IMPAR;  Surgeon: Christian James MD;  Location: [facility];  Service: Pain Management;  Laterality: N/A;    INSERTION OF IMPLANTABLE LOOP RECORDER N/A 12/18/2020    Procedure: INSERTION, IMPLANTABLE LOOP RECORDER;  Surgeon: Adin Saba MD;  Location: [facility] CATH/EP LAB;  Service: Cardiology;  Laterality: N/A;    OOPHORECTOMY      SUPERFICIAL KERATECTOMY Right 10/27/2017    Dr. Morales    TONSILLECTOMY      TRANSFORAMINAL EPIDURAL INJECTION OF STEROID Left 8/12/2019    Procedure: Injection,steroid,epidural,transforaminal approach L4-5, L5-S1;  Surgeon: Christian James MD;  Location: [facility] OR;  Service: Pain Management;  Laterality: Left;    TRANSFORAMINAL EPIDURAL INJECTION OF STEROID Left 1/16/2020    Procedure: Injection,steroid,epidural,transforaminal approach;  Surgeon: Christian James MD;  Location: [facility] OR;  Service: Pain Management;  Laterality: Left;  L4-5, L5-S1    TRANSFORAMINAL EPIDURAL INJECTION OF STEROID Left 6/30/2020    Procedure: Injection,steroid,epidural,transforaminal approach;  Surgeon: Christian James MD;  Location: [facility];  Service: Pain Management;  Laterality: Left;  L4-5 and L5-S1            Objective:     Vitals:    10/26/23 1116   BP: 113/61   Pulse: 65        Focused Gu exam: neg      Assessment:     Sunita Carlson is a 82 y.o. female with       1. Other urinary incontinence    2. OAB (overactive bladder)    3. Interstitial cystitis        Plan:     Try myrbetriq 50mg once daily see if it helps with urinary frequency and incontinence  Tried VESIcare and it caused her dry eyes/vision problems  If myrbetriq is too expensive she will need to ask the pharmacist which other overactive bladder medications or covered.  All the other ones can also cause dry eyes except for Vibegron which would likely not be covered myrbetriq is not covered  Follow up with urology NP for pelvic exam and symptom check on oab med in 2months. Could always consider hydrodistension in or or urethral dilation at [facility] (she's had before multiple times ith improvement)  Only on asa 81mg daily. Cardiologist: - cardiac surgery  Or Consider referral to urogynecology for pressure, seeing gynecology this afternoon for vaginal lump and pelvic pain as well    My Leija MD

## 2023-10-26 NOTE — PATIENT INSTRUCTIONS
Sunita Carlson is a 82 y.o. female with       1. Other urinary incontinence    2. OAB (overactive bladder)    3. Interstitial cystitis        Plan:     Try myrbetriq 50mg once daily see if it helps with urinary frequency and incontinence  Tried VESIcare and it caused her dry eyes/vision problems  If myrbetriq is too expensive she will need to ask the pharmacist which other overactive bladder medications or covered.  All the other ones can also cause dry eyes except for Vibegron which would likely not be covered myrbetriq is not covered  Follow up with urology NP for pelvic exam and symptom check on oab med in 2months. Could always consider hydrodistension in or or urethral dilation at [facility] (she's had before multiple times ith improvement)  Only on asa 81mg daily. Cardiologist: - cardiac surgery   Consider referral to urogynecology for pressure, seeing gynecology this afternoon for vaginal lump.

## 2023-10-26 NOTE — PROGRESS NOTES
"[facility] Note    Subjective:     Chief Complaint: Annual Exam      HPI:  10/26/2023    82-year-old female is new to this clinic with a couple of complaints.  She reports sensation of a "fist pushing out of the vagina" for the last 2 months.  Denies any bleeding issues.  She had a total vaginal hysterectomy with BSO due to a previous provider telling her the uterus was "a worn out tire" after she had 2 children and 2 miscarriages.  She also reports vague right-sided pelvic pain that comes and goes and it feels like the location of her appendix (appendectomy years ago).  This pain has been present for 2 months and she has not tried any medications to help relieve the pain.    Has urinary issues including urgency and incontinence and is being managed by Urology.    She reports feeling a lump on the outside approximately 1 week ago that was noticed when she was wiping after urination.  The lump does not hurt, no drainage no bleeding but sometimes she notices a foul odor from the vaginal area.  Denies any fever.      GYN & OB History  Patient's last menstrual period was 2020.     Date of Last Pap: No result found    OB History    Para Term  AB Living   4 2 2   2 2   SAB IAB Ectopic Multiple Live Births   2       2      # Outcome Date GA Lbr Cecil/2nd Weight Sex Delivery Anes PTL Lv   4 1958           3 1957           2 Term 54    M Vag-Spont   GHADA   1 Term 03/10/55    F Vag-Spont   GHADA       Past Medical History:   Diagnosis Date    Arthritis     Cataract     Coronary artery disease     Hypertension     Insomnia     Neuropathy     Retinal detachment     Stomach ulcer      Past Surgical History:   Procedure Laterality Date    APPENDECTOMY      BLADDER SUSPENSION      BREAST BIOPSY Right     Benign.    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    CAROTID ENDARTERECTOMY Left     L    CATARACT EXTRACTION Left     CHOLECYSTECTOMY      DILATION AND " CURETTAGE OF UTERUS      Due to miscarriage.    INJECTION OF ANESTHETIC AGENT AROUND GANGLION IMPAR N/A 2019    Procedure: BLOCK, GANGLION IMPAR;  Surgeon: Christian James MD;  Location: [facility];  Service: Pain Management;  Laterality: N/A;    INSERTION OF IMPLANTABLE LOOP RECORDER N/A 2020    Procedure: INSERTION, IMPLANTABLE LOOP RECORDER;  Surgeon: Adin Saba MD;  Location: [facility] CATH/EP LAB;  Service: Cardiology;  Laterality: N/A;    OOPHORECTOMY Bilateral     SUPERFICIAL KERATECTOMY Right 10/27/2017    Dr. Morales    TONSILLECTOMY      TRANSFORAMINAL EPIDURAL INJECTION OF STEROID Left 2019    Procedure: Injection,steroid,epidural,transforaminal approach L4-5, L5-S1;  Surgeon: Christian James MD;  Location: [facility] OR;  Service: Pain Management;  Laterality: Left;    TRANSFORAMINAL EPIDURAL INJECTION OF STEROID Left 2020    Procedure: Injection,steroid,epidural,transforaminal approach;  Surgeon: Christian James MD;  Location: [facility];  Service: Pain Management;  Laterality: Left;  L4-5, L5-S1    TRANSFORAMINAL EPIDURAL INJECTION OF STEROID Left 2020    Procedure: Injection,steroid,epidural,transforaminal approach;  Surgeon: Christian James MD;  Location: [facility];  Service: Pain Management;  Laterality: Left;  L4-5 and L5-S1    VAGINAL HYSTERECTOMY       Review of patient's allergies indicates:   Allergen Reactions    Demerol [meperidine] Nausea And Vomiting    Oxycodone     Hydrocodone Itching    Oxycodone-acetaminophen Itching       Social History     Socioeconomic History    Marital status:    Tobacco Use    Smoking status: Former     Current packs/day: 0.00     Types: Cigarettes     Start date: 10/13/1958     Quit date: 10/13/1962     Years since quittin.0    Smokeless tobacco: Never   Substance and Sexual Activity    Alcohol use: Yes     Comment: very rarely    Drug use: No    Sexual activity: Not Currently     Birth control/protection: Surgical     Social Determinants of  Health     Financial Resource Strain: Low Risk  (10/19/2022)    Overall Financial Resource Strain (CARDIA)     Difficulty of Paying Living Expenses: Not very hard   Food Insecurity: No Food Insecurity (10/19/2022)    Hunger Vital Sign     Worried About Running Out of Food in the Last Year: Never true     Ran Out of Food in the Last Year: Never true   Transportation Needs: No Transportation Needs (10/19/2022)    PRAPARE - Transportation     Lack of Transportation (Medical): No     Lack of Transportation (Non-Medical): No   Physical Activity: Inactive (10/19/2022)    Exercise Vital Sign     Days of Exercise per Week: 0 days     Minutes of Exercise per Session: 0 min   Stress: Stress Concern Present (10/29/2020)    [employer] - Occupational Stress Questionnaire     Feeling of Stress : To some extent   Social Connections: Socially Isolated (10/19/2022)    Social Connection and Isolation Panel [NHANES]     Frequency of Communication with Friends and Family: More than three times a week     Frequency of Social Gatherings with Friends and Family: Twice a week     Attends Caoda[employer] Services: Never     Active Member of Clubs or Organizations: No     Attends Club or Organization Meetings: Never     Marital Status:    Housing Stability: Low Risk  (10/19/2022)    Housing Stability Vital Sign     Unable to Pay for Housing in the Last Year: No     Number of Places Lived in the Last Year: 1     Unstable Housing in the Last Year: No       Family History   Problem Relation Age of Onset    Blindness Maternal Grandfather     Cancer Father     Macular degeneration Maternal Aunt     Cancer Maternal Aunt     Breast cancer Maternal Aunt     Macular degeneration Maternal Uncle     Diabetes Paternal Aunt     Cancer Paternal Aunt     Breast cancer Paternal Aunt     Melanoma Neg Hx     Psoriasis Neg Hx     Lupus Neg Hx     Eczema Neg Hx        Medications  Current Outpatient Medications on File Prior to  Visit   Medication Sig Dispense Refill Last Dose    ALPRAZolam (XANAX) 0.25 MG tablet Take 0.25 mg by mouth daily as needed.   Taking    amLODIPine (NORVASC) 5 MG tablet Take 5 mg by mouth once daily.   Taking    aspirin (ECOTRIN) 81 MG EC tablet Take 81 mg by mouth once daily.   Taking    atorvastatin (LIPITOR) 20 MG tablet Take 20 mg by mouth once daily.   Taking    cholecalciferol, vitamin D3, (VITAMIN D3) 2,000 unit Tab Take 1 tablet by mouth once daily.   Taking    diclofenac sodium (VOLTAREN) 1 % Gel Apply 2 g topically daily as needed.   Taking    dicyclomine (BENTYL) 10 MG capsule 2 (two) times daily as needed.    Taking    diphenoxylate-atropine 2.5-0.025 mg (LOMOTIL) 2.5-0.025 mg per tablet Take 1 tablet by mouth 4 (four) times daily as needed for Diarrhea.   Taking    mirabegron (MYRBETRIQ) 50 mg Tb24 Take 1 tablet (50 mg total) by mouth every morning. Take 1 in morning for overactive bladder 90 tablet 0 Taking    nitroGLYCERIN (NITROSTAT) 0.4 MG SL tablet Place under the tongue.   Taking    ondansetron (ZOFRAN-ODT) 8 MG TbDL every 6 (six) hours as needed.    Taking    pantoprazole (PROTONIX) 40 MG tablet Take 40 mg by mouth every morning.   Taking    propranoloL (INDERAL LA) 60 MG 24 hr capsule Take 60 mg by mouth every morning.   Taking    rOPINIRole (REQUIP) 0.5 MG tablet Take 0.5 mg by mouth nightly as needed.   Taking    sertraline (ZOLOFT) 100 MG tablet Take 100 mg by mouth every morning.   Taking    temazepam (RESTORIL) 30 mg capsule Take 30 mg by mouth every evening.   Taking    [DISCONTINUED] metoprolol succinate (TOPROL-XL) 50 MG 24 hr tablet Take 50 mg by mouth once daily.       [DISCONTINUED] MYRBETRIQ 50 mg Tb24 Take 1 tablet by mouth once daily.          Review of Systems   Constitutional: Negative for appetite change, fever and unexpected weight change.   Respiratory: Negative for cough and shortness of breath.    Cardiovascular: Negative for chest pain and palpitations.   Genitourinary:  "Negative for dyspareunia, dysuria, hematuria and pelvic pain.        GYN ROS per HPI.   Psychiatric/Behavioral: The patient is not nervous/anxious.      Objective:     /67 (BP Location: Right arm, Patient Position: Sitting, BP Method: Small (Automatic))   Pulse 64   Ht 4' 11" (1.499 m)   Wt 54.4 kg (119 lb 14.9 oz)   LMP 02/06/2020   BMI 24.22 kg/m²     Physical Exam  Exam conducted with a chaperone present.   Constitutional:       General: She is not in acute distress.  HENT:      Head: Normocephalic.   Eyes:      General: No scleral icterus.  Abdominal:      Tenderness: There is generalized abdominal tenderness.   Genitourinary:     General: Normal vulva.      Pubic Area: No rash.       Labia:         Right: No rash or tenderness.         Left: No rash or tenderness.       Vagina: No tenderness or bleeding.      Cervix: No cervical motion tenderness.      Uterus: Not tender.       Adnexa:         Right: No mass or tenderness.          Left: No mass or tenderness.        Comments: Uterus and cervix surgically absent.    Moderate vaginal atrophy.  No bumps or masses palpated on vulva. No skin rashes.   Vaginal walls are not prolapsed. No cystocele.   Neurological:      General: No focal deficit present.      Mental Status: She is alert.   Psychiatric:         Mood and Affect: Mood normal.         Assessment:     1. Vulvar lesion    2. Vaginal atrophy    3. S/P vaginal hysterectomy      Plan:     1. Vulvar lesion    2. Vaginal atrophy    3. S/P vaginal hysterectomy      Follow up if symptoms worsen or fail to improve.  Follow-up sooner if needed.     No vulvar lesion or mass was palpated on today's exam.  Patient also attempted to point out the lesion but was unsuccessful.  If the lesion becomes symptomatic or grows, she was instructed to contact the clinic for further evaluation.  If the dropping  sensation worsens, she should be seen follow-up.  May need to consider a referral to Urogynecology if " necessary.    Discussed the physical exam findings with the patient.  We discussed the etiology of atrophic vaginitis and potential issues including vaginal discomfort.  Conservative versus medical management discussed and at this time, she declined further treatment. She was instructed to contact the clinic if she becomes symptomatic or changes her mind.      In regards to the generalized tenderness of her abdomen, I recommended following up with primary care or GI for further management.  She is status post vaginal hysterectomy and BSO.      Recommended pelvic exam performed periodically for general maintenance.      Aida Clarke PA-C  10/26/2023

## 2023-11-06 ENCOUNTER — TELEPHONE (OUTPATIENT)
Dept: CARDIOLOGY | Facility: CLINIC | Age: 83
End: 2023-11-06
Payer: MEDICARE

## 2023-11-06 NOTE — TELEPHONE ENCOUNTER
----- Message from Ruth Avina sent at 11/6/2023  4:50 PM CST -----  Regarding: appointment  Contact: patient  Type:  Sooner Appointment Request    Caller is requesting a sooner appointment.  Caller declined first available appointment listed below.  Caller will not accept being placed on the waitlist and is requesting a message be sent to doctor.    Name of Caller:  patient  When is the first available appointment?    Symptoms:  neck artery  Would the patient rather a call back or a response via [facility]? call  Best Call Back Number:  [phone] (home)     Additional Information:  Please call patient to schedule.  Thanks!

## 2023-11-08 ENCOUNTER — TELEPHONE (OUTPATIENT)
Dept: CARDIOLOGY | Facility: CLINIC | Age: 83
End: 2023-11-08
Payer: MEDICARE

## 2023-11-08 NOTE — TELEPHONE ENCOUNTER
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 Provider type    No Physician      Primary Contact Information    Phone Fax E-mail Address   [phone] Not available [address]    [address]    [place] [address]       Languages Specialties     English Vascular Surgery, Cardiology              See my chart message

## 2023-11-08 NOTE — TELEPHONE ENCOUNTER
----- Message from Mackenzei Jewell, Patient Care Assistant sent at 11/8/2023 11:57 AM CST -----  Contact: Pt  Type: Needs Medical Advice    Who Called: Pt  Best Call Back Number: [phone] (home)   Inquiry/Question: Pt is calling to get referral to CV Surgery. Please advise Thank you~

## 2023-11-14 ENCOUNTER — TELEPHONE (OUTPATIENT)
Dept: CARDIOLOGY | Facility: CLINIC | Age: 83
End: 2023-11-14
Payer: MEDICARE

## 2023-11-14 ENCOUNTER — TELEPHONE (OUTPATIENT)
Dept: OPHTHALMOLOGY | Facility: CLINIC | Age: 83
End: 2023-11-14
Payer: MEDICARE

## 2023-11-14 NOTE — TELEPHONE ENCOUNTER
----- Message from Maricarmen Paige sent at 11/14/2023  2:54 PM CST -----  Regarding: Appt Inquiry  Patient called in regards to 12/6 appt and questions she has.     Please call back to further assist- [phone]

## 2023-11-14 NOTE — TELEPHONE ENCOUNTER
----- Message from Francisco Shultz sent at 11/14/2023  4:19 PM CST -----  Regarding: Return Call  Contact: patient  Type:  Patient Returning Call    Who Called:patient  Who Left Message for Patient:office nurse  Does the patient know what this is regarding?:fax records/ Implants chest/   Would the patient rather a call back or a response via [facility]? Fax to diagnostic services on [place] Fax# [phone]  Best Call Back Number:[phone]  Additional Information: please let patient know

## 2023-11-20 ENCOUNTER — TELEPHONE (OUTPATIENT)
Dept: CARDIOLOGY | Facility: CLINIC | Age: 83
End: 2023-11-20
Payer: MEDICARE

## 2023-11-20 NOTE — TELEPHONE ENCOUNTER
----- Message from Jaye Enciso sent at 11/20/2023 10:35 AM CST -----  Type: Needs Medical Advice  Who Called:  pt  Symptoms (please be specific):  pt said she need to speak to the nurse about the CT scan she having--please call and advise--said she can't have it until they speak to the dr and it's been since last week that they been trying to get in touch w/ the office--   Best Call Back Number: [phone] (home)     Additional Information: thank you

## 2023-11-24 NOTE — TELEPHONE ENCOUNTER
Patient was informed that all MDT ILR are MRI compatible but she needs to bring card when she goes to MRI. Pt understood.

## 2023-12-05 ENCOUNTER — OFFICE VISIT (OUTPATIENT)
Dept: CARDIOLOGY | Facility: CLINIC | Age: 83
End: 2023-12-05
Payer: MEDICARE

## 2023-12-05 VITALS
WEIGHT: 122 LBS | OXYGEN SATURATION: 96 % | SYSTOLIC BLOOD PRESSURE: 118 MMHG | DIASTOLIC BLOOD PRESSURE: 64 MMHG | BODY MASS INDEX: 24.6 KG/M2 | HEIGHT: 59 IN | RESPIRATION RATE: 16 BRPM | HEART RATE: 96 BPM

## 2023-12-05 DIAGNOSIS — I67.2 CEREBRAL ATHEROSCLEROSIS: ICD-10-CM

## 2023-12-05 DIAGNOSIS — I25.700 CORONARY ARTERY DISEASE INVOLVING CORONARY BYPASS GRAFT OF NATIVE HEART WITH UNSTABLE ANGINA PECTORIS: ICD-10-CM

## 2023-12-05 DIAGNOSIS — I77.9 BILATERAL CAROTID ARTERY DISEASE, UNSPECIFIED TYPE: ICD-10-CM

## 2023-12-05 DIAGNOSIS — E78.2 MIXED HYPERLIPIDEMIA: ICD-10-CM

## 2023-12-05 DIAGNOSIS — G25.2 COARSE TREMORS: ICD-10-CM

## 2023-12-05 DIAGNOSIS — I25.110 ATHEROSCLEROSIS OF NATIVE CORONARY ARTERY OF NATIVE HEART WITH UNSTABLE ANGINA PECTORIS: Primary | ICD-10-CM

## 2023-12-05 PROCEDURE — 1160F RVW MEDS BY RX/DR IN RCRD: CPT | Mod: CPTII,S$GLB,, | Performed by: INTERNAL MEDICINE

## 2023-12-05 PROCEDURE — 1159F PR MEDICATION LIST DOCUMENTED IN MEDICAL RECORD: ICD-10-PCS | Mod: CPTII,S$GLB,, | Performed by: INTERNAL MEDICINE

## 2023-12-05 PROCEDURE — 93010 EKG 12-LEAD: ICD-10-PCS | Mod: S$GLB,,, | Performed by: GENERAL PRACTICE

## 2023-12-05 PROCEDURE — 93005 EKG 12-LEAD: ICD-10-PCS | Mod: S$GLB,,, | Performed by: INTERNAL MEDICINE

## 2023-12-05 PROCEDURE — 93005 ELECTROCARDIOGRAM TRACING: CPT | Mod: S$GLB,,, | Performed by: INTERNAL MEDICINE

## 2023-12-05 PROCEDURE — 3288F FALL RISK ASSESSMENT DOCD: CPT | Mod: CPTII,S$GLB,, | Performed by: INTERNAL MEDICINE

## 2023-12-05 PROCEDURE — 1101F PR PT FALLS ASSESS DOC 0-1 FALLS W/OUT INJ PAST YR: ICD-10-PCS | Mod: CPTII,S$GLB,, | Performed by: INTERNAL MEDICINE

## 2023-12-05 PROCEDURE — 3288F PR FALLS RISK ASSESSMENT DOCUMENTED: ICD-10-PCS | Mod: CPTII,S$GLB,, | Performed by: INTERNAL MEDICINE

## 2023-12-05 PROCEDURE — 99214 PR OFFICE/OUTPT VISIT, EST, LEVL IV, 30-39 MIN: ICD-10-PCS | Mod: S$GLB,,, | Performed by: INTERNAL MEDICINE

## 2023-12-05 PROCEDURE — 3074F PR MOST RECENT SYSTOLIC BLOOD PRESSURE < 130 MM HG: ICD-10-PCS | Mod: CPTII,S$GLB,, | Performed by: INTERNAL MEDICINE

## 2023-12-05 PROCEDURE — 3078F PR MOST RECENT DIASTOLIC BLOOD PRESSURE < 80 MM HG: ICD-10-PCS | Mod: CPTII,S$GLB,, | Performed by: INTERNAL MEDICINE

## 2023-12-05 PROCEDURE — 1160F PR REVIEW ALL MEDS BY PRESCRIBER/CLIN PHARMACIST DOCUMENTED: ICD-10-PCS | Mod: CPTII,S$GLB,, | Performed by: INTERNAL MEDICINE

## 2023-12-05 PROCEDURE — 99999 PR PBB SHADOW E&M-EST. PATIENT-LVL IV: ICD-10-PCS | Mod: PBBFAC,,, | Performed by: INTERNAL MEDICINE

## 2023-12-05 PROCEDURE — 1101F PT FALLS ASSESS-DOCD LE1/YR: CPT | Mod: CPTII,S$GLB,, | Performed by: INTERNAL MEDICINE

## 2023-12-05 PROCEDURE — 3078F DIAST BP <80 MM HG: CPT | Mod: CPTII,S$GLB,, | Performed by: INTERNAL MEDICINE

## 2023-12-05 PROCEDURE — 99214 OFFICE O/P EST MOD 30 MIN: CPT | Mod: S$GLB,,, | Performed by: INTERNAL MEDICINE

## 2023-12-05 PROCEDURE — 3074F SYST BP LT 130 MM HG: CPT | Mod: CPTII,S$GLB,, | Performed by: INTERNAL MEDICINE

## 2023-12-05 PROCEDURE — 1159F MED LIST DOCD IN RCRD: CPT | Mod: CPTII,S$GLB,, | Performed by: INTERNAL MEDICINE

## 2023-12-05 PROCEDURE — 99999 PR PBB SHADOW E&M-EST. PATIENT-LVL IV: CPT | Mod: PBBFAC,,, | Performed by: INTERNAL MEDICINE

## 2023-12-05 PROCEDURE — 93010 ELECTROCARDIOGRAM REPORT: CPT | Mod: S$GLB,,, | Performed by: GENERAL PRACTICE

## 2023-12-05 RX ORDER — ESCITALOPRAM OXALATE 10 MG/1
10 TABLET ORAL DAILY
Qty: 30 TABLET | Refills: 11 | Status: SHIPPED | OUTPATIENT
Start: 2023-12-05 | End: 2024-12-04

## 2023-12-05 NOTE — PROGRESS NOTES
" Subjective:    Patient ID:  Sunita Carlson is a 83 y.o. female patient here for evaluation Follow-up (Last ov 2022, she is having increased sob with exertion )      History of Present Illness:     Ms. Smith is 83-year-old with history of coronary artery disease and carotid artery disease seeking follow-up evaluation.  She has been doing reasonably well some shortness of breath with effort is noted but denies having any swelling in the lower extremities no chest discomfort no arm neck or jaw pain noted.  She is concerned about progression of carotid artery disease as she had prior endarterectomy on the left and noted to have 50% stenosis in the right ICA.  She has no focal neurologic deficits.  No cough or congestion no fevers chills no nausea vomiting noted.        Review of patient's allergies indicates:   Allergen Reactions    Demerol [meperidine] Nausea And Vomiting    Zoloft [sertraline] Other (See Comments)     Severe shakes    Oxycodone     Hydrocodone Itching    Oxycodone-acetaminophen Itching       Past Medical History:   Diagnosis Date    Arthritis     Cataract     Coronary artery disease     Hypertension     Insomnia     Neuropathy     Retinal detachment     Stomach ulcer      Past Surgical History:   Procedure Laterality Date    APPENDECTOMY  1951    BLADDER SUSPENSION      BREAST BIOPSY Right     Benign.    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    CAROTID ENDARTERECTOMY Left     L    CATARACT EXTRACTION Left     CHOLECYSTECTOMY      DILATION AND CURETTAGE OF UTERUS      Due to miscarriage.    INJECTION OF ANESTHETIC AGENT AROUND GANGLION IMPAR N/A 09/12/2019    Procedure: BLOCK, GANGLION IMPAR;  Surgeon: Christian James MD;  Location: [facility] OR;  Service: Pain Management;  Laterality: N/A;    INSERTION OF IMPLANTABLE LOOP RECORDER N/A 12/18/2020    Procedure: INSERTION, IMPLANTABLE LOOP RECORDER;  Surgeon: Adin Saba MD;  Location: [facility] CATH/EP LAB;  Service: Cardiology;  Laterality: N/A;    " OOPHORECTOMY Bilateral     SUPERFICIAL KERATECTOMY Right 10/27/2017    Dr. Morales    TONSILLECTOMY      TRANSFORAMINAL EPIDURAL INJECTION OF STEROID Left 2019    Procedure: Injection,steroid,epidural,transforaminal approach L4-5, L5-S1;  Surgeon: Christian James MD;  Location: [facility] OR;  Service: Pain Management;  Laterality: Left;    TRANSFORAMINAL EPIDURAL INJECTION OF STEROID Left 2020    Procedure: Injection,steroid,epidural,transforaminal approach;  Surgeon: Christian James MD;  Location: [facility] OR;  Service: Pain Management;  Laterality: Left;  L4-5, L5-S1    TRANSFORAMINAL EPIDURAL INJECTION OF STEROID Left 2020    Procedure: Injection,steroid,epidural,transforaminal approach;  Surgeon: Christian James MD;  Location: [facility] OR;  Service: Pain Management;  Laterality: Left;  L4-5 and L5-S1    VAGINAL HYSTERECTOMY       Social History     Tobacco Use    Smoking status: Former     Current packs/day: 0.00     Types: Cigarettes     Start date: 10/13/1958     Quit date: 10/13/1962     Years since quittin.1    Smokeless tobacco: Never   Substance Use Topics    Alcohol use: Yes     Comment: very rarely    Drug use: No        Review of Systems:    As noted in HPI in addition      REVIEW OF SYSTEMS  CARDIOVASCULAR: No recent chest pain, palpitations, arm, neck, or jaw pain  RESPIRATORY: No recent fever, cough chills, SOB or congestion  : No blood in the urine  GI: No Nausea, vomiting, constipation, diarrhea, blood, or reflux.  MUSCULOSKELETAL: No myalgias  NEURO: No lightheadedness or dizziness  EYES: No Double vision, blurry, vision or headache              Objective        Vitals:    23 1153   BP: 118/64   Pulse: (P) 66   Resp: 16       LIPIDS - LAST 2   Lab Results   Component Value Date    CHOL 123 10/19/2022    CHOL 152 2020    HDL 49 10/19/2022    HDL 70 2020    LDLCALC 59.0 (L) 10/19/2022    LDLCALC 61.2 (L) 2020    TRIG 75 10/19/2022    TRIG 104 2020    CHOLHDL 39.8  10/19/2022    CHOLHDL 46.1 06/03/2020       CBC - LAST 2  Lab Results   Component Value Date    WBC 6.27 10/19/2022    WBC 6.24 10/18/2022    RBC 4.53 10/19/2022    RBC 4.86 10/18/2022    HGB 13.6 10/19/2022    HGB 14.6 10/18/2022    HCT 39.7 10/19/2022    HCT 43.2 10/18/2022    MCV 88 10/19/2022    MCV 89 10/18/2022    MCH 30.0 10/19/2022    MCH 30.0 10/18/2022    MCHC 34.3 10/19/2022    MCHC 33.8 10/18/2022    RDW 12.2 10/19/2022    RDW 12.3 10/18/2022     10/19/2022     10/18/2022    MPV 9.4 10/19/2022    MPV 9.3 10/18/2022    GRAN 2.7 10/19/2022    GRAN 43.2 10/19/2022    LYMPH 2.8 10/19/2022    LYMPH 43.9 10/19/2022    MONO 0.6 10/19/2022    MONO 9.7 10/19/2022    BASO 0.04 10/19/2022    BASO 0.05 10/18/2022    NRBC 0 10/19/2022    NRBC 0 10/18/2022       CHEMISTRY & LIVER FUNCTION - LAST 2  Lab Results   Component Value Date     10/19/2022     10/18/2022    K 3.3 (L) 10/19/2022    K 3.9 10/18/2022     10/19/2022     10/18/2022    CO2 26 10/19/2022    CO2 28 10/18/2022    ANIONGAP 7 (L) 10/19/2022    ANIONGAP 7 (L) 10/18/2022    BUN 8 10/19/2022    BUN 11 10/18/2022    CREATININE 0.3 (L) 10/19/2022    CREATININE 0.7 10/18/2022    GLU 93 10/19/2022    GLU 94 10/18/2022    CALCIUM 9.0 10/19/2022    CALCIUM 9.1 10/18/2022    MG 1.9 10/18/2022    MG 1.7 03/22/2022    ALBUMIN 4.0 10/18/2022    ALBUMIN 3.9 03/22/2022    PROT 7.1 10/18/2022    PROT 6.9 03/22/2022    ALKPHOS 82 10/18/2022    ALKPHOS 74 03/22/2022    ALT 17 10/18/2022    ALT 18 03/22/2022    AST 23 10/18/2022    AST 22 03/22/2022    BILITOT 0.9 10/18/2022    BILITOT 0.8 03/22/2022        CARDIAC PROFILE - LAST 2  Lab Results   Component Value Date     (H) 10/18/2022    BNP 76 03/22/2022    CPK 37 11/27/2013    CPK 35 11/27/2013    CPKMB 0.6 11/27/2013    CPKMB 0.7 11/27/2013    TROPONINI <0.030 10/19/2022    TROPONINI <0.030 10/18/2022        COAGULATION - LAST 2  Lab Results   Component Value Date    INR 0.9  02/06/2020    INR 1.0 11/26/2013       ENDOCRINE & PSA - LAST 2  Lab Results   Component Value Date    HGBA1C 5.5 07/22/2019    HGBA1C 5.4 06/25/2015    TSH 3.020 10/19/2022    TSH 3.050 03/22/2022        ECHOCARDIOGRAM RESULTS  Results for orders placed in visit on 04/11/22    Echo    Interpretation Summary  · The left ventricle is normal in size with concentric hypertrophy and normal systolic function.  · The estimated ejection fraction is 66%.  · Indeterminate left ventricular diastolic function.  · Normal right ventricular size with normal right ventricular systolic function.  · Normal central venous pressure (3 mmHg).  · The estimated PA systolic pressure is 31 mmHg.  · Mild aortic regurgitation.      CURRENT/PREVIOUS VISIT EKG  Results for orders placed or performed during the hospital encounter of 10/18/22   EKG 12-lead    Collection Time: 10/19/22 12:19 AM    Narrative    Test Reason : R07.9,    Vent. Rate : 053 BPM     Atrial Rate : 053 BPM     P-R Int : 156 ms          QRS Dur : 068 ms      QT Int : 472 ms       P-R-T Axes : 059 033 061 degrees     QTc Int : 442 ms    Sinus bradycardia  Otherwise normal ECG  When compared with ECG of 18-OCT-2022 12:35,  Sinus rhythm has replaced Ectopic atrial rhythm  Confirmed by Miguel Angel Saba MD ([id number]) on 10/19/2022 5:59:57 PM    Referred By: [email or web address]   SELF           Confirmed By:Miguel Angel Saba MD     No valid procedures specified.   Results for orders placed in visit on 04/11/22    Nuclear Stress Test    Interpretation Summary    The nuclear portion of this study will be reported separately.    The EKG portion of this study is negative for ischemia.    The patient reported no chest pain during the stress test.    There were no arrhythmias during stress.    No valid procedures specified.    PHYSICAL EXAM  CONSTITUTIONAL: Well built, well nourished in no apparent distress  NECK: no carotid bruit, no JVD left CEA scar noted.  LUNGS: CTA  CHEST WALL: no tenderness,  midsternal scar is noted  HEART: regular rate and rhythm, S1, S2 normal, no murmur, click, rub or gallop   ABDOMEN: soft, non-tender; bowel sounds normal; no masses,  no organomegaly  EXTREMITIES: Extremities normal, no edema, no calf tenderness noted  NEURO: AAO X 3    I HAVE REVIEWED :    The vital signs, nurses notes, and all the pertinent radiology and labs.    12/05/2023 EKG shows normal sinus rhythm poor R-wave progression noted.    Current Outpatient Medications   Medication Instructions    ALPRAZolam (XANAX) 0.25 mg, Oral, Daily PRN    amLODIPine (NORVASC) 5 mg, Oral, Daily    aspirin (ECOTRIN) 81 mg, Oral, Daily    atorvastatin (LIPITOR) 20 mg, Oral, Daily    cholecalciferol, vitamin D3, (VITAMIN D3) 2,000 unit Tab 1 tablet, Oral, Daily    diclofenac sodium (VOLTAREN) 2 g, Topical (Top), Daily PRN    dicyclomine (BENTYL) 10 MG capsule 2 times daily PRN    diphenoxylate-atropine 2.5-0.025 mg (LOMOTIL) 2.5-0.025 mg per tablet 1 tablet, Oral, 4 times daily PRN    MYRBETRIQ 50 mg, Oral, Every morning, Take 1 in morning for overactive bladder    nitroGLYCERIN (NITROSTAT) 0.4 MG SL tablet Sublingual    ondansetron (ZOFRAN-ODT) 8 MG TbDL Every 6 hours PRN    pantoprazole (PROTONIX) 40 mg, Oral, Every morning    propranoloL (INDERAL LA) 60 mg, Oral, Every morning    rOPINIRole (REQUIP) 0.5 mg, Oral, Nightly PRN    temazepam (RESTORIL) 30 mg, Oral, Nightly          Assessment & Plan     Atherosclerotic heart disease of native coronary artery with unstable angina pectoris  Continue on present therapy with aggressive risk factor modification.  She is on amlodipine 5 mg daily, continue on Lipitor 20 mg daily she is on baby aspirin currently on having some nosebleeds intermittently.  Will cut down further to every other day continue on Inderal LA 60 mg daily    Coronary artery disease involving coronary bypass graft of native heart with unstable angina pectoris  As above continue on secondary prevention risk factor  modification with Lipitor maintain on aspirin therapy continue on beta-blockers with propanolol    Mixed hyperlipidemia  She is maintaining on Lipitor 20 mg daily   Latest Reference Range & Units 10/19/22 05:09   Cholesterol Total 120 - 199 mg/dL 123   HDL 40 - 75 mg/dL 49   HDL/Cholesterol Ratio 20.0 - 50.0 % 39.8   Non-HDL Cholesterol mg/dL 74   Total Cholesterol/HDL Ratio 2.0 - 5.0  2.5   Triglycerides 30 - 150 mg/dL 75   LDL Cholesterol 63.0 - 159.0 mg/dL 59.0 (L)   (L): Data is abnormally low  More recent labs performed at her primary care physician and and do not have access to this at this time.    Bilateral carotid artery disease  She is noted to have bilateral carotid artery disease she would carotid endarterectomy in the left and noted to have moderate amount of stenosis in the right.  Recommend to repeat the carotid duplex scan maintain on aspirin therapy and statin therapy.    Coarse tremors  She has coarse tremors and she is started on Inderal LA by her primary care this seemed to be helping her.  She is also losing her balance at times and other neurologic manifestations succumbed non and she is seen Dr. Yury Santos for further neurologic evaluation and he is undergoing some imaging, follow this with Neurology          No follow-ups on file.

## 2023-12-05 NOTE — ASSESSMENT & PLAN NOTE
Continue on present therapy with aggressive risk factor modification.  She is on amlodipine 5 mg daily, continue on Lipitor 20 mg daily she is on baby aspirin currently on having some nosebleeds intermittently.  Will cut down further to every other day continue on Inderal LA 60 mg daily

## 2023-12-05 NOTE — ASSESSMENT & PLAN NOTE
She is noted to have bilateral carotid artery disease she would carotid endarterectomy in the left and noted to have moderate amount of stenosis in the right.  Recommend to repeat the carotid duplex scan maintain on aspirin therapy and statin therapy.

## 2023-12-05 NOTE — ASSESSMENT & PLAN NOTE
She has coarse tremors and she is started on Inderal LA by her primary care this seemed to be helping her.  She is also losing her balance at times and other neurologic manifestations succumbed non and she is seen Dr. Yury Santos for further neurologic evaluation and he is undergoing some imaging, follow this with Neurology

## 2023-12-05 NOTE — ASSESSMENT & PLAN NOTE
She is maintaining on Lipitor 20 mg daily   Latest Reference Range & Units 10/19/22 05:09   Cholesterol Total 120 - 199 mg/dL 123   HDL 40 - 75 mg/dL 49   HDL/Cholesterol Ratio 20.0 - 50.0 % 39.8   Non-HDL Cholesterol mg/dL 74   Total Cholesterol/HDL Ratio 2.0 - 5.0  2.5   Triglycerides 30 - 150 mg/dL 75   LDL Cholesterol 63.0 - 159.0 mg/dL 59.0 (L)   (L): Data is abnormally low  More recent labs performed at her primary care physician and and do not have access to this at this time.

## 2023-12-05 NOTE — ASSESSMENT & PLAN NOTE
As above continue on secondary prevention risk factor modification with Lipitor maintain on aspirin therapy continue on beta-blockers with propanolol

## 2023-12-06 ENCOUNTER — PROCEDURE VISIT (OUTPATIENT)
Dept: OPHTHALMOLOGY | Facility: CLINIC | Age: 83
End: 2023-12-06
Payer: MEDICARE

## 2023-12-06 DIAGNOSIS — H18.421 BAND KERATOPATHY OF RIGHT EYE: Primary | ICD-10-CM

## 2023-12-06 PROCEDURE — 92014 COMPRE OPH EXAM EST PT 1/>: CPT | Mod: 57,S$GLB,, | Performed by: OPHTHALMOLOGY

## 2023-12-06 PROCEDURE — 65436 CURETTE/TREAT CORNEA: CPT | Mod: RT,S$GLB,, | Performed by: OPHTHALMOLOGY

## 2023-12-06 PROCEDURE — 65436 PR CURETTE/TREAT CORNEA,APPLY CHELATE: ICD-10-PCS | Mod: RT,S$GLB,, | Performed by: OPHTHALMOLOGY

## 2023-12-06 PROCEDURE — 92014 PR EYE EXAM, EST PATIENT,COMPREHESV: ICD-10-PCS | Mod: 57,S$GLB,, | Performed by: OPHTHALMOLOGY

## 2023-12-06 RX ORDER — NEOMYCIN SULFATE, POLYMYXIN B SULFATE AND DEXAMETHASONE 3.5; 10000; 1 MG/ML; [USP'U]/ML; MG/ML
1 SUSPENSION/ DROPS OPHTHALMIC 4 TIMES DAILY
Qty: 5 ML | Refills: 3 | Status: SHIPPED | OUTPATIENT
Start: 2023-12-06 | End: 2023-12-16

## 2023-12-06 NOTE — PROGRESS NOTES
HPI    Patient present today for EDTA procedure   Pt state painful OD, OS double vision  No other complaints at this time     Last edited by Hiral Morales MD on 12/6/2023 11:35 AM.            Assessment /Plan     For exam results, see Encounter Report.    Band keratopathy of right eye        SURGEON:  Hiral Morales M.D.    PREOPERATIVE DIAGNOSIS: Band Keratopathy Right eye    POSTOPERATIVE DIAGNOSIS: Band Keratopathy Right eye    PROCEDURES:    Superficial Keratectomy with EDTA chelation OD    ANESTHESIA: Topical Tetracaine 0.5%    COMPLICATIONS:  None    ESTIMATED BLOOD LOSS: None    SPECIMENS: None    INDICATIONS:  The patient has a history of the diagnosis above, causing visually significant visual loss. After a thorough discussion of risks, benefits, and alternatives, it was agreed to proceed with surgical removal to attempt visual rehabilitation and improve activities of daily living which have suffered due to the impaired visual status.    PROCEDURE IN DETAIL  The patient was placed in a supine position on the procedure table while the eye was prepped and draped in standard sterile fashion with 5% Betadine. A lid speculum was placed and the procedure begun by debridement of the corneal epithelium with a [place] blade, which was also used to remove as much of the diseased cornea as could safely and easily be accomplished.   A iva zane was used to remove additional diseased cornea and smooth the stromal surface.  EDTA 2% solution was used to remove any residual calcium deposits in the cornea, if present.  Antibiotic drops were placed on the eye, and a bandage contact lens applied.  The patient will continue with antibiotic and anti-inflammatory treatment, and be followed up in the eye clinic in 1 week.

## 2023-12-09 ENCOUNTER — HOSPITAL ENCOUNTER (OUTPATIENT)
Dept: RADIOLOGY | Facility: HOSPITAL | Age: 83
Discharge: HOME | End: 2023-12-09
Attending: INTERNAL MEDICINE
Payer: MEDICARE

## 2023-12-09 DIAGNOSIS — I77.9 BILATERAL CAROTID ARTERY DISEASE, UNSPECIFIED TYPE: ICD-10-CM

## 2023-12-09 DIAGNOSIS — I67.2 CEREBRAL ATHEROSCLEROSIS: ICD-10-CM

## 2023-12-09 PROCEDURE — 93880 US CAROTID BILATERAL: ICD-10-PCS | Mod: 26,,, | Performed by: RADIOLOGY

## 2023-12-09 PROCEDURE — 93880 EXTRACRANIAL BILAT STUDY: CPT | Mod: TC

## 2023-12-09 PROCEDURE — 93880 EXTRACRANIAL BILAT STUDY: CPT | Mod: 26,,, | Performed by: RADIOLOGY

## 2023-12-12 ENCOUNTER — PROCEDURE VISIT (OUTPATIENT)
Dept: OPHTHALMOLOGY | Facility: CLINIC | Age: 83
End: 2023-12-12
Payer: MEDICARE

## 2023-12-12 DIAGNOSIS — H18.421 BAND KERATOPATHY OF RIGHT EYE: Primary | ICD-10-CM

## 2023-12-12 PROCEDURE — 99024 POSTOP FOLLOW-UP VISIT: CPT | Mod: S$GLB,,, | Performed by: OPHTHALMOLOGY

## 2023-12-12 PROCEDURE — 99024 PR POST-OP FOLLOW-UP VISIT: ICD-10-PCS | Mod: S$GLB,,, | Performed by: OPHTHALMOLOGY

## 2023-12-12 NOTE — PROGRESS NOTES
HPI    82 y/o female is here for follow up Band Keratopathy OD. States   occasionally she experiences double vision when watching TV. No pain but   slight burning sensation with using eye drops.     Maxitrol QID OD  Last edited by Verenice Caraballo on 12/12/2023  3:13 PM.            Assessment /Plan     For exam results, see Encounter Report.    Band keratopathy of right eye      BCL removed  Epi healed  Mild Band K intrastromal remains but happy with appearance

## 2023-12-14 ENCOUNTER — TELEPHONE (OUTPATIENT)
Dept: ORTHOPEDICS | Facility: CLINIC | Age: 83
End: 2023-12-14
Payer: MEDICARE

## 2023-12-14 NOTE — TELEPHONE ENCOUNTER
Offered to overbook with Yair on 12/21 but patient declined, requesting to be seen sooner.  Scheduled with Lars for tomorrow to discuss repeat B knee cortisone injections.       Asa

## 2023-12-14 NOTE — TELEPHONE ENCOUNTER
----- Message from Kathy Valencia MA sent at 12/14/2023 10:34 AM CST -----  Contact: pt  Bilateral knee pain, asking to been seen today   Call back

## 2023-12-15 ENCOUNTER — OFFICE VISIT (OUTPATIENT)
Dept: ORTHOPEDICS | Facility: CLINIC | Age: 83
End: 2023-12-15
Payer: MEDICARE

## 2023-12-15 VITALS — HEIGHT: 59 IN | BODY MASS INDEX: 24.58 KG/M2 | WEIGHT: 121.94 LBS

## 2023-12-15 DIAGNOSIS — M25.561 CHRONIC PAIN OF BOTH KNEES: ICD-10-CM

## 2023-12-15 DIAGNOSIS — G89.29 CHRONIC PAIN OF BOTH KNEES: ICD-10-CM

## 2023-12-15 DIAGNOSIS — M25.562 CHRONIC PAIN OF BOTH KNEES: ICD-10-CM

## 2023-12-15 DIAGNOSIS — M17.11 PRIMARY OSTEOARTHRITIS OF RIGHT KNEE: Primary | ICD-10-CM

## 2023-12-15 DIAGNOSIS — M17.12 PRIMARY OSTEOARTHRITIS OF LEFT KNEE: ICD-10-CM

## 2023-12-15 PROCEDURE — 1101F PR PT FALLS ASSESS DOC 0-1 FALLS W/OUT INJ PAST YR: ICD-10-PCS | Mod: CPTII,S$GLB,, | Performed by: FAMILY MEDICINE

## 2023-12-15 PROCEDURE — 20610 DRAIN/INJ JOINT/BURSA W/O US: CPT | Mod: 50,S$GLB,, | Performed by: FAMILY MEDICINE

## 2023-12-15 PROCEDURE — 99214 PR OFFICE/OUTPT VISIT, EST, LEVL IV, 30-39 MIN: ICD-10-PCS | Mod: 25,S$GLB,, | Performed by: FAMILY MEDICINE

## 2023-12-15 PROCEDURE — 99999 PR PBB SHADOW E&M-EST. PATIENT-LVL III: CPT | Mod: PBBFAC,,, | Performed by: FAMILY MEDICINE

## 2023-12-15 PROCEDURE — 20610 LARGE JOINT ASPIRATION/INJECTION: BILATERAL KNEE: ICD-10-PCS | Mod: 50,S$GLB,, | Performed by: FAMILY MEDICINE

## 2023-12-15 PROCEDURE — 1125F PR PAIN SEVERITY QUANTIFIED, PAIN PRESENT: ICD-10-PCS | Mod: CPTII,S$GLB,, | Performed by: FAMILY MEDICINE

## 2023-12-15 PROCEDURE — 99214 OFFICE O/P EST MOD 30 MIN: CPT | Mod: 25,S$GLB,, | Performed by: FAMILY MEDICINE

## 2023-12-15 PROCEDURE — 3288F FALL RISK ASSESSMENT DOCD: CPT | Mod: CPTII,S$GLB,, | Performed by: FAMILY MEDICINE

## 2023-12-15 PROCEDURE — 1125F AMNT PAIN NOTED PAIN PRSNT: CPT | Mod: CPTII,S$GLB,, | Performed by: FAMILY MEDICINE

## 2023-12-15 PROCEDURE — 1159F MED LIST DOCD IN RCRD: CPT | Mod: CPTII,S$GLB,, | Performed by: FAMILY MEDICINE

## 2023-12-15 PROCEDURE — 3288F PR FALLS RISK ASSESSMENT DOCUMENTED: ICD-10-PCS | Mod: CPTII,S$GLB,, | Performed by: FAMILY MEDICINE

## 2023-12-15 PROCEDURE — 99999 PR PBB SHADOW E&M-EST. PATIENT-LVL III: ICD-10-PCS | Mod: PBBFAC,,, | Performed by: FAMILY MEDICINE

## 2023-12-15 PROCEDURE — 1159F PR MEDICATION LIST DOCUMENTED IN MEDICAL RECORD: ICD-10-PCS | Mod: CPTII,S$GLB,, | Performed by: FAMILY MEDICINE

## 2023-12-15 PROCEDURE — 1101F PT FALLS ASSESS-DOCD LE1/YR: CPT | Mod: CPTII,S$GLB,, | Performed by: FAMILY MEDICINE

## 2023-12-15 RX ADMIN — METHYLPREDNISOLONE ACETATE 80 MG: 80 INJECTION, SUSPENSION INTRA-ARTICULAR; INTRALESIONAL; INTRAMUSCULAR; SOFT TISSUE at 09:12

## 2023-12-15 NOTE — PROGRESS NOTES
Subjective:     Patient ID: Sunita Carlson is a 83 y.o. female.    Chief Complaint: Pain of the Left Knee and Pain of the Right Knee    Pain  Pertinent negatives include no chest pain, chills, congestion, coughing, headaches, rash or sore throat.     Review of Systems   Constitutional: Negative for chills and decreased appetite.   HENT:  Negative for congestion and sore throat.    Eyes:  Negative for blurred vision.   Cardiovascular:  Negative for chest pain, dyspnea on exertion and palpitations.   Respiratory:  Negative for cough and shortness of breath.    Skin:  Negative for rash.   Neurological:  Negative for difficulty with concentration, disturbances in coordination and headaches.   Psychiatric/Behavioral:  Negative for altered mental status, depression, hallucinations, memory loss and suicidal ideas.      Objective:       General    Nursing note and vitals reviewed.  Constitutional: She is oriented to person, place, and time. She appears well-developed and well-nourished.   HENT:   Nose: Nose normal.   Eyes: EOM are normal. Pupils are equal, round, and reactive to light.   Neck: Neck supple.   Cardiovascular:  Normal rate.            Pulmonary/Chest: Effort normal.   Abdominal: Soft.   Neurological: She is alert and oriented to person, place, and time. She has normal reflexes.   Psychiatric: She has a normal mood and affect. Her behavior is normal. Judgment and thought content normal.           Right Knee Exam     Inspection   Effusion: present    Tenderness   The patient is tender to palpation of the medial joint line.    Crepitus   The patient has crepitus of the patella and medial joint line.    Range of Motion   Extension:  50   Flexion:  140     Tests   Meniscus   Jose A:  Medial - negative Lateral - negative  Ligament Examination   Lachman: normal (-1 to 2mm)   PCL-Posterior Drawer: normal (0 to 2mm)     MCL - Valgus: normal (0 to 2mm)  LCL - Varus: normal  Patella   Patellar apprehension:  negative  Passive Patellar Tilt: neutral  Patellar Tracking: normal    Other   Popliteal (Baker's) Cyst: present  Sensation: normal    Left Knee Exam     Inspection   Effusion: present    Tenderness   The patient tender to palpation of the medial joint line.    Crepitus   The patient has crepitus of the patella and medial joint line.    Range of Motion   Extension:  50   Flexion:  140     Tests   Meniscus   Jose A:  Medial - negative Lateral - negative  Stability   Lachman: normal (-1 to 2mm)   PCL-Posterior Drawer: normal (0 to 2mm)  MCL - Valgus: normal (0 to 2mm)  LCL - Varus: normal (0 to 2mm)  Patella   Patellar apprehension: negative  Passive Patellar Tilt: neutral  Patellar Tracking: normal    Other   Popliteal (Baker's) Cyst: present  Sensation: normal    Muscle Strength   Right Lower Extremity   Quadriceps:  5/5   Hamstrin/5   Left Lower Extremity   Quadriceps:  5/5   Hamstrin/5     Vascular Exam     Right Pulses  Dorsalis Pedis:      2+          Left Pulses  Dorsalis Pedis:      2+        Physical Exam  Vitals and nursing note reviewed.   Constitutional:       Appearance: She is well-developed and well-nourished.   HENT:      Nose: Nose normal.   Eyes:      Extraocular Movements: EOM normal.      Pupils: Pupils are equal, round, and reactive to light.   Cardiovascular:      Rate and Rhythm: Normal rate.      Pulses:           Dorsalis pedis pulses are 2+ on the right side and 2+ on the left side.   Pulmonary:      Effort: Pulmonary effort is normal.   Abdominal:      Palpations: Abdomen is soft.   Musculoskeletal:      Cervical back: Normal range of motion and neck supple.      Right knee: Effusion present.      Instability Tests: Medial Jose A test negative and lateral Jose A test negative.      Left knee: Effusion present.      Instability Tests: Medial Jose A test negative and lateral Jose A test negative.   Neurological:      Mental Status: She is alert and oriented to person,  place, and time.      Deep Tendon Reflexes: Reflexes are normal and symmetric.   Psychiatric:         Mood and Affect: Mood and affect normal.         Behavior: Behavior normal.         Thought Content: Thought content normal.         Judgment: Judgment normal.     Assessment:     No diagnosis found.    Plan:      There are no diagnoses linked to this encounter.

## 2023-12-15 NOTE — PROGRESS NOTES
Subjective:     Patient ID: Sunita Carlson is a 83 y.o. female.    Chief Complaint: Pain of the Left Knee and Pain of the Right Knee    83-year-old female here today with complaints of bilateral knee pain secondary to osteoarthritis.  This has been an issue now for several years.  Most recently was seen by Dr. Oviedo who gave her steroid injections in both knees in April of this year.  She usually responds very well to cortisone injections.  She is requesting repeat injections today.    Pain  Pertinent negatives include no chest pain, chills, congestion, coughing, headaches, rash or sore throat.       Review of Systems   Constitutional: Negative for chills and decreased appetite.   HENT:  Negative for congestion and sore throat.    Eyes:  Negative for blurred vision.   Cardiovascular:  Negative for chest pain, dyspnea on exertion and palpitations.   Respiratory:  Negative for cough and shortness of breath.    Skin:  Negative for rash.   Neurological:  Negative for difficulty with concentration, disturbances in coordination and headaches.   Psychiatric/Behavioral:  Negative for altered mental status, depression, hallucinations, memory loss and suicidal ideas.        Objective:       General    Nursing note and vitals reviewed.  Constitutional: She is oriented to person, place, and time. She appears well-developed and well-nourished.   HENT:   Nose: Nose normal.   Eyes: EOM are normal. Pupils are equal, round, and reactive to light.   Neck: Neck supple.   Cardiovascular:  Normal rate.            Pulmonary/Chest: Effort normal.   Abdominal: Soft.   Neurological: She is alert and oriented to person, place, and time. She has normal reflexes.   Psychiatric: She has a normal mood and affect. Her behavior is normal. Judgment and thought content normal.           Right Knee Exam   Right knee exam is normal.    Inspection   Effusion: absent    Range of Motion   Extension:  50   Flexion:  140     Tests   Meniscus    Jose A:  Medial - negative Lateral - negative  Ligament Examination   Lachman: normal (-1 to 2mm)   PCL-Posterior Drawer: normal (0 to 2mm)     MCL - Valgus: normal (0 to 2mm)  LCL - Varus: normal  Patella   Patellar apprehension: negative  Passive Patellar Tilt: neutral  Patellar Tracking: normal    Other   Popliteal (Baker's) Cyst: absent  Sensation: normal    Left Knee Exam     Inspection   Effusion: present    Tenderness   The patient tender to palpation of the medial joint line.    Crepitus   The patient has crepitus of the patella and medial joint line.    Range of Motion   Extension:  50   Flexion:  140     Tests   Meniscus   Jose A:  Medial - negative Lateral - negative  Stability   Lachman: normal (-1 to 2mm)   PCL-Posterior Drawer: normal (0 to 2mm)  MCL - Valgus: normal (0 to 2mm)  LCL - Varus: normal (0 to 2mm)  Patella   Patellar apprehension: negative  Passive Patellar Tilt: neutral  Patellar Tracking: normal    Other   Popliteal (Baker's) Cyst: present  Sensation: normal    Muscle Strength   Right Lower Extremity   Quadriceps:  5/5   Hamstrin/5   Left Lower Extremity   Quadriceps:  5/5   Hamstrin/5     Vascular Exam     Right Pulses  Dorsalis Pedis:      2+          Left Pulses  Dorsalis Pedis:      2+        Physical Exam  Vitals and nursing note reviewed.   Constitutional:       Appearance: She is well-developed and well-nourished.   HENT:      Nose: Nose normal.   Eyes:      Extraocular Movements: EOM normal.      Pupils: Pupils are equal, round, and reactive to light.   Cardiovascular:      Rate and Rhythm: Normal rate.      Pulses:           Dorsalis pedis pulses are 2+ on the right side and 2+ on the left side.   Pulmonary:      Effort: Pulmonary effort is normal.   Abdominal:      Palpations: Abdomen is soft.   Musculoskeletal:      Cervical back: Normal range of motion and neck supple.      Right knee: No effusion.      Instability Tests: Medial Jose A test negative and  lateral Jose A test negative.      Left knee: Effusion present.      Instability Tests: Medial Jose A test negative and lateral Jose A test negative.   Neurological:      Mental Status: She is alert and oriented to person, place, and time.      Deep Tendon Reflexes: Reflexes are normal and symmetric.   Psychiatric:         Mood and Affect: Mood and affect normal.         Behavior: Behavior normal.         Thought Content: Thought content normal.         Judgment: Judgment normal.     Assessment:     Encounter Diagnoses   Name Primary?    Chronic pain of both knees     Primary osteoarthritis of right knee Yes    Primary osteoarthritis of left knee        Plan:      Sunita was seen today for pain and pain.    Diagnoses and all orders for this visit:    Primary osteoarthritis of right knee    Chronic pain of both knees    Primary osteoarthritis of left knee      Gave her repeat injections in both knees today.  May follow up here as needed.    Large Joint Aspiration/Injection: bilateral knee    Date/Time: 12/15/2023 9:40 AM    Performed by: Juan Sousa MD  Authorized by: Juan Sousa MD    Consent Done?:  Yes (Verbal)  Indications:  Arthritis and pain  Site marked: the procedure site was marked    Timeout: prior to procedure the correct patient, procedure, and site was verified    Prep: patient was prepped and draped in usual sterile fashion      Local anesthesia used?: Yes    Local anesthetic:  Lidocaine 1% without epinephrine  Anesthetic total (ml):  4      Details:  Needle Size:  22 G  Ultrasonic Guidance for needle placement?: No    Approach:  Anterolateral  Location:  Knee  Laterality:  Bilateral  Site:  Bilateral knee  Medications (Right):  80 mg methylPREDNISolone acetate 80 mg/mL  Medications (Left):  80 mg methylPREDNISolone acetate 80 mg/mL  Patient tolerance:  Patient tolerated the procedure well with no immediate complications

## 2023-12-18 RX ORDER — METHYLPREDNISOLONE ACETATE 80 MG/ML
80 INJECTION, SUSPENSION INTRA-ARTICULAR; INTRALESIONAL; INTRAMUSCULAR; SOFT TISSUE
Status: DISCONTINUED | OUTPATIENT
Start: 2023-12-15 | End: 2023-12-18 | Stop reason: HOSPADM

## 2024-02-07 ENCOUNTER — TELEPHONE (OUTPATIENT)
Dept: DERMATOLOGY | Facility: CLINIC | Age: 84
End: 2024-02-07
Payer: MEDICARE

## 2024-02-07 NOTE — TELEPHONE ENCOUNTER
Called patient back and got her scheduled with Dr. Redmond.     ----- Message from My Rosenthal sent at 2/7/2024 11:22 AM CST -----  Contact: slef  Type: Needs Medical Advice  Who Called:  pt  Best Call Back Number: [phone]   Additional Information: pt had a biopsy done on her ear at least a year ago or maybe a little longer.pt would like to know if it was dr orlando or dr redmond.pt has made an appt for 2/14 but wants to see the same dr.please call

## 2024-02-08 ENCOUNTER — TELEPHONE (OUTPATIENT)
Dept: DERMATOLOGY | Facility: CLINIC | Age: 84
End: 2024-02-08

## 2024-02-08 ENCOUNTER — OFFICE VISIT (OUTPATIENT)
Dept: DERMATOLOGY | Facility: CLINIC | Age: 84
End: 2024-02-08
Payer: MEDICARE

## 2024-02-08 DIAGNOSIS — L81.4 LENTIGO: ICD-10-CM

## 2024-02-08 DIAGNOSIS — L82.1 SEBORRHEIC KERATOSES: Primary | ICD-10-CM

## 2024-02-08 PROCEDURE — 99212 OFFICE O/P EST SF 10 MIN: CPT | Mod: S$GLB,,, | Performed by: STUDENT IN AN ORGANIZED HEALTH CARE EDUCATION/TRAINING PROGRAM

## 2024-02-08 NOTE — TELEPHONE ENCOUNTER
----- Message from Theodora Doni sent at 2/8/2024 12:39 PM CST -----  Contact: self  Type:  Needs Medical Advice    Who Called: self  Symptoms (please be specific): pt has a lump on back that she forgot to show dr, was wondering if she can come in right quick to show her.     Would the patient rather a call back or a response via [facility]? call  Best Call Back Number:      Additional Information: please advise and thank you.

## 2024-02-14 ENCOUNTER — LAB VISIT (OUTPATIENT)
Dept: LAB | Facility: HOSPITAL | Age: 84
End: 2024-02-14
Attending: INTERNAL MEDICINE
Payer: MEDICARE

## 2024-02-14 DIAGNOSIS — E78.5 HYPERLIPIDEMIA: Primary | ICD-10-CM

## 2024-02-14 LAB
CHOLEST SERPL-MCNC: 205 MG/DL (ref 120–199)
CHOLEST/HDLC SERPL: 3 {RATIO} (ref 2–5)
HDLC SERPL-MCNC: 69 MG/DL (ref 40–75)
HDLC SERPL: 33.7 % (ref 20–50)
LDLC SERPL CALC-MCNC: 111.6 MG/DL (ref 63–159)
NONHDLC SERPL-MCNC: 136 MG/DL
TRIGL SERPL-MCNC: 122 MG/DL (ref 30–150)

## 2024-02-14 PROCEDURE — 36415 COLL VENOUS BLD VENIPUNCTURE: CPT | Performed by: INTERNAL MEDICINE

## 2024-02-14 PROCEDURE — 80061 LIPID PANEL: CPT | Performed by: INTERNAL MEDICINE

## 2024-02-26 ENCOUNTER — OFFICE VISIT (OUTPATIENT)
Dept: DERMATOLOGY | Facility: CLINIC | Age: 84
End: 2024-02-26
Payer: MEDICARE

## 2024-02-26 DIAGNOSIS — D17.1 LIPOMA OF BACK: Primary | ICD-10-CM

## 2024-02-26 DIAGNOSIS — M54.9 OTHER CHRONIC BACK PAIN: ICD-10-CM

## 2024-02-26 DIAGNOSIS — G89.29 OTHER CHRONIC BACK PAIN: ICD-10-CM

## 2024-02-26 PROCEDURE — 99213 OFFICE O/P EST LOW 20 MIN: CPT | Mod: S$GLB,,, | Performed by: STUDENT IN AN ORGANIZED HEALTH CARE EDUCATION/TRAINING PROGRAM

## 2024-02-26 NOTE — PROGRESS NOTES
Subjective:      Patient ID:  Sunita Carlson is a 83 y.o. female who presents for   Chief Complaint   Patient presents with    Lesion     back     LOV 02/08/2024    Patient is coming in today for a lump on her back. States that it has been there x's years. States that it is getting bigger and is starting to get sore.     Derm Hx  Denies Phx NMSC  Denies Fhx MM          Review of Systems   Constitutional:  Negative for fever, chills and fatigue.   Respiratory:  Negative for cough and shortness of breath.    Gastrointestinal:  Negative for nausea and vomiting.   Skin:  Negative for daily sunscreen use.   Hematologic/Lymphatic: Bruises/bleeds easily (asa).       Objective:   Physical Exam   Constitutional: She appears well-developed and well-nourished.   Neurological: She is alert and oriented to person, place, and time.   Psychiatric: She has a normal mood and affect.   Skin:   Areas Examined (abnormalities noted in diagram):   Back Inspection Performed            Diagram Legend     Erythematous scaling macule/papule c/w actinic keratosis       Vascular papule c/w angioma      Pigmented verrucoid papule/plaque c/w seborrheic keratosis      Yellow umbilicated papule c/w sebaceous hyperplasia      Irregularly shaped tan macule c/w lentigo     1-2 mm smooth white papules consistent with Milia      Movable subcutaneous cyst with punctum c/w epidermal inclusion cyst      Subcutaneous movable cyst c/w pilar cyst      Firm pink to brown papule c/w dermatofibroma      Pedunculated fleshy papule(s) c/w skin tag(s)      Evenly pigmented macule c/w junctional nevus     Mildly variegated pigmented, slightly irregular-bordered macule c/w mildly atypical nevus      Flesh colored to evenly pigmented papule c/w intradermal nevus       Pink pearly papule/plaque c/w basal cell carcinoma      Erythematous hyperkeratotic cursted plaque c/w SCC      Surgical scar with no sign of skin cancer recurrence      Open and closed comedones       Inflammatory papules and pustules      Verrucoid papule consistent consistent with wart     Erythematous eczematous patches and plaques     Dystrophic onycholytic nail with subungual debris c/w onychomycosis     Umbilicated papule    Erythematous-base heme-crusted tan verrucoid plaque consistent with inflamed seborrheic keratosis     Erythematous Silvery Scaling Plaque c/w Psoriasis     See annotation      Assessment / Plan:        Lipoma of back  Discussed that lesion is most likely a lipoma  Too large to remove in clinic but if she would like it removed can refer to general surgery, she declines today  Discussed that pathology is definitive    Other chronic back pain  On further questioning it appears she has pain on left mid back, no over lipoma  Unclear etiology, recommend f/u with pcp           No follow-ups on file.

## 2024-03-07 ENCOUNTER — HOSPITAL ENCOUNTER (OUTPATIENT)
Dept: RADIOLOGY | Facility: HOSPITAL | Age: 84
Discharge: HOME | End: 2024-03-07
Attending: REGISTERED NURSE
Payer: MEDICARE

## 2024-03-07 ENCOUNTER — OFFICE VISIT (OUTPATIENT)
Dept: ORTHOPEDICS | Facility: CLINIC | Age: 84
End: 2024-03-07
Payer: MEDICARE

## 2024-03-07 VITALS — WEIGHT: 120.56 LBS | HEIGHT: 59 IN | BODY MASS INDEX: 24.31 KG/M2

## 2024-03-07 DIAGNOSIS — M50.30 DDD (DEGENERATIVE DISC DISEASE), CERVICAL: Primary | ICD-10-CM

## 2024-03-07 DIAGNOSIS — M50.30 DDD (DEGENERATIVE DISC DISEASE), CERVICAL: ICD-10-CM

## 2024-03-07 DIAGNOSIS — M54.12 CERVICAL RADICULOPATHY: Primary | ICD-10-CM

## 2024-03-07 PROCEDURE — 99999 PR PBB SHADOW E&M-EST. PATIENT-LVL III: CPT | Mod: PBBFAC,,, | Performed by: REGISTERED NURSE

## 2024-03-07 PROCEDURE — 99204 OFFICE O/P NEW MOD 45 MIN: CPT | Mod: S$GLB,,, | Performed by: REGISTERED NURSE

## 2024-03-07 PROCEDURE — 72050 X-RAY EXAM NECK SPINE 4/5VWS: CPT | Mod: TC

## 2024-03-07 PROCEDURE — 72050 X-RAY EXAM NECK SPINE 4/5VWS: CPT | Mod: 26,,, | Performed by: RADIOLOGY

## 2024-03-07 RX ORDER — GABAPENTIN 100 MG/1
100 CAPSULE ORAL 2 TIMES DAILY
Qty: 60 CAPSULE | Refills: 0 | Status: SHIPPED | OUTPATIENT
Start: 2024-03-07 | End: 2024-03-21 | Stop reason: SDUPTHER

## 2024-03-07 RX ORDER — MELOXICAM 7.5 MG/1
7.5 TABLET ORAL DAILY
Qty: 14 TABLET | Refills: 0 | Status: SHIPPED | OUTPATIENT
Start: 2024-03-07 | End: 2024-03-21 | Stop reason: SDUPTHER

## 2024-03-07 RX ORDER — TIZANIDINE 2 MG/1
2 TABLET ORAL NIGHTLY
Qty: 14 TABLET | Refills: 0 | Status: SHIPPED | OUTPATIENT
Start: 2024-03-07 | End: 2024-03-07 | Stop reason: SDUPTHER

## 2024-03-07 RX ORDER — PREDNISONE 20 MG/1
20 TABLET ORAL 2 TIMES DAILY
Qty: 10 TABLET | Refills: 0 | Status: SHIPPED | OUTPATIENT
Start: 2024-03-07 | End: 2024-03-07 | Stop reason: SDUPTHER

## 2024-03-07 RX ORDER — PREDNISONE 20 MG/1
20 TABLET ORAL 2 TIMES DAILY
Qty: 10 TABLET | Refills: 0 | Status: SHIPPED | OUTPATIENT
Start: 2024-03-07 | End: 2024-03-21

## 2024-03-07 RX ORDER — GABAPENTIN 100 MG/1
100 CAPSULE ORAL 2 TIMES DAILY
Qty: 60 CAPSULE | Refills: 0 | Status: SHIPPED | OUTPATIENT
Start: 2024-03-07 | End: 2024-03-07 | Stop reason: SDUPTHER

## 2024-03-07 RX ORDER — TIZANIDINE 2 MG/1
2 TABLET ORAL NIGHTLY
Qty: 14 TABLET | Refills: 0 | Status: SHIPPED | OUTPATIENT
Start: 2024-03-07 | End: 2024-03-21 | Stop reason: SDUPTHER

## 2024-03-07 RX ORDER — MELOXICAM 7.5 MG/1
7.5 TABLET ORAL DAILY
Qty: 14 TABLET | Refills: 0 | Status: SHIPPED | OUTPATIENT
Start: 2024-03-07 | End: 2024-03-07 | Stop reason: SDUPTHER

## 2024-03-07 NOTE — PROGRESS NOTES
"DATE: 3/7/2024  PATIENT: Sunita Carlson    Supervising Physician: Conrad Gurrola M.D.    CHIEF COMPLAINT: shoulder blade pain    HISTORY:  Sunita Carlson is a 83 y.o. female with pmhx of CAD, CABG and HTN here for initial evaluation of neck and right neck, parascapular and armpit pain arm pain (Neck - 5, Arm - 6). The pain has been present for about 1 week after reaching into the washing machine for clothing. The patient describes the pain as dull and burning. The pain is worse with any movement and improved by nothing. There is associated numbness and tingling in her right pinky finger. There is no subjective weakness. Prior treatments have included Tylenol, but no ALANNA or surgery.   The patient denies myelopathic symptoms such as handwriting changes or difficulty with buttons/coins/keys. Denies perineal paresthesias, bowel/bladder dysfunction.    PAST MEDICAL/SURGICAL HISTORY:  Past Medical History:   Diagnosis Date    Arthritis     Cataract     Coronary artery disease     Hypertension     Insomnia     Neuropathy     Retinal detachment     Stomach ulcer      Past Surgical History:   Procedure Laterality Date    APPENDECTOMY  1951    BLADDER SUSPENSION      BREAST BIOPSY Right     Benign.    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    CAROTID ENDARTERECTOMY Left     L    CATARACT EXTRACTION Left     CHOLECYSTECTOMY      DILATION AND CURETTAGE OF UTERUS      Due to miscarriage.    INJECTION OF ANESTHETIC AGENT AROUND GANGLION IMPAR N/A 09/12/2019    Procedure: BLOCK, GANGLION IMPAR;  Surgeon: Christian James MD;  Location: [facility] OR;  Service: Pain Management;  Laterality: N/A;    INSERTION OF IMPLANTABLE LOOP RECORDER N/A 12/18/2020    Procedure: INSERTION, IMPLANTABLE LOOP RECORDER;  Surgeon: Adin Saba MD;  Location: [facility] CATH/EP LAB;  Service: Cardiology;  Laterality: N/A;    OOPHORECTOMY Bilateral 1959    SUPERFICIAL KERATECTOMY Right 10/27/2017    Dr. Morales    TONSILLECTOMY      TRANSFORAMINAL " EPIDURAL INJECTION OF STEROID Left 08/12/2019    Procedure: Injection,steroid,epidural,transforaminal approach L4-5, L5-S1;  Surgeon: Christian James MD;  [facility];  Service: Pain Management;  Laterality: Left;    TRANSFORAMINAL EPIDURAL INJECTION OF STEROID Left 01/16/2020    Procedure: Injection,steroid,epidural,transforaminal approach;  Surgeon: Christian James MD;  [facility];  Service: Pain Management;  Laterality: Left;  L4-5, L5-S1    TRANSFORAMINAL EPIDURAL INJECTION OF STEROID Left 06/30/2020    Procedure: Injection,steroid,epidural,transforaminal approach;  Surgeon: Christian James MD;  [facility];  Service: Pain Management;  Laterality: Left;  L4-5 and L5-S1    VAGINAL HYSTERECTOMY  1959       Medications:  Current Outpatient Medications on File Prior to Visit   Medication Sig Dispense Refill    ALPRAZolam (XANAX) 0.25 MG tablet Take 0.25 mg by mouth daily as needed.      aspirin (ECOTRIN) 81 MG EC tablet Take 81 mg by mouth once daily.      atorvastatin (LIPITOR) 20 MG tablet Take 20 mg by mouth once daily.      cholecalciferol, vitamin D3, (VITAMIN D3) 2,000 unit Tab Take 1 tablet by mouth once daily.      diclofenac sodium (VOLTAREN) 1 % Gel Apply 2 g topically daily as needed.      dicyclomine (BENTYL) 10 MG capsule 2 (two) times daily as needed.       diphenoxylate-atropine 2.5-0.025 mg (LOMOTIL) 2.5-0.025 mg per tablet Take 1 tablet by mouth 4 (four) times daily as needed for Diarrhea.      EScitalopram oxalate (LEXAPRO) 10 MG tablet Take 1 tablet (10 mg total) by mouth once daily. 30 tablet 11    nitroGLYCERIN (NITROSTAT) 0.4 MG SL tablet Place under the tongue.      ondansetron (ZOFRAN-ODT) 8 MG TbDL every 6 (six) hours as needed.       pantoprazole (PROTONIX) 40 MG tablet Take 40 mg by mouth every morning.      propranoloL (INDERAL LA) 60 MG 24 hr capsule Take 60 mg by mouth every morning.      rOPINIRole (REQUIP) 0.5 MG tablet Take 0.5 mg by mouth nightly as needed.      temazepam  (RESTORIL) 30 mg capsule Take 30 mg by mouth every evening.      amLODIPine (NORVASC) 5 MG tablet Take 5 mg by mouth once daily.      mirabegron (MYRBETRIQ) 50 mg Tb24 Take 1 tablet (50 mg total) by mouth every morning. Take 1 in morning for overactive bladder 90 tablet 0     Current Facility-Administered Medications on File Prior to Visit   Medication Dose Route Frequency Provider Last Rate Last Admin    ondansetron injection 4 mg  4 mg Intravenous 1 time in Clinic/HOD My Voss NP           Social History:   Social History     Socioeconomic History    Marital status:    Tobacco Use    Smoking status: Former     Current packs/day: 0.00     Types: Cigarettes     Start date: 10/13/1958     Quit date: 10/13/1962     Years since quittin.4    Smokeless tobacco: Never   Substance and Sexual Activity    Alcohol use: Yes     Comment: very rarely    Drug use: No    Sexual activity: Not Currently     Birth control/protection: Surgical     Social Determinants of Health     Financial Resource Strain: Low Risk  (10/19/2022)    Overall Financial Resource Strain (CARDIA)     Difficulty of Paying Living Expenses: Not very hard   Food Insecurity: No Food Insecurity (10/19/2022)    Hunger Vital Sign     Worried About Running Out of Food in the Last Year: Never true     Ran Out of Food in the Last Year: Never true   Transportation Needs: No Transportation Needs (10/19/2022)    PRAPARE - Transportation     Lack of Transportation (Medical): No     Lack of Transportation (Non-Medical): No   Physical Activity: Inactive (10/19/2022)    Exercise Vital Sign     Days of Exercise per Week: 0 days     Minutes of Exercise per Session: 0 min   Stress: Stress Concern Present (10/29/2020)    [employer] [employer] - Occupational Stress Questionnaire     Feeling of Stress : To some extent   Social Connections: Socially Isolated (10/19/2022)    Social Connection and Isolation Panel [NHANES]     Frequency of  "Communication with Friends and Family: More than three times a week     Frequency of Social Gatherings with Friends and Family: Twice a week     Attends Taoist Services: Never     Active Member of Clubs or Organizations: No     Attends Club or Organization Meetings: Never     Marital Status:    Housing Stability: Low Risk  (10/19/2022)    Housing Stability Vital Sign     Unable to Pay for Housing in the Last Year: No     Number of Places Lived in the Last Year: 1     Unstable Housing in the Last Year: No       REVIEW OF SYSTEMS:  Constitution: Negative. Negative for chills, fever and night sweats.   Cardiovascular: Negative for chest pain and syncope.   Respiratory: Negative for cough and shortness of breath.   Gastrointestinal: See HPI. Negative for nausea/vomiting. Negative for abdominal pain.  Genitourinary: See HPI. Negative for discoloration or dysuria.  Skin: Negative for dry skin, itching and rash.   Hematologic/Lymphatic: Negative for bleeding problem. Does not bruise/bleed easily.   Musculoskeletal: Negative for falls and muscle weakness.   Neurological: See HPI. No seizures.   Endocrine: Negative for polydipsia, polyphagia and polyuria.   Allergic/Immunologic: Negative for hives and persistent infections.  Psychiatric/Behavioral: Negative for depression and insomnia.         EXAM:  Ht 4' 11" (1.499 m)   Wt 54.7 kg (120 lb 9.5 oz)   LMP 02/06/2020   BMI 24.36 kg/m²     General: The patient is a 83 y.o. female in no apparent distress, the patient is oriented to person, place and time.  Psych: Normal mood and affect  HEENT: Vision grossly intact, hearing intact to the spoken word.  Lungs: Respirations unlabored.  Gait: Normal station and gait, no difficulty with toe or heel walk.   Skin: Cervical skin negative for rashes, lesions, hairy patches and surgical scars.  Range of motion: Cervical range of motion is acceptable. There is mild tenderness to palpation.  Spinal Balance: Global saggital and " coronal spinal balance acceptable, no significant for scoliosis and kyphosis.  Musculoskeletal: No pain with the range of motion of the bilateral shoulders and elbows. Normal bulk and contour of the bilateral hands.  Vascular: Bilateral hands warm and well perfused, radial pulses 2+ bilaterally.  Neurological: Normal strength and tone in all major motor groups in the bilateral upper and lower extremities. Normal sensation to light touch in the C5-T1 and L2-S1 dermatomes bilaterally.  Deep tendon reflexes symmetric 2+ in the bilateral upper and lower extremities.  Negative Inverted Radial Reflex and Elliott's bilaterally. Negative Babinski bilaterally.     IMAGING:   Today I personally reviewed AP, Lat and Flex/Ex  upright C-spine films that demonstrate moderate DDD from C4-7.      Body mass index is 24.36 kg/m².    Hemoglobin A1C   Date Value Ref Range Status   07/22/2019 5.5 4.0 - 5.6 % Final     Comment:     ADA Screening Guidelines:  5.7-6.4%  Consistent with prediabetes  >or=6.5%  Consistent with diabetes  High levels of fetal hemoglobin interfere with the HbA1C  assay. Heterozygous hemoglobin variants (HbS, HgC, etc)do  not significantly interfere with this assay.   However, presence of multiple variants may affect accuracy.     06/25/2015 5.4 4.5 - 6.2 % Final           ASSESSMENT/PLAN:    Sunita was seen today for shoulder pain and neck pain.    Diagnoses and all orders for this visit:    Cervical radiculopathy  -     Discontinue: predniSONE (DELTASONE) 20 MG tablet; Take 1 tablet (20 mg total) by mouth 2 (two) times daily.  -     Discontinue: meloxicam (MOBIC) 7.5 MG tablet; Take 1 tablet (7.5 mg total) by mouth once daily.  -     Discontinue: tiZANidine (ZANAFLEX) 2 MG tablet; Take 1 tablet (2 mg total) by mouth every evening. for 14 days  -     Discontinue: gabapentin (NEURONTIN) 100 MG capsule; Take 1 capsule (100 mg total) by mouth 2 (two) times daily.  -     meloxicam (MOBIC) 7.5 MG tablet; Take 1  tablet (7.5 mg total) by mouth once daily.  -     predniSONE (DELTASONE) 20 MG tablet; Take 1 tablet (20 mg total) by mouth 2 (two) times daily.  -     gabapentin (NEURONTIN) 100 MG capsule; Take 1 capsule (100 mg total) by mouth 2 (two) times daily.  -     tiZANidine (ZANAFLEX) 2 MG tablet; Take 1 tablet (2 mg total) by mouth every evening. for 14 days    DDD (degenerative disc disease), cervical  -     Discontinue: predniSONE (DELTASONE) 20 MG tablet; Take 1 tablet (20 mg total) by mouth 2 (two) times daily.  -     Discontinue: meloxicam (MOBIC) 7.5 MG tablet; Take 1 tablet (7.5 mg total) by mouth once daily.  -     Discontinue: tiZANidine (ZANAFLEX) 2 MG tablet; Take 1 tablet (2 mg total) by mouth every evening. for 14 days  -     Discontinue: gabapentin (NEURONTIN) 100 MG capsule; Take 1 capsule (100 mg total) by mouth 2 (two) times daily.  -     meloxicam (MOBIC) 7.5 MG tablet; Take 1 tablet (7.5 mg total) by mouth once daily.  -     predniSONE (DELTASONE) 20 MG tablet; Take 1 tablet (20 mg total) by mouth 2 (two) times daily.  -     gabapentin (NEURONTIN) 100 MG capsule; Take 1 capsule (100 mg total) by mouth 2 (two) times daily.  -     tiZANidine (ZANAFLEX) 2 MG tablet; Take 1 tablet (2 mg total) by mouth every evening. for 14 days      Today we discussed at length all of the different treatment options including anti-inflammatories, acetaminophen, rest, ice, heat, physical therapy including strengthening and stretching exercises, home exercises, ROM, aerobic conditioning, aqua therapy, other modalities including ultrasound, massage, and dry needling, epidural steroid injections and finally surgical intervention.  medications sent to the pharmacy. She will follow up in 2 weeks; if her pain persists I will order a MRI.

## 2024-03-13 NOTE — PROGRESS NOTES
Established Patient - Audio Only Telehealth Visit     The patient location is: [place]  The chief complaint leading to consultation is: f/u  Visit type: Virtual visit with audio only (telephone)  Total time spent with patient: 15min     The reason for the audio only service rather than synchronous audio and video virtual visit was related to technical difficulties or patient preference/necessity.     Each patient to whom I provide medical services by telemedicine is:  (1) informed of the relationship between the physician and patient and the respective role of any other health care provider with respect to management of the patient; and (2) notified that they may decline to receive medical services by telemedicine and may withdraw from such care at any time. Patient verbally consented to receive this service via voice-only telephone call.    DATE: 3/21/2024  PATIENT: Sunita Carlson    Supervising Physician: Conrad Gurrola M.D.    CHIEF COMPLAINT: shoulder blade pain    HISTORY:  Sunita Carlson is a 83 y.o. female with pmhx of CAD, CABG and HTN here for initial evaluation of neck and right neck, parascapular and armpit pain arm pain (Neck - 2, Arm - 2). She also notes low back and bilateral (L>R) leg pain. The pain has been present for about 2 weeks after reaching into the washing machine for clothing. The patient describes the pain as dull and burning. The pain is worse with any movement and improved by nothing. There is associated numbness and tingling in her right pinky finger. There is no subjective weakness. Prior treatments have included Tylenol, but no ALANNA or surgery.   The patient denies myelopathic symptoms such as handwriting changes or difficulty with buttons/coins/keys. Denies perineal paresthesias, bowel/bladder dysfunction.    PAST MEDICAL/SURGICAL HISTORY:  Past Medical History:   Diagnosis Date    Arthritis     Cataract     Coronary artery disease     Hypertension     Insomnia     Neuropathy      "Retinal detachment     Stomach ulcer      Past Surgical History:   Procedure Laterality Date    APPENDECTOMY  1951    BLADDER SUSPENSION      BREAST BIOPSY Right     Benign.    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    CAROTID ENDARTERECTOMY Left     L    CATARACT EXTRACTION Left     CHOLECYSTECTOMY      DILATION AND CURETTAGE OF UTERUS      Due to miscarriage.    INJECTION OF ANESTHETIC AGENT AROUND GANGLION IMPAR N/A 09/12/2019    Procedure: BLOCK, GANGLION IMPAR;  Surgeon: Christian James MD;  Location: [facility] OR;  Service: Pain Management;  Laterality: N/A;    INSERTION OF IMPLANTABLE LOOP RECORDER N/A 12/18/2020    Procedure: INSERTION, IMPLANTABLE LOOP RECORDER;  Surgeon: Adin Saba MD;  Location: [facility] CATH/EP LAB;  Service: Cardiology;  Laterality: N/A;    OOPHORECTOMY Bilateral 1959    SUPERFICIAL KERATECTOMY Right 10/27/2017    Dr. Morales    TONSILLECTOMY      TRANSFORAMINAL EPIDURAL INJECTION OF STEROID Left 08/12/2019    Procedure: Injection,steroid,epidural,transforaminal approach L4-5, L5-S1;  Surgeon: Christian James MD;  Location: [facility] OR;  Service: Pain Management;  Laterality: Left;    TRANSFORAMINAL EPIDURAL INJECTION OF STEROID Left 01/16/2020    Procedure: Injection,steroid,epidural,transforaminal approach;  Surgeon: Christian James MD;  Location: [facility] OR;  Service: Pain Management;  Laterality: Left;  L4-5, L5-S1    TRANSFORAMINAL EPIDURAL INJECTION OF STEROID Left 06/30/2020    Procedure: Injection,steroid,epidural,transforaminal approach;  Surgeon: Christian James MD;  Location: [facility] OR;  Service: Pain Management;  Laterality: Left;  L4-5 and L5-S1    VAGINAL HYSTERECTOMY  1959       Medications:  Current Outpatient Medications on File Prior to Visit   Medication Sig Dispense Refill    ALPRAZolam (XANAX) 0.25 MG tablet Take 0.25 mg by mouth daily as needed.      amLODIPine (NORVASC) 5 MG tablet Take 5 mg by mouth once daily.      aspirin (ECOTRIN) 81 MG EC tablet Take 81 mg by mouth once daily.   "    atorvastatin (LIPITOR) 20 MG tablet Take 20 mg by mouth once daily.      cholecalciferol, vitamin D3, (VITAMIN D3) 2,000 unit Tab Take 1 tablet by mouth once daily.      diclofenac sodium (VOLTAREN) 1 % Gel Apply 2 g topically daily as needed.      dicyclomine (BENTYL) 10 MG capsule 2 (two) times daily as needed.       diphenoxylate-atropine 2.5-0.025 mg (LOMOTIL) 2.5-0.025 mg per tablet Take 1 tablet by mouth 4 (four) times daily as needed for Diarrhea.      EScitalopram oxalate (LEXAPRO) 10 MG tablet Take 1 tablet (10 mg total) by mouth once daily. 30 tablet 11    mirabegron (MYRBETRIQ) 50 mg Tb24 Take 1 tablet (50 mg total) by mouth every morning. Take 1 in morning for overactive bladder 90 tablet 0    nitroGLYCERIN (NITROSTAT) 0.4 MG SL tablet Place under the tongue.      ondansetron (ZOFRAN-ODT) 8 MG TbDL every 6 (six) hours as needed.       pantoprazole (PROTONIX) 40 MG tablet Take 40 mg by mouth every morning.      propranoloL (INDERAL LA) 60 MG 24 hr capsule Take 60 mg by mouth every morning.      rOPINIRole (REQUIP) 0.5 MG tablet Take 0.5 mg by mouth nightly as needed.      temazepam (RESTORIL) 30 mg capsule Take 30 mg by mouth every evening.      [DISCONTINUED] gabapentin (NEURONTIN) 100 MG capsule Take 1 capsule (100 mg total) by mouth 2 (two) times daily. 60 capsule 0    [DISCONTINUED] meloxicam (MOBIC) 7.5 MG tablet Take 1 tablet (7.5 mg total) by mouth once daily. 14 tablet 0    [DISCONTINUED] predniSONE (DELTASONE) 20 MG tablet Take 1 tablet (20 mg total) by mouth 2 (two) times daily. 10 tablet 0    [DISCONTINUED] tiZANidine (ZANAFLEX) 2 MG tablet Take 1 tablet (2 mg total) by mouth every evening. for 14 days 14 tablet 0     Current Facility-Administered Medications on File Prior to Visit   Medication Dose Route Frequency Provider Last Rate Last Admin    ondansetron injection 4 mg  4 mg Intravenous 1 time in Clinic/HOD My Voss NP           Social History:   Social History      Socioeconomic History    Marital status:    Tobacco Use    Smoking status: Former     Current packs/day: 0.00     Types: Cigarettes     Start date: 10/13/1958     Quit date: 10/13/1962     Years since quittin.4    Smokeless tobacco: Never   Substance and Sexual Activity    Alcohol use: Yes     Comment: very rarely    Drug use: No    Sexual activity: Not Currently     Birth control/protection: Surgical     Social Determinants of Health     Financial Resource Strain: Low Risk  (10/19/2022)    Overall Financial Resource Strain (CARDIA)     Difficulty of Paying Living Expenses: Not very hard   Food Insecurity: No Food Insecurity (10/19/2022)    Hunger Vital Sign     Worried About Running Out of Food in the Last Year: Never true     Ran Out of Food in the Last Year: Never true   Transportation Needs: No Transportation Needs (10/19/2022)    PRAPARE - Transportation     Lack of Transportation (Medical): No     Lack of Transportation (Non-Medical): No   Physical Activity: Inactive (10/19/2022)    Exercise Vital Sign     Days of Exercise per Week: 0 days     Minutes of Exercise per Session: 0 min   Stress: Stress Concern Present (10/29/2020)    [place] [employer] - Occupational Stress Questionnaire     Feeling of Stress : To some extent   Social Connections: Socially Isolated (10/19/2022)    Social Connection and Isolation Panel [NHANES]     Frequency of Communication with Friends and Family: More than three times a week     Frequency of Social Gatherings with Friends and Family: Twice a week     Attends Hindu Services: Never     Active Member of Clubs or Organizations: No     Attends Club or Organization Meetings: Never     Marital Status:    Housing Stability: Low Risk  (10/19/2022)    Housing Stability Vital Sign     Unable to Pay for Housing in the Last Year: No     Number of Places Lived in the Last Year: 1     Unstable Housing in the Last Year: No       REVIEW OF  SYSTEMS:  Constitution: Negative. Negative for chills, fever and night sweats.   Cardiovascular: Negative for chest pain and syncope.   Respiratory: Negative for cough and shortness of breath.   Gastrointestinal: See HPI. Negative for nausea/vomiting. Negative for abdominal pain.  Genitourinary: See HPI. Negative for discoloration or dysuria.  Skin: Negative for dry skin, itching and rash.   Hematologic/Lymphatic: Negative for bleeding problem. Does not bruise/bleed easily.   Musculoskeletal: Negative for falls and muscle weakness.   Neurological: See HPI. No seizures.   Endocrine: Negative for polydipsia, polyphagia and polyuria.   Allergic/Immunologic: Negative for hives and persistent infections.  Psychiatric/Behavioral: Negative for depression and insomnia.         EXAM:  [facility] 02/06/2020     General: The patient is a 83 y.o. female in no apparent distress, the patient is oriented to person, place and time.  Psych: Normal mood and affect  HEENT: Vision grossly intact, hearing intact to the spoken word.  Lungs: Respirations unlabored.  Gait: Normal station and gait, no difficulty with toe or heel walk.   Skin: Cervical skin negative for rashes, lesions, hairy patches and surgical scars.  Range of motion: Cervical range of motion is acceptable. There is mild tenderness to palpation.  Spinal Balance: Global saggital and coronal spinal balance acceptable, no significant for scoliosis and kyphosis.  Musculoskeletal: No pain with the range of motion of the bilateral shoulders and elbows. Normal bulk and contour of the bilateral hands.  Vascular: Bilateral hands warm and well perfused, radial pulses 2+ bilaterally.  Neurological: Normal strength and tone in all major motor groups in the bilateral upper and lower extremities. Normal sensation to light touch in the C5-T1 and L2-S1 dermatomes bilaterally.  Deep tendon reflexes symmetric 2+ in the bilateral upper and lower extremities.  Negative Inverted Radial Reflex and  Elliott's bilaterally. Negative Babinski bilaterally.     IMAGING:   Today I personally reviewed AP, Lat and Flex/Ex  upright C-spine films that demonstrate moderate DDD from C4-7.      There is no height or weight on file to calculate BMI.    Hemoglobin A1C   Date Value Ref Range Status   07/22/2019 5.5 4.0 - 5.6 % Final     Comment:     ADA Screening Guidelines:  5.7-6.4%  Consistent with prediabetes  >or=6.5%  Consistent with diabetes  High levels of fetal hemoglobin interfere with the HbA1C  assay. Heterozygous hemoglobin variants (HbS, HgC, etc)do  not significantly interfere with this assay.   However, presence of multiple variants may affect accuracy.     06/25/2015 5.4 4.5 - 6.2 % Final           ASSESSMENT/PLAN:    Diagnoses and all orders for this visit:    DDD (degenerative disc disease), cervical  -     meloxicam (MOBIC) 7.5 MG tablet; Take 1 tablet (7.5 mg total) by mouth once daily.  -     gabapentin (NEURONTIN) 100 MG capsule; Take 1 capsule (100 mg total) by mouth 2 (two) times daily.  -     tiZANidine (ZANAFLEX) 2 MG tablet; Take 1 tablet (2 mg total) by mouth every evening.    Cervical radiculopathy  -     meloxicam (MOBIC) 7.5 MG tablet; Take 1 tablet (7.5 mg total) by mouth once daily.  -     gabapentin (NEURONTIN) 100 MG capsule; Take 1 capsule (100 mg total) by mouth 2 (two) times daily.  -     tiZANidine (ZANAFLEX) 2 MG tablet; Take 1 tablet (2 mg total) by mouth every evening.    Dorsalgia, unspecified  -     MRI Lumbar Spine Without Contrast; Future    DDD (degenerative disc disease), lumbar  -     X-Ray Lumbar Complete Including Flex And Ext; Future  -     MRI Lumbar Spine Without Contrast; Future      Lumbar xray and MRI ordered, she will follow up in the clinic for results.      This service was not originating from a related E/M service provided within the previous 7 days nor will  to an E/M service or procedure within the next 24 hours or my soonest available appointment.   Prevailing standard of care was able to be met in this audio-only visit.

## 2024-03-21 ENCOUNTER — OFFICE VISIT (OUTPATIENT)
Dept: ORTHOPEDICS | Facility: CLINIC | Age: 84
End: 2024-03-21
Payer: MEDICARE

## 2024-03-21 DIAGNOSIS — M54.12 CERVICAL RADICULOPATHY: ICD-10-CM

## 2024-03-21 DIAGNOSIS — M50.30 DDD (DEGENERATIVE DISC DISEASE), CERVICAL: Primary | ICD-10-CM

## 2024-03-21 DIAGNOSIS — M51.36 DDD (DEGENERATIVE DISC DISEASE), LUMBAR: ICD-10-CM

## 2024-03-21 DIAGNOSIS — M54.9 DORSALGIA, UNSPECIFIED: ICD-10-CM

## 2024-03-21 PROCEDURE — 99442 PR PHYSICIAN TELEPHONE EVALUATION 11-20 MIN: CPT | Mod: 95,,, | Performed by: REGISTERED NURSE

## 2024-03-21 RX ORDER — TIZANIDINE 2 MG/1
2 TABLET ORAL NIGHTLY
Qty: 60 TABLET | Refills: 1 | Status: SHIPPED | OUTPATIENT
Start: 2024-03-21 | End: 2024-07-19

## 2024-03-21 RX ORDER — MELOXICAM 7.5 MG/1
7.5 TABLET ORAL DAILY
Qty: 30 TABLET | Refills: 0 | Status: SHIPPED | OUTPATIENT
Start: 2024-03-21

## 2024-03-21 RX ORDER — GABAPENTIN 100 MG/1
100 CAPSULE ORAL 2 TIMES DAILY
Qty: 60 CAPSULE | Refills: 8 | Status: SHIPPED | OUTPATIENT
Start: 2024-03-21 | End: 2024-12-16

## 2024-03-25 ENCOUNTER — HOSPITAL ENCOUNTER (OUTPATIENT)
Dept: RADIOLOGY | Facility: HOSPITAL | Age: 84
Discharge: HOME | End: 2024-03-25
Attending: REGISTERED NURSE
Payer: MEDICARE

## 2024-03-25 DIAGNOSIS — M51.36 DDD (DEGENERATIVE DISC DISEASE), LUMBAR: ICD-10-CM

## 2024-03-25 DIAGNOSIS — M54.9 DORSALGIA, UNSPECIFIED: ICD-10-CM

## 2024-03-25 PROCEDURE — 72114 X-RAY EXAM L-S SPINE BENDING: CPT | Mod: TC,PO

## 2024-03-25 PROCEDURE — 72148 MRI LUMBAR SPINE W/O DYE: CPT | Mod: TC,PO

## 2024-03-26 ENCOUNTER — PATIENT MESSAGE (OUTPATIENT)
Dept: ORTHOPEDICS | Facility: CLINIC | Age: 84
End: 2024-03-26
Payer: MEDICARE

## 2024-03-27 ENCOUNTER — OFFICE VISIT (OUTPATIENT)
Dept: ORTHOPEDICS | Facility: CLINIC | Age: 84
End: 2024-03-27
Payer: MEDICARE

## 2024-03-27 ENCOUNTER — TELEPHONE (OUTPATIENT)
Dept: PAIN MEDICINE | Facility: CLINIC | Age: 84
End: 2024-03-27
Payer: MEDICARE

## 2024-03-27 DIAGNOSIS — M51.36 DDD (DEGENERATIVE DISC DISEASE), LUMBAR: ICD-10-CM

## 2024-03-27 DIAGNOSIS — M54.16 LUMBAR RADICULOPATHY: Primary | ICD-10-CM

## 2024-03-27 PROCEDURE — 99213 OFFICE O/P EST LOW 20 MIN: CPT | Mod: 95,,, | Performed by: REGISTERED NURSE

## 2024-03-27 NOTE — PROGRESS NOTES
Established Patient - Audio Only Telehealth Visit     The patient location is: [place]  The chief complaint leading to consultation is: MRI results  Visit type: Virtual visit with audio only (telephone)  Total time spent with patient: 15min     The reason for the audio only service rather than synchronous audio and video virtual visit was related to technical difficulties or patient preference/necessity.     Each patient to whom I provide medical services by telemedicine is:  (1) informed of the relationship between the physician and patient and the respective role of any other health care provider with respect to management of the patient; and (2) notified that they may decline to receive medical services by telemedicine and may withdraw from such care at any time. Patient verbally consented to receive this service via voice-only telephone call.    DATE: 3/27/2024  PATIENT: Sunita Carlson    Attending Physician: Conrad Gurrola M.D.    HISTORY:  Sunita Carlson is a 83 y.o. female who returns to me today for MRI results.  She was last seen by me 3/21/2024.  Today she is doing well but notes low back and bilateral (L>R) leg pain.   The Patient denies myelopathic symptoms such as handwriting changes or difficulty with buttons/coins/keys. Denies perineal paresthesias, bowel/bladder dysfunction.    PMH/PSH/FamHx/SocHx:  Unchanged from prior visit    ROS:  REVIEW OF SYSTEMS:  Constitution: Negative. Negative for chills, fever and night sweats.   HENT: Negative for congestion and headaches.    Eyes: Negative for blurred vision, left vision loss and right vision loss.   Cardiovascular: Negative for chest pain and syncope.   Respiratory: Negative for cough and shortness of breath.    Endocrine: Negative for polydipsia, polyphagia and polyuria.   Hematologic/Lymphatic: Negative for bleeding problem. Does not bruise/bleed easily.   Skin: Negative for dry skin, itching and rash.   Musculoskeletal: Negative for falls and  muscle weakness.   Gastrointestinal: Negative for abdominal pain and bowel incontinence.   Allergic/Immunologic: Negative for hives and persistent infections.  Genitourinary: Negative for urinary retention/incontinence and nocturia.   Neurological: negative for disturbances in coordination, no myelopathic symptoms such as handwriting changes or difficulty with buttons, coins, keys or small objects. No loss of balance and seizures.   Psychiatric/Behavioral: Negative for depression. The patient does not have insomnia.   Denies perineal paresthesias, bowel or bladder incontinence    EXAM:  LMP 02/06/2020   Stable.     IMAGING:    Today I personally re- reviewed AP, Lat and Flex/Ex  upright L-spine that demonstrate anterolisthesis of L4 on L5. Mild DDD throughout.     Lumbar MRI shows bilateral foraminal narrowing at L4/5.     AP, Lat and Flex/Ex upright C-spine films that demonstrate moderate DDD from C4-7.     There is no height or weight on file to calculate BMI.    Hemoglobin A1C   Date Value Ref Range Status   07/22/2019 5.5 4.0 - 5.6 % Final     Comment:     ADA Screening Guidelines:  5.7-6.4%  Consistent with prediabetes  >or=6.5%  Consistent with diabetes  High levels of fetal hemoglobin interfere with the HbA1C  assay. Heterozygous hemoglobin variants (HbS, HgC, etc)do  not significantly interfere with this assay.   However, presence of multiple variants may affect accuracy.     06/25/2015 5.4 4.5 - 6.2 % Final         ASSESSMENT/PLAN:    Diagnoses and all orders for this visit:    Lumbar radiculopathy  -     Procedure Order to Pain Management; Future    DDD (degenerative disc disease), lumbar  -     Procedure Order to Pain Management; Future      Gus L4/5 TF ALANNA ordered with pain mgmt. She may follow up 2 weeks after if her pain persists. I would suggest MBBs at that time.      This service was not originating from a related E/M service provided within the previous 7 days nor will  to an E/M service or  procedure within the next 24 hours or my soonest available appointment.  Prevailing standard of care was able to be met in this audio-only visit.

## 2024-04-01 ENCOUNTER — TELEPHONE (OUTPATIENT)
Dept: PAIN MEDICINE | Facility: CLINIC | Age: 84
End: 2024-04-01
Payer: MEDICARE

## 2024-04-01 NOTE — TELEPHONE ENCOUNTER
----- Message from Alejandra Perez sent at 4/1/2024  2:22 PM CDT -----  Contact: self  Patient has an appt on 4/10/24 and she recently had xray and a mri done on 3/25/24 at the [facility] that you can look at before seeing her.  Call back if needed at [phone] and thanks

## 2024-04-01 NOTE — TELEPHONE ENCOUNTER
Attempted pt x 3 and not available still is the response . Closing encounter now.   
Attempted to call pt and the number says unavailable.   
Attempted to call pt no answer after several rings will try again later   
Types of orders made on 2024: Procedure Request      Order Date:3/27/2024   Ordering User:AIDA MOLINA [id number]]   Encounter Provider:NORRIS Molina NP [[id number]]   Authorizing Pro   vider: NORRIS Molina NP [[id number]]   Supervising Provider:AZEB SIN [[id number]]   Type of Supervision:Collaborating Physician   Department:[facility][[id number]]      Common Order Information   Procedure -> Transforaminal Injection (Specify level and laterality) Cmt: bartolo             L4/5      Order Specific Information   Order: Procedure Order to Pain Management [Custom: [id number]]  Order #:          [id number]: 1    FUTURE     Priority: Routine  Class: Clinic Performed     Future Order Information       Expires on:2025            Expected by:2024                   Associated Diagnoses       M54.16 Lumbar radiculopathy       M51.36 DDD (degenerative disc disease), lumbar       Facility Name: -> [place]              Priority: Routine  Class: Clinic Performed     Future Order Information          s on:2025            Expected by:2024                   Associated Diagnoses   Ok to schedule?   
L4-5

## 2024-04-01 NOTE — TELEPHONE ENCOUNTER
Pt does not answer her phone , I have tired her again as we were to schedule her for an injection and cannot get a hold of her.

## 2024-04-15 ENCOUNTER — TELEPHONE (OUTPATIENT)
Dept: PAIN MEDICINE | Facility: CLINIC | Age: 84
End: 2024-04-15
Payer: MEDICARE

## 2024-04-15 NOTE — TELEPHONE ENCOUNTER
Pt has an appt this Wednesday Dr James does not do evening appointments. Pt has been called without answer see all messages

## 2024-04-15 NOTE — TELEPHONE ENCOUNTER
----- Message from Marianna Mejia sent at 4/15/2024 12:20 PM CDT -----  Regarding: Appointment time reschedule Request  Contact: patient at [phone]  Type:  Appointment time reschedule Request    Name of Caller:  patient at [phone]      Additional Information:  she would like an evening appointment with Dr. James if available.Please call and advise. Thank you

## 2024-04-17 ENCOUNTER — TELEPHONE (OUTPATIENT)
Dept: PAIN MEDICINE | Facility: CLINIC | Age: 84
End: 2024-04-17

## 2024-04-17 ENCOUNTER — OFFICE VISIT (OUTPATIENT)
Dept: PAIN MEDICINE | Facility: CLINIC | Age: 84
End: 2024-04-17
Payer: MEDICARE

## 2024-04-17 VITALS
HEART RATE: 74 BPM | DIASTOLIC BLOOD PRESSURE: 61 MMHG | BODY MASS INDEX: 24.31 KG/M2 | SYSTOLIC BLOOD PRESSURE: 132 MMHG | WEIGHT: 120.56 LBS | HEIGHT: 59 IN

## 2024-04-17 DIAGNOSIS — M54.16 LUMBAR RADICULITIS: Primary | ICD-10-CM

## 2024-04-17 DIAGNOSIS — M47.896 OTHER SPONDYLOSIS, LUMBAR REGION: ICD-10-CM

## 2024-04-17 DIAGNOSIS — M51.36 DDD (DEGENERATIVE DISC DISEASE), LUMBAR: ICD-10-CM

## 2024-04-17 PROCEDURE — 99204 OFFICE O/P NEW MOD 45 MIN: CPT | Mod: S$GLB,,, | Performed by: ANESTHESIOLOGY

## 2024-04-17 PROCEDURE — 3288F FALL RISK ASSESSMENT DOCD: CPT | Mod: CPTII,S$GLB,, | Performed by: ANESTHESIOLOGY

## 2024-04-17 PROCEDURE — 3078F DIAST BP <80 MM HG: CPT | Mod: CPTII,S$GLB,, | Performed by: ANESTHESIOLOGY

## 2024-04-17 PROCEDURE — 1159F MED LIST DOCD IN RCRD: CPT | Mod: CPTII,S$GLB,, | Performed by: ANESTHESIOLOGY

## 2024-04-17 PROCEDURE — 99999 PR PBB SHADOW E&M-EST. PATIENT-LVL III: CPT | Mod: PBBFAC,,, | Performed by: ANESTHESIOLOGY

## 2024-04-17 PROCEDURE — 1125F AMNT PAIN NOTED PAIN PRSNT: CPT | Mod: CPTII,S$GLB,, | Performed by: ANESTHESIOLOGY

## 2024-04-17 PROCEDURE — 3075F SYST BP GE 130 - 139MM HG: CPT | Mod: CPTII,S$GLB,, | Performed by: ANESTHESIOLOGY

## 2024-04-17 PROCEDURE — 1101F PT FALLS ASSESS-DOCD LE1/YR: CPT | Mod: CPTII,S$GLB,, | Performed by: ANESTHESIOLOGY

## 2024-04-17 NOTE — TELEPHONE ENCOUNTER
Types of orders made on 04/17/2024: Procedure Request      Order Date:4/17/2024   Ordering User:CORBY JAMES [[id number]   Encounter Provider:Corby James MD [[id number]]   Authorizing Provider: Corby James MD [[id number]]   Department:[facility][[id number]]      Common Order Information   Procedure -> Transforaminal Injection (Specify level and laterality) Cmt: LEft             L4-5 and L5-S1      Order Specific Information   Order: Procedure Order to Pain Management [Custom: [id number]]  Order #:          [id number]: 1 FUTURE     Priority: Routine  Class: Clinic Performed     Future Order Information   u       Expires on:04/17/2025            Expected by:04/17/2024                   Associated Diagnoses       M54.16 Lumbar radiculitis       Facility Name: -> [place]              Priority: Routine  Class: Clinic Performed     Future Order Information       Expires on:04/17/2025            Expected by:04/17/2024                   Associated Diagnoses       M54.16 Lumbar radiculitis       Procedure -> Transforamin   al Injection (Specify level and laterality) Cmt:

## 2024-04-17 NOTE — PROGRESS NOTES
This note was completed with dictation software and grammatical errors may exist.     Referring Physician: Jennifer Bassett    PCP: Linsey Bear MD      CC: Low back and left leg pain, left knee    Interval History:  Ms. Carlson is a 83 y.o. female with chronic low back and left leg pain who returns to our clinic.  She was last seen in 2020.  She has recurrence of her low back and radiating left leg pain.  She is tried physical therapy recently with minimal benefit.  She had updated lumbar MRI.  She has had relief with lumbar ALANNA in the past.  She wishes to have repeat procedure.  She takes gabapentin nightly 300 mg her peripheral neuropathy.  She denies any worsening weakness.  No bowel bladder changes.   Prior HPI:   Patient is 83 y.o. female referred to us for lower back and left leg pain.  Pain has been present for over 5 years.  No traumatic incident.  She has intermittent aching, throbbing, sharp pain in her lower back.  Pain radiates down her left leg into her left posterior thigh.  Pain worsens with standing, walking, bending and getting up.  Pain improves with rest.  She denies any weakness.  No bowel bladder changes.  MRI lumbar spine shows lumbar facet hypertrophy as well as possible L5 nerve impingement.  She underwent caudal ALANNA's with Dr. Barba with moderate benefit of her generalized lower back pain.  She continues to have left-sided radicular pain.  She rates her pain 4/10 today, 10/10 at worse.    Interventional history:  Left L4-5 L5-S1 TFESI 03/2017 with 50% relief  Left knee injection 07/2019 with 50% relief of left knee pain.  Repeat left L4-5 L5-S1 TFESI 08/2019 with 50% relief.  Ganglion impar injection 09/2019 with 60% relief of her tailbone  Left L4-5 and L50S1 TFESI 1/2020 with 60%relief  Left knee Eufllexxa series 1/2020 with 60% relief    ROS:  CONSTITUTIONAL: No fevers, chills, night sweats, wt. loss, appetite changes  SKIN: no rashes or itching  ENT: No headaches, head  "trauma, vision changes, or eye pain  LYMPH NODES: None noticed   CV: No chest pain, palpitations.   RESP: No shortness of breath, dyspnea on exertion, cough, wheezing, or hemoptysis  GI: No nausea, emesis, diarrhea, constipation, melena, hematochezia, pain.    : No dysuria, hematuria, urgency, or frequency   HEME: No easy bruising, bleeding problems  PSYCHIATRIC: No depression, anxiety, psychosis, hallucinations.  NEURO: No seizures, memory loss, dizziness or difficulty sleeping  MSK: + History of present illness      Past Medical History:   Diagnosis Date    Arthritis     Cataract     Coronary artery disease     Hypertension     Insomnia     Neuropathy     Retinal detachment     Stomach ulcer      Past Surgical History:   Procedure Laterality Date    APPENDECTOMY  1951    BLADDER SUSPENSION      BREAST BIOPSY Right     Benign.    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    CAROTID ENDARTERECTOMY Left     L    CATARACT EXTRACTION Left     CHOLECYSTECTOMY      DILATION AND CURETTAGE OF UTERUS      Due to miscarriage.    INJECTION OF ANESTHETIC AGENT AROUND GANGLION IMPAR N/A 09/12/2019    Procedure: BLOCK, GANGLION IMPAR;  Surgeon: Christian James MD;  [facility];  Service: Pain Management;  Laterality: N/A;    INSERTION OF IMPLANTABLE LOOP RECORDER N/A 12/18/2020    Procedure: INSERTION, IMPLANTABLE LOOP RECORDER;  Surgeon: Adin Saba MD;  Location: [facility] CATH/EP LAB;  Service: Cardiology;  Laterality: N/A;    OOPHORECTOMY Bilateral 1959    SUPERFICIAL KERATECTOMY Right 10/27/2017    Dr. Morales    TONSILLECTOMY      TRANSFORAMINAL EPIDURAL INJECTION OF STEROID Left 08/12/2019    Procedure: Injection,steroid,epidural,transforaminal approach L4-5, L5-S1;  Surgeon: Christian James MD;  [facility];  Service: Pain Management;  Laterality: Left;    TRANSFORAMINAL EPIDURAL INJECTION OF STEROID Left 01/16/2020    Procedure: Injection,steroid,epidural,transforaminal approach;  Surgeon: Christian James MD;  " Location: [facility] OR;  Service: Pain Management;  Laterality: Left;  L4-5, L5-S1    TRANSFORAMINAL EPIDURAL INJECTION OF STEROID Left 2020    Procedure: Injection,steroid,epidural,transforaminal approach;  Surgeon: Christian James MD;  Location: [facility] OR;  Service: Pain Management;  Laterality: Left;  L4-5 and L5-S1    VAGINAL HYSTERECTOMY       Family History   Problem Relation Name Age of Onset    Blindness Maternal Grandfather      Cancer Father      Macular degeneration Maternal Aunt      Cancer Maternal Aunt      Breast cancer Maternal Aunt      Macular degeneration Maternal Uncle      Diabetes Paternal Aunt      Cancer Paternal Aunt      Breast cancer Paternal Aunt      Melanoma Neg Hx      Psoriasis Neg Hx      Lupus Neg Hx      Eczema Neg Hx       Social History     Socioeconomic History    Marital status:    Tobacco Use    Smoking status: Former     Current packs/day: 0.00     Types: Cigarettes     Start date: 10/13/1958     Quit date: 10/13/1962     Years since quittin.5    Smokeless tobacco: Never   Substance and Sexual Activity    Alcohol use: Yes     Comment: very rarely    Drug use: No    Sexual activity: Not Currently     Birth control/protection: Surgical     Social Determinants of Health     Financial Resource Strain: Low Risk  (10/19/2022)    Overall Financial Resource Strain (CARDIA)     Difficulty of Paying Living Expenses: Not very hard   Food Insecurity: No Food Insecurity (10/19/2022)    Hunger Vital Sign     Worried About Running Out of Food in the Last Year: Never true     Ran Out of Food in the Last Year: Never true   Transportation Needs: No Transportation Needs (10/19/2022)    PRAPARE - Transportation     Lack of Transportation (Medical): No     Lack of Transportation (Non-Medical): No   Physical Activity: Inactive (10/19/2022)    Exercise Vital Sign     Days of Exercise per Week: 0 days     Minutes of Exercise per Session: 0 min   Stress: Stress Concern Present (10/29/2020)  "   [place] [employer] - Occupational Stress Questionnaire     Feeling of Stress : To some extent   Social Connections: Socially Isolated (10/19/2022)    Social Connection and Isolation Panel [NHANES]     Frequency of Communication with Friends and Family: More than three times a week     Frequency of Social Gatherings with Friends and Family: Twice a week     Attends Quaker Services: Never     Active Member of Clubs or Organizations: No     Attends Club or Organization Meetings: Never     Marital Status:    Housing Stability: Low Risk  (10/19/2022)    Housing Stability Vital Sign     Unable to Pay for Housing in the Last Year: No     Number of Places Lived in the Last Year: 1     Unstable Housing in the Last Year: No         Medications/Allergies: See med card    Vitals:    04/17/24 1008   BP: 132/61   Pulse: 74   Weight: 54.7 kg (120 lb 9.5 oz)   Height: 4' 11" (1.499 m)   PainSc:   5   PainLoc: Back         Physical exam:    GENERAL: A and O x3, the patient appears well groomed and is in no acute distress.  Skin: No rashes or obvious lesions  HEENT: normocephalic, atraumatic  CARDIOVASCULAR:  RRR  LUNGS: non labored breathing  ABDOMEN: soft, nontender   UPPER EXTREMITIES: Normal alignment, normal range of motion, no atrophy, no skin changes,  hair growth and nail growth normal and equal bilaterally. No swelling, no tenderness.    LOWER EXTREMITIES:  Normal alignment, normal range of motion, no atrophy, no skin changes,  hair growth and nail growth normal and equal bilaterally. No swelling, mild tenderness to left patellar    LUMBAR SPINE  Lumbar spine: ROM is full with flexion extension and oblique extension with no increased pain.    Tom's test causes no increased pain on either side.    Supine straight leg raise is positive on the left at 60°    Internal and external rotation of the hip causes no increased pain on either side.  Myofascial exam: No tenderness to palpation " across lumbar paraspinous muscles.  Tenderness to coccyx    MENTAL STATUS: normal orientation, speech, language, and fund of knowledge for social situation.  Emotional state appropriate.    CRANIAL NERVES:  II:  PERRL bilaterally,   III,IV,VI: EOMI.    V:  Facial sensation equal bilaterally  VII:  Facial motor function normal.  VIII:  Hearing equal to finger rub bilaterally  IX/X: Gag normal, palate symmetric  XI:  Shoulder shrug equal, head turn equal  XII:  Tongue midline without fasciculations      MOTOR: Tone and bulk: normal bilateral upper and lower Strength: normal   Delt Bi Tri WE WF     R 5 5 5 5 5 5   L 5 5 5 5 5 5     IP ADD ABD Quad TA Gas HAM  R 5 5 5 5 5 5 5  L 5 5 5 5 5 5 5    SENSATION: Light touch and pinprick intact bilaterally  REFLEXES: normal, symmetric, nonbrisk.  Toes down, no clonus. No hoffmans.  GAIT: normal rise, base, steps, and arm swing.        Imaging:  MRI lumbar spine without contrast 10/2016 ([facility])  L3-4, broad-based disc bulge and facet hypertrophy results in mild central canal narrowing and foraminal narrowing.  L4-5, broad-based disc bulge with moderate facet hypertrophy,  Left greater than right.  There is mild spurring of the left facet joint which narrows the left lateral recess and compresses the left L5 nerve.  L5-S1, mild facet hypertrophy    MRI lumbar spine 3/25/24  At L1-2, there is moderate disc height loss with a small broad-based posterior disc bulge. There is a small superimposed central posterior disc protrusion (sagittal image 10, axial image 7). There is no facet arthropathy ligamentum flavum hypertrophy. There is mild right neural foraminal stenosis. The left neural foramen is patent. There is mild spinal canal stenosis.     At L2-3, there is mild disc height loss with a moderate-sized broad-based posterior disc bulge (which appears more eccentric to the left greater than right neural foramina). There is no facet arthropathy with ligament flavum hypertrophy.  This results in mild left greater than right neural foraminal stenosis with minimal spinal canal stenosis.     At L3-4, there is mild disc height loss with a moderate-sized broad-based posterior disc bulge. There is a small superimposed central posterior disc protrusion and high intensity zone/annular fissure in the disc (sagittal image 9, axial angled image 18). There is mild to moderate facet arthropathy without ligament flavum hypertrophy. This results in moderate right and mild left neural foraminal stenosis with mild spinal canal stenosis.     At L4-5, there is moderate disc height loss with a moderate-sized broad-based posterior disc bulge. There is grade 1 anterolisthesis with slight dorsal uncovering of the disc and a broad-based posterior disc bulge. There is severe bilateral facet arthropathy with a small right greater than left facet joint effusion. This results in severe left neural foraminal stenosis, moderate right neural foraminal stenosis and moderate to severe spinal canal stenosis. There is crowding of the nerve roots centrally, and effacement of the left greater than right lateral recess (with likely impingement on the traversing L5 nerve roots in the lateral recesses.     At L5-S1, is mild disc height loss with a small broad-based posterior disc bulge somewhat more eccentric to the neural foramina. There is moderate left and mild right facet arthropathy without ligament flavum hypertrophy. This results in moderate left and mild right neural foraminal stenosis with minimal spinal canal stenosis.     The SI joints and visualized pelvis are within normal limits.    Assessment:  Ms. Carlson is a 83 y.o. female back and left leg pain  1. Lumbar radiculitis    2. DDD (degenerative disc disease), lumbar    3. Other spondylosis, lumbar region      Plan:  1.  I have stressed the importance of physical activity and exercise to improve overall health. Home exercises given  2.  I think that the patient's back  pain and radicular leg symptoms are due to degenerative disc disease and have recommended a lumbar epidural steroid injection to the Left L4-5 and L5-S1 level(s).  3. Continue gabapentin as prescribed  4.  Follow-up after the procedure.

## 2024-04-17 NOTE — H&P (VIEW-ONLY)
This note was completed with dictation software and grammatical errors may exist.     Referring Physician: Jennifer Bassett    PCP: Linsey Bear MD      CC: Low back and left leg pain, left knee    Interval History:  Ms. Carlson is a 83 y.o. female with chronic low back and left leg pain who returns to our clinic.  She was last seen in 2020.  She has recurrence of her low back and radiating left leg pain.  She is tried physical therapy recently with minimal benefit.  She had updated lumbar MRI.  She has had relief with lumbar ALANNA in the past.  She wishes to have repeat procedure.  She takes gabapentin nightly 300 mg her peripheral neuropathy.  She denies any worsening weakness.  No bowel bladder changes.   Prior HPI:   Patient is 83 y.o. female referred to us for lower back and left leg pain.  Pain has been present for over 5 years.  No traumatic incident.  She has intermittent aching, throbbing, sharp pain in her lower back.  Pain radiates down her left leg into her left posterior thigh.  Pain worsens with standing, walking, bending and getting up.  Pain improves with rest.  She denies any weakness.  No bowel bladder changes.  MRI lumbar spine shows lumbar facet hypertrophy as well as possible L5 nerve impingement.  She underwent caudal ALANNA's with Dr. Barba with moderate benefit of her generalized lower back pain.  She continues to have left-sided radicular pain.  She rates her pain 4/10 today, 10/10 at worse.    Interventional history:  Left L4-5 L5-S1 TFESI 03/2017 with 50% relief  Left knee injection 07/2019 with 50% relief of left knee pain.  Repeat left L4-5 L5-S1 TFESI 08/2019 with 50% relief.  Ganglion impar injection 09/2019 with 60% relief of her tailbone  Left L4-5 and L50S1 TFESI 1/2020 with 60%relief  Left knee Eufllexxa series 1/2020 with 60% relief    ROS:  CONSTITUTIONAL: No fevers, chills, night sweats, wt. loss, appetite changes  SKIN: no rashes or itching  ENT: No headaches, head  "trauma, vision changes, or eye pain  LYMPH NODES: None noticed   CV: No chest pain, palpitations.   RESP: No shortness of breath, dyspnea on exertion, cough, wheezing, or hemoptysis  GI: No nausea, emesis, diarrhea, constipation, melena, hematochezia, pain.    : No dysuria, hematuria, urgency, or frequency   HEME: No easy bruising, bleeding problems  PSYCHIATRIC: No depression, anxiety, psychosis, hallucinations.  NEURO: No seizures, memory loss, dizziness or difficulty sleeping  MSK: + History of present illness      Past Medical History:   Diagnosis Date    Arthritis     Cataract     Coronary artery disease     Hypertension     Insomnia     Neuropathy     Retinal detachment     Stomach ulcer      Past Surgical History:   Procedure Laterality Date    APPENDECTOMY  1951    BLADDER SUSPENSION      BREAST BIOPSY Right     Benign.    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    CAROTID ENDARTERECTOMY Left     L    CATARACT EXTRACTION Left     CHOLECYSTECTOMY      DILATION AND CURETTAGE OF UTERUS      Due to miscarriage.    INJECTION OF ANESTHETIC AGENT AROUND GANGLION IMPAR N/A 09/12/2019    Procedure: BLOCK, GANGLION IMPAR;  Surgeon: Christian James MD;  Location: [facility] OR;  Service: Pain Management;  Laterality: N/A;    INSERTION OF IMPLANTABLE LOOP RECORDER N/A 12/18/2020    Procedure: INSERTION, IMPLANTABLE LOOP RECORDER;  Surgeon: Adin Saba MD;  Location: [facility] CATH/EP LAB;  Service: Cardiology;  Laterality: N/A;    OOPHORECTOMY Bilateral 1959    SUPERFICIAL KERATECTOMY Right 10/27/2017    Dr. Morales    TONSILLECTOMY      TRANSFORAMINAL EPIDURAL INJECTION OF STEROID Left 08/12/2019    Procedure: Injection,steroid,epidural,transforaminal approach L4-5, L5-S1;  Surgeon: Christian James MD;  Location: [facility] OR;  Service: Pain Management;  Laterality: Left;    TRANSFORAMINAL EPIDURAL INJECTION OF STEROID Left 01/16/2020    Procedure: Injection,steroid,epidural,transforaminal approach;  Surgeon: Christian James MD;  " Location: [facility] OR;  Service: Pain Management;  Laterality: Left;  L4-5, L5-S1    TRANSFORAMINAL EPIDURAL INJECTION OF STEROID Left 2020    Procedure: Injection,steroid,epidural,transforaminal approach;  Surgeon: Christian James MD;  Location: [facility] OR;  Service: Pain Management;  Laterality: Left;  L4-5 and L5-S1    VAGINAL HYSTERECTOMY       Family History   Problem Relation Name Age of Onset    Blindness Maternal Grandfather      Cancer Father      Macular degeneration Maternal Aunt      Cancer Maternal Aunt      Breast cancer Maternal Aunt      Macular degeneration Maternal Uncle      Diabetes Paternal Aunt      Cancer Paternal Aunt      Breast cancer Paternal Aunt      Melanoma Neg Hx      Psoriasis Neg Hx      Lupus Neg Hx      Eczema Neg Hx       Social History     Socioeconomic History    Marital status:    Tobacco Use    Smoking status: Former     Current packs/day: 0.00     Types: Cigarettes     Start date: 10/13/1958     Quit date: 10/13/1962     Years since quittin.5    Smokeless tobacco: Never   Substance and Sexual Activity    Alcohol use: Yes     Comment: very rarely    Drug use: No    Sexual activity: Not Currently     Birth control/protection: Surgical     Social Determinants of Health     Financial Resource Strain: Low Risk  (10/19/2022)    Overall Financial Resource Strain (CARDIA)     Difficulty of Paying Living Expenses: Not very hard   Food Insecurity: No Food Insecurity (10/19/2022)    Hunger Vital Sign     Worried About Running Out of Food in the Last Year: Never true     Ran Out of Food in the Last Year: Never true   Transportation Needs: No Transportation Needs (10/19/2022)    PRAPARE - Transportation     Lack of Transportation (Medical): No     Lack of Transportation (Non-Medical): No   Physical Activity: Inactive (10/19/2022)    Exercise Vital Sign     Days of Exercise per Week: 0 days     Minutes of Exercise per Session: 0 min   Stress: Stress Concern Present (10/29/2020)  "   [employer] [facility] - Occupational Stress Questionnaire     Feeling of Stress : To some extent   Social Connections: Socially Isolated (10/19/2022)    Social Connection and Isolation Panel [NHANES]     Frequency of Communication with Friends and Family: More than three times a week     Frequency of Social Gatherings with Friends and Family: Twice a week     Attends Protestant Services: Never     Active Member of Clubs or Organizations: No     Attends Club or Organization Meetings: Never     Marital Status:    Housing Stability: Low Risk  (10/19/2022)    Housing Stability Vital Sign     Unable to Pay for Housing in the Last Year: No     Number of Places Lived in the Last Year: 1     Unstable Housing in the Last Year: No         Medications/Allergies: See med card    Vitals:    04/17/24 1008   BP: 132/61   Pulse: 74   Weight: 54.7 kg (120 lb 9.5 oz)   Height: 4' 11" (1.499 m)   PainSc:   5   PainLoc: Back         Physical exam:    GENERAL: A and O x3, the patient appears well groomed and is in no acute distress.  Skin: No rashes or obvious lesions  HEENT: normocephalic, atraumatic  CARDIOVASCULAR:  RRR  LUNGS: non labored breathing  ABDOMEN: soft, nontender   UPPER EXTREMITIES: Normal alignment, normal range of motion, no atrophy, no skin changes,  hair growth and nail growth normal and equal bilaterally. No swelling, no tenderness.    LOWER EXTREMITIES:  Normal alignment, normal range of motion, no atrophy, no skin changes,  hair growth and nail growth normal and equal bilaterally. No swelling, mild tenderness to left patellar    LUMBAR SPINE  Lumbar spine: ROM is full with flexion extension and oblique extension with no increased pain.    Tom's test causes no increased pain on either side.    Supine straight leg raise is positive on the left at 60°    Internal and external rotation of the hip causes no increased pain on either side.  Myofascial exam: No tenderness to palpation " across lumbar paraspinous muscles.  Tenderness to coccyx    MENTAL STATUS: normal orientation, speech, language, and fund of knowledge for social situation.  Emotional state appropriate.    CRANIAL NERVES:  II:  PERRL bilaterally,   III,IV,VI: EOMI.    V:  Facial sensation equal bilaterally  VII:  Facial motor function normal.  VIII:  Hearing equal to finger rub bilaterally  IX/X: Gag normal, palate symmetric  XI:  Shoulder shrug equal, head turn equal  XII:  Tongue midline without fasciculations      MOTOR: Tone and bulk: normal bilateral upper and lower Strength: normal   Delt Bi Tri WE WF     R 5 5 5 5 5 5   L 5 5 5 5 5 5     IP ADD ABD Quad TA Gas HAM  R 5 5 5 5 5 5 5  L 5 5 5 5 5 5 5    SENSATION: Light touch and pinprick intact bilaterally  REFLEXES: normal, symmetric, nonbrisk.  Toes down, no clonus. No hoffmans.  GAIT: normal rise, base, steps, and arm swing.        Imaging:  MRI lumbar spine without contrast 10/2016 ([facility])  L3-4, broad-based disc bulge and facet hypertrophy results in mild central canal narrowing and foraminal narrowing.  L4-5, broad-based disc bulge with moderate facet hypertrophy,  Left greater than right.  There is mild spurring of the left facet joint which narrows the left lateral recess and compresses the left L5 nerve.  L5-S1, mild facet hypertrophy    MRI lumbar spine 3/25/24  At L1-2, there is moderate disc height loss with a small broad-based posterior disc bulge. There is a small superimposed central posterior disc protrusion (sagittal image 10, axial image 7). There is no facet arthropathy ligamentum flavum hypertrophy. There is mild right neural foraminal stenosis. The left neural foramen is patent. There is mild spinal canal stenosis.     At L2-3, there is mild disc height loss with a moderate-sized broad-based posterior disc bulge (which appears more eccentric to the left greater than right neural foramina). There is no facet arthropathy with ligament flavum hypertrophy.  This results in mild left greater than right neural foraminal stenosis with minimal spinal canal stenosis.     At L3-4, there is mild disc height loss with a moderate-sized broad-based posterior disc bulge. There is a small superimposed central posterior disc protrusion and high intensity zone/annular fissure in the disc (sagittal image 9, axial angled image 18). There is mild to moderate facet arthropathy without ligament flavum hypertrophy. This results in moderate right and mild left neural foraminal stenosis with mild spinal canal stenosis.     At L4-5, there is moderate disc height loss with a moderate-sized broad-based posterior disc bulge. There is grade 1 anterolisthesis with slight dorsal uncovering of the disc and a broad-based posterior disc bulge. There is severe bilateral facet arthropathy with a small right greater than left facet joint effusion. This results in severe left neural foraminal stenosis, moderate right neural foraminal stenosis and moderate to severe spinal canal stenosis. There is crowding of the nerve roots centrally, and effacement of the left greater than right lateral recess (with likely impingement on the traversing L5 nerve roots in the lateral recesses.     At L5-S1, is mild disc height loss with a small broad-based posterior disc bulge somewhat more eccentric to the neural foramina. There is moderate left and mild right facet arthropathy without ligament flavum hypertrophy. This results in moderate left and mild right neural foraminal stenosis with minimal spinal canal stenosis.     The SI joints and visualized pelvis are within normal limits.    Assessment:  Ms. Carlson is a 83 y.o. female back and left leg pain  1. Lumbar radiculitis    2. DDD (degenerative disc disease), lumbar    3. Other spondylosis, lumbar region      Plan:  1.  I have stressed the importance of physical activity and exercise to improve overall health. Home exercises given  2.  I think that the patient's back  pain and radicular leg symptoms are due to degenerative disc disease and have recommended a lumbar epidural steroid injection to the Left L4-5 and L5-S1 level(s).  3. Continue gabapentin as prescribed  4.  Follow-up after the procedure.

## 2024-04-30 ENCOUNTER — HOSPITAL ENCOUNTER (OUTPATIENT)
Facility: HOSPITAL | Age: 84
Discharge: HOME | End: 2024-04-30
Attending: ANESTHESIOLOGY | Admitting: ANESTHESIOLOGY
Payer: MEDICARE

## 2024-04-30 DIAGNOSIS — M54.16 LUMBAR RADICULITIS: ICD-10-CM

## 2024-04-30 PROCEDURE — 64483 NJX AA&/STRD TFRM EPI L/S 1: CPT | Mod: LT,,, | Performed by: ANESTHESIOLOGY

## 2024-04-30 PROCEDURE — 25500020 PHARM REV CODE 255: Performed by: ANESTHESIOLOGY

## 2024-04-30 PROCEDURE — 64484 NJX AA&/STRD TFRM EPI L/S EA: CPT | Mod: LT,,, | Performed by: ANESTHESIOLOGY

## 2024-04-30 PROCEDURE — 63600175 PHARM REV CODE 636 W HCPCS: Mod: JZ,JG | Performed by: ANESTHESIOLOGY

## 2024-04-30 PROCEDURE — 64484 NJX AA&/STRD TFRM EPI L/S EA: CPT | Mod: LT | Performed by: ANESTHESIOLOGY

## 2024-04-30 PROCEDURE — 64483 NJX AA&/STRD TFRM EPI L/S 1: CPT | Mod: LT | Performed by: ANESTHESIOLOGY

## 2024-04-30 PROCEDURE — 25000003 PHARM REV CODE 250: Performed by: ANESTHESIOLOGY

## 2024-04-30 RX ORDER — FENTANYL CITRATE 50 UG/ML
INJECTION, SOLUTION INTRAMUSCULAR; INTRAVENOUS
Status: DISCONTINUED | OUTPATIENT
Start: 2024-04-30 | End: 2024-04-30 | Stop reason: HOSPADM

## 2024-04-30 RX ORDER — MIDAZOLAM HYDROCHLORIDE 1 MG/ML
INJECTION INTRAMUSCULAR; INTRAVENOUS
Status: DISCONTINUED | OUTPATIENT
Start: 2024-04-30 | End: 2024-04-30 | Stop reason: HOSPADM

## 2024-04-30 RX ORDER — LIDOCAINE HYDROCHLORIDE 10 MG/ML
1 INJECTION, SOLUTION EPIDURAL; INFILTRATION; INTRACAUDAL; PERINEURAL ONCE
Status: COMPLETED | OUTPATIENT
Start: 2024-04-30 | End: 2024-04-30

## 2024-04-30 RX ORDER — BUPIVACAINE HYDROCHLORIDE 2.5 MG/ML
INJECTION, SOLUTION EPIDURAL; INFILTRATION; INTRACAUDAL
Status: DISCONTINUED | OUTPATIENT
Start: 2024-04-30 | End: 2024-04-30 | Stop reason: HOSPADM

## 2024-04-30 RX ORDER — SODIUM CHLORIDE, SODIUM LACTATE, POTASSIUM CHLORIDE, CALCIUM CHLORIDE 600; 310; 30; 20 MG/100ML; MG/100ML; MG/100ML; MG/100ML
INJECTION, SOLUTION INTRAVENOUS CONTINUOUS
Status: ACTIVE | OUTPATIENT
Start: 2024-04-30

## 2024-04-30 RX ORDER — DEXAMETHASONE SODIUM PHOSPHATE 10 MG/ML
INJECTION INTRAMUSCULAR; INTRAVENOUS
Status: DISCONTINUED | OUTPATIENT
Start: 2024-04-30 | End: 2024-04-30 | Stop reason: HOSPADM

## 2024-04-30 RX ORDER — LIDOCAINE HYDROCHLORIDE 10 MG/ML
INJECTION, SOLUTION EPIDURAL; INFILTRATION; INTRACAUDAL; PERINEURAL
Status: DISCONTINUED | OUTPATIENT
Start: 2024-04-30 | End: 2024-04-30 | Stop reason: HOSPADM

## 2024-04-30 RX ORDER — ONDANSETRON HYDROCHLORIDE 2 MG/ML
4 INJECTION, SOLUTION INTRAVENOUS
Status: COMPLETED | OUTPATIENT
Start: 2024-04-30 | End: 2024-04-30

## 2024-04-30 RX ADMIN — LIDOCAINE HYDROCHLORIDE 10 MG: 10 INJECTION, SOLUTION EPIDURAL; INFILTRATION; INTRACAUDAL; PERINEURAL at 10:04

## 2024-04-30 RX ADMIN — ONDANSETRON 4 MG: 2 INJECTION INTRAMUSCULAR; INTRAVENOUS at 10:04

## 2024-04-30 RX ADMIN — SODIUM CHLORIDE, POTASSIUM CHLORIDE, SODIUM LACTATE AND CALCIUM CHLORIDE: 600; 310; 30; 20 INJECTION, SOLUTION INTRAVENOUS at 10:04

## 2024-04-30 NOTE — OP NOTE
PROCEDURE DATE: 4/30/2024    PROCEDURE: Left L4-5 and L5-S1 transforaminal epidural steroid injection under fluoroscopy    DIAGNOSIS: Lumbar radiculitis    Post op diagnosis: Same    PHYSICIAN: Christian James MD    MEDICATIONS INJECTED:  Dexamethasone 5mg (0.5ml) and 1.5ml 0.25% bupivicaine at each nerve root.     LOCAL ANESTHETIC INJECTED:  Lidocaine 1%. 2 ml per site.    SEDATION MEDICATIONS: RN IV sedation    ESTIMATED BLOOD LOSS:  None    COMPLICATIONS:  None    TECHNIQUE:   A time-out was taken to identify patient and procedure side prior to starting the procedure. The patient was placed in a prone position, prepped and draped in the usual sterile fashion using ChloraPrep and sterile towels.  The area to be injected was determined under fluoroscopic guidance in AP and oblique view.  Local anesthetic was given by raising a wheal and going down to the hub of a 25-gauge 1.5 inch needle.  In oblique view, a 3.5 inch 22-gauge bent-tip spinal needle was introduced towards 6 oclock position of the pedicle of each above named nerve root level.  The needle was walked medially then hinged into the neural foramen and position was confirmed in AP and lateral views.  1ml contrast dye was injected to confirm appropriate placement and that there was no vascular uptake.  After negative aspiration for blood or CSF, the medication was then injected. This was performed at the left L4-5 and L5-S1 level(s). The patient tolerated the procedure well.    The patient was monitored after the procedure.  Patient was given post procedure and discharge instructions to follow at home. The patient was discharged in a stable condition.

## 2024-04-30 NOTE — DISCHARGE SUMMARY
Replaced by [facility]  Discharge Note  Short Stay    Procedure(s) (LRB):  Injection,steroid,epidural,transforaminal approach (Left)      OUTCOME: Patient tolerated treatment/procedure well without complication and is now ready for discharge.    DISPOSITION: Home or Self Care    FINAL DIAGNOSIS:  <principal problem not specified>    FOLLOWUP: In clinic    DISCHARGE INSTRUCTIONS:    Discharge Procedure Orders   Notify your health care provider if you experience any of the following:  temperature >100.4     Notify your health care provider if you experience any of the following:  severe uncontrolled pain     Notify your health care provider if you experience any of the following:  redness, tenderness, or signs of infection (pain, swelling, redness, odor or green/yellow discharge around incision site)     Activity as tolerated        TIME SPENT ON DISCHARGE: 30 minutes

## 2024-04-30 NOTE — PLAN OF CARE
Patient is awake and alert and says she is ready to go home with son in law; her son in law is ready to take patient home and he is driving. Patient's vital signs and injections site stable. Patient denies pain, nausea, weakness or dizziness. All patient belongings have been returned to patient. Patient is in no distress and in stable condition. Noted on patient  AVS,  ,was  the last time she received Zofran 4 mg(1014) . She is in stable condition and in no acute distress. Patient is being discharged via wheelchair to car her son in law is driving.Patient is holding her purse.

## 2024-05-02 VITALS
TEMPERATURE: 98 F | HEIGHT: 59 IN | DIASTOLIC BLOOD PRESSURE: 73 MMHG | SYSTOLIC BLOOD PRESSURE: 139 MMHG | WEIGHT: 120.56 LBS | RESPIRATION RATE: 16 BRPM | BODY MASS INDEX: 24.31 KG/M2 | HEART RATE: 65 BPM | OXYGEN SATURATION: 98 %

## 2024-06-05 DIAGNOSIS — R19.8 CHANGE IN BOWEL FUNCTION: Primary | ICD-10-CM

## 2024-06-06 ENCOUNTER — OFFICE VISIT (OUTPATIENT)
Dept: PAIN MEDICINE | Facility: CLINIC | Age: 84
End: 2024-06-06
Payer: MEDICARE

## 2024-06-06 VITALS
WEIGHT: 120.56 LBS | SYSTOLIC BLOOD PRESSURE: 104 MMHG | BODY MASS INDEX: 24.31 KG/M2 | HEART RATE: 63 BPM | DIASTOLIC BLOOD PRESSURE: 69 MMHG | HEIGHT: 59 IN

## 2024-06-06 DIAGNOSIS — M51.36 DDD (DEGENERATIVE DISC DISEASE), LUMBAR: ICD-10-CM

## 2024-06-06 DIAGNOSIS — M47.896 OTHER SPONDYLOSIS, LUMBAR REGION: ICD-10-CM

## 2024-06-06 DIAGNOSIS — M54.16 LUMBAR RADICULITIS: Primary | ICD-10-CM

## 2024-06-06 PROCEDURE — 3074F SYST BP LT 130 MM HG: CPT | Mod: CPTII,S$GLB,, | Performed by: PHYSICIAN ASSISTANT

## 2024-06-06 PROCEDURE — 3288F FALL RISK ASSESSMENT DOCD: CPT | Mod: CPTII,S$GLB,, | Performed by: PHYSICIAN ASSISTANT

## 2024-06-06 PROCEDURE — 99214 OFFICE O/P EST MOD 30 MIN: CPT | Mod: S$GLB,,, | Performed by: PHYSICIAN ASSISTANT

## 2024-06-06 PROCEDURE — 3078F DIAST BP <80 MM HG: CPT | Mod: CPTII,S$GLB,, | Performed by: PHYSICIAN ASSISTANT

## 2024-06-06 PROCEDURE — 1101F PT FALLS ASSESS-DOCD LE1/YR: CPT | Mod: CPTII,S$GLB,, | Performed by: PHYSICIAN ASSISTANT

## 2024-06-06 PROCEDURE — 1159F MED LIST DOCD IN RCRD: CPT | Mod: CPTII,S$GLB,, | Performed by: PHYSICIAN ASSISTANT

## 2024-06-06 PROCEDURE — 99999 PR PBB SHADOW E&M-EST. PATIENT-LVL III: CPT | Mod: PBBFAC,,, | Performed by: PHYSICIAN ASSISTANT

## 2024-06-06 PROCEDURE — 1126F AMNT PAIN NOTED NONE PRSNT: CPT | Mod: CPTII,S$GLB,, | Performed by: PHYSICIAN ASSISTANT

## 2024-06-06 NOTE — PROGRESS NOTES
Referring Physician: No ref. provider found    PCP: Linsey Bear MD      CC: Low back and left leg pain, left knee    Interval History:  Ms. Carlson is a 83 y.o. female with chronic low back and left leg pain who presents today for f/u. She is s/p left L4-5 and L5-S1 TF ALANNA with 100% relief. She is very happy with results.  She tried physical therapy recently with minimal benefit.  She had updated lumbar MRI.  She takes gabapentin nightly 300 mg her peripheral neuropathy.  She denies any worsening weakness.  No bowel bladder changes. Pain today is rated 0/10.  Pt new to me since 2019    Prior HPI:   Patient is 83 y.o. female referred to us for lower back and left leg pain.  Pain has been present for over 5 years.  No traumatic incident.  She has intermittent aching, throbbing, sharp pain in her lower back.  Pain radiates down her left leg into her left posterior thigh.  Pain worsens with standing, walking, bending and getting up.  Pain improves with rest.  She denies any weakness.  No bowel bladder changes.  MRI lumbar spine shows lumbar facet hypertrophy as well as possible L5 nerve impingement.  She underwent caudal ALANNA's with Dr. Barba with moderate benefit of her generalized lower back pain.  She continues to have left-sided radicular pain.  She rates her pain 4/10 today, 10/10 at worse.    Interventional history:  Left L4-5 L5-S1 TFESI 03/2017 with 50% relief  Left knee injection 07/2019 with 50% relief of left knee pain.  Repeat left L4-5 L5-S1 TFESI 08/2019 with 50% relief.  Ganglion impar injection 09/2019 with 60% relief of her tailbone  Left L4-5 and L50S1 TFESI 1/2020 with 60%relief  Left knee Eufllexxa series 1/2020 with 60% relief    ROS:  CONSTITUTIONAL: No fevers, chills, night sweats, wt. loss, appetite changes  SKIN: no rashes or itching  ENT: No headaches, head trauma, vision changes, or eye pain  LYMPH NODES: None noticed   CV: No chest pain, palpitations.   RESP: No shortness of  "breath, dyspnea on exertion, cough, wheezing, or hemoptysis  GI: No nausea, emesis, diarrhea, constipation, melena, hematochezia, pain.    : No dysuria, hematuria, urgency, or frequency   HEME: No easy bruising, bleeding problems  PSYCHIATRIC: No depression, anxiety, psychosis, hallucinations.  NEURO: No seizures, memory loss, dizziness or difficulty sleeping  MSK: + History of present illness      Past Medical History:   Diagnosis Date    Arthritis     Cataract     Coronary artery disease     Hypertension     Insomnia     Neuropathy     Retinal detachment     Stomach ulcer      Past Surgical History:   Procedure Laterality Date    APPENDECTOMY  1951    BLADDER SUSPENSION      BREAST BIOPSY Right     Benign.    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    CAROTID ENDARTERECTOMY Left     L    CATARACT EXTRACTION Left     CHOLECYSTECTOMY      DILATION AND CURETTAGE OF UTERUS      Due to miscarriage.    INJECTION OF ANESTHETIC AGENT AROUND GANGLION IMPAR N/A 09/12/2019    Procedure: BLOCK, GANGLION IMPAR;  Surgeon: Christian James MD;  Location: [facility] OR;  Service: Pain Management;  Laterality: N/A;    INSERTION OF IMPLANTABLE LOOP RECORDER N/A 12/18/2020    Procedure: INSERTION, IMPLANTABLE LOOP RECORDER;  Surgeon: Adin Saba MD;  Location: [facility] CATH/EP LAB;  Service: Cardiology;  Laterality: N/A;    OOPHORECTOMY Bilateral 1959    SUPERFICIAL KERATECTOMY Right 10/27/2017    Dr. Morales    TONSILLECTOMY      TRANSFORAMINAL EPIDURAL INJECTION OF STEROID Left 08/12/2019    Procedure: Injection,steroid,epidural,transforaminal approach L4-5, L5-S1;  Surgeon: Christian James MD;  Location: [facility] OR;  Service: Pain Management;  Laterality: Left;    TRANSFORAMINAL EPIDURAL INJECTION OF STEROID Left 01/16/2020    Procedure: Injection,steroid,epidural,transforaminal approach;  Surgeon: Christian James MD;  Location: [facility] OR;  Service: Pain Management;  Laterality: Left;  L4-5, L5-S1    TRANSFORAMINAL EPIDURAL INJECTION OF STEROID " Left 2020    Procedure: Injection,steroid,epidural,transforaminal approach;  Surgeon: Christian James MD;  Location: [facility] OR;  Service: Pain Management;  Laterality: Left;  L4-5 and L5-S1    TRANSFORAMINAL EPIDURAL INJECTION OF STEROID Left 2024    Procedure: Injection,steroid,epidural,transforaminal approach;  Surgeon: Christian James MD;  Location: [facility] OR;  Service: Anesthesiology;  Laterality: Left;  L4-5 and l5-s1    VAGINAL HYSTERECTOMY       Family History   Problem Relation Name Age of Onset    Blindness Maternal Grandfather      Cancer Father      Macular degeneration Maternal Aunt      Cancer Maternal Aunt      Breast cancer Maternal Aunt      Macular degeneration Maternal Uncle      Diabetes Paternal Aunt      Cancer Paternal Aunt      Breast cancer Paternal Aunt      Melanoma Neg Hx      Psoriasis Neg Hx      Lupus Neg Hx      Eczema Neg Hx       Social History     Socioeconomic History    Marital status:    Tobacco Use    Smoking status: Former     Current packs/day: 0.00     Types: Cigarettes     Start date: 10/13/1958     Quit date: 10/13/1962     Years since quittin.6    Smokeless tobacco: Never   Substance and Sexual Activity    Alcohol use: Yes     Comment: very rarely    Drug use: No    Sexual activity: Not Currently     Birth control/protection: Surgical     Social Determinants of Health     Financial Resource Strain: Low Risk  (10/19/2022)    Overall Financial Resource Strain (CARDIA)     Difficulty of Paying Living Expenses: Not very hard   Food Insecurity: No Food Insecurity (10/19/2022)    Hunger Vital Sign     Worried About Running Out of Food in the Last Year: Never true     Ran Out of Food in the Last Year: Never true   Transportation Needs: No Transportation Needs (10/19/2022)    PRAPARE - Transportation     Lack of Transportation (Medical): No     Lack of Transportation (Non-Medical): No   Physical Activity: Inactive (10/19/2022)    Exercise Vital Sign     Days of  "Exercise per Week: 0 days     Minutes of Exercise per Session: 0 min   Stress: Stress Concern Present (10/29/2020)    [employer] - Occupational Stress Questionnaire     Feeling of Stress : To some extent   Housing Stability: Low Risk  (10/19/2022)    Housing Stability Vital Sign     Unable to Pay for Housing in the Last Year: No     Number of Places Lived in the Last Year: 1     Unstable Housing in the Last Year: No         Medications/Allergies: See med card    Vitals:    06/06/24 1325   BP: 104/69   Pulse: 63   Weight: 54.7 kg (120 lb 9.5 oz)   Height: 4' 11" (1.499 m)   PainSc: 0-No pain   PainLoc: Back         Physical exam:    GENERAL: A and O x3, the patient appears well groomed and is in no acute distress.  Skin: No rashes or obvious lesions  HEENT: normocephalic, atraumatic  CARDIOVASCULAR:  RRR  LUNGS: non labored breathing  ABDOMEN: soft, nontender   UPPER EXTREMITIES: Normal alignment, normal range of motion, no atrophy, no skin changes,  hair growth and nail growth normal and equal bilaterally. No swelling, no tenderness.    LOWER EXTREMITIES:  Normal alignment, normal range of motion, no atrophy, no skin changes,  hair growth and nail growth normal and equal bilaterally. No swelling, mild tenderness to left patellar    LUMBAR SPINE  Lumbar spine: ROM is full with flexion extension and oblique extension with no increased pain.    Tom's test causes no increased pain on either side.    Supine straight leg raise is positive on the left at 60°    Internal and external rotation of the hip causes no increased pain on either side.  Myofascial exam: No tenderness to palpation across lumbar paraspinous muscles.  Tenderness to coccyx    MENTAL STATUS: normal orientation, speech, language, and fund of knowledge for social situation.  Emotional state appropriate.    CRANIAL NERVES:  II:  PERRL bilaterally,   III,IV,VI: EOMI.    V:  Facial sensation equal bilaterally  VII:  Facial " motor function normal.  VIII:  Hearing equal to finger rub bilaterally  IX/X: Gag normal, palate symmetric  XI:  Shoulder shrug equal, head turn equal  XII:  Tongue midline without fasciculations      MOTOR: Tone and bulk: normal bilateral upper and lower Strength: normal   Delt Bi Tri WE WF     R 5 5 5 5 5 5   L 5 5 5 5 5 5     IP ADD ABD Quad TA Gas HAM  R 5 5 5 5 5 5 5  L 5 5 5 5 5 5 5    SENSATION: Light touch and pinprick intact bilaterally  REFLEXES: normal, symmetric, nonbrisk.  Toes down, no clonus. No hoffmans.  GAIT: normal rise, base, steps, and arm swing.        Imaging:  MRI lumbar spine without contrast 10/2016 ([facility])  L3-4, broad-based disc bulge and facet hypertrophy results in mild central canal narrowing and foraminal narrowing.  L4-5, broad-based disc bulge with moderate facet hypertrophy,  Left greater than right.  There is mild spurring of the left facet joint which narrows the left lateral recess and compresses the left L5 nerve.  L5-S1, mild facet hypertrophy    MRI lumbar spine 3/25/24  At L1-2, there is moderate disc height loss with a small broad-based posterior disc bulge. There is a small superimposed central posterior disc protrusion (sagittal image 10, axial image 7). There is no facet arthropathy ligamentum flavum hypertrophy. There is mild right neural foraminal stenosis. The left neural foramen is patent. There is mild spinal canal stenosis.     At L2-3, there is mild disc height loss with a moderate-sized broad-based posterior disc bulge (which appears more eccentric to the left greater than right neural foramina). There is no facet arthropathy with ligament flavum hypertrophy. This results in mild left greater than right neural foraminal stenosis with minimal spinal canal stenosis.     At L3-4, there is mild disc height loss with a moderate-sized broad-based posterior disc bulge. There is a small superimposed central posterior disc protrusion and high intensity zone/annular  fissure in the disc (sagittal image 9, axial angled image 18). There is mild to moderate facet arthropathy without ligament flavum hypertrophy. This results in moderate right and mild left neural foraminal stenosis with mild spinal canal stenosis.     At L4-5, there is moderate disc height loss with a moderate-sized broad-based posterior disc bulge. There is grade 1 anterolisthesis with slight dorsal uncovering of the disc and a broad-based posterior disc bulge. There is severe bilateral facet arthropathy with a small right greater than left facet joint effusion. This results in severe left neural foraminal stenosis, moderate right neural foraminal stenosis and moderate to severe spinal canal stenosis. There is crowding of the nerve roots centrally, and effacement of the left greater than right lateral recess (with likely impingement on the traversing L5 nerve roots in the lateral recesses.     At L5-S1, is mild disc height loss with a small broad-based posterior disc bulge somewhat more eccentric to the neural foramina. There is moderate left and mild right facet arthropathy without ligament flavum hypertrophy. This results in moderate left and mild right neural foraminal stenosis with minimal spinal canal stenosis.     The SI joints and visualized pelvis are within normal limits.    Assessment:  Ms. Carlson is a 83 y.o. female back and left leg pain  1. Lumbar radiculitis    2. DDD (degenerative disc disease), lumbar    3. Other spondylosis, lumbar region        Plan:  1.  I have stressed the importance of physical activity and exercise to improve overall health. Home exercises given  2.  Monitor progress from lumbar epidural steroid injection to the Left L4-5 and L5-S1 level(s).  3. Continue gabapentin as prescribed  4.  Follow-up prn

## 2024-06-07 ENCOUNTER — HOSPITAL ENCOUNTER (OUTPATIENT)
Dept: CARDIOLOGY | Facility: CLINIC | Age: 84
Discharge: HOME | End: 2024-06-07
Attending: INTERNAL MEDICINE
Payer: MEDICARE

## 2024-06-07 DIAGNOSIS — R55 SYNCOPE AND COLLAPSE: ICD-10-CM

## 2024-06-07 DIAGNOSIS — R55 SYNCOPE AND COLLAPSE: Primary | ICD-10-CM

## 2024-06-07 PROCEDURE — 93298 REM INTERROG DEV EVAL SCRMS: CPT | Mod: S$GLB,,, | Performed by: INTERNAL MEDICINE

## 2024-06-08 ENCOUNTER — HOSPITAL ENCOUNTER (OUTPATIENT)
Dept: RADIOLOGY | Facility: HOSPITAL | Age: 84
Discharge: HOME | End: 2024-06-08
Attending: INTERNAL MEDICINE
Payer: MEDICARE

## 2024-06-08 DIAGNOSIS — R19.8 CHANGE IN BOWEL FUNCTION: ICD-10-CM

## 2024-06-08 PROCEDURE — 76700 US EXAM ABDOM COMPLETE: CPT | Mod: 26,,, | Performed by: RADIOLOGY

## 2024-06-08 PROCEDURE — 76700 US EXAM ABDOM COMPLETE: CPT | Mod: TC

## 2024-06-18 DIAGNOSIS — K86.81 EXOCRINE PANCREATIC INSUFFICIENCY: ICD-10-CM

## 2024-06-18 DIAGNOSIS — R10.12 ABDOMINAL PAIN, LEFT UPPER QUADRANT: Primary | ICD-10-CM

## 2024-06-18 DIAGNOSIS — K63.8219 SMALL INTESTINAL BACTERIAL OVERGROWTH: ICD-10-CM

## 2024-06-20 ENCOUNTER — HOSPITAL ENCOUNTER (OUTPATIENT)
Dept: RADIOLOGY | Facility: HOSPITAL | Age: 84
Discharge: HOME | End: 2024-06-20
Attending: INTERNAL MEDICINE
Payer: MEDICARE

## 2024-06-20 DIAGNOSIS — R10.12 ABDOMINAL PAIN, LEFT UPPER QUADRANT: ICD-10-CM

## 2024-06-20 DIAGNOSIS — K86.81 EXOCRINE PANCREATIC INSUFFICIENCY: ICD-10-CM

## 2024-06-20 DIAGNOSIS — K63.8219 SMALL INTESTINAL BACTERIAL OVERGROWTH: ICD-10-CM

## 2024-06-20 LAB
CREAT SERPL-MCNC: 0.7 MG/DL (ref 0.5–1.4)
SAMPLE: NORMAL

## 2024-06-20 PROCEDURE — 82565 ASSAY OF CREATININE: CPT | Mod: PO

## 2024-06-20 PROCEDURE — 25500020 PHARM REV CODE 255: Mod: PO | Performed by: INTERNAL MEDICINE

## 2024-06-20 PROCEDURE — 74177 CT ABD & PELVIS W/CONTRAST: CPT | Mod: 26,,, | Performed by: RADIOLOGY

## 2024-06-20 RX ADMIN — IOHEXOL 100 ML: 350 INJECTION, SOLUTION INTRAVENOUS at 02:06

## 2024-06-21 DIAGNOSIS — Z12.31 ENCOUNTER FOR SCREENING MAMMOGRAM FOR MALIGNANT NEOPLASM OF BREAST: Primary | ICD-10-CM

## 2024-06-24 ENCOUNTER — HOSPITAL ENCOUNTER (OUTPATIENT)
Dept: RADIOLOGY | Facility: HOSPITAL | Age: 84
Discharge: HOME | End: 2024-06-24
Attending: INTERNAL MEDICINE
Payer: MEDICARE

## 2024-06-24 DIAGNOSIS — Z12.31 ENCOUNTER FOR SCREENING MAMMOGRAM FOR MALIGNANT NEOPLASM OF BREAST: ICD-10-CM

## 2024-06-24 PROCEDURE — 77063 BREAST TOMOSYNTHESIS BI: CPT | Mod: 26,,, | Performed by: RADIOLOGY

## 2024-06-24 PROCEDURE — 77067 SCR MAMMO BI INCL CAD: CPT | Mod: 26,,, | Performed by: RADIOLOGY

## 2024-06-24 PROCEDURE — 77067 SCR MAMMO BI INCL CAD: CPT | Mod: TC

## 2024-06-25 NOTE — PLAN OF CARE
Controlled on BID tegretol per Dr Marie, but does take tegretol 100 in the afternoon frequently for sx breakthrough.   May be contributing to lower sodium.    Discharge orders and chart reviewed. No other discharge needs noted at this time. Pt is clear for discharge from case management. Pt is discharging to home.     In Basket message sent to Dr. Lopez's staff requesting hospital follow up.         10/19/22 1049   Final Note   Assessment Type Final Discharge Note   Anticipated Discharge Disposition Home   What phone number can be called within the next 1-3 days to see how you are doing after discharge? [phone]   Hospital Resources/Appts/Education Provided Appointments scheduled and added to AVS

## 2024-08-02 DIAGNOSIS — Z78.0 MENOPAUSE: Primary | ICD-10-CM

## 2024-08-06 ENCOUNTER — HOSPITAL ENCOUNTER (OUTPATIENT)
Dept: RADIOLOGY | Facility: CLINIC | Age: 84
Discharge: HOME | End: 2024-08-06
Attending: INTERNAL MEDICINE
Payer: MEDICARE

## 2024-08-06 DIAGNOSIS — Z78.0 MENOPAUSE: ICD-10-CM

## 2024-08-06 PROCEDURE — 77080 DXA BONE DENSITY AXIAL: CPT | Mod: TC,PO

## 2024-08-06 PROCEDURE — 77080 DXA BONE DENSITY AXIAL: CPT | Mod: 26,,, | Performed by: RADIOLOGY

## 2024-08-08 ENCOUNTER — CLINICAL SUPPORT (OUTPATIENT)
Dept: REHABILITATION | Facility: HOSPITAL | Age: 84
End: 2024-08-08
Payer: MEDICARE

## 2024-08-08 DIAGNOSIS — M62.89 PELVIC FLOOR DYSFUNCTION: Primary | ICD-10-CM

## 2024-08-08 PROCEDURE — 97163 PT EVAL HIGH COMPLEX 45 MIN: CPT | Mod: PO

## 2024-08-08 PROCEDURE — 97530 THERAPEUTIC ACTIVITIES: CPT | Mod: PO

## 2024-08-08 NOTE — PATIENT INSTRUCTIONS
"    Home Exercise Program: 08/08/2024      Home Exercise Program: 08/08/2024    "Roll for Control"  Strategies to Delay Voiding    What to do when you feel like you "gotta go gotta go!"     Stop what you are doing.    Take a few good deep breaths (this settles down your nervous system and relaxes your bladder).    WHILE YOU CONTINUE DEEP BREATHING, activate your pelvic floor by performing several quick contractions and releases.  (Pelvic floor contractions send a message to the bladder to relax and hold urine.)  Sitting: Roll thigh in and out slowly or squeeze knees together  Standing: Roll thigh in/out slowly and gently    As you do the above, you can also count backwards from 100 (to help get your mind off the urge!).       After the urge to void has quietly, you may be able to process with your activity OR if it has been approximately 3 hours since you've voided, you may choose to go to the bathroom and void.        Remember......"Control your bladder before it controls YOU!"      4 bottles a day, small sips throughout the day. Stop 2 hours before bed   "
stable for discharge. abdomen soft/nt.   follow up with GI.

## 2024-08-10 NOTE — PLAN OF CARE
"[facility]   Physical Therapy Initial Evaluation        Date: 2024   Name: Sunita Carlson  Clinic Number: [id number]    Therapy Diagnosis:   Encounter Diagnosis   Name Primary?    Pelvic floor dysfunction Yes     Physician: Helga Goldberg MD    Physician Orders: PT Eval and Treat   Medical Diagnosis from Referral: Pelvic floor dysfunction [M62.89]   Evaluation Date: 2024  Authorization Period Expiration: 24  Plan of Care Expiration: 10/31/2024  Progress Note Due: 2024  Visit # / Visits authorized:    FOTO: 1/3    Precautions: Standard     Time In: 11:38 pm  Time Out: 12:25 pm  Total Appointment Time (timed & untimed codes): 47 minutes    SUBJECTIVE       Date of onset: chronic     History of current condition - Sunita reports: diarrhea has started to lighten up since starting the medication. The reason she is here is because she has no control of bowel movements as there is no holding it in. The bladder incontinence is starting to be the same way.   She is going to the bathroom all day long. States she had a bad infection a month ago. She is still on the medication from the infection. She feels like she may coming on with a bladder infection and has always had problems with her urethra. She has always gotten her urethra dilated until the last few years.     OB/GYN History:   ; 2 episiotomies - had a lot of stitches that healed okay (states she had over 40 stitches for both) had her kids at 14 and 15.  Hysterectomy? Yes- at 17   Oophorectomy? Yes  Using vaginal estrogen cream: No  Sexually active? No  Pelvic Pain with: none reported  Pelvic pressure? Yes    Bladder/Bowel History:   Frequency of urination:   Daytime: at least every hour           Nighttime: waking up every 2 hours - tries to stop drinking water two hours before bed   Do you use the bathroom "just in case"? Yes  Urine stream: strong but can be weak and dribbly  Complete emptying: " Yes  Post-micturition dribble? Yes  Bladder leakage: Yes  Activities that cause leakage: Urgency  Frequency of incidents: several times per day  Amount leaked (urine): few drops and teaspoon(s)  Urinary Urgency: Yes.  Able to delay the urge for at least <1 minute(s). The last few weeks.   Pain with delaying the urge to urinate: No     Frequency of bowel movements: once a day but can vary. Pancrelipase creon  has been helping more than immodium   Difficulty initiating BM: Yes but better with medication  Quality/Shape of BM: improving but varies. Type 1 to type 6-7  Does Patient Feel Empty after BM? Not always  Bowel Urgency: Yes.  Able to delay the urge for at least <1 minute(s).  Fiber Supplements or Laxative Use? yes   Colon leakage: Yes  Frequency of incidents: a few time per week   Amount leaked (bowels): varies; pellets to small amount around the anus - more soft       Form of protection: pad and panty liner   Number of pads required in 24 hours: depends; 3 at most per day     Types of fluid intake: 3 bottles (16 ounces) and two 8 ounce cups of decaf tea  Diet: Standard  Habitus: well developed, well nourished  Abuse/Neglect: Pt denies a history of physical or emotional abuse at this visit.       Pain:  Location: none reported      Medical History: Sunita  has a past medical history of Arthritis, Cataract, Coronary artery disease, Hypertension, Insomnia, Neuropathy, Retinal detachment, and Stomach ulcer.     Surgical History: Sunita Carlson  has a past surgical history that includes Cardiac surgery; Carotid endarterectomy (Left); Cholecystectomy; Appendectomy (1951); Vaginal hysterectomy (1959); Tonsillectomy; Bladder suspension; Cataract extraction (Left); Superficial keratectomy (Right, 10/27/2017); Oophorectomy (Bilateral, 1959); Breast biopsy (Right); Transforaminal epidural injection of steroid (Left, 08/12/2019); Injection of anesthetic agent around ganglion impar (N/A, 09/12/2019); Transforaminal  epidural injection of steroid (Left, 01/16/2020); Transforaminal epidural injection of steroid (Left, 06/30/2020); Insertion of implantable loop recorder (N/A, 12/18/2020); Dilation and curettage of uterus; and Transforaminal epidural injection of steroid (Left, 4/30/2024).    Medications: Sunita has a current medication list which includes the following prescription(s): alprazolam, amlodipine, aspirin, atorvastatin, cholecalciferol (vitamin d3), diclofenac sodium, dicyclomine, diphenoxylate-atropine 2.5-0.025 mg, escitalopram oxalate, gabapentin, meloxicam, myrbetriq, nitroglycerin, ondansetron, pantoprazole, propranolol, ropinirole, and temazepam, and the following Facility-Administered Medications: lactated ringers and ondansetron.    Allergies:   Review of patient's allergies indicates:   Allergen Reactions    Demerol [meperidine] Nausea And Vomiting    Zoloft [sertraline] Other (See Comments)     Severe shakes    Oxycodone     Hydrocodone Itching    Oxycodone-acetaminophen Itching        Imaging: None reported for this condition     Prior Therapy/Previous treatment included: None reported for this condition   Social History/Living Arrangements: lives alone  Current exercise: very minimal   Occupation: not working   Prior Level of Function: Pt was independent with all ADLs and iADLs without pain, no reports of incontinence of bowel or bladder.  Current Level of Function: Urinary and bowel incontinence impacting ADLs and iADLs.    Constitutional Symptoms Review: The patient denies having any constitutional symptoms.     Pts goals: to reduce urinary and bowel leakage     OBJECTIVE     See EMR under MEDIA for written consent provided 8/8/2024  Chaperone: declined    ORTHO SCREEN  Posture in sitting: slouched  and sits squarely   Posture in standing: forward and rounded shoulders  and increased kyphosis  Pelvic alignment: Not assessed today    Adductor Palpation: decreased length    ABDOMINAL WALL  ASSESSMENT  Palpation: tender ; R>L side abdominal tenderness  Abdominal strength: Rectus abdominus: poor     Transverse abdominus: inconsistent   Pelvic Floor Muscle and Transverse Abdominus Synergy: absent    BREATHING MECHANICS ASSESSMENT   Thorax Assessment During Quiet Respiration: WNL excursion of ribcage and WNL excursion of abdominal wall  Thorax Assessment During Deep Respiration: paradoxical (abdominal wall contracts during inhalation)      Intake Outcome Measures for FOTO Survey    Therapist reviewed FOTO scores for Sunita Carlson on 8/8/2024.     FOTO report- see Media section in Epic or account episode details in FOTO.      Intake Score:   PFDI bowel leakage- 47         TREATMENT        Treatment Time In: 12;00 pm  Treatment Time Out: 12:25 pm  Total Treatment time (time-based codes) separate from Evaluation: 25 minutes    Therapeutic Activity Patient participated in dynamic functional therapeutic activities to improve functional performance for 25 minutes. Including: Education as described below.     [x] bladder irritants, anatomy/physiology of pelvic floor, posture/body mechanices, diaphragmatic breathing, kegels, proper bearing down techniques, fluid intake/dietary modifications, and behavior modifications   [x] Instruction on diaphragmatic breathing   [x] Education on visual cues/mental imagery for pelvic floor relaxation  [x] Education on visual cues/mental imagery for pelvic floor activation   [x] Instruction on the Knack for reduction of stress urinary incontinence  [x] Pelvic anatomy edu and impact on current level of function   [x] HEP building      Written Home Exercises and Education Provided: Patient instructed to cont prior HEP. Exercises were reviewed and Sunita was able to demonstrate them prior to the end of the session.  Sunita demonstrated good  understanding of the education provided. See EMR under Patient Instructions for exercises and education provided during therapy  sessions.      ASSESSMENT     Sunita is a 83 y.o. female referred to outpatient Physical Therapy with a medical diagnosis of  Pelvic floor dysfunction . Pt presents with altered posture, poor knowledge of body mechanics and posture, poor trunk stability, decreased pelvic muscle strength, poor coordination of pelvic floor muscles during ADL's leading to urinary or fecal leakage, poor fluid intake, poor diet, dysfunctional voiding, dysfunctional defecation, and unable to co-contract or co-relax abdominal wall and pelvic floor muscles. Upon assessment, patient demonstrates R>L abdominal tenderness likely due to chronic constipation and stool within the colon. Patient demonstrates coordination impairments with diaphragmatic breathing and bowel  techniques. Patient will benefit from pelvic floor assessment in follow up visit for further individualization of current plan of care.      Pt prognosis is Good.   Pt will benefit from skilled outpatient Physical Therapy to address the deficits stated above and in the chart below, provide pt/family education, and to maximize pt's level of independence.     Plan of care discussed with patient: Yes  Pt's spiritual, cultural and educational needs considered and patient is agreeable to the plan of care and goals as stated below:     Anticipated Barriers for therapy: none    Medical Necessity is demonstrated by the following  History  Co-morbidities and personal factors that may impact the plan of care [] LOW: no personal factors / co-morbidities  [] MODERATE: 1-2 personal factors / co-morbidities  [x] HIGH: 3+ personal factors / co-morbidities    Moderate / High Support Documentation:   Co-morbidities affecting plan of care: Gastroesophageal reflux disease without esophagitis  Irritable bowel syndrome with both constipation and diarrhea  Gastroparesis  History of stroke  Anxiety and depression  SOB  Chest pain  Essential hypertension  Mixed hyperlipidemia  Coronary artery  disease involving coronary bypass graft of native heart with unstable angina pectoris  Syncope, cardiogenic  Hx of CABG  Atherosclerotic heart disease of native coronary artery with unstable angina pectoris  Stable angina pectoris  Palpitations  Bilateral carotid artery disease          Personal Factors:   lifestyle     Examination  Body Structures and Functions, activity limitations and participation restrictions that may impact the plan of care [] LOW: addressing 1-2 elements  [] MODERATE: 3+ elements  [x] HIGH: 4+ elements (please support below)    Moderate / High Support Documentation: Coccydynia  Osteopenia determined by x-ray  Primary osteoarthritis involving multiple joints  DDD, lumbar  Lumbar radic        Clinical Presentation [] LOW: stable  [] MODERATE: Evolving  [x] HIGH: Unstable     Decision Making/ Complexity Score: moderate         Goals:  Short Term Goals: 6 weeks   - Pt will demonstrate excellent knowledge and adherence to HEP to facilitate optimal recovery.  - Pt will demonstrate proper PFM contraction, relaxation, and lengthening coordinated with TA and breath for improved muscle coordination needed for functional activity.    Long Term Goals: 12 weeks   - Pt will demonstrate excellent knowledge and adherence to HEP for continued self-maintenance of symptoms.  - Pt will report FOTO score of PFDI bowel leakage- 54%  or greater, indicating clinically relevant increase in function.  - Pt will report voiding interval of 2-3 hours for improved ADL tolerance.  - Pt will report ability to delay urinary urge for at least  10-15 minutes to maintain continence with ADL/IADLs.   - Pt will report 60-70% improvement in urinary incontinence for improved hygiene and ADL/IADL tolerance.   - Pt will report 60-70% improvement in fecal incontinence for improved hygiene and ADL/IADL tolerance.   - Pt will demonstrate PFM strength of at least 3/5 MMT for improved strength needed to maintain continence.   - Pt will  report bearing down appropriately 100% of the time for improved bowel function and decreased stress on adjacent pelvic structures.   - Pt will demonstrate independence with pressure-management strategies to decreased stress on adjacent pelvic structures.     PLAN     Plan of care Certification: 8/8/2024 to 10/31/2024.    Outpatient Physical Therapy 1-2 times weekly for 12 weeks to include the following interventions: therapeutic exercises, therapeutic activity, neuromuscular re-education, gait training, manual therapy, modalities PRN, patient/family education, and self care/home management.      Next visit: PT - do pelvic assessmen; work on education and strengthening       Tg Newman, PT

## 2024-08-20 ENCOUNTER — CLINICAL SUPPORT (OUTPATIENT)
Dept: REHABILITATION | Facility: HOSPITAL | Age: 84
End: 2024-08-20
Payer: MEDICARE

## 2024-08-20 DIAGNOSIS — M62.89 PELVIC FLOOR DYSFUNCTION: Primary | ICD-10-CM

## 2024-08-20 PROCEDURE — 97112 NEUROMUSCULAR REEDUCATION: CPT | Mod: PO

## 2024-08-20 PROCEDURE — 97530 THERAPEUTIC ACTIVITIES: CPT | Mod: PO

## 2024-08-20 PROCEDURE — 97140 MANUAL THERAPY 1/> REGIONS: CPT | Mod: PO

## 2024-08-20 NOTE — PROGRESS NOTES
[facility]   Physical Therapy Treatment Note      Name: Sunita Carlson  [facility] Number: [id number]    Therapy Diagnosis:   Encounter Diagnosis   Name Primary?    Pelvic floor dysfunction Yes     Physician: Helga Goldberg MD    Visit Date: 8/20/2024    Physician Orders: PT Eval and Treat   Medical Diagnosis from Referral: Pelvic floor dysfunction [M62.89]   Evaluation Date: 8/8/2024  Authorization Period Expiration: 12-31-24  Plan of Care Expiration: 10/31/2024  Progress Note Due: 9/8/2024  Visit # / Visits authorized: 1/ 20 + eval  FOTO: 1/3  Date last given: 88/8/2024    Time In: 11:20 am   Time Out: 12:32 pm  Total Billable Time: 72 minutes    Precautions: Standard    Subjective     Pt reports: things are the same. The Creon helps but she is still having the diarrhea. She had fried chicken and potato salad yesterday.     She was compliant with home exercise program.  Response to previous treatment: no adverse reaction  Functional change: none     Pain: none reported     Constitutional Symptoms Review: The patient denies having any constitutional symptoms.       Objective      Objective Measures updated at progress report unless specified.     VAGINAL PELVIC FLOOR EXAM    EXTERNAL ASSESSMENT  Introitus: gaping  Skin condition: WNL  Sensation: WNL   Pain: none  Voluntary contraction: visible lift  Voluntary relaxation: visible drop  Involuntary contraction: visible drop  Bearing down: visible drop  Perineal descent: present      INTERNAL ASSESSMENT  Pain: tender areas noted as follows: B levator ani, B OI L>R (refers pain into abdominals that she typically feels) , coccygeus  Sensation: able to localized pressure appropriately   Vaginal vault: roomy inferiorly, decreased length superiorly  Muscle Bulk: hypertonus   Muscle Power: 2/5  Muscle Endurance: 3 sec  Quality of contraction: decreased hold and slow relaxation   Specificity: patient contracts: glutes   Coordination: tends to hold  breath during PFM contration     Treatment   Sunita received the treatments listed below:    Pt verbally consents to intra-vaginal treatment today.  Signed consent form already on file.  Chaperone declined today.      Sunita received the following manual therapy techniques: to develop flexibility, extensibility, and desensitization for 15 minutes including:   [x] soft tissue mobilization of global pelvic floor B, especially at layer 3     Sunita participated in neuromuscular re-education activities to develop Coordination, Control, Down training, Proprioception, and Sense for 42 minutes including:   [x]pelvic floor relaxation/bulging training, bearing down with abdominal release, and diaphragmatic breathing   [x] Kegel edu and practice.  Increased time spent on edu on proper technique.  Pt improves with practice and verbal cues.  [x]Seated pelvic tilts   [x] Pelvic assessment as noted above to address pelvic coordination, control, and sense      Sunita participated in dynamic functional therapeutic activities to improve functional performance for 15  minutes, including:  [x] Splinting reviewed- to be provided handout should she continue to feel incomplete emptying   [x] Plan of care review  [x] Anatomy review and discussion of the anatomy on current level of function   [x] Education on visual cues/mental imagery for pelvic floor relaxation  [x] Education on visual cues/mental imagery for pelvic floor activation  [x] Coordination of kegels with functional activities such as cough, laugh, sneeze, lift, etc.   [] Pt edu in the Roll for Control technique to decrease incontinence with strong urge.  Practiced new technique in sitting and standing.    [x] Home exercise program issued and reviewed     Home Exercises Provided and Patient Education Provided     Education provided: See above  Discussed progression of plan of care with patient; educated pt in activity modification; reviewed HEP with pt. Pt demonstrated and  verbalized understanding of all instruction and was provided with a handout of HEP (see Patient Instructions).    Written Home Exercises Provided: yes.  Exercises were reviewed and Sunita was able to demonstrate them prior to the end of the session.  Sunita demonstrated good  understanding of the education provided.     See EMR under Patient Instructions for exercises provided 8/20/2024.    Assessment     Sunita presents today with continued abdominal discomfort, diarrhea, and incomplete emptying. Pelvic assessment revealed layer 3 tension and palpable stool in vault likely due to incomplete emptying. Great relaxation of superficial pelvic musculature, however, greatest difficulty lengthening deeper pelvic floor. Tenderness palpable at layer three bilaterally and refers pain into the lower abdominal quadrants. This is the typical pain she feels when she does not empty her bowels. In depth education on importance of diet and increased water to assist with stool consistency. Encouraged patient to discuss fiber supplementation with her medical provider as she stopped taking what was recommended but would like to begin again. Will further discuss splinting with patient in follow up visit should she continue to feel as though she is not emptying more completely and efficiently.  Pt will continue to benefit from skilled outpatient physical therapy to address the deficits listed in the problem list box on initial evaluation, provide pt/family education and to maximize pt's level of independence in the home and community environment.       Sunita Is progressing well towards her goals.   Pt prognosis is Good.     Pt will continue to benefit from skilled outpatient physical therapy to address the deficits listed in the problem list box on initial evaluation, provide pt/family education and to maximize pt's level of independence in the home and community environment.     Pt's spiritual, cultural and educational needs  considered and pt agreeable to plan of care and goals.     Anticipated barriers to physical therapy: none    Goals: (PROGRESSING 8/20/2024 )  Short Term Goals: 6 weeks   - Pt will demonstrate excellent knowledge and adherence to HEP to facilitate optimal recovery.  - Pt will demonstrate proper PFM contraction, relaxation, and lengthening coordinated with TA and breath for improved muscle coordination needed for functional activity.     Long Term Goals: 12 weeks   - Pt will demonstrate excellent knowledge and adherence to HEP for continued self-maintenance of symptoms.  - Pt will report FOTO score of PFDI bowel leakage- 54%  or greater, indicating clinically relevant increase in function.  - Pt will report voiding interval of 2-3 hours for improved ADL tolerance.  - Pt will report ability to delay urinary urge for at least  10-15 minutes to maintain continence with ADL/IADLs.   - Pt will report 60-70% improvement in urinary incontinence for improved hygiene and ADL/IADL tolerance.   - Pt will report 60-70% improvement in fecal incontinence for improved hygiene and ADL/IADL tolerance.   - Pt will demonstrate PFM strength of at least 3/5 MMT for improved strength needed to maintain continence.   - Pt will report bearing down appropriately 100% of the time for improved bowel function and decreased stress on adjacent pelvic structures.   - Pt will demonstrate independence with pressure-management strategies to decreased stress on adjacent pelvic structures.     Plan   Plan of care Certification: 8/8/2024 to 10/31/2024.    Continue with established Plan of Care, working toward established PT goals.    At next visit: work on bowel mechanics, posterior pelvic floor lengthening , can perform colon massage    Tg Newman, PT

## 2024-08-20 NOTE — PATIENT INSTRUCTIONS
"  1) Bowel function - consider the following recommendations for optimizing bowel function. Good bowel health is important for overall pelvic floor function.    Continue increasing your water intake and incorporating those foods with soluble fiber      Positioning and techniques for elimination     Bowel Movement Mechanics  1. Sit on the toilet comfortably with legs and buttocks relaxed.  2. Put your feet on a waste basket or squatty potty  3. Lean forward while keeping your back straight, forearms rest on knees.  4. Keep your knees apart.  5. Fully relax pelvic floor muscles - think about softening, opening, dropping  6. Use gentle belly breaths and bulge lower abdominals like making a beer belly  7. Keep belly big on exhalation  8. Exhale like blowing out birthday candles  9. Keep breathing like this through entire bowel movement  10. Make sure to give enough time, ok for it to take several minutes     Do not strain and Do not hold your breath.       (Search "Squatty Potty" on [employer])                     Try 1 or 2 of the following stretches with your lower belly breathing before a bowel movement:                      "

## 2024-09-12 ENCOUNTER — TELEPHONE (OUTPATIENT)
Dept: ORTHOPEDICS | Facility: CLINIC | Age: 84
End: 2024-09-12
Payer: MEDICARE

## 2024-09-12 NOTE — TELEPHONE ENCOUNTER
----- Message from Kathy Valencia MA sent at 9/12/2024 10:25 AM CDT -----  Contact: pt  Has appt Monday   Asking to be seen today   Call back

## 2024-09-12 NOTE — TELEPHONE ENCOUNTER
Returned patient's phone call- left voicemail informing her we do not have any appts today and that she will need to report to the hospital check in at registration for 8:00 Monday for xray then come to her appt once done.

## 2024-09-16 ENCOUNTER — OFFICE VISIT (OUTPATIENT)
Dept: ORTHOPEDICS | Facility: CLINIC | Age: 84
End: 2024-09-16
Payer: MEDICARE

## 2024-09-16 ENCOUNTER — HOSPITAL ENCOUNTER (OUTPATIENT)
Dept: RADIOLOGY | Facility: HOSPITAL | Age: 84
Discharge: HOME | End: 2024-09-16
Attending: ORTHOPAEDIC SURGERY
Payer: MEDICARE

## 2024-09-16 VITALS — RESPIRATION RATE: 18 BRPM | HEIGHT: 59 IN | WEIGHT: 120.56 LBS | BODY MASS INDEX: 24.31 KG/M2

## 2024-09-16 DIAGNOSIS — M25.569 KNEE PAIN, UNSPECIFIED CHRONICITY, UNSPECIFIED LATERALITY: ICD-10-CM

## 2024-09-16 DIAGNOSIS — M17.12 PRIMARY OSTEOARTHRITIS OF LEFT KNEE: ICD-10-CM

## 2024-09-16 DIAGNOSIS — M25.569 KNEE PAIN, UNSPECIFIED CHRONICITY, UNSPECIFIED LATERALITY: Primary | ICD-10-CM

## 2024-09-16 DIAGNOSIS — M17.11 PRIMARY OSTEOARTHRITIS OF RIGHT KNEE: Primary | ICD-10-CM

## 2024-09-16 PROCEDURE — 1125F AMNT PAIN NOTED PAIN PRSNT: CPT | Mod: CPTII,S$GLB,, | Performed by: ORTHOPAEDIC SURGERY

## 2024-09-16 PROCEDURE — 99214 OFFICE O/P EST MOD 30 MIN: CPT | Mod: 25,S$GLB,, | Performed by: ORTHOPAEDIC SURGERY

## 2024-09-16 PROCEDURE — 73564 X-RAY EXAM KNEE 4 OR MORE: CPT | Mod: 26,50,, | Performed by: RADIOLOGY

## 2024-09-16 PROCEDURE — 1101F PT FALLS ASSESS-DOCD LE1/YR: CPT | Mod: CPTII,S$GLB,, | Performed by: ORTHOPAEDIC SURGERY

## 2024-09-16 PROCEDURE — 73564 X-RAY EXAM KNEE 4 OR MORE: CPT | Mod: TC,50

## 2024-09-16 PROCEDURE — 20610 DRAIN/INJ JOINT/BURSA W/O US: CPT | Mod: 50,S$GLB,, | Performed by: ORTHOPAEDIC SURGERY

## 2024-09-16 PROCEDURE — 1159F MED LIST DOCD IN RCRD: CPT | Mod: CPTII,S$GLB,, | Performed by: ORTHOPAEDIC SURGERY

## 2024-09-16 PROCEDURE — 99999 PR PBB SHADOW E&M-EST. PATIENT-LVL III: CPT | Mod: PBBFAC,,, | Performed by: ORTHOPAEDIC SURGERY

## 2024-09-16 PROCEDURE — 3288F FALL RISK ASSESSMENT DOCD: CPT | Mod: CPTII,S$GLB,, | Performed by: ORTHOPAEDIC SURGERY

## 2024-09-16 RX ORDER — TRIAMCINOLONE ACETONIDE 40 MG/ML
40 INJECTION, SUSPENSION INTRA-ARTICULAR; INTRAMUSCULAR
Status: DISCONTINUED | OUTPATIENT
Start: 2024-09-16 | End: 2024-09-16 | Stop reason: HOSPADM

## 2024-09-16 RX ADMIN — TRIAMCINOLONE ACETONIDE 40 MG: 40 INJECTION, SUSPENSION INTRA-ARTICULAR; INTRAMUSCULAR at 03:09

## 2024-09-16 NOTE — PROCEDURES
Large Joint Aspiration/Injection: bilateral knee    Date/Time: 9/16/2024 3:30 PM    Performed by: Andrez Mazariegos MD  Authorized by: Andrez Mazariegos MD    Consent Done?:  Yes (Verbal)  Indications:  Pain  Site marked: the procedure site was marked    Timeout: prior to procedure the correct patient, procedure, and site was verified    Prep: patient was prepped and draped in usual sterile fashion      Local anesthesia used?: Yes    Anesthesia:  Local infiltration  Local anesthetic: Ropivicaine.  Anesthetic total (ml):  3      Details:  Needle Size:  22 G  Ultrasonic Guidance for needle placement?: No    Approach:  Anterolateral  Location:  Knee  Laterality:  Bilateral  Site:  Bilateral knee  Medications (Right):  40 mg triamcinolone acetonide 40 mg/mL  Medications (Left):  40 mg triamcinolone acetonide 40 mg/mL  Patient tolerance:  Patient tolerated the procedure well with no immediate complications

## 2024-09-16 NOTE — PROGRESS NOTES
"Past Medical History:   Diagnosis Date    Arthritis     Cataract     Coronary artery disease     Hypertension     Insomnia     Neuropathy     Retinal detachment     Stomach ulcer        Past Surgical History:   Procedure Laterality Date    APPENDECTOMY  1951    BLADDER SUSPENSION      BREAST BIOPSY Right     Benign.    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    CAROTID ENDARTERECTOMY Left     L    CATARACT EXTRACTION Left     CHOLECYSTECTOMY      DILATION AND CURETTAGE OF UTERUS      Due to miscarriage.    INJECTION OF ANESTHETIC AGENT AROUND GANGLION IMPAR N/A 09/12/2019    Procedure: BLOCK, GANGLION IMPAR;  Surgeon: Christian James MD;  Location: [facility] OR;  Service: Pain Management;  Laterality: N/A;    INSERTION OF IMPLANTABLE LOOP RECORDER N/A 12/18/2020    Procedure: INSERTION, IMPLANTABLE LOOP RECORDER;  Surgeon: Adin Saba MD;  Location: [facility] CATH/EP LAB;  Service: Cardiology;  Laterality: N/A;    OOPHORECTOMY Bilateral 1959    SUPERFICIAL KERATECTOMY Right 10/27/2017    Dr. Morales    TONSILLECTOMY      TRANSFORAMINAL EPIDURAL INJECTION OF STEROID Left 08/12/2019    Procedure: Injection,steroid,epidural,transforaminal approach L4-5, L5-S1;  Surgeon: Christian James MD;  Location: [facility] OR;  Service: Pain Management;  Laterality: Left;    TRANSFORAMINAL EPIDURAL INJECTION OF STEROID Left 01/16/2020    Procedure: Injection,steroid,epidural,transforaminal approach;  Surgeon: Christian James MD;  Location: [facility] OR;  Service: Pain Management;  Laterality: Left;  L4-5, L5-S1    TRANSFORAMINAL EPIDURAL INJECTION OF STEROID Left 06/30/2020    Procedure: Injection,steroid,epidural,transforaminal approach;  Surgeon: Christian James MD;  Location: [facility] OR;  Service: Pain Management;  Laterality: Left;  L4-5 and L5-S1    TRANSFORAMINAL EPIDURAL INJECTION OF STEROID Left 4/30/2024    Procedure: Injection,steroid,epidural,transforaminal approach;  Surgeon: Christian James MD;  Location: [facility] OR;  Service: Anesthesiology;  " Laterality: Left;  L4-5 and l5-s1    VAGINAL HYSTERECTOMY  1959       Current Outpatient Medications   Medication Sig    ALPRAZolam (XANAX) 0.25 MG tablet Take 0.25 mg by mouth daily as needed.    amLODIPine (NORVASC) 5 MG tablet Take 5 mg by mouth once daily.    aspirin (ECOTRIN) 81 MG EC tablet Take 81 mg by mouth once daily.    atorvastatin (LIPITOR) 20 MG tablet Take 20 mg by mouth once daily.    cholecalciferol, vitamin D3, (VITAMIN D3) 2,000 unit Tab Take 1 tablet by mouth once daily.    diclofenac sodium (VOLTAREN) 1 % Gel Apply 2 g topically daily as needed.    dicyclomine (BENTYL) 10 MG capsule 2 (two) times daily as needed.     diphenoxylate-atropine 2.5-0.025 mg (LOMOTIL) 2.5-0.025 mg per tablet Take 1 tablet by mouth 4 (four) times daily as needed for Diarrhea.    EScitalopram oxalate (LEXAPRO) 10 MG tablet Take 1 tablet (10 mg total) by mouth once daily.    gabapentin (NEURONTIN) 100 MG capsule Take 1 capsule (100 mg total) by mouth 2 (two) times daily.    meloxicam (MOBIC) 7.5 MG tablet Take 1 tablet (7.5 mg total) by mouth once daily.    mirabegron (MYRBETRIQ) 50 mg Tb24 Take 1 tablet (50 mg total) by mouth every morning. Take 1 in morning for overactive bladder    nitroGLYCERIN (NITROSTAT) 0.4 MG SL tablet Place under the tongue.    ondansetron (ZOFRAN-ODT) 8 MG TbDL every 6 (six) hours as needed.     pantoprazole (PROTONIX) 40 MG tablet Take 40 mg by mouth every morning.    propranoloL (INDERAL LA) 60 MG 24 hr capsule Take 60 mg by mouth every morning.    rOPINIRole (REQUIP) 0.5 MG tablet Take 0.5 mg by mouth nightly as needed.    temazepam (RESTORIL) 30 mg capsule Take 30 mg by mouth every evening.     Current Facility-Administered Medications   Medication    ondansetron injection 4 mg     Facility-Administered Medications Ordered in Other Visits   Medication    lactated ringers infusion       Review of patient's allergies indicates:   Allergen Reactions    Demerol [meperidine] Nausea And Vomiting     Zoloft [sertraline] Other (See Comments)     Severe shakes    Oxycodone     Hydrocodone Itching    Oxycodone-acetaminophen Itching       Family History   Problem Relation Name Age of Onset    Blindness Maternal Grandfather      Cancer Father      Macular degeneration Maternal Aunt      Cancer Maternal Aunt      Breast cancer Maternal Aunt      Macular degeneration Maternal Uncle      Diabetes Paternal Aunt      Cancer Paternal Aunt      Breast cancer Paternal Aunt      Melanoma Neg Hx      Psoriasis Neg Hx      Lupus Neg Hx      Eczema Neg Hx         Social History     Socioeconomic History    Marital status:    Tobacco Use    Smoking status: Former     Current packs/day: 0.00     Types: Cigarettes     Start date: 10/13/1958     Quit date: 10/13/1962     Years since quittin.9    Smokeless tobacco: Never   Substance and Sexual Activity    Alcohol use: Yes     Comment: very rarely    Drug use: No    Sexual activity: Not Currently     Birth control/protection: Surgical     Social Determinants of Health     Financial Resource Strain: Low Risk  (10/19/2022)    Overall Financial Resource Strain (CARDIA)     Difficulty of Paying Living Expenses: Not very hard   Food Insecurity: No Food Insecurity (10/19/2022)    Hunger Vital Sign     Worried About Running Out of Food in the Last Year: Never true     Ran Out of Food in the Last Year: Never true   Transportation Needs: No Transportation Needs (10/19/2022)    PRAPARE - Transportation     Lack of Transportation (Medical): No     Lack of Transportation (Non-Medical): No   Physical Activity: Inactive (10/19/2022)    Exercise Vital Sign     Days of Exercise per Week: 0 days     Minutes of Exercise per Session: 0 min   Stress: Stress Concern Present (10/29/2020)    [employer] - Occupational Stress Questionnaire     Feeling of Stress : To some extent   Housing Stability: Low Risk  (10/19/2022)    Housing Stability Vital Sign     Unable  to Pay for Housing in the Last Year: No     Number of Places Lived in the Last Year: 1     Unstable Housing in the Last Year: No       Chief Complaint:   No chief complaint on file.      History of present illness:  This is an 83-year-old female seen for knee pain.  I saw her previously and have done injections in both of her knees.   Both knees are hurting.  Pain is a 9/10.  Last treatment was back in December.      Review of Systems:  Musculoskeletal:  See HPI    Physical Examination:    Vital Signs:    There were no vitals filed for this visit.      There is no height or weight on file to calculate BMI.    This a well-developed, well nourished patient in no acute distress.  They are alert and oriented and cooperative to examination.  Pt. walks without an antalgic gait.      Examination of the left knee shows no rashes or erythema. There are no masses ecchymosis or effusion. Patient has full range of motion from 0-130°. Patient is nontender to palpation over lateral joint line and moderately tender to palpation over the medial joint line.. Knee is stable to varus and valgus stress. 5 out of 5 motor strength. Palpable distal pulses. Intact light touch sensation. Negative Patellofemoral crepitus    X-rays:  X-rays of both knees are ordered and reviewed which show some mild medial compartment narrowing bilaterally.  No significant progression noted.    MRI of the left knee is reviewed and interpreted:Suspected small tear of the posterior horn of the medial meniscus.  Mild interstitial tearing of the ACL.  Small ganglion cyst at its tibial attachment.  Minimal joint effusion.     Assessment::  Bilateral knee arthritis    Plan:  Reviewed the findings with her today.  We discussed treatment options.  I agreed to inject both of her knees today.      This note was created using [employer] voice recognition software that occasionally misinterpreted phrases or words.    Consult note is delivered via Epic messaging  service.

## 2024-09-27 DIAGNOSIS — R10.9 RIGHT SIDED ABDOMINAL PAIN: Primary | ICD-10-CM

## 2024-10-08 ENCOUNTER — HOSPITAL ENCOUNTER (OUTPATIENT)
Dept: RADIOLOGY | Facility: HOSPITAL | Age: 84
Discharge: HOME | End: 2024-10-08
Attending: NURSE PRACTITIONER
Payer: MEDICARE

## 2024-10-08 DIAGNOSIS — R10.9 RIGHT SIDED ABDOMINAL PAIN: ICD-10-CM

## 2024-10-08 LAB
CREAT SERPL-MCNC: 0.8 MG/DL (ref 0.5–1.4)
SAMPLE: NORMAL

## 2024-10-08 PROCEDURE — 74177 CT ABD & PELVIS W/CONTRAST: CPT | Mod: TC

## 2024-10-08 PROCEDURE — 74177 CT ABD & PELVIS W/CONTRAST: CPT | Mod: 26,,, | Performed by: RADIOLOGY

## 2024-10-08 PROCEDURE — 25500020 PHARM REV CODE 255

## 2024-10-08 PROCEDURE — A9698 NON-RAD CONTRAST MATERIALNOC: HCPCS

## 2024-10-08 RX ADMIN — IOHEXOL 1000 ML: 12 SOLUTION ORAL at 04:10

## 2024-10-08 RX ADMIN — IOHEXOL 75 ML: 350 INJECTION, SOLUTION INTRAVENOUS at 04:10

## 2024-10-13 ENCOUNTER — HOSPITAL ENCOUNTER (OUTPATIENT)
Dept: CARDIOLOGY | Facility: CLINIC | Age: 84
Discharge: HOME | End: 2024-10-13
Attending: INTERNAL MEDICINE
Payer: MEDICARE

## 2024-10-13 ENCOUNTER — CLINICAL SUPPORT (OUTPATIENT)
Dept: CARDIOLOGY | Facility: CLINIC | Age: 84
End: 2024-10-13

## 2024-10-13 DIAGNOSIS — I49.8 OTHER SPECIFIED CARDIAC ARRHYTHMIAS: ICD-10-CM

## 2024-11-02 ENCOUNTER — HOSPITAL ENCOUNTER (OUTPATIENT)
Dept: CARDIOLOGY | Facility: CLINIC | Age: 84
Discharge: HOME | End: 2024-11-02
Attending: INTERNAL MEDICINE
Payer: MEDICARE

## 2024-11-02 DIAGNOSIS — I49.8 OTHER SPECIFIED CARDIAC ARRHYTHMIAS: ICD-10-CM

## 2024-11-02 PROCEDURE — 93298 REM INTERROG DEV EVAL SCRMS: CPT | Mod: PN | Performed by: INTERNAL MEDICINE

## 2024-11-19 ENCOUNTER — TELEPHONE (OUTPATIENT)
Dept: OTOLARYNGOLOGY | Facility: CLINIC | Age: 84
End: 2024-11-19
Payer: MEDICARE

## 2024-11-19 NOTE — TELEPHONE ENCOUNTER
Spoke w/pt per call back msg for scheduling.  W/pt, ENT appt scheduled for 12/0+ @ 1415 w/Dr. Antonio.  Pt confirmed appt date/time.  Pt has no further ENT questions/concerns at this time.

## 2024-11-19 NOTE — TELEPHONE ENCOUNTER
----- Message from Jeannette sent at 11/19/2024  3:42 PM CST -----  Hi pt would like to schedule appointment due to an earache she would like someone from the staff to call and schedule appointment @ [phone]

## 2024-11-20 ENCOUNTER — OFFICE VISIT (OUTPATIENT)
Dept: CARDIOLOGY | Facility: CLINIC | Age: 84
End: 2024-11-20
Payer: MEDICARE

## 2024-11-20 VITALS
BODY MASS INDEX: 24.4 KG/M2 | HEART RATE: 75 BPM | OXYGEN SATURATION: 98 % | HEIGHT: 59 IN | WEIGHT: 121.06 LBS | SYSTOLIC BLOOD PRESSURE: 120 MMHG | DIASTOLIC BLOOD PRESSURE: 70 MMHG

## 2024-11-20 DIAGNOSIS — Z01.810 PREOP CARDIOVASCULAR EXAM: Primary | ICD-10-CM

## 2024-11-20 DIAGNOSIS — I25.810 CORONARY ARTERY DISEASE INVOLVING CORONARY BYPASS GRAFT OF NATIVE HEART WITHOUT ANGINA PECTORIS: ICD-10-CM

## 2024-11-20 PROBLEM — I25.110 ATHEROSCLEROTIC HEART DISEASE OF NATIVE CORONARY ARTERY WITH UNSTABLE ANGINA PECTORIS: Status: RESOLVED | Noted: 2021-01-04 | Resolved: 2024-11-20

## 2024-11-20 PROBLEM — R00.2 PALPITATIONS: Status: RESOLVED | Noted: 2022-03-22 | Resolved: 2024-11-20

## 2024-11-20 PROBLEM — R55 SYNCOPE, CARDIOGENIC: Status: RESOLVED | Noted: 2020-02-06 | Resolved: 2024-11-20

## 2024-11-20 PROCEDURE — 99999 PR PBB SHADOW E&M-EST. PATIENT-LVL IV: CPT | Mod: PBBFAC,,, | Performed by: STUDENT IN AN ORGANIZED HEALTH CARE EDUCATION/TRAINING PROGRAM

## 2024-11-20 PROCEDURE — 1101F PT FALLS ASSESS-DOCD LE1/YR: CPT | Mod: CPTII,S$GLB,, | Performed by: STUDENT IN AN ORGANIZED HEALTH CARE EDUCATION/TRAINING PROGRAM

## 2024-11-20 PROCEDURE — 99214 OFFICE O/P EST MOD 30 MIN: CPT | Mod: S$GLB,,, | Performed by: STUDENT IN AN ORGANIZED HEALTH CARE EDUCATION/TRAINING PROGRAM

## 2024-11-20 PROCEDURE — 1159F MED LIST DOCD IN RCRD: CPT | Mod: CPTII,S$GLB,, | Performed by: STUDENT IN AN ORGANIZED HEALTH CARE EDUCATION/TRAINING PROGRAM

## 2024-11-20 PROCEDURE — 1160F RVW MEDS BY RX/DR IN RCRD: CPT | Mod: CPTII,S$GLB,, | Performed by: STUDENT IN AN ORGANIZED HEALTH CARE EDUCATION/TRAINING PROGRAM

## 2024-11-20 PROCEDURE — 3288F FALL RISK ASSESSMENT DOCD: CPT | Mod: CPTII,S$GLB,, | Performed by: STUDENT IN AN ORGANIZED HEALTH CARE EDUCATION/TRAINING PROGRAM

## 2024-11-20 PROCEDURE — 1126F AMNT PAIN NOTED NONE PRSNT: CPT | Mod: CPTII,S$GLB,, | Performed by: STUDENT IN AN ORGANIZED HEALTH CARE EDUCATION/TRAINING PROGRAM

## 2024-11-22 LAB
OHS QRS DURATION: 64 MS
OHS QTC CALCULATION: 434 MS

## 2024-12-03 DIAGNOSIS — R10.31 ABDOMINAL PAIN, RIGHT LOWER QUADRANT: Primary | ICD-10-CM

## 2024-12-03 DIAGNOSIS — K59.00 CONSTIPATION: ICD-10-CM

## 2024-12-06 ENCOUNTER — OFFICE VISIT (OUTPATIENT)
Dept: OTOLARYNGOLOGY | Facility: CLINIC | Age: 84
End: 2024-12-06
Payer: MEDICARE

## 2024-12-06 VITALS — BODY MASS INDEX: 24.27 KG/M2 | WEIGHT: 120.38 LBS | HEIGHT: 59 IN

## 2024-12-06 DIAGNOSIS — H81.10 BPPV (BENIGN PAROXYSMAL POSITIONAL VERTIGO), UNSPECIFIED LATERALITY: Primary | ICD-10-CM

## 2024-12-06 PROCEDURE — 99999 PR PBB SHADOW E&M-EST. PATIENT-LVL III: CPT | Mod: PBBFAC,,, | Performed by: OTOLARYNGOLOGY

## 2024-12-08 NOTE — PROGRESS NOTES
Patient ID:  Sunita Carlson  84 y.o.  female      Assessment:       1. Preop cardiovascular exam    2. Coronary artery disease involving coronary bypass graft of native heart without angina pectoris        Plan:     No absolute cardiac contraindication at this time for noncardiac surgery. She is scheduled for bilateral ptosis repair with monitored anesthesia care.  General anesthesia is not planned.  She is at low risk of adverse perioperative cardiovascular events.  Okay to hold aspirin 7 days prior to the surgery.  Continue Lipitor 20 mg daily.    Subjective:     Chief Complaint   Patient presents with    Pre-op Exam     Ptosis repair bilateral dr rodriguez       HPI:  Sunita Carlson is a 84 y.o. female who presents today for preop evaluation.  She is scheduled for bilateral ptosis repair with monitored anesthesia care.  General anesthesia is not planned.  She has a history of coronary artery disease status post CABG, hyperlipidemia and moderate right carotid artery stenosis (reported 50-69%, closer to 50%).    She reports doing well overall.  Has mild shortness of breath with moderately strenuous activities that is chronic without any recent worsening.  Reports being able to perform activities of daily living without experiencing any angina or anginal equivalent symptoms.  Reports no palpitations, swelling of extremities, syncope or near-syncope.  EKGs today shows sinus rhythm, normal intervals, narrow QRS complex, and no significant ST-T abnormalities.    PREVIOUS CARDIAC TESTING/PROCEDURES HISTORY:  Most Recent Echocardiogram Results  Results for orders placed in visit on 04/11/22    Echo    Interpretation Summary  · The left ventricle is normal in size with concentric hypertrophy and normal systolic function.  · The estimated ejection fraction is 66%.  · Indeterminate left ventricular diastolic function.  · Normal right ventricular size with normal right ventricular systolic function.  · Normal central venous  pressure (3 mmHg).  · The estimated PA systolic pressure is 31 mmHg.  · Mild aortic regurgitation.      Most Recent Stress Test Results  Results for orders placed in visit on 04/11/22    Nuclear Stress Test    Interpretation Summary    The nuclear portion of this study will be reported separately.    The EKG portion of this study is negative for ischemia.    The patient reported no chest pain during the stress test.    There were no arrhythmias during stress.      Most Recent Cardiac PET Stress Test Results  No results found for this or any previous visit.      Most Recent Cardiovascular Angiogram results  No results found for this or any previous visit.      Other Most Recent Cardiology Results  Results for orders placed during the hospital encounter of 06/07/24    Cardiac device check - Remote    Interpretation Summary    Tasspass remote loop recorder check.    Normal device function.    No events or episodes.    AT/AF burden 0.0%.    See full report in media.      Verified      CARDIAC SURGERY:    Most Recent EKG Results  Results for orders placed or performed in visit on 11/20/24   IN OFFICE EKG 12-LEAD (to [place])    Collection Time: 11/20/24 12:06 PM   Result Value Ref Range    QRS Duration 64 ms    [facility] QTC Calculation 434 ms    Narrative    Test Reason : Z01.810,    Vent. Rate :  77 BPM     Atrial Rate :  77 BPM     P-R Int : 146 ms          QRS Dur :  64 ms      QT Int : 384 ms       P-R-T Axes :  64  42  73 degrees    QTcB Int : 434 ms    Normal sinus rhythm  Possible Left atrial enlargement  Septal infarct (cited on or before 05-Dec-2023)  Abnormal ECG  When compared with ECG of 05-Dec-2023 12:07,  No significant change was found    Referred By: YURY           Confirmed By:        The ASCVD Risk score (Gail JADEN, et al., 2019) failed to calculate for the following reasons:    The 2019 ASCVD risk score is only valid for ages 40 to 79    Review of patient's allergies indicates:   Allergen Reactions     "Demerol [meperidine] Nausea And Vomiting    Zoloft [sertraline] Other (See Comments)     Severe shakes    Oxycodone     Hydrocodone Itching    Oxycodone-acetaminophen Itching       Past Medical History:   Diagnosis Date    Arthritis     Cataract     Coronary artery disease     Hypertension     Insomnia     Neuropathy     Retinal detachment     Stomach ulcer      Past Surgical History:   Procedure Laterality Date    APPENDECTOMY  1951    BLADDER SUSPENSION      BREAST BIOPSY Right     Benign.    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    CAROTID ENDARTERECTOMY Left     L    CATARACT EXTRACTION Left     CHOLECYSTECTOMY      DILATION AND CURETTAGE OF UTERUS      Due to miscarriage.    INJECTION OF ANESTHETIC AGENT AROUND GANGLION IMPAR N/A 09/12/2019    Procedure: BLOCK, GANGLION IMPAR;  Surgeon: Christian James MD;  Location: [facility] OR;  Service: Pain Management;  Laterality: N/A;    INSERTION OF IMPLANTABLE LOOP RECORDER N/A 12/18/2020    Procedure: INSERTION, IMPLANTABLE LOOP RECORDER;  Surgeon: Adin Saba MD;  Location: [facility] CATH/EP LAB;  Service: Cardiology;  Laterality: N/A;    OOPHORECTOMY Bilateral 1959    SUPERFICIAL KERATECTOMY Right 10/27/2017    Dr. Morales    TONSILLECTOMY      TRANSFORAMINAL EPIDURAL INJECTION OF STEROID Left 08/12/2019    Procedure: Injection,steroid,epidural,transforaminal approach L4-5, L5-S1;  Surgeon: Christian James MD;  Location: [facility] OR;  Service: Pain Management;  Laterality: Left;    TRANSFORAMINAL EPIDURAL INJECTION OF STEROID Left 01/16/2020    Procedure: Injection,steroid,epidural,transforaminal approach;  Surgeon: Christian James MD;  Location: [facility] OR;  Service: Pain Management;  Laterality: Left;  L4-5, L5-S1    TRANSFORAMINAL EPIDURAL INJECTION OF STEROID Left 06/30/2020    Procedure: Injection,steroid,epidural,transforaminal approach;  Surgeon: Christian James MD;  Location: [facility] OR;  Service: Pain Management;  Laterality: Left;  L4-5 and L5-S1    TRANSFORAMINAL EPIDURAL " INJECTION OF STEROID Left 2024    Procedure: Injection,steroid,epidural,transforaminal approach;  Surgeon: Christian Jmaes MD;  Location: [facility] OR;  Service: Anesthesiology;  Laterality: Left;  L4-5 and l5-s1    VAGINAL HYSTERECTOMY       Social History     Tobacco Use    Smoking status: Former     Current packs/day: 0.00     Types: Cigarettes     Start date: 10/13/1958     Quit date: 10/13/1962     Years since quittin.1    Smokeless tobacco: Never   Substance Use Topics    Alcohol use: Yes     Comment: very rarely    Drug use: No          REVIEW OF SYSTEMS  CONSTITUTIONAL: No chills, no fatigue, no fever.   EYES: No double vision, no blurred vision  NEURO: No headaches, no dizziness  RESPIRATORY: No cough, no wheezing.    CARDIOVASCULAR: See HPI   GI: No abdominal pain, no melena, no diarrhea, no nausea or vomiting.   : No  dysuria and frequency, no hematuria  SKIN: no bruising, no discoloration  ENDOCRINE: no polyphagia, no heat intolerance, no cold intolerance  PSYCHIATRIC: no depression, no anxiety, no memory loss  MUSCULOSKELETAL: no  neck pain, no muscle weakness,no back pain          Objective:        Vitals:    24 1203   BP: 120/70   Pulse: 75       PHYSICAL EXAM  CONSTITUTIONAL: In no apparent distress  HEENT: Normocephalic. Pupils normal and conjunctivae normal. No pallor  NECK: No JVD  LUNGS: B/L air entry to the lungs, clear to auscultation. No rales, wheezing or rhonchi.  HEART: Normal rate and regular rhythm. Normal S1 and S2.  No murmur   ABDOMEN: soft, non-tender; bowel sounds normal  EXTREMITIES: No edema. Good palpable distal pulses.  NEURO: AAO X 3, no gross sensory or motor deficits  SKIN:  No rash  Psych:  Normal affect    Lab Results   Component Value Date    WBC 6.27 10/19/2022    HGB 13.6 10/19/2022    HCT 39.7 10/19/2022     10/19/2022    CHOL 205 (H) 2024    TRIG 122 2024    HDL 69 2024    ALT 17 10/18/2022    AST 23 10/18/2022      "10/19/2022    K 3.3 (L) 10/19/2022     10/19/2022    CREATININE 0.3 (L) 10/19/2022    BUN 8 10/19/2022    CO2 26 10/19/2022    TSH 3.020 10/19/2022    INR 0.9 02/06/2020    HGBA1C 5.9 (H) 02/02/2024        @  Lab Results   Component Value Date    CHOL 205 (H) 02/14/2024    CHOL 123 10/19/2022    CHOL 152 06/03/2020     Lab Results   Component Value Date    HDL 69 02/14/2024    HDL 49 10/19/2022    HDL 70 06/03/2020     Lab Results   Component Value Date    LDLCALC 111.6 02/14/2024    LDLCALC 59.0 (L) 10/19/2022    LDLCALC 61.2 (L) 06/03/2020     No results found for: "DLDL"  Lab Results   Component Value Date    TRIG 122 02/14/2024    TRIG 75 10/19/2022    TRIG 104 06/03/2020       f1   Lab Results   Component Value Date    CHOLHDL 33.7 02/14/2024    CHOLHDL 39.8 10/19/2022    CHOLHDL 46.1 06/03/2020            Current Outpatient Medications   Medication Instructions    ALPRAZolam (XANAX) 0.25 mg, Daily PRN    amLODIPine (NORVASC) 5 mg, Daily    aspirin (ECOTRIN) 81 mg, Daily    atorvastatin (LIPITOR) 20 mg, Daily    cholecalciferol, vitamin D3, (VITAMIN D3) 2,000 unit Tab 1 tablet, Daily    diclofenac sodium (VOLTAREN) 2 g, Daily PRN    dicyclomine (BENTYL) 10 MG capsule 2 times daily PRN    diphenoxylate-atropine 2.5-0.025 mg (LOMOTIL) 2.5-0.025 mg per tablet 1 tablet, 4 times daily PRN    EScitalopram oxalate (LEXAPRO) 10 mg, Oral, Daily    gabapentin (NEURONTIN) 100 mg, Oral, 2 times daily    meloxicam (MOBIC) 7.5 mg, Oral, Daily    MYRBETRIQ 50 mg, Oral, Every morning, Take 1 in morning for overactive bladder    nitroGLYCERIN (NITROSTAT) 0.4 MG SL tablet Place under the tongue.    ondansetron (ZOFRAN-ODT) 8 MG TbDL Every 6 hours PRN    pantoprazole (PROTONIX) 40 mg, Every morning    propranoloL (INDERAL LA) 60 mg, Every morning    rOPINIRole (REQUIP) 0.5 mg, Nightly PRN    temazepam (RESTORIL) 30 mg, Nightly       Medication List with Changes/Refills   Current Medications    ALPRAZOLAM (XANAX) 0.25 MG " TABLET    Take 0.25 mg by mouth daily as needed.    AMLODIPINE (NORVASC) 5 MG TABLET    Take 5 mg by mouth once daily.    ASPIRIN (ECOTRIN) 81 MG EC TABLET    Take 81 mg by mouth once daily.    ATORVASTATIN (LIPITOR) 20 MG TABLET    Take 20 mg by mouth once daily.    CHOLECALCIFEROL, VITAMIN D3, (VITAMIN D3) 2,000 UNIT TAB    Take 1 tablet by mouth once daily.    DICLOFENAC SODIUM (VOLTAREN) 1 % GEL    Apply 2 g topically daily as needed.    DICYCLOMINE (BENTYL) 10 MG CAPSULE    2 (two) times daily as needed.     DIPHENOXYLATE-ATROPINE 2.5-0.025 MG (LOMOTIL) 2.5-0.025 MG PER TABLET    Take 1 tablet by mouth 4 (four) times daily as needed for Diarrhea.    ESCITALOPRAM OXALATE (LEXAPRO) 10 MG TABLET    Take 1 tablet (10 mg total) by mouth once daily.    GABAPENTIN (NEURONTIN) 100 MG CAPSULE    Take 1 capsule (100 mg total) by mouth 2 (two) times daily.    MELOXICAM (MOBIC) 7.5 MG TABLET    Take 1 tablet (7.5 mg total) by mouth once daily.    MIRABEGRON (MYRBETRIQ) 50 MG TB24    Take 1 tablet (50 mg total) by mouth every morning. Take 1 in morning for overactive bladder    NITROGLYCERIN (NITROSTAT) 0.4 MG SL TABLET    Place under the tongue.    ONDANSETRON (ZOFRAN-ODT) 8 MG TBDL    every 6 (six) hours as needed.     PANTOPRAZOLE (PROTONIX) 40 MG TABLET    Take 40 mg by mouth every morning.    PROPRANOLOL (INDERAL LA) 60 MG 24 HR CAPSULE    Take 60 mg by mouth every morning.    ROPINIROLE (REQUIP) 0.5 MG TABLET    Take 0.5 mg by mouth nightly as needed.    TEMAZEPAM (RESTORIL) 30 MG CAPSULE    Take 30 mg by mouth every evening.               All pertinent labs, imaging, and EKGs reviewed.  Patient's most recent EKG tracing was personally interpreted by this provider.    Problem List Items Addressed This Visit          Cardiac/Vascular    Coronary artery disease involving coronary bypass graft of native heart without angina pectoris     Other Visit Diagnoses       Preop cardiovascular exam    -  Primary    Relevant  Orders    IN OFFICE EKG 12-LEAD (to [place])            Follow up in about 6 months (around 5/20/2025).

## 2024-12-12 ENCOUNTER — HOSPITAL ENCOUNTER (OUTPATIENT)
Dept: CARDIOLOGY | Facility: CLINIC | Age: 84
Discharge: HOME | End: 2024-12-12
Attending: INTERNAL MEDICINE
Payer: MEDICARE

## 2024-12-12 ENCOUNTER — TELEPHONE (OUTPATIENT)
Dept: CARDIOLOGY | Facility: CLINIC | Age: 84
End: 2024-12-12
Payer: MEDICARE

## 2024-12-12 ENCOUNTER — CLINICAL SUPPORT (OUTPATIENT)
Dept: CARDIOLOGY | Facility: CLINIC | Age: 84
End: 2024-12-12

## 2024-12-12 DIAGNOSIS — I49.8 OTHER SPECIFIED CARDIAC ARRHYTHMIAS: ICD-10-CM

## 2024-12-12 PROCEDURE — 93298 REM INTERROG DEV EVAL SCRMS: CPT | Mod: PN | Performed by: INTERNAL MEDICINE

## 2024-12-12 NOTE — TELEPHONE ENCOUNTER
12/12/24    Received notification on patient that her Loop recorder is at [facility].  Physician notified.

## 2025-01-12 ENCOUNTER — HOSPITAL ENCOUNTER (OUTPATIENT)
Dept: CARDIOLOGY | Facility: CLINIC | Age: 85
Discharge: HOME | End: 2025-01-12
Attending: INTERNAL MEDICINE
Payer: MEDICARE

## 2025-01-12 DIAGNOSIS — I49.8 OTHER SPECIFIED CARDIAC ARRHYTHMIAS: ICD-10-CM

## 2025-01-12 PROCEDURE — 93298 REM INTERROG DEV EVAL SCRMS: CPT | Mod: PN | Performed by: INTERNAL MEDICINE

## 2025-02-10 ENCOUNTER — TELEPHONE (OUTPATIENT)
Dept: OTOLARYNGOLOGY | Facility: CLINIC | Age: 85
End: 2025-02-10
Payer: MEDICARE

## 2025-02-10 NOTE — TELEPHONE ENCOUNTER
----- Message from Bekah sent at 2/10/2025 11:45 AM CST -----  Contact: Sunita Dorsey is calling to speak to the nurse regarding a sooner appointment to remove the Crystals from ear, she stated she only wanted to see Ileana, but before Thursday 02/13, please give her a call at      Thanks  LJ

## 2025-02-11 ENCOUNTER — OFFICE VISIT (OUTPATIENT)
Dept: OTOLARYNGOLOGY | Facility: CLINIC | Age: 85
End: 2025-02-11
Payer: MEDICARE

## 2025-02-11 VITALS
WEIGHT: 122.13 LBS | DIASTOLIC BLOOD PRESSURE: 73 MMHG | BODY MASS INDEX: 24.62 KG/M2 | SYSTOLIC BLOOD PRESSURE: 133 MMHG | HEIGHT: 59 IN | HEART RATE: 67 BPM

## 2025-02-11 DIAGNOSIS — H81.12 BENIGN PAROXYSMAL POSITIONAL VERTIGO OF LEFT EAR: ICD-10-CM

## 2025-02-11 DIAGNOSIS — R11.0 NAUSEA: Primary | ICD-10-CM

## 2025-02-11 LAB
OHS CV AF BURDEN PERCENT: < 1
OHS CV DC REMOTE DEVICE TYPE: NORMAL
OHS CV ICD SHOCK: NO

## 2025-02-11 PROCEDURE — 95992 CANALITH REPOSITIONING PROC: CPT | Mod: S$GLB,,, | Performed by: PHYSICIAN ASSISTANT

## 2025-02-11 PROCEDURE — 99214 OFFICE O/P EST MOD 30 MIN: CPT | Mod: S$GLB,,, | Performed by: PHYSICIAN ASSISTANT

## 2025-02-11 PROCEDURE — 1160F RVW MEDS BY RX/DR IN RCRD: CPT | Mod: CPTII,S$GLB,, | Performed by: PHYSICIAN ASSISTANT

## 2025-02-11 PROCEDURE — 99999 PR PBB SHADOW E&M-EST. PATIENT-LVL IV: CPT | Mod: PBBFAC,,, | Performed by: PHYSICIAN ASSISTANT

## 2025-02-11 PROCEDURE — 1125F AMNT PAIN NOTED PAIN PRSNT: CPT | Mod: CPTII,S$GLB,, | Performed by: PHYSICIAN ASSISTANT

## 2025-02-11 PROCEDURE — 1159F MED LIST DOCD IN RCRD: CPT | Mod: CPTII,S$GLB,, | Performed by: PHYSICIAN ASSISTANT

## 2025-02-11 PROCEDURE — 1101F PT FALLS ASSESS-DOCD LE1/YR: CPT | Mod: CPTII,S$GLB,, | Performed by: PHYSICIAN ASSISTANT

## 2025-02-11 PROCEDURE — 3288F FALL RISK ASSESSMENT DOCD: CPT | Mod: CPTII,S$GLB,, | Performed by: PHYSICIAN ASSISTANT

## 2025-02-11 NOTE — PROGRESS NOTES
"[facility]    Subjective:      Patient: Sunita Carlson Patient PCP: Linsey Nelson MD         :  1940     Sex:  female      MRN:  [id number]          Date of Visit: 2025      Chief Complaint: Left posterior semicircular canal BPPV     Patient ID: Sunita Carlson is a 84 y.o. female with PMHx of bilateral BPPV corrected in the past treated with epley maneuver who presents to office for evaluation of vertigo. Pt states that she has had issues with headache that starts in the front of her head and wrap around to the back of her head. She has been having issues with vertigo and dysequilibrium. She states that she has positional vertigo when turning her head to the left. She will also fall to the right. She recently fell and hit her left buttock. She did not hit her head or have LOC. Pt has h/o peripheral neuropathy and states that she has been having pain in her left ear that is sharp and stabbing. It does not feel like an ear infection, but feels as though it is going into the bone of the ear itself.       Past Medical History  She has a past medical history of Arthritis, Cataract, Coronary artery disease, Hypertension, Insomnia, Neuropathy, Retinal detachment, and Stomach ulcer.    Family History  Her family history includes Blindness in her maternal grandfather; Breast cancer in her maternal aunt and paternal aunt; Cancer in her father, maternal aunt, and paternal aunt; Diabetes in her paternal aunt; Macular degeneration in her maternal aunt and maternal uncle.    Past Surgical History:   Procedure Laterality Date    APPENDECTOMY      BLADDER SUSPENSION      BREAST BIOPSY Right     Benign.    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    CAROTID ENDARTERECTOMY Left     L    CATARACT EXTRACTION Left     CHOLECYSTECTOMY      DILATION AND CURETTAGE OF UTERUS      Due to miscarriage.    INJECTION OF ANESTHETIC AGENT AROUND GANGLION IMPAR N/A 2019    Procedure: BLOCK, GANGLION IMPAR; "  Surgeon: Christian James MD;  Location: [facility] OR;  Service: Pain Management;  Laterality: N/A;    INSERTION OF IMPLANTABLE LOOP RECORDER N/A 2020    Procedure: INSERTION, IMPLANTABLE LOOP RECORDER;  Surgeon: Adin Saba MD;  Location: [facility] CATH/EP LAB;  Service: Cardiology;  Laterality: N/A;    OOPHORECTOMY Bilateral     SUPERFICIAL KERATECTOMY Right 10/27/2017    Dr. Morales    TONSILLECTOMY      TRANSFORAMINAL EPIDURAL INJECTION OF STEROID Left 2019    Procedure: Injection,steroid,epidural,transforaminal approach L4-5, L5-S1;  Surgeon: Christian James MD;  Location: [facility] OR;  Service: Pain Management;  Laterality: Left;    TRANSFORAMINAL EPIDURAL INJECTION OF STEROID Left 2020    Procedure: Injection,steroid,epidural,transforaminal approach;  Surgeon: Christian James MD;  Location: [facility] OR;  Service: Pain Management;  Laterality: Left;  L4-5, L5-S1    TRANSFORAMINAL EPIDURAL INJECTION OF STEROID Left 2020    Procedure: Injection,steroid,epidural,transforaminal approach;  Surgeon: Christian James MD;  Location: [facility] OR;  Service: Pain Management;  Laterality: Left;  L4-5 and L5-S1    TRANSFORAMINAL EPIDURAL INJECTION OF STEROID Left 2024    Procedure: Injection,steroid,epidural,transforaminal approach;  Surgeon: Christian James MD;  Location: [facility];  Service: Anesthesiology;  Laterality: Left;  L4-5 and l5-s1    VAGINAL HYSTERECTOMY       Social History     Tobacco Use    Smoking status: Former     Current packs/day: 0.00     Types: Cigarettes     Start date: 10/13/1958     Quit date: 10/13/1962     Years since quittin.3    Smokeless tobacco: Never   Substance and Sexual Activity    Alcohol use: Yes     Comment: very rarely    Drug use: No    Sexual activity: Not Currently     Birth control/protection: Surgical     Medications  She has a current medication list which includes the following prescription(s): alprazolam, amlodipine, aspirin, atorvastatin, cholecalciferol (vitamin d3),  "diclofenac sodium, dicyclomine, diphenoxylate-atropine 2.5-0.025 mg, escitalopram oxalate, gabapentin, meloxicam, myrbetriq, nitroglycerin, ondansetron, pantoprazole, propranolol, ropinirole, and temazepam, and the following Facility-Administered Medications: lactated ringers and ondansetron.    Review of patient's allergies indicates:   Allergen Reactions    Demerol [meperidine] Nausea And Vomiting    Zoloft [sertraline] Other (See Comments)     Severe shakes    Oxycodone     Hydrocodone Itching    Oxycodone-acetaminophen Itching     All medications, allergies, and past history have been reviewed.    Objective:      Vitals:      11/20/2024    12:03 PM 12/6/2024     2:40 PM 2/11/2025     2:15 PM   Vitals - 1 value per visit   SYSTOLIC 120  133   DIASTOLIC 70  73   Pulse 75  67   SPO2 98 %     Weight (lb) 121.03 120.37 122.14   Weight (kg) 54.9 54.6 55.4   Height 4' 11" (1.499 m) 4' 11" (1.499 m) 4' 11" (1.499 m)   BMI (Calculated) 24.4 24.3 24.7   Pain Score Zero Four Six       Body surface area is 1.52 meters squared.    Physical Exam  Constitutional:       General: She is not in acute distress.     Appearance: Normal appearance. She is not ill-appearing.   HENT:      Head: Normocephalic and atraumatic.      Right Ear: Tympanic membrane, ear canal and external ear normal.      Left Ear: Tympanic membrane, ear canal and external ear normal.      Nose: Nose normal.      Mouth/Throat:      Lips: Pink. No lesions.      Mouth: Mucous membranes are moist. No oral lesions.      Tongue: No lesions.      Palate: No lesions.      Pharynx: Oropharynx is clear. Uvula midline. No pharyngeal swelling, oropharyngeal exudate, posterior oropharyngeal erythema or uvula swelling.      Tonsils: 0 on the right. 0 on the left.      Comments: S/p tonsillectomy.  Eyes:      General:         Right eye: No discharge.         Left eye: No discharge.      Extraocular Movements: Extraocular movements intact.      Conjunctiva/sclera: " Conjunctivae normal.   Pulmonary:      Effort: Pulmonary effort is normal.   Neurological:      General: No focal deficit present.      Mental Status: She is alert and oriented to person, place, and time. Mental status is at baseline.   Psychiatric:         Mood and Affect: Mood normal.         Behavior: Behavior normal.         Thought Content: Thought content normal.         Judgment: Judgment normal.     Milton-Hallpike:   Negative Right  Positive Left: fatiguing rotary geotropic nystagmus toward left ear head hang left.     Patient: Sunita Carlson [id number], :1940  Procedure date:2025  Patient's medications, allergies, past medical, surgical, social and family histories were reviewed and updated as appropriate.  Chief Complaint:  Left posterior semicircular canal BPPV    HPI:  Sunita is a 84 y.o. female with benign paroxysmal positional vertigo (BPPV) refractory to medical therapy. Hallpike-Milton maneuver demonstrates nystagmus of delayed onset that fatigues, rotary, clockwise, associated with vertigo.    Procedure: Risks, benefits, and alternatives of the procedure were discussed with the patient, and the patient consented to the Epley maneuver or canalith repositioning procedure (CRP).      With the patient sitting upright on the examination table, the head was lowered to the supine position and the neck rotated 45 degrees to the left side; once nystagmus fatigued, the body and head were slowly rotated to the opposite side 90 degrees, and then after 20-30 seconds the rotation continued another 90 degrees (they rolled onto their shoulder and were looking downwards at a 45 degree angle). After the nystagmus resolved, the patient was gently allowed to sit up with neck flexion.    There were no complications and the patient tolerated it well.  Post-procedure instructions were given.    Theo Tejeda performed the entire procedure.      Labs:  WBC   Date Value Ref Range Status   10/19/2022 6.27 3.90  - 12.70 K/uL Final     Platelets   Date Value Ref Range Status   10/19/2022 247 150 - 450 K/uL Final     Creatinine   Date Value Ref Range Status   10/19/2022 0.3 (L) 0.5 - 1.4 mg/dL Final     TSH   Date Value Ref Range Status   10/19/2022 3.020 0.340 - 5.600 uIU/mL Final     Glucose   Date Value Ref Range Status   10/19/2022 93 70 - 110 mg/dL Final     Hemoglobin A1C   Date Value Ref Range Status   02/02/2024 5.9 (H) <5.7 % of total Hgb Final     Comment:     For someone without known diabetes, a hemoglobin   A1c value between 5.7% and 6.4% is consistent with  prediabetes and should be confirmed with a   follow-up test.    For someone with known diabetes, a value <7%  indicates that their diabetes is well controlled. A1c  targets should be individualized based on duration of  diabetes, age, comorbid conditions, and other  considerations.    This assay result is consistent with an increased risk  of diabetes.    Currently, no consensus exists regarding use of  hemoglobin A1c for diagnosis of diabetes for children.    HbA1c performed on Roche platform.  Effective 11/13/23 a change in test platforms may have   shifted HbA1c results compared to historical results.         All lab results and imaging results have been reviewed.    Assessment:        ICD-10-CM ICD-9-CM   1. Nausea  R11.0 787.02   2. Benign paroxysmal positional vertigo of left ear  H81.12 386.11            Plan:      Pt had left-sided BPPV corrected with left-sided epley maneuver today in office. Pt is to follow post-epley instructions as provided via AVS. She has zofran at home as needed for nausea. I suspect that her left ear pain is neuropathic in origin. She takes gabapentin. If new onset headache is still an issue at f/u, then will proceed with MRI Brain w/wo to further assess. Pt is to follow up in 2-3 weeks to ensure that BPPV has resolved.

## 2025-02-11 NOTE — PATIENT INSTRUCTIONS
"  Patient Instruction After Office Treatments (Epley or Semont maneuvers)    It takes time for the debris to settle in the correct location (think of a snow globe).         Sleep semi-recumbent for the next 48 hours.  Sleep at a 45 degree angle (eg. Chair).  Stay vertical during the day.  Do not got to the dentist or hairdresser.       No exercise for a week.    For 48 hours, avoid up and down head movements (this includes nodding) and avoid bending over or bending down. If you drop something and are unable to squat down to reach it, then have someone pick it up for you or leave it there. You may turn your head left and right slowly, but avoid sudden head movements left or right for 48 hours.  Use a couple of pillows when you sleep for the next 48 hours.  Avoid sleeping on the "bad" side.    Wait at least 2 days before doing the Plasencia-Daroff exercise or home epley maneuver unless otherwise instructed by your physician. If you are symptomatic, you may do the exercise 3 times to try to alleviate symptoms. If you are corrected today in office and are no longer having vertigo with head movements, then do the exercise once 2 times daily for 1 week prophylactically. Complete the exercises 3 times before bed that way if you are dizzy symptoms can resolve while sleeping. Repeat every night for a week.    At one week post-treatment, put yourself in the position that usually makes you dizzy under conditions in which you can't fall or hurt yourself. Let your doctor know how you did.      AFTER 2 DAYS START AT HOME EPLEY MANEUVERS.                 "

## 2025-02-20 ENCOUNTER — OFFICE VISIT (OUTPATIENT)
Dept: ORTHOPEDICS | Facility: CLINIC | Age: 85
End: 2025-02-20
Payer: MEDICARE

## 2025-02-20 ENCOUNTER — LAB VISIT (OUTPATIENT)
Dept: LAB | Facility: HOSPITAL | Age: 85
End: 2025-02-20
Attending: ORTHOPAEDIC SURGERY
Payer: MEDICARE

## 2025-02-20 DIAGNOSIS — E78.00 HYPERCHOLESTEREMIA: ICD-10-CM

## 2025-02-20 DIAGNOSIS — M25.569 KNEE PAIN, UNSPECIFIED CHRONICITY, UNSPECIFIED LATERALITY: ICD-10-CM

## 2025-02-20 DIAGNOSIS — M17.11 PRIMARY OSTEOARTHRITIS OF RIGHT KNEE: ICD-10-CM

## 2025-02-20 DIAGNOSIS — M17.12 PRIMARY OSTEOARTHRITIS OF LEFT KNEE: ICD-10-CM

## 2025-02-20 DIAGNOSIS — M17.11 PRIMARY OSTEOARTHRITIS OF RIGHT KNEE: Primary | ICD-10-CM

## 2025-02-20 LAB
ALBUMIN SERPL BCP-MCNC: 4 G/DL (ref 3.5–5.2)
ALP SERPL-CCNC: 93 U/L (ref 40–150)
ALT SERPL W/O P-5'-P-CCNC: 20 U/L (ref 10–44)
ANION GAP SERPL CALC-SCNC: 13 MMOL/L (ref 8–16)
AST SERPL-CCNC: 22 U/L (ref 10–40)
BILIRUB SERPL-MCNC: 0.6 MG/DL (ref 0.1–1)
BUN SERPL-MCNC: 11 MG/DL (ref 8–23)
CALCIUM SERPL-MCNC: 9.6 MG/DL (ref 8.7–10.5)
CHLORIDE SERPL-SCNC: 104 MMOL/L (ref 95–110)
CHOLEST SERPL-MCNC: 191 MG/DL (ref 120–199)
CHOLEST/HDLC SERPL: 2.5 {RATIO} (ref 2–5)
CO2 SERPL-SCNC: 24 MMOL/L (ref 23–29)
CREAT SERPL-MCNC: 0.7 MG/DL (ref 0.5–1.4)
EST. GFR  (NO RACE VARIABLE): >60 ML/MIN/1.73 M^2
GLUCOSE SERPL-MCNC: 156 MG/DL (ref 70–110)
HDLC SERPL-MCNC: 75 MG/DL (ref 40–75)
HDLC SERPL: 39.3 % (ref 20–50)
LDLC SERPL CALC-MCNC: 89.4 MG/DL (ref 63–159)
MAGNESIUM SERPL-MCNC: 1.8 MG/DL (ref 1.6–2.6)
NONHDLC SERPL-MCNC: 116 MG/DL
POTASSIUM SERPL-SCNC: 3.6 MMOL/L (ref 3.5–5.1)
PROT SERPL-MCNC: 7.6 G/DL (ref 6–8.4)
SODIUM SERPL-SCNC: 141 MMOL/L (ref 136–145)
TRIGL SERPL-MCNC: 133 MG/DL (ref 30–150)

## 2025-02-20 PROCEDURE — 80053 COMPREHEN METABOLIC PANEL: CPT | Performed by: ORTHOPAEDIC SURGERY

## 2025-02-20 PROCEDURE — 83735 ASSAY OF MAGNESIUM: CPT | Performed by: ORTHOPAEDIC SURGERY

## 2025-02-20 PROCEDURE — 80061 LIPID PANEL: CPT | Performed by: ORTHOPAEDIC SURGERY

## 2025-02-20 PROCEDURE — 36415 COLL VENOUS BLD VENIPUNCTURE: CPT | Performed by: ORTHOPAEDIC SURGERY

## 2025-02-20 RX ORDER — TRIAMCINOLONE ACETONIDE 40 MG/ML
40 INJECTION, SUSPENSION INTRA-ARTICULAR; INTRAMUSCULAR
Status: DISCONTINUED | OUTPATIENT
Start: 2025-02-20 | End: 2025-02-20 | Stop reason: HOSPADM

## 2025-02-20 RX ADMIN — TRIAMCINOLONE ACETONIDE 40 MG: 40 INJECTION, SUSPENSION INTRA-ARTICULAR; INTRAMUSCULAR at 03:02

## 2025-02-20 NOTE — PROGRESS NOTES
"Past Medical History:   Diagnosis Date    Arthritis     Cataract     Coronary artery disease     Hypertension     Insomnia     Neuropathy     Retinal detachment     Stomach ulcer        Past Surgical History:   Procedure Laterality Date    APPENDECTOMY  1951    BLADDER SUSPENSION      BREAST BIOPSY Right     Benign.    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    CAROTID ENDARTERECTOMY Left     L    CATARACT EXTRACTION Left     CHOLECYSTECTOMY      DILATION AND CURETTAGE OF UTERUS      Due to miscarriage.    INJECTION OF ANESTHETIC AGENT AROUND GANGLION IMPAR N/A 09/12/2019    Procedure: BLOCK, GANGLION IMPAR;  Surgeon: Christian James MD;  Location: [facility] OR;  Service: Pain Management;  Laterality: N/A;    INSERTION OF IMPLANTABLE LOOP RECORDER N/A 12/18/2020    Procedure: INSERTION, IMPLANTABLE LOOP RECORDER;  Surgeon: Adin Saba MD;  Location: [facility] CATH/EP LAB;  Service: Cardiology;  Laterality: N/A;    OOPHORECTOMY Bilateral 1959    SUPERFICIAL KERATECTOMY Right 10/27/2017    Dr. Morales    TONSILLECTOMY      TRANSFORAMINAL EPIDURAL INJECTION OF STEROID Left 08/12/2019    Procedure: Injection,steroid,epidural,transforaminal approach L4-5, L5-S1;  Surgeon: Christian James MD;  Location: [facility] OR;  Service: Pain Management;  Laterality: Left;    TRANSFORAMINAL EPIDURAL INJECTION OF STEROID Left 01/16/2020    Procedure: Injection,steroid,epidural,transforaminal approach;  Surgeon: Christian James MD;  Location: [facility] OR;  Service: Pain Management;  Laterality: Left;  L4-5, L5-S1    TRANSFORAMINAL EPIDURAL INJECTION OF STEROID Left 06/30/2020    Procedure: Injection,steroid,epidural,transforaminal approach;  Surgeon: Christian James MD;  Location: [facility] OR;  Service: Pain Management;  Laterality: Left;  L4-5 and L5-S1    TRANSFORAMINAL EPIDURAL INJECTION OF STEROID Left 4/30/2024    Procedure: Injection,steroid,epidural,transforaminal approach;  Surgeon: Christian James MD;  Location: [facility] OR;  Service: Anesthesiology;  " Laterality: Left;  L4-5 and l5-s1    VAGINAL HYSTERECTOMY  1959       Current Outpatient Medications   Medication Sig    ALPRAZolam (XANAX) 0.25 MG tablet Take 0.25 mg by mouth daily as needed.    amLODIPine (NORVASC) 5 MG tablet Take 5 mg by mouth once daily.    aspirin (ECOTRIN) 81 MG EC tablet Take 81 mg by mouth once daily.    atorvastatin (LIPITOR) 20 MG tablet Take 20 mg by mouth once daily.    cholecalciferol, vitamin D3, (VITAMIN D3) 2,000 unit Tab Take 1 tablet by mouth once daily.    diclofenac sodium (VOLTAREN) 1 % Gel Apply 2 g topically daily as needed.    dicyclomine (BENTYL) 10 MG capsule 2 (two) times daily as needed.     diphenoxylate-atropine 2.5-0.025 mg (LOMOTIL) 2.5-0.025 mg per tablet Take 1 tablet by mouth 4 (four) times daily as needed for Diarrhea.    EScitalopram oxalate (LEXAPRO) 10 MG tablet Take 1 tablet (10 mg total) by mouth once daily.    gabapentin (NEURONTIN) 100 MG capsule Take 1 capsule (100 mg total) by mouth 2 (two) times daily.    meloxicam (MOBIC) 7.5 MG tablet Take 1 tablet (7.5 mg total) by mouth once daily.    mirabegron (MYRBETRIQ) 50 mg Tb24 Take 1 tablet (50 mg total) by mouth every morning. Take 1 in morning for overactive bladder    nitroGLYCERIN (NITROSTAT) 0.4 MG SL tablet Place under the tongue.    ondansetron (ZOFRAN-ODT) 8 MG TbDL every 6 (six) hours as needed.     pantoprazole (PROTONIX) 40 MG tablet Take 40 mg by mouth every morning.    propranoloL (INDERAL LA) 60 MG 24 hr capsule Take 60 mg by mouth every morning.    rOPINIRole (REQUIP) 0.5 MG tablet Take 0.5 mg by mouth nightly as needed.    temazepam (RESTORIL) 30 mg capsule Take 30 mg by mouth every evening.     Current Facility-Administered Medications   Medication    ondansetron injection 4 mg     Facility-Administered Medications Ordered in Other Visits   Medication    lactated ringers infusion       Review of patient's allergies indicates:   Allergen Reactions    Demerol [meperidine] Nausea And Vomiting     Zoloft [sertraline] Other (See Comments)     Severe shakes    Oxycodone     Hydrocodone Itching    Oxycodone-acetaminophen Itching       Family History   Problem Relation Name Age of Onset    Blindness Maternal Grandfather      Cancer Father      Macular degeneration Maternal Aunt      Cancer Maternal Aunt      Breast cancer Maternal Aunt      Macular degeneration Maternal Uncle      Diabetes Paternal Aunt      Cancer Paternal Aunt      Breast cancer Paternal Aunt      Melanoma Neg Hx      Psoriasis Neg Hx      Lupus Neg Hx      Eczema Neg Hx         Social History     Socioeconomic History    Marital status:    Tobacco Use    Smoking status: Former     Current packs/day: 0.00     Types: Cigarettes     Start date: 10/13/1958     Quit date: 10/13/1962     Years since quittin.4    Smokeless tobacco: Never   Substance and Sexual Activity    Alcohol use: Yes     Comment: very rarely    Drug use: No    Sexual activity: Not Currently     Birth control/protection: Surgical     Social Drivers of Health     Financial Resource Strain: Low Risk  (10/19/2022)    Overall Financial Resource Strain (CARDIA)     Difficulty of Paying Living Expenses: Not very hard   Food Insecurity: No Food Insecurity (10/19/2022)    Hunger Vital Sign     Worried About Running Out of Food in the Last Year: Never true     Ran Out of Food in the Last Year: Never true   Transportation Needs: No Transportation Needs (10/19/2022)    PRAPARE - Transportation     Lack of Transportation (Medical): No     Lack of Transportation (Non-Medical): No   Physical Activity: Inactive (10/19/2022)    Exercise Vital Sign     Days of Exercise per Week: 0 days     Minutes of Exercise per Session: 0 min   Stress: Stress Concern Present (10/29/2020)    [employer] - Occupational Stress Questionnaire     Feeling of Stress : To some extent   Housing Stability: Low Risk  (10/19/2022)    Housing Stability Vital Sign     Unable to Pay  for Housing in the Last Year: No     Number of Places Lived in the Last Year: 1     Unstable Housing in the Last Year: No       Chief Complaint:   No chief complaint on file.      History of present illness:  This is an 84-year-old female seen for knee pain.  I saw her previously and have done injections in both of her knees.   Both knees are hurting.  Pain is a 9/10.  Last treatment was back in September.  Complaining of lot of Charley horses recently.      Review of Systems:  Musculoskeletal:  See HPI    Physical Examination:    Vital Signs:    There were no vitals filed for this visit.      There is no height or weight on file to calculate BMI.    This a well-developed, well nourished patient in no acute distress.  They are alert and oriented and cooperative to examination.  Pt. walks without an antalgic gait.      Examination of the left knee shows no rashes or erythema. There are no masses ecchymosis or effusion. Patient has full range of motion from 0-130°. Patient is nontender to palpation over lateral joint line and moderately tender to palpation over the medial joint line.. Knee is stable to varus and valgus stress. 5 out of 5 motor strength. Palpable distal pulses. Intact light touch sensation. Negative Patellofemoral crepitus    X-rays:  X-rays of both knees are reviewed which show some mild medial compartment narrowing bilaterally.  No significant progression noted.    MRI of the left knee is reviewed and interpreted:Suspected small tear of the posterior horn of the medial meniscus.  Mild interstitial tearing of the ACL.  Small ganglion cyst at its tibial attachment.  Minimal joint effusion.     Assessment::  Bilateral knee arthritis  Muscle cramping/Charley horses    Plan:  Reviewed the findings with her today.  We discussed treatment options.  I agreed to inject both of her knees today.  We will go ahead and check some electrolytes for her as well as her cholesterol.    This note was created using [employer]  Modal voice recognition software that occasionally misinterpreted phrases or words.    Consult note is delivered via Epic messaging service.

## 2025-02-20 NOTE — PROCEDURES
Large Joint Aspiration/Injection: bilateral knee    Date/Time: 2/20/2025 3:30 PM    Performed by: Andrez Mazariegos MD  Authorized by: Andrez Mazariegos MD    Consent Done?:  Yes (Verbal)  Indications:  Pain  Site marked: the procedure site was marked    Timeout: prior to procedure the correct patient, procedure, and site was verified    Prep: patient was prepped and draped in usual sterile fashion      Local anesthesia used?: Yes    Anesthesia:  Local infiltration  Local anesthetic: Ropivicaine.  Anesthetic total (ml):  3      Details:  Needle Size:  22 G  Ultrasonic Guidance for needle placement?: No    Approach:  Anterolateral  Location:  Knee  Laterality:  Bilateral  Site:  Bilateral knee  Medications (Right):  40 mg triamcinolone acetonide 40 mg/mL  Medications (Left):  40 mg triamcinolone acetonide 40 mg/mL  Patient tolerance:  Patient tolerated the procedure well with no immediate complications

## 2025-03-31 DIAGNOSIS — I65.21 OCCLUSION OF RIGHT CAROTID ARTERY: Primary | ICD-10-CM

## 2025-04-02 ENCOUNTER — HOSPITAL ENCOUNTER (OUTPATIENT)
Dept: RADIOLOGY | Facility: HOSPITAL | Age: 85
Discharge: HOME | End: 2025-04-02
Attending: INTERNAL MEDICINE
Payer: MEDICARE

## 2025-04-02 DIAGNOSIS — I65.21 OCCLUSION OF RIGHT CAROTID ARTERY: ICD-10-CM

## 2025-04-02 PROCEDURE — 93880 EXTRACRANIAL BILAT STUDY: CPT | Mod: 26,,, | Performed by: RADIOLOGY

## 2025-04-02 PROCEDURE — 93880 EXTRACRANIAL BILAT STUDY: CPT | Mod: TC,PO

## 2025-04-03 ENCOUNTER — OFFICE VISIT (OUTPATIENT)
Dept: OTOLARYNGOLOGY | Facility: CLINIC | Age: 85
End: 2025-04-03
Payer: MEDICARE

## 2025-04-03 VITALS — WEIGHT: 118.63 LBS | BODY MASS INDEX: 23.96 KG/M2

## 2025-04-03 DIAGNOSIS — H81.12 BENIGN PAROXYSMAL POSITIONAL VERTIGO OF LEFT EAR: Primary | ICD-10-CM

## 2025-04-03 PROCEDURE — 1159F MED LIST DOCD IN RCRD: CPT | Mod: CPTII,S$GLB,, | Performed by: PHYSICIAN ASSISTANT

## 2025-04-03 PROCEDURE — 95992 CANALITH REPOSITIONING PROC: CPT | Mod: S$GLB,,, | Performed by: PHYSICIAN ASSISTANT

## 2025-04-03 PROCEDURE — 1101F PT FALLS ASSESS-DOCD LE1/YR: CPT | Mod: CPTII,S$GLB,, | Performed by: PHYSICIAN ASSISTANT

## 2025-04-03 PROCEDURE — 3288F FALL RISK ASSESSMENT DOCD: CPT | Mod: CPTII,S$GLB,, | Performed by: PHYSICIAN ASSISTANT

## 2025-04-03 PROCEDURE — 99214 OFFICE O/P EST MOD 30 MIN: CPT | Mod: S$GLB,,, | Performed by: PHYSICIAN ASSISTANT

## 2025-04-03 PROCEDURE — 99999 PR PBB SHADOW E&M-EST. PATIENT-LVL III: CPT | Mod: PBBFAC,,, | Performed by: PHYSICIAN ASSISTANT

## 2025-04-03 PROCEDURE — 1160F RVW MEDS BY RX/DR IN RCRD: CPT | Mod: CPTII,S$GLB,, | Performed by: PHYSICIAN ASSISTANT

## 2025-04-03 NOTE — PROGRESS NOTES
[facility]    Subjective:      Patient: Sunita Carlson Patient PCP: Linsey Nelson MD         :  1940     Sex:  female      MRN:  [id number]          Date of Visit: 2025      Chief Complaint: Left posterior semicircular canal BPPV f/u    Interval History 2025: Pt states that her vertigo has improved since undergoing left-sided epley maneuver. Pt states that she has been following post-epley directions, but has not been doing at home maneuvers like she should. Pt states that she still has some issues with dizziness when turning her head to the left.     Patient ID 2025: Sunita Carslon is a 84 y.o. female with PMHx of bilateral BPPV corrected in the past treated with epley maneuver who presents to office for evaluation of vertigo. Pt states that she has had issues with headache that starts in the front of her head and wrap around to the back of her head. She has been having issues with vertigo and dysequilibrium. She states that she has positional vertigo when turning her head to the left. She will also fall to the right. She recently fell and hit her left buttock. She did not hit her head or have LOC. Pt has h/o peripheral neuropathy and states that she has been having pain in her left ear that is sharp and stabbing. It does not feel like an ear infection, but feels as though it is going into the bone of the ear itself.       Past Medical History  She has a past medical history of Arthritis, Cataract, Coronary artery disease, Hypertension, Insomnia, Neuropathy, Retinal detachment, and Stomach ulcer.    Family History  Her family history includes Blindness in her maternal grandfather; Breast cancer in her maternal aunt and paternal aunt; Cancer in her father, maternal aunt, and paternal aunt; Diabetes in her paternal aunt; Macular degeneration in her maternal aunt and maternal uncle.    Past Surgical History:   Procedure Laterality Date    APPENDECTOMY      BLADDER SUSPENSION    "   BREAST BIOPSY Right     Benign.    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    CAROTID ENDARTERECTOMY Left     L    CATARACT EXTRACTION Left     CHOLECYSTECTOMY      DILATION AND CURETTAGE OF UTERUS      Due to miscarriage.    INJECTION OF ANESTHETIC AGENT AROUND GANGLION IMPAR N/A 2019    Procedure: BLOCK, GANGLION IMPAR;  Surgeon: Christian James MD;  Location: [facility] OR;  Service: Pain Management;  Laterality: N/A;    INSERTION OF IMPLANTABLE LOOP RECORDER N/A 2020    Procedure: INSERTION, IMPLANTABLE LOOP RECORDER;  Surgeon: Adin Saba MD;  Location: [facility] CATH/EP LAB;  Service: Cardiology;  Laterality: N/A;    OOPHORECTOMY Bilateral     SUPERFICIAL KERATECTOMY Right 10/27/2017    Dr. Morales    TONSILLECTOMY      TRANSFORAMINAL EPIDURAL INJECTION OF STEROID Left 2019    Procedure: Injection,steroid,epidural,transforaminal approach L4-5, L5-S1;  Surgeon: Christian James MD;  Location: [facility] OR;  Service: Pain Management;  Laterality: Left;    TRANSFORAMINAL EPIDURAL INJECTION OF STEROID Left 2020    Procedure: Injection,steroid,epidural,transforaminal approach;  Surgeon: Christian James MD;  Location: [facility] OR;  Service: Pain Management;  Laterality: Left;  L4-5, L5-S1    TRANSFORAMINAL EPIDURAL INJECTION OF STEROID Left 2020    Procedure: Injection,steroid,epidural,transforaminal approach;  Surgeon: Christian James MD;  Location: [facility] OR;  Service: Pain Management;  Laterality: Left;  L4-5 and L5-S1    TRANSFORAMINAL EPIDURAL INJECTION OF STEROID Left 2024    Procedure: Injection,steroid,epidural,transforaminal approach;  Surgeon: Christian James MD;  Location: [facility] OR;  Service: Anesthesiology;  Laterality: Left;  L4-5 and l5-s1    VAGINAL HYSTERECTOMY       Social History     Tobacco Use    Smoking status: Former     Current packs/day: 0.00     Types: Cigarettes     Start date: 10/13/1958     Quit date: 10/13/1962     Years since quittin.5    Smokeless tobacco: Never " "  Substance and Sexual Activity    Alcohol use: Yes     Comment: very rarely    Drug use: No    Sexual activity: Not Currently     Birth control/protection: Surgical     Medications  She has a current medication list which includes the following prescription(s): alprazolam, aspirin, atorvastatin, cholecalciferol (vitamin d3), diclofenac sodium, dicyclomine, diphenoxylate-atropine 2.5-0.025 mg, meloxicam, nitroglycerin, ondansetron, pantoprazole, propranolol, ropinirole, temazepam, amlodipine, escitalopram oxalate, gabapentin, and myrbetriq, and the following Facility-Administered Medications: lactated ringers and ondansetron.    Review of patient's allergies indicates:   Allergen Reactions    Demerol [meperidine] Nausea And Vomiting    Zoloft [sertraline] Other (See Comments)     Severe shakes    Oxycodone     Hydrocodone Itching    Oxycodone-acetaminophen Itching     All medications, allergies, and past history have been reviewed.    Objective:      Vitals:      12/6/2024     2:40 PM 2/11/2025     2:15 PM 4/3/2025     3:10 PM   Vitals - 1 value per visit   SYSTOLIC  133    DIASTOLIC  73    Pulse  67    Weight (lb) 120.37 122.14 118.61   Weight (kg) 54.6 55.4 53.8   Height 4' 11" (1.499 m) 4' 11" (1.499 m)    BMI (Calculated) 24.3 24.7    Pain Score Four Six        Body surface area is 1.5 meters squared.    Physical Exam  Constitutional:       General: She is not in acute distress.     Appearance: Normal appearance. She is not ill-appearing.   HENT:      Head: Normocephalic and atraumatic.      Right Ear: Tympanic membrane, ear canal and external ear normal.      Left Ear: Tympanic membrane, ear canal and external ear normal.      Nose: Nose normal.      Mouth/Throat:      Lips: Pink. No lesions.      Mouth: Mucous membranes are moist. No oral lesions.      Tongue: No lesions.      Palate: No lesions.      Pharynx: Oropharynx is clear. Uvula midline. No pharyngeal swelling, oropharyngeal exudate, posterior " oropharyngeal erythema or uvula swelling.      Tonsils: 0 on the right. 0 on the left.      Comments: S/p tonsillectomy.  Eyes:      General:         Right eye: No discharge.         Left eye: No discharge.      Extraocular Movements: Extraocular movements intact.      Conjunctiva/sclera: Conjunctivae normal.   Pulmonary:      Effort: Pulmonary effort is normal.   Neurological:      General: No focal deficit present.      Mental Status: She is alert and oriented to person, place, and time. Mental status is at baseline.   Psychiatric:         Mood and Affect: Mood normal.         Behavior: Behavior normal.         Thought Content: Thought content normal.         Judgment: Judgment normal.     Aberdeen-Hallpike:   Negative Right  Positive Left: fatiguing rotary geotropic nystagmus toward left ear head hang left.     Patient: Sunita Carlson [id number], :1940  Procedure date:4/3/2025  Patient's medications, allergies, past medical, surgical, social and family histories were reviewed and updated as appropriate.  Chief Complaint:  Left posterior semicircular canal BPPV    HPI:  Sunita is a 84 y.o. female with benign paroxysmal positional vertigo (BPPV) refractory to medical therapy. Hallpike-Dagoberto maneuver demonstrates nystagmus of delayed onset that fatigues, rotary, clockwise, associated with vertigo.    Procedure: Risks, benefits, and alternatives of the procedure were discussed with the patient, and the patient consented to the Epley maneuver or canalith repositioning procedure (CRP).      With the patient sitting upright on the examination table, the head was lowered to the supine position and the neck rotated 45 degrees to the left side; once nystagmus fatigued, the body and head were slowly rotated to the opposite side 90 degrees, and then after 20-30 seconds the rotation continued another 90 degrees (they rolled onto their shoulder and were looking downwards at a 45 degree angle). After the nystagmus resolved, the  patient was gently allowed to sit up with neck flexion.    There were no complications and the patient tolerated it well.  Post-procedure instructions were given.    Theo Tejeda performed the entire procedure.      Labs:  WBC   Date Value Ref Range Status   10/19/2022 6.27 3.90 - 12.70 K/uL Final     Platelets   Date Value Ref Range Status   10/19/2022 247 150 - 450 K/uL Final     Creatinine   Date Value Ref Range Status   02/20/2025 0.7 0.5 - 1.4 mg/dL Final     TSH   Date Value Ref Range Status   10/19/2022 3.020 0.340 - 5.600 uIU/mL Final     Glucose   Date Value Ref Range Status   02/20/2025 156 (H) 70 - 110 mg/dL Final     Hemoglobin A1C   Date Value Ref Range Status   02/02/2024 5.9 (H) <5.7 % of total Hgb Final     Comment:     For someone without known diabetes, a hemoglobin   A1c value between 5.7% and 6.4% is consistent with  prediabetes and should be confirmed with a   follow-up test.    For someone with known diabetes, a value <7%  indicates that their diabetes is well controlled. A1c  targets should be individualized based on duration of  diabetes, age, comorbid conditions, and other  considerations.    This assay result is consistent with an increased risk  of diabetes.    Currently, no consensus exists regarding use of  hemoglobin A1c for diagnosis of diabetes for children.    HbA1c performed on Roche platform.  Effective 11/13/23 a change in test platforms may have   shifted HbA1c results compared to historical results.         All lab results and imaging results have been reviewed.    Assessment:        ICD-10-CM ICD-9-CM   1. Benign paroxysmal positional vertigo of left ear  H81.12 386.11         Plan:      Left-sided posterior semicircular canal BPPV corrected today in office with left-sided epley maneuver today in office. Pt is to follow post-epley instructions as advised in AVS. Follow up as needed for ENT issues/concerns.

## 2025-04-14 DIAGNOSIS — R93.3 ABNORMAL FINDINGS ON RADIOLOGICAL EXAMINATION OF GASTROINTESTINAL TRACT: Primary | ICD-10-CM

## 2025-04-22 ENCOUNTER — HOSPITAL ENCOUNTER (OUTPATIENT)
Dept: RADIOLOGY | Facility: HOSPITAL | Age: 85
Discharge: HOME | End: 2025-04-22
Attending: NURSE PRACTITIONER
Payer: MEDICARE

## 2025-04-22 DIAGNOSIS — R93.3 ABNORMAL FINDINGS ON RADIOLOGICAL EXAMINATION OF GASTROINTESTINAL TRACT: ICD-10-CM

## 2025-04-22 LAB
CREAT SERPL-MCNC: 0.9 MG/DL (ref 0.5–1.4)
SAMPLE: NORMAL

## 2025-04-22 PROCEDURE — 74177 CT ABD & PELVIS W/CONTRAST: CPT | Mod: 26,,, | Performed by: RADIOLOGY

## 2025-04-22 PROCEDURE — 74177 CT ABD & PELVIS W/CONTRAST: CPT | Mod: TC,PO

## 2025-04-22 PROCEDURE — 25500020 PHARM REV CODE 255: Mod: PO | Performed by: NURSE PRACTITIONER

## 2025-04-22 RX ADMIN — IOHEXOL 100 ML: 350 INJECTION, SOLUTION INTRAVENOUS at 03:04

## 2025-04-29 DIAGNOSIS — R16.1 SPLENIC MASS: Primary | ICD-10-CM

## 2025-04-29 DIAGNOSIS — D18.00 HEMANGIOMA: ICD-10-CM

## 2025-05-12 ENCOUNTER — TELEPHONE (OUTPATIENT)
Dept: OBSTETRICS AND GYNECOLOGY | Facility: CLINIC | Age: 85
End: 2025-05-12
Payer: MEDICARE

## 2025-05-12 NOTE — TELEPHONE ENCOUNTER
----- Message from Moises sent at 5/12/2025  1:47 PM CDT -----  Type:  Sooner Appointment RequestCaller is requesting a sooner appointment.  Caller declined first available appointment listed below.  Caller will not accept being placed on the waitlist and is requesting a message be sent to doctor.Name of Caller:  ptWhen is the first available appointment?  N/ASymptoms:  Large lump inside of vaginaWould the patient rather a call back or a response via [facility]? CallBe Call Back Number:  [phone] Information:  Please call back to advise. Thanks!

## 2025-05-12 NOTE — TELEPHONE ENCOUNTER
Returned call to pt, she states that she has a lump in her vagina when she checked in manually last night, she states its almost the size of an egg, she has no pain but does experience pressure. Pt scheduled to see Dr. James on Wednesday 5/14/25 at 130pm. Pt confirmed appt information and office location.

## 2025-05-14 ENCOUNTER — OFFICE VISIT (OUTPATIENT)
Dept: OBSTETRICS AND GYNECOLOGY | Facility: CLINIC | Age: 85
End: 2025-05-14
Payer: MEDICARE

## 2025-05-14 VITALS
SYSTOLIC BLOOD PRESSURE: 153 MMHG | HEIGHT: 59 IN | WEIGHT: 116.63 LBS | DIASTOLIC BLOOD PRESSURE: 70 MMHG | BODY MASS INDEX: 23.51 KG/M2 | HEART RATE: 66 BPM

## 2025-05-14 DIAGNOSIS — N81.4 CYSTOCELE WITH PROLAPSE: Primary | ICD-10-CM

## 2025-05-14 DIAGNOSIS — N39.46 MIXED INCONTINENCE: ICD-10-CM

## 2025-05-14 PROCEDURE — 99215 OFFICE O/P EST HI 40 MIN: CPT | Mod: S$GLB,,, | Performed by: STUDENT IN AN ORGANIZED HEALTH CARE EDUCATION/TRAINING PROGRAM

## 2025-05-14 PROCEDURE — 1126F AMNT PAIN NOTED NONE PRSNT: CPT | Mod: CPTII,S$GLB,, | Performed by: STUDENT IN AN ORGANIZED HEALTH CARE EDUCATION/TRAINING PROGRAM

## 2025-05-14 PROCEDURE — 1159F MED LIST DOCD IN RCRD: CPT | Mod: CPTII,S$GLB,, | Performed by: STUDENT IN AN ORGANIZED HEALTH CARE EDUCATION/TRAINING PROGRAM

## 2025-05-14 PROCEDURE — 99999 PR PBB SHADOW E&M-EST. PATIENT-LVL V: CPT | Mod: PBBFAC,,, | Performed by: STUDENT IN AN ORGANIZED HEALTH CARE EDUCATION/TRAINING PROGRAM

## 2025-05-14 PROCEDURE — 1160F RVW MEDS BY RX/DR IN RCRD: CPT | Mod: CPTII,S$GLB,, | Performed by: STUDENT IN AN ORGANIZED HEALTH CARE EDUCATION/TRAINING PROGRAM

## 2025-05-15 NOTE — PROGRESS NOTES
Chief Complaint   Patient presents with    Gynecologic Exam     Lump inside vagina       History of Present Illness   Sunita Carlson is 84 y.o. WF  who presents today c/o a lump on the R side of vagina and inside the vagina for the past couple of months.  She first noticed it when she was sitting on the toilet.  She reported that it was hard but has soften over time.  Patient had also felt inside her vagina and noted a lemon-sized bulge as well.  She denies PMB.    History  PMH: DDD, MI, Anxiety/Depression, Bilateral carotid artery disease, CAD, HLD, stable angina, Goiter, Osteoporosis, Thyroid cyst, GERD, Gastroparesis, IBS, OA, cataract, HTN  Psx: appy, bladder suspension, carotid endartectomy, peg, D&C, TVH/BSO, Tonsils, insertion of loop recorder  All: Demerol, Zoloft, Oxycodone, Hydrocodone  OB:   GYN: Denies any STIs or abnl Pap  FH: Denies any female/colon cancer or MI prior to 49yo  SH: Denies PAU  Meds:  Current Outpatient Medications   Medication Instructions    ALPRAZolam (XANAX) 0.25 mg, Daily PRN    amLODIPine (NORVASC) 5 mg, Daily    aspirin (ECOTRIN) 81 mg, Daily    atorvastatin (LIPITOR) 20 mg, Daily    cholecalciferol, vitamin D3, (VITAMIN D3) 2,000 unit Tab 1 tablet, Daily    diclofenac sodium (VOLTAREN) 2 g, Daily PRN    dicyclomine (BENTYL) 10 MG capsule 2 times daily PRN    diphenoxylate-atropine 2.5-0.025 mg (LOMOTIL) 2.5-0.025 mg per tablet 1 tablet, 4 times daily PRN    EScitalopram oxalate (LEXAPRO) 10 mg, Oral, Daily    gabapentin (NEURONTIN) 100 mg, Oral, 2 times daily    meloxicam (MOBIC) 7.5 mg, Oral, Daily    MYRBETRIQ 50 mg, Oral, Every morning, Take 1 in morning for overactive bladder    nitroGLYCERIN (NITROSTAT) 0.4 MG SL tablet Place under the tongue.    ondansetron (ZOFRAN-ODT) 8 MG TbDL Every 6 hours PRN    pantoprazole (PROTONIX) 40 mg, Every morning    propranoloL (INDERAL LA) 60 mg, Every morning    rOPINIRole (REQUIP) 0.5 mg, Nightly PRN    temazepam  "(RESTORIL) 30 mg, Nightly       Review of Systems   Constitutional:  Negative for chills and fever.   Eyes:  Negative for visual disturbance.   Respiratory:  Negative for cough and shortness of breath.    Cardiovascular:  Negative for chest pain and palpitations.   Gastrointestinal:  Negative for abdominal pain, nausea and vomiting.   Genitourinary:  Positive for urinary incontinence. Negative for vaginal discharge, vaginal pain, postmenopausal bleeding and vaginal odor.   Neurological:  Negative for headaches.   Psychiatric/Behavioral:  Negative for depression and sleep disturbance. The patient is not nervous/anxious.    All other systems reviewed and are negative.  Breast: Negative for lump and mastodynia        Physical Examination:  Vitals:    05/14/25 1336   BP: (!) 153/70   BP Location: Right arm   Patient Position: Sitting   Pulse: 66   Weight: 52.9 kg (116 lb 10 oz)   Height: 4' 11" (1.499 m)        Physical Exam  Vitals reviewed. Exam conducted with a chaperone present.   Constitutional:       Appearance: Normal appearance.   HENT:      Head: Normocephalic and atraumatic.   Pulmonary:      Effort: Pulmonary effort is normal.   Genitourinary:     Exam position: Lithotomy position.      Labia:         Right: No tenderness or lesion.         Left: No tenderness or lesion.       Urethra: No prolapse.      Vagina: Normal.      Uterus: Absent.           Comments: Areas of palpable concern for patient.  Examined with patient in lithotomy.  Slight descent of anterior vaginal wall.  No palpable masses or swelling noted.  Patient reports noticing it when she is sitting on the toilet and request examination in similar position.  In squatting position, patient guided my hand to the areas noted.  No bulging noted on the R labia.  Bladder palpable on exam.  Skin:     General: Skin is warm and dry.   Neurological:      General: No focal deficit present.      Mental Status: She is alert and oriented to person, place, and " time.   Psychiatric:         Mood and Affect: Mood normal.         Behavior: Behavior normal.          Assessment:    1. Cystocele with prolapse        2. Mixed incontinence  Ambulatory referral/consult to Urology        40  Plan:  Reassurance given re: examination as no masses or swelling noted  Referral back to Urology to manage incontinence  Patient to avoid prolonged sitting on the toilet.    I spent a total of 40 minutes on the day of the visit.This includes face to face time and non-face to face time preparing to see the patient (eg, review of tests), obtaining and/or reviewing separately obtained history, documenting clinical information in the electronic or other health record, independently interpreting results and communicating results to the patient/family/caregiver, or care coordinator.

## 2025-05-27 ENCOUNTER — OFFICE VISIT (OUTPATIENT)
Dept: CARDIOLOGY | Facility: CLINIC | Age: 85
End: 2025-05-27
Payer: MEDICARE

## 2025-05-27 VITALS
WEIGHT: 118.19 LBS | OXYGEN SATURATION: 96 % | DIASTOLIC BLOOD PRESSURE: 72 MMHG | SYSTOLIC BLOOD PRESSURE: 145 MMHG | BODY MASS INDEX: 23.87 KG/M2 | HEART RATE: 65 BPM

## 2025-05-27 DIAGNOSIS — E78.2 MIXED HYPERLIPIDEMIA: ICD-10-CM

## 2025-05-27 DIAGNOSIS — K58.2 IRRITABLE BOWEL SYNDROME WITH BOTH CONSTIPATION AND DIARRHEA: ICD-10-CM

## 2025-05-27 DIAGNOSIS — I25.810 CORONARY ARTERY DISEASE INVOLVING CORONARY BYPASS GRAFT OF NATIVE HEART WITHOUT ANGINA PECTORIS: Primary | ICD-10-CM

## 2025-05-27 DIAGNOSIS — I10 ESSENTIAL HYPERTENSION: ICD-10-CM

## 2025-05-27 DIAGNOSIS — Z95.1 HX OF CABG: ICD-10-CM

## 2025-05-27 PROCEDURE — 1160F RVW MEDS BY RX/DR IN RCRD: CPT | Mod: CPTII,S$GLB,, | Performed by: INTERNAL MEDICINE

## 2025-05-27 PROCEDURE — 99999 PR PBB SHADOW E&M-EST. PATIENT-LVL IV: CPT | Mod: PBBFAC,,, | Performed by: INTERNAL MEDICINE

## 2025-05-27 PROCEDURE — 3288F FALL RISK ASSESSMENT DOCD: CPT | Mod: CPTII,S$GLB,, | Performed by: INTERNAL MEDICINE

## 2025-05-27 PROCEDURE — 1101F PT FALLS ASSESS-DOCD LE1/YR: CPT | Mod: CPTII,S$GLB,, | Performed by: INTERNAL MEDICINE

## 2025-05-27 PROCEDURE — 1159F MED LIST DOCD IN RCRD: CPT | Mod: CPTII,S$GLB,, | Performed by: INTERNAL MEDICINE

## 2025-05-27 PROCEDURE — 1126F AMNT PAIN NOTED NONE PRSNT: CPT | Mod: CPTII,S$GLB,, | Performed by: INTERNAL MEDICINE

## 2025-05-27 PROCEDURE — 99214 OFFICE O/P EST MOD 30 MIN: CPT | Mod: S$GLB,,, | Performed by: INTERNAL MEDICINE

## 2025-05-27 NOTE — ASSESSMENT & PLAN NOTE
Recommend her to consider doing a Lexiscan Myoview to evaluate for progression of ischemic heart disease in view of profound symptoms of fatigue tiredness and limitations in his effort capacity.  Suspect this has secondary to progression of ischemia and coronary artery disease and consider as an anginal equivalent presenting as heaviness in both the arms and pressure as well as generalized fatigue

## 2025-05-27 NOTE — ASSESSMENT & PLAN NOTE
Blood pressure is 145/72 continue on present therapy to include amlodipine 5 mg a day, maintain on low-salt diet

## 2025-05-27 NOTE — PROGRESS NOTES
Subjective:    Patient ID:  Sunita Carlson is a 84 y.o. female patient here for evaluation Follow-up      History of Present Illness:   Patient is 84-year-old with history of known coronary artery disease arterial hypertension cardiac surgery several years ago is seeking follow-up evaluation.  She has been having fatigue and tiredness and heaviness in both the arms initially started off with the right arm then also progressed with the left.  Tiredness noted easily and effort capacity is diminished.    And a associated to this she has been having discomfort in his upper abdomen and pelvic region starts in the flank in the right side then radiates down to the midline into the flank region into the pelvic region.  And this has occurs with varying intensity unnecessarily with meal position or exertion.  She has undergone workup including a CAT scan she is noted to have multiple lesions in his spleen some of them appeared to be mildly enlarged she was recommended to have a PET-CT.  About 30 years ago she had a tummy tuck operation she may had developed some scar tissue around this has surgical site contributing to her atypical abdominal wall pain.  CTA of the abdomen did not show identify any specific pathology    She does have nausea regularly but no vomiting and motion sickness as well and diarrhea and a regular basis for which she takes Lomotil.        Review of patient's allergies indicates:   Allergen Reactions    Demerol [meperidine] Nausea And Vomiting    Zoloft [sertraline] Other (See Comments)     Severe shakes    Oxycodone     Hydrocodone Itching    Oxycodone-acetaminophen Itching       Past Medical History:   Diagnosis Date    Arthritis     Cataract     Coronary artery disease     Hypertension     Insomnia     Neuropathy     Retinal detachment     Stomach ulcer      Past Surgical History:   Procedure Laterality Date    APPENDECTOMY  1951    BLADDER SUSPENSION      BREAST BIOPSY Right     Benign.    CARDIAC  "SURGERY      "Main artery 98% blocked".    CAROTID ENDARTERECTOMY Left     L    CATARACT EXTRACTION Left     CHOLECYSTECTOMY      DILATION AND CURETTAGE OF UTERUS      Due to miscarriage.    INJECTION OF ANESTHETIC AGENT AROUND GANGLION IMPAR N/A 09/12/2019    Procedure: BLOCK, GANGLION IMPAR;  Surgeon: Christian James MD;  Location: [facility] OR;  Service: Pain Management;  Laterality: N/A;    INSERTION OF IMPLANTABLE LOOP RECORDER N/A 12/18/2020    Procedure: INSERTION, IMPLANTABLE LOOP RECORDER;  Surgeon: Adin Saba MD;  Location: [facility] CATH/EP LAB;  Service: Cardiology;  Laterality: N/A;    OOPHORECTOMY Bilateral 1959    SUPERFICIAL KERATECTOMY Right 10/27/2017    Dr. Morales    TONSILLECTOMY      TRANSFORAMINAL EPIDURAL INJECTION OF STEROID Left 08/12/2019    Procedure: Injection,steroid,epidural,transforaminal approach L4-5, L5-S1;  Surgeon: Christian James MD;  Location: [facility] OR;  Service: Pain Management;  Laterality: Left;    TRANSFORAMINAL EPIDURAL INJECTION OF STEROID Left 01/16/2020    Procedure: Injection,steroid,epidural,transforaminal approach;  Surgeon: Christian James MD;  Location: [facility] OR;  Service: Pain Management;  Laterality: Left;  L4-5, L5-S1    TRANSFORAMINAL EPIDURAL INJECTION OF STEROID Left 06/30/2020    Procedure: Injection,steroid,epidural,transforaminal approach;  Surgeon: Christian James MD;  Location: [facility] OR;  Service: Pain Management;  Laterality: Left;  L4-5 and L5-S1    TRANSFORAMINAL EPIDURAL INJECTION OF STEROID Left 4/30/2024    Procedure: Injection,steroid,epidural,transforaminal approach;  Surgeon: Christian James MD;  Location: [facility] OR;  Service: Anesthesiology;  Laterality: Left;  L4-5 and l5-s1    VAGINAL HYSTERECTOMY  1959     Social History[1]     Review of Systems:    As noted in HPI in addition      REVIEW OF SYSTEMS  CARDIOVASCULAR: No recent chest pain, palpitations, arm, neck, or jaw pain  RESPIRATORY: No recent fever, cough chills, SOB or congestion  : No blood in the urine  GI: " No Nausea, vomiting, She has frequent symptoms of irritable bowel as discussed above    MUSCULOSKELETAL: No myalgias  NEURO: No lightheadedness or dizziness  EYES: No Double vision, blurry, vision or headache          Objective        Vitals:    05/27/25 1353   BP: (!) 145/72   Pulse: 65       LIPIDS - LAST 2   Lab Results   Component Value Date    CHOL 191 02/20/2025    CHOL 205 (H) 02/14/2024    HDL 75 02/20/2025    HDL 69 02/14/2024    LDLCALC 89.4 02/20/2025    LDLCALC 111.6 02/14/2024    TRIG 133 02/20/2025    TRIG 122 02/14/2024    CHOLHDL 39.3 02/20/2025    CHOLHDL 33.7 02/14/2024       CBC - LAST 2  Lab Results   Component Value Date    WBC 7 05/14/2025    WBC 6.27 10/19/2022    RBC 4.53 10/19/2022    RBC 4.86 10/18/2022    HGB 14.4 05/14/2025    HGB 13.6 10/19/2022    HCT 42.3 05/14/2025    HCT 39.7 10/19/2022    MCV 88 10/19/2022    MCV 89 10/18/2022    MCH 31.7 05/14/2025    MCH 30.0 10/19/2022    MCHC 34.3 10/19/2022    MCHC 33.8 10/18/2022    RDW 12.4 05/14/2025    RDW 12.2 10/19/2022     05/14/2025     10/19/2022    MPV 9.4 10/19/2022    MPV 9.3 10/18/2022    GRAN 2.7 10/19/2022    GRAN 43.2 10/19/2022    LYMPH 2.8 10/19/2022    LYMPH 43.9 10/19/2022    MONO 0.6 10/19/2022    MONO 9.7 10/19/2022    BASO 0.1 05/14/2025    BASO 0.04 10/19/2022    NRBC 0 10/19/2022    NRBC 0 10/18/2022       CHEMISTRY & LIVER FUNCTION - LAST 2  Lab Results   Component Value Date     02/20/2025     10/19/2022    K 3.6 02/20/2025    K 3.3 (L) 10/19/2022     02/20/2025     10/19/2022    CO2 24 02/20/2025    CO2 26 10/19/2022    ANIONGAP 13 02/20/2025    ANIONGAP 7 (L) 10/19/2022    BUN 11 02/20/2025    BUN 8 10/19/2022    CREATININE 0.7 02/20/2025    CREATININE 0.3 (L) 10/19/2022     (H) 02/20/2025    GLU 93 10/19/2022    CALCIUM 9.6 02/20/2025    CALCIUM 9.0 10/19/2022    MG 1.8 02/20/2025    MG 1.9 10/18/2022    ALBUMIN 4.0 02/20/2025    ALBUMIN 4.0 10/18/2022    PROT 7.6  02/20/2025    PROT 7.1 10/18/2022    ALKPHOS 93 02/20/2025    ALKPHOS 82 10/18/2022    ALT 20 02/20/2025    ALT 17 10/18/2022    AST 22 02/20/2025    AST 23 10/18/2022    BILITOT 0.6 02/20/2025    BILITOT 0.9 10/18/2022        CARDIAC PROFILE - LAST 2  Lab Results   Component Value Date     (H) 10/18/2022    BNP 76 03/22/2022    CPK 37 11/27/2013    CPK 35 11/27/2013    CPKMB 0.6 11/27/2013    CPKMB 0.7 11/27/2013    TROPONINI <0.030 10/19/2022    TROPONINI <0.030 10/18/2022        COAGULATION - LAST 2  Lab Results   Component Value Date    INR 0.9 02/06/2020    INR 1.0 11/26/2013       ENDOCRINE & PSA - LAST 2  Lab Results   Component Value Date    HGBA1C 5.9 (H) 02/02/2024    HGBA1C 5.4 02/03/2021    TSH 1.63 05/14/2025    TSH 3.020 10/19/2022        ECHOCARDIOGRAM RESULTS  Results for orders placed in visit on 04/11/22    Echo    Interpretation Summary  · The left ventricle is normal in size with concentric hypertrophy and normal systolic function.  · The estimated ejection fraction is 66%.  · Indeterminate left ventricular diastolic function.  · Normal right ventricular size with normal right ventricular systolic function.  · Normal central venous pressure (3 mmHg).  · The estimated PA systolic pressure is 31 mmHg.  · Mild aortic regurgitation.      CURRENT/PREVIOUS VISIT EKG  Results for orders placed or performed in visit on 11/20/24   IN OFFICE EKG 12-LEAD (to [facility])    Collection Time: 11/20/24 12:06 PM   Result Value Ref Range    QRS Duration 64 ms    [facility] QTC Calculation 434 ms    Narrative    Test Reason : Z01.810,    Vent. Rate :  77 BPM     Atrial Rate :  77 BPM     P-R Int : 146 ms          QRS Dur :  64 ms      QT Int : 384 ms       P-R-T Axes :  64  42  73 degrees    QTcB Int : 434 ms    Normal sinus rhythm  Possible Left atrial enlargement  Septal infarct (cited on or before 05-Dec-2023)  Abnormal ECG  When compared with ECG of 05-Dec-2023 12:07,  No significant change was found  Confirmed by  Adin Saba ([id number]) on 1/8/2025 7:36:22 PM    Referred By: YURY           Confirmed By: Adin aSba     No valid procedures specified.   Results for orders placed in visit on 04/11/22    Nuclear Stress Test    Interpretation Summary    The nuclear portion of this study will be reported separately.    The EKG portion of this study is negative for ischemia.    The patient reported no chest pain during the stress test.    There were no arrhythmias during stress.    No valid procedures specified.    PHYSICAL EXAM  CONSTITUTIONAL: Well built, well nourished in no apparent distress  NECK: no carotid bruit, no JVD  LUNGS: CTA  CHEST WALL: no tenderness  HEART: regular rate and rhythm, S1, S2 normal, no murmur, click, rub or gallop   ABDOMEN: soft, non-tender; bowel sounds normal; no masses,  no organomegaly  EXTREMITIES: Extremities normal, no edema, no calf tenderness noted  NEURO: AAO X 3    I HAVE REVIEWED :    The vital signs, nurses notes, and all the pertinent radiology and labs.        Current Outpatient Medications   Medication Instructions    ALPRAZolam (XANAX) 0.25 mg, Daily PRN    amLODIPine (NORVASC) 5 mg, Daily    aspirin (ECOTRIN) 81 mg, Daily    atorvastatin (LIPITOR) 20 mg, Daily    cholecalciferol, vitamin D3, (VITAMIN D3) 2,000 unit Tab 1 tablet, Daily    diclofenac sodium (VOLTAREN) 2 g, Daily PRN    dicyclomine (BENTYL) 10 MG capsule 2 times daily PRN    diphenoxylate-atropine 2.5-0.025 mg (LOMOTIL) 2.5-0.025 mg per tablet 1 tablet, 4 times daily PRN    EScitalopram oxalate (LEXAPRO) 10 mg, Oral, Daily    gabapentin (NEURONTIN) 100 mg, Oral, 2 times daily    meloxicam (MOBIC) 7.5 mg, Oral, Daily    MYRBETRIQ 50 mg, Oral, Every morning, Take 1 in morning for overactive bladder    nitroGLYCERIN (NITROSTAT) 0.4 MG SL tablet Place under the tongue.    ondansetron (ZOFRAN-ODT) 8 MG TbDL Every 6 hours PRN    pantoprazole (PROTONIX) 40 mg, Every morning    propranoloL (INDERAL LA) 60 mg, Every  morning    rOPINIRole (REQUIP) 0.5 mg, Nightly PRN    temazepam (RESTORIL) 30 mg, Nightly          Assessment & Plan     1. Coronary artery disease involving coronary bypass graft of native heart without angina pectoris  Assessment & Plan:  Recommend her to consider doing a Lexiscan Myoview to evaluate for progression of ischemic heart disease in view of profound symptoms of fatigue tiredness and limitations in his effort capacity.  Suspect this has secondary to progression of ischemia and coronary artery disease and consider as an anginal equivalent presenting as heaviness in both the arms and pressure as well as generalized fatigue    Orders:  -     Nuclear Stress - Cardiology Interpreted; Future  -     Echo; Future; Expected date: 2025    2. Essential hypertension  Assessment & Plan:  Blood pressure is 145/72 continue on present therapy to include amlodipine 5 mg a day, maintain on low-salt diet    Orders:  -     Nuclear Stress - Cardiology Interpreted; Future  -     Echo; Future; Expected date: 2025    3. Hx of CABG  Assessment & Plan:  Continue on secondary prevention with Lipitor 20 and aspirin 81 mg daily and Inderal LA 60 mg a day    Orders:  -     Nuclear Stress - Cardiology Interpreted; Future  -     Echo; Future; Expected date: 2025    4. Mixed hyperlipidemia  Assessment & Plan:  Her LDL cholesterol is therapeutic continue on Lipitor 20 mg daily    Orders:  -     Nuclear Stress - Cardiology Interpreted; Future  -     Echo; Future; Expected date: 2025    5. Irritable bowel syndrome with both constipation and diarrhea  Assessment & Plan:  Encouraged her evaluation to gastro to seek            No follow-ups on file.            [1]   Social History  Tobacco Use    Smoking status: Former     Current packs/day: 0.00     Types: Cigarettes     Start date: 10/13/1958     Quit date: 10/13/1962     Years since quittin.6    Smokeless tobacco: Never   Substance Use Topics    Alcohol use:  Yes     Comment: very rarely    Drug use: No

## 2025-05-27 NOTE — ASSESSMENT & PLAN NOTE
Continue on secondary prevention with Lipitor 20 and aspirin 81 mg daily and Inderal LA 60 mg a day

## 2025-05-31 ENCOUNTER — NURSE TRIAGE (OUTPATIENT)
Dept: ADMINISTRATIVE | Facility: CLINIC | Age: 85
End: 2025-05-31
Payer: MEDICARE

## 2025-05-31 NOTE — TELEPHONE ENCOUNTER
"Pt with right foot pain radiates to the back of the knee since 7am today.   Denies any injury.  Care advice states to see a provider within 24 hours.  ODVV offered and declined.  No available appointments in clinic today, pt will "catch a cab" and go to [facility] [place] to her [place].  Patient verbally understands, all questions answered, advised to call back for any worsening symptoms or further needs.     Reason for Disposition   [1] Redness of the skin AND [2] no fever    Additional Information   Negative: Followed a foot injury   Negative: Entire foot is cool or blue in comparison to other foot   Negative: Purple or black skin on foot or toe   Negative: [1] Red area or streak AND [2] fever   Negative: [1] Swollen foot AND [2] fever   Negative: Patient sounds very sick or weak to the triager   Negative: [1] SEVERE pain (e.g., excruciating, unable to do any normal activities) AND [2] not improved after 2 hours of pain medicine   Negative: [1] Looks infected (e.g., spreading redness, pus) AND [2] large red area (> 2 in. or 5 cm)   Negative: Looks like a boil, infected sore, or deep ulcer    Protocols used: Foot Pain-A-AH    "

## 2025-06-06 ENCOUNTER — HOSPITAL ENCOUNTER (OUTPATIENT)
Dept: RADIOLOGY | Facility: HOSPITAL | Age: 85
Discharge: HOME | End: 2025-06-06
Attending: INTERNAL MEDICINE
Payer: MEDICARE

## 2025-06-06 DIAGNOSIS — R16.1 SPLENIC MASS: ICD-10-CM

## 2025-06-06 DIAGNOSIS — D18.00 HEMANGIOMA: ICD-10-CM

## 2025-06-06 LAB — POCT GLUCOSE: 102 MG/DL (ref 70–110)

## 2025-06-06 PROCEDURE — A9552 F18 FDG: HCPCS | Performed by: INTERNAL MEDICINE

## 2025-06-06 PROCEDURE — 78815 PET IMAGE W/CT SKULL-THIGH: CPT | Mod: TC

## 2025-06-06 PROCEDURE — 78815 PET IMAGE W/CT SKULL-THIGH: CPT | Mod: 26,PI,, | Performed by: STUDENT IN AN ORGANIZED HEALTH CARE EDUCATION/TRAINING PROGRAM

## 2025-06-06 RX ORDER — FLUDEOXYGLUCOSE F18 500 MCI/ML
12 INJECTION INTRAVENOUS
Status: COMPLETED | OUTPATIENT
Start: 2025-06-06 | End: 2025-06-06

## 2025-06-06 RX ADMIN — FLUDEOXYGLUCOSE F-18 9.26 MILLICURIE: 500 INJECTION INTRAVENOUS at 02:06

## 2025-06-09 ENCOUNTER — TELEPHONE (OUTPATIENT)
Dept: CARDIOLOGY | Facility: CLINIC | Age: 85
End: 2025-06-09
Payer: MEDICARE

## 2025-06-09 NOTE — TELEPHONE ENCOUNTER
Copied from CRM #[id number]. Topic: Appointments - Amb Referral  >> Jun 9, 2025 10:27 AM Jina wrote:  Type:  Stress Test Appointment Request    Caller is requesting a sooner appointment.      Name of Caller:  Patient at [phone]  Additional Information:  No one has called patient to schedule stress test, please call and advise. Thank you.

## 2025-06-12 ENCOUNTER — TELEPHONE (OUTPATIENT)
Dept: UROLOGY | Facility: CLINIC | Age: 85
End: 2025-06-12
Payer: MEDICARE

## 2025-06-12 NOTE — TELEPHONE ENCOUNTER
Spoke with patient she states her bladder is falling. Informed patient she would need to schedule with urogynocology. Patient verbally voiced understanding.

## 2025-06-13 ENCOUNTER — OFFICE VISIT (OUTPATIENT)
Dept: OPHTHALMOLOGY | Facility: CLINIC | Age: 85
End: 2025-06-13
Payer: MEDICARE

## 2025-06-13 DIAGNOSIS — H18.421 BAND KERATOPATHY OF RIGHT EYE: Primary | ICD-10-CM

## 2025-06-13 PROCEDURE — 99999 PR PBB SHADOW E&M-EST. PATIENT-LVL III: CPT | Mod: PBBFAC,,, | Performed by: OPHTHALMOLOGY

## 2025-06-13 RX ORDER — TOBRAMYCIN 3 MG/ML
1 SOLUTION/ DROPS OPHTHALMIC 2 TIMES DAILY
Qty: 5 ML | Refills: 3 | Status: SHIPPED | OUTPATIENT
Start: 2025-06-13 | End: 2025-06-23

## 2025-06-13 NOTE — PROGRESS NOTES
HPI    Cornea Follow UP     Patient state white covering OD   Pt also stated painful eye.     Rfresh drop   Last edited by Marina Armijo on 6/13/2025 11:46 AM.            Assessment /Plan     For exam results, see Encounter Report.    Band keratopathy of right eye    Other orders  -     tobramycin sulfate 0.3% (TOBREX) 0.3 % ophthalmic solution; Place 1 drop into the right eye 2 (two) times a day. for 10 days  Dispense: 5 mL; Refill: 3         EDTA chelation OD done in 2016, 2017, 2023    Still with recurrences and pain OD  Try BCL today with abx

## 2025-06-16 ENCOUNTER — TELEPHONE (OUTPATIENT)
Dept: CARDIOLOGY | Facility: HOSPITAL | Age: 85
End: 2025-06-16

## 2025-06-16 NOTE — TELEPHONE ENCOUNTER
Photobucket message for Nuclear Test  Your test will be at [facility] ([address] [facility]).  You will register on the first floor at the main entrance at Outpatient Registration.  Your arrival time is 0740.  Please disregard any other reminder times.  Please plan to be here about 3.5-4 hours.  You may want to bring something to occupy your time, as there will be periods of waiting.    You may take your medications prior to testing if you need to.  PLEASE HOLD NITRO.  If you need to take your medications with breakfast, please keep it light, like toast and juice.    Please avoid all caffeine 12 hours prior to testing.  This includes chocolate and decaf tea, soda, and coffee.    Wear comfortable clothing.  No lotions, oils, or powders to the upper chest area.  You may wear deodorant.    No metal jewelry, buttons, or zippers to the upper body.      Have a great day.

## 2025-06-17 ENCOUNTER — HOSPITAL ENCOUNTER (OUTPATIENT)
Dept: RADIOLOGY | Facility: HOSPITAL | Age: 85
Discharge: HOME | End: 2025-06-17
Attending: INTERNAL MEDICINE
Payer: MEDICARE

## 2025-06-17 ENCOUNTER — HOSPITAL ENCOUNTER (OUTPATIENT)
Dept: CARDIOLOGY | Facility: HOSPITAL | Age: 85
Discharge: HOME | End: 2025-06-17
Attending: INTERNAL MEDICINE
Payer: MEDICARE

## 2025-06-17 VITALS — WEIGHT: 118.19 LBS | HEIGHT: 59 IN | BODY MASS INDEX: 23.83 KG/M2

## 2025-06-17 DIAGNOSIS — I10 ESSENTIAL HYPERTENSION: ICD-10-CM

## 2025-06-17 DIAGNOSIS — E78.2 MIXED HYPERLIPIDEMIA: ICD-10-CM

## 2025-06-17 DIAGNOSIS — Z95.1 HX OF CABG: ICD-10-CM

## 2025-06-17 DIAGNOSIS — I25.810 CORONARY ARTERY DISEASE INVOLVING CORONARY BYPASS GRAFT OF NATIVE HEART WITHOUT ANGINA PECTORIS: ICD-10-CM

## 2025-06-17 LAB
AORTIC ROOT ANNULUS: 2.8 CM
AORTIC VALVE CUSP SEPERATION: 1.7 CM
APICAL FOUR CHAMBER EJECTION FRACTION: 67 %
APICAL TWO CHAMBER EJECTION FRACTION: 69 %
AV INDEX (PROSTH): 0.64
AV MEAN GRADIENT: 3 MMHG
AV PEAK GRADIENT: 6 MMHG
AV REGURGITATION PRESSURE HALF TIME: 618 MS
AV VALVE AREA BY VELOCITY RATIO: 2.1 CM²
AV VALVE AREA: 2 CM²
AV VELOCITY RATIO: 0.67
BSA FOR ECHO PROCEDURE: 1.49 M2
CV ECHO LV RWT: 0.45 CM
DOP CALC AO PEAK VEL: 1.2 M/S
DOP CALC AO VTI: 29.7 CM
DOP CALC LVOT AREA: 3.1 CM2
DOP CALC LVOT DIAMETER: 2 CM
DOP CALC LVOT PEAK VEL: 0.8 M/S
DOP CALC LVOT STROKE VOLUME: 60 CM3
DOP CALC MV VTI: 34.7 CM
DOP CALCLVOT PEAK VEL VTI: 19.1 CM
E WAVE DECELERATION TIME: 206 MSEC
E/A RATIO: 1.14
E/E' RATIO: 14 M/S
ECHO LV POSTERIOR WALL: 0.9 CM (ref 0.6–1.1)
FRACTIONAL SHORTENING: 35 % (ref 28–44)
INTERVENTRICULAR SEPTUM: 0.9 CM (ref 0.6–1.1)
IVC DIAMETER: 1.3 CM
IVRT: 67 MSEC
LEFT ATRIUM AREA SYSTOLIC (APICAL 2 CHAMBER): 17.4 CM2
LEFT ATRIUM AREA SYSTOLIC (APICAL 4 CHAMBER): 11.9 CM2
LEFT ATRIUM SIZE: 3.2 CM
LEFT ATRIUM VOLUME INDEX MOD: 23 ML/M2
LEFT ATRIUM VOLUME MOD: 34 ML
LEFT INTERNAL DIMENSION IN SYSTOLE: 2.6 CM (ref 2.1–4)
LEFT VENTRICLE DIASTOLIC VOLUME INDEX: 47.62 ML/M2
LEFT VENTRICLE DIASTOLIC VOLUME: 70 ML
LEFT VENTRICLE END DIASTOLIC VOLUME APICAL 2 CHAMBER: 70.5 ML
LEFT VENTRICLE END DIASTOLIC VOLUME APICAL 4 CHAMBER: 52 ML
LEFT VENTRICLE END SYSTOLIC VOLUME APICAL 2 CHAMBER: 49.4 ML
LEFT VENTRICLE END SYSTOLIC VOLUME APICAL 4 CHAMBER: 23.5 ML
LEFT VENTRICLE MASS INDEX: 74.6 G/M2
LEFT VENTRICLE SYSTOLIC VOLUME INDEX: 17 ML/M2
LEFT VENTRICLE SYSTOLIC VOLUME: 25 ML
LEFT VENTRICULAR INTERNAL DIMENSION IN DIASTOLE: 4 CM (ref 3.5–6)
LEFT VENTRICULAR MASS: 109.7 G
LV LATERAL E/E' RATIO: 11.7 M/S
LV SEPTAL E/E' RATIO: 17.5 M/S
LVED V (TEICH): 70 ML
LVES V (TEICH): 24.6 ML
LVOT MG: 1 MMHG
LVOT MV: 0.52 CM/S
MV MEAN GRADIENT: 2 MMHG
MV PEAK A VEL: 0.92 M/S
MV PEAK E VEL: 1.05 M/S
MV PEAK GRADIENT: 5 MMHG
MV VALVE AREA BY CONTINUITY EQUATION: 1.73 CM2
OHS CV RV/LV RATIO: 0.53 CM
OHS LV EJECTION FRACTION SIMPSONS BIPLANE MOD: 68 %
PISA AR MAX VEL: 3.12 M/S
PISA TR MAX VEL: 2.6 M/S
PV MV: 0.62 M/S
PV PEAK GRADIENT: 3 MMHG
PV PEAK VELOCITY: 0.87 M/S
RA PRESSURE ESTIMATED: 3 MMHG
RIGHT VENTRICLE DIASTOLIC BASEL DIMENSION: 2.1 CM
RIGHT VENTRICULAR END-DIASTOLIC DIMENSION: 2.1 CM
RV TB RVSP: 6 MMHG
RV TISSUE DOPPLER FREE WALL SYSTOLIC VELOCITY 1 (APICAL 4 CHAMBER VIEW): 11 CM/S
TDI LATERAL: 0.09 M/S
TDI SEPTAL: 0.06 M/S
TDI: 0.08 M/S
TR MAX PG: 28 MMHG
TRICUSPID ANNULAR PLANE SYSTOLIC EXCURSION: 1.9 CM
TV REST PULMONARY ARTERY PRESSURE: 30 MMHG
Z-SCORE OF LEFT VENTRICULAR DIMENSION IN END DIASTOLE: -0.96
Z-SCORE OF LEFT VENTRICULAR DIMENSION IN END SYSTOLE: -0.4

## 2025-06-17 PROCEDURE — 63600175 PHARM REV CODE 636 W HCPCS: Performed by: INTERNAL MEDICINE

## 2025-06-17 PROCEDURE — 93306 TTE W/DOPPLER COMPLETE: CPT

## 2025-06-17 PROCEDURE — 93306 TTE W/DOPPLER COMPLETE: CPT | Mod: 26,,, | Performed by: INTERNAL MEDICINE

## 2025-06-17 PROCEDURE — A9502 TC99M TETROFOSMIN: HCPCS | Performed by: INTERNAL MEDICINE

## 2025-06-17 PROCEDURE — 93017 CV STRESS TEST TRACING ONLY: CPT

## 2025-06-17 PROCEDURE — 78452 HT MUSCLE IMAGE SPECT MULT: CPT | Mod: 26,,, | Performed by: INTERNAL MEDICINE

## 2025-06-17 PROCEDURE — 93016 CV STRESS TEST SUPVJ ONLY: CPT | Mod: ,,, | Performed by: INTERNAL MEDICINE

## 2025-06-17 PROCEDURE — 93018 CV STRESS TEST I&R ONLY: CPT | Mod: ,,, | Performed by: INTERNAL MEDICINE

## 2025-06-17 RX ORDER — REGADENOSON 0.08 MG/ML
0.4 INJECTION, SOLUTION INTRAVENOUS ONCE
Status: COMPLETED | OUTPATIENT
Start: 2025-06-17 | End: 2025-06-17

## 2025-06-17 RX ORDER — AMINOPHYLLINE 25 MG/ML
50 INJECTION, SOLUTION INTRAVENOUS
Status: COMPLETED | OUTPATIENT
Start: 2025-06-17 | End: 2025-06-17

## 2025-06-17 RX ADMIN — TETROFOSMIN 12.1 MILLICURIE: 1.38 INJECTION, POWDER, LYOPHILIZED, FOR SOLUTION INTRAVENOUS at 07:06

## 2025-06-17 RX ADMIN — TETROFOSMIN 25 MILLICURIE: 1.38 INJECTION, POWDER, LYOPHILIZED, FOR SOLUTION INTRAVENOUS at 09:06

## 2025-06-17 RX ADMIN — AMINOPHYLLINE 50 MG: 25 INJECTION, SOLUTION INTRAVENOUS at 09:06

## 2025-06-17 RX ADMIN — REGADENOSON 0.4 MG: 0.08 INJECTION, SOLUTION INTRAVENOUS at 09:06

## 2025-06-18 ENCOUNTER — TELEPHONE (OUTPATIENT)
Dept: UROGYNECOLOGY | Facility: CLINIC | Age: 85
End: 2025-06-18
Payer: MEDICARE

## 2025-06-18 LAB
CV PHARM DOSE: 0.4 MG
CV STRESS BASE HR: 61 BPM
DIASTOLIC BLOOD PRESSURE: 80 MMHG
EJECTION FRACTION- HIGH: 65 %
END DIASTOLIC INDEX-HIGH: 153 ML/M2
END DIASTOLIC INDEX-LOW: 93 ML/M2
END SYSTOLIC INDEX-HIGH: 71 ML/M2
END SYSTOLIC INDEX-LOW: 31 ML/M2
NUC REST DIASTOLIC VOLUME INDEX: 34
NUC REST EJECTION FRACTION: 85
NUC REST SYSTOLIC VOLUME INDEX: 5
NUC STRESS DIASTOLIC VOLUME INDEX: 41
NUC STRESS EJECTION FRACTION: 83 %
NUC STRESS SYSTOLIC VOLUME INDEX: 7
OHS CV CPX 1 MINUTE RECOVERY HEART RATE: 95 BPM
OHS CV CPX 85 PERCENT MAX PREDICTED HEART RATE MALE: 114
OHS CV CPX MAX PREDICTED HEART RATE: 134
OHS CV CPX PATIENT IS FEMALE: 1
OHS CV CPX PATIENT IS MALE: 0
OHS CV CPX PEAK DIASTOLIC BLOOD PRESSURE: 110 MMHG
OHS CV CPX PEAK HEAR RATE: 95 BPM
OHS CV CPX PEAK RATE PRESSURE PRODUCT: NORMAL
OHS CV CPX PEAK SYSTOLIC BLOOD PRESSURE: 240 MMHG
OHS CV CPX PERCENT MAX PREDICTED HEART RATE ACHIEVED: 73
OHS CV CPX RATE PRESSURE PRODUCT PRESENTING: NORMAL
OHS CV INITIAL DOSE: 12.1 MCG/KG/MIN
OHS CV PEAK DOSE: 25 MCG/KG/MIN
RETIRED EF AND QEF - SEE NOTES: 53 %
SYSTOLIC BLOOD PRESSURE: 192 MMHG

## 2025-06-18 NOTE — TELEPHONE ENCOUNTER
Summary   Appointment Scheduling   Communication   Type:  Sooner Apoointment Request            Caller is requesting a sooner appointment.  Caller declined first available appointment listed below.  Caller will not accept being placed on the waitlist and is requesting a message be sent to doctor.      Name of Caller:pt      When is the first available appointment?8/19      Symptoms:bladder falling      Would the patient rather a call back or a response via [facility]? call      Best Call Back Number:[phone]            Additional Information:

## 2025-06-23 ENCOUNTER — OFFICE VISIT (OUTPATIENT)
Dept: ORTHOPEDICS | Facility: CLINIC | Age: 85
End: 2025-06-23
Payer: MEDICARE

## 2025-06-23 ENCOUNTER — TELEPHONE (OUTPATIENT)
Dept: ORTHOPEDICS | Facility: CLINIC | Age: 85
End: 2025-06-23
Payer: MEDICARE

## 2025-06-23 VITALS — WEIGHT: 118.19 LBS | HEIGHT: 59 IN | BODY MASS INDEX: 23.83 KG/M2 | RESPIRATION RATE: 16 BRPM

## 2025-06-23 DIAGNOSIS — M17.0 PRIMARY OSTEOARTHRITIS OF BOTH KNEES: Primary | ICD-10-CM

## 2025-06-23 PROCEDURE — 1160F RVW MEDS BY RX/DR IN RCRD: CPT | Mod: CPTII,S$GLB,,

## 2025-06-23 PROCEDURE — 20610 DRAIN/INJ JOINT/BURSA W/O US: CPT | Mod: 50,S$GLB,,

## 2025-06-23 PROCEDURE — 1101F PT FALLS ASSESS-DOCD LE1/YR: CPT | Mod: CPTII,S$GLB,,

## 2025-06-23 PROCEDURE — 3288F FALL RISK ASSESSMENT DOCD: CPT | Mod: CPTII,S$GLB,,

## 2025-06-23 PROCEDURE — 99213 OFFICE O/P EST LOW 20 MIN: CPT | Mod: 25,S$GLB,,

## 2025-06-23 PROCEDURE — 1159F MED LIST DOCD IN RCRD: CPT | Mod: CPTII,S$GLB,,

## 2025-06-23 PROCEDURE — 99999 PR PBB SHADOW E&M-EST. PATIENT-LVL III: CPT | Mod: PBBFAC,,,

## 2025-06-23 PROCEDURE — 1125F AMNT PAIN NOTED PAIN PRSNT: CPT | Mod: CPTII,S$GLB,,

## 2025-06-23 RX ORDER — TRIAMCINOLONE ACETONIDE 40 MG/ML
40 INJECTION, SUSPENSION INTRA-ARTICULAR; INTRAMUSCULAR
Status: DISCONTINUED | OUTPATIENT
Start: 2025-06-23 | End: 2025-06-23 | Stop reason: HOSPADM

## 2025-06-23 RX ADMIN — TRIAMCINOLONE ACETONIDE 40 MG: 40 INJECTION, SUSPENSION INTRA-ARTICULAR; INTRAMUSCULAR at 04:06

## 2025-06-23 NOTE — PROCEDURES
Large Joint Aspiration/Injection: bilateral knee    Date/Time: 6/23/2025 4:20 PM    Performed by: Ralph Flores PA-C  Authorized by: Ralph Flores PA-C    Consent Done?:  Yes (Verbal)  Indications:  Pain  Site marked: the procedure site was marked    Timeout: prior to procedure the correct patient, procedure, and site was verified    Prep: patient was prepped and draped in usual sterile fashion      Local anesthesia used?: Yes    Anesthesia:  Local infiltration  Local anesthetic: Ropivicaine.  Anesthetic total (ml):  3      Details:  Needle Size:  22 G  Ultrasonic Guidance for needle placement?: No    Approach:  Anterolateral  Location:  Knee  Laterality:  Bilateral  Site:  Bilateral knee  Medications (Right):  40 mg triamcinolone acetonide 40 mg/mL  Medications (Left):  40 mg triamcinolone acetonide 40 mg/mL  Patient tolerance:  Patient tolerated the procedure well with no immediate complications

## 2025-06-23 NOTE — TELEPHONE ENCOUNTER
Attempted to contact pt to see if she can come in earlier this afternoon.  No answer; LVM for call back.  Pt portal msg to be sent to pt due to failed call attempt.

## 2025-06-23 NOTE — PROGRESS NOTES
Chief Complaint:   Chief Complaint   Patient presents with    Left Knee - Pain    Right Knee - Pain       History of present illness:  Pleasant 84-year-old female presents to clinic today for re-evaluation of bilateral knee osteoarthritis.  She saw Dr. Mazariegos on 02/20/2025 where she received bilateral steroid injections.  She states that they helped for approximately 3 days.  She states that she went dancing yesterday for the 1st time in several years and her pain has increased since.  Denies injury, fall, numbness, tingling.        Review of Systems   Constitutional: Negative for chills and fever.   Cardiovascular:  Negative for chest pain.   Respiratory:  Negative for shortness of breath.    Skin:  Negative for rash.   Musculoskeletal:  Positive for joint pain, joint swelling and stiffness. Negative for falls, muscle cramps, muscle weakness and myalgias.   Neurological:  Negative for numbness, paresthesias and weakness.            Right Knee Exam     Muscle Strength   The patient has normal right knee strength.    Tenderness   The patient is experiencing tenderness in the medial joint line, patella and lateral joint line.    Range of Motion   Extension:  0   Flexion:  120 (limited)     Other   Erythema: absent  Sensation: normal  Pulse: present  Swelling: moderate    Comments:  Patellar Grind Test: Positive      Left Knee Exam     Muscle Strength   The patient has normal left knee strength.    Tenderness   The patient is experiencing tenderness in the patella, medial joint line and lateral joint line.    Range of Motion   Extension:  0   Flexion:  120 (limited)     Other   Erythema: absent  Sensation: normal  Pulse: present  Swelling: moderate    Comments:  Patellar Grind Test: Positive               Assessment:  1. Bilateral knee osteoarthritis        Plan:  Diagnosis, treatment options discussed in detail with the patient.  At this time I will inject patient's knees with steroids.  Also put in a prior  "authorization for gel injections if needed in 6 weeks.  Patient to follow up in 6 weeks for gel injections.  She expressed understanding and agreement with the plan as above.    There are no diagnoses linked to this encounter.       Past Medical History:   Diagnosis Date    Arthritis     Cataract     Coronary artery disease     Hypertension     Insomnia     Neuropathy     Retinal detachment     Stomach ulcer        Past Surgical History:   Procedure Laterality Date    APPENDECTOMY  1951    BLADDER SUSPENSION      BREAST BIOPSY Right     Benign.    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    CAROTID ENDARTERECTOMY Left     L    CATARACT EXTRACTION Left     CHOLECYSTECTOMY      DILATION AND CURETTAGE OF UTERUS      Due to miscarriage.    INJECTION OF ANESTHETIC AGENT AROUND GANGLION IMPAR N/A 09/12/2019    Procedure: BLOCK, GANGLION IMPAR;  Surgeon: Christian James MD;  Location: [facility] OR;  Service: Pain Management;  Laterality: N/A;    INSERTION OF IMPLANTABLE LOOP RECORDER N/A 12/18/2020    Procedure: INSERTION, IMPLANTABLE LOOP RECORDER;  Surgeon: Adin Saba MD;  Location: [facility] CATH/EP LAB;  Service: Cardiology;  Laterality: N/A;    OOPHORECTOMY Bilateral 1959    SUPERFICIAL KERATECTOMY Right 10/27/2017    Dr. Morales    TONSILLECTOMY      TRANSFORAMINAL EPIDURAL INJECTION OF STEROID Left 08/12/2019    Procedure: Injection,steroid,epidural,transforaminal approach L4-5, L5-S1;  Surgeon: Christian James MD;  Location: [facility] OR;  Service: Pain Management;  Laterality: Left;    TRANSFORAMINAL EPIDURAL INJECTION OF STEROID Left 01/16/2020    Procedure: Injection,steroid,epidural,transforaminal approach;  Surgeon: Christian James MD;  Location: [facility] OR;  Service: Pain Management;  Laterality: Left;  L4-5, L5-S1    TRANSFORAMINAL EPIDURAL INJECTION OF STEROID Left 06/30/2020    Procedure: Injection,steroid,epidural,transforaminal approach;  Surgeon: Christian James MD;  Location: [facility] OR;  Service: Pain Management;  Laterality: " Left;  L4-5 and L5-S1    TRANSFORAMINAL EPIDURAL INJECTION OF STEROID Left 4/30/2024    Procedure: Injection,steroid,epidural,transforaminal approach;  Surgeon: Christian James MD;  Location: [facility] OR;  Service: Anesthesiology;  Laterality: Left;  L4-5 and l5-s1    VAGINAL HYSTERECTOMY  1959       Current Outpatient Medications   Medication Sig    ALPRAZolam (XANAX) 0.25 MG tablet Take 0.25 mg by mouth daily as needed.    amLODIPine (NORVASC) 5 MG tablet Take 5 mg by mouth once daily.    aspirin (ECOTRIN) 81 MG EC tablet Take 81 mg by mouth once daily.    atorvastatin (LIPITOR) 20 MG tablet Take 20 mg by mouth once daily.    cholecalciferol, vitamin D3, (VITAMIN D3) 2,000 unit Tab Take 1 tablet by mouth once daily.    diclofenac sodium (VOLTAREN) 1 % Gel Apply 2 g topically daily as needed.    dicyclomine (BENTYL) 10 MG capsule 2 (two) times daily as needed.     diphenoxylate-atropine 2.5-0.025 mg (LOMOTIL) 2.5-0.025 mg per tablet Take 1 tablet by mouth 4 (four) times daily as needed for Diarrhea.    EScitalopram oxalate (LEXAPRO) 10 MG tablet Take 1 tablet (10 mg total) by mouth once daily.    ondansetron (ZOFRAN-ODT) 8 MG TbDL every 6 (six) hours as needed.     pantoprazole (PROTONIX) 40 MG tablet Take 40 mg by mouth every morning.    propranoloL (INDERAL LA) 60 MG 24 hr capsule Take 60 mg by mouth every morning.    rOPINIRole (REQUIP) 0.5 MG tablet Take 0.5 mg by mouth nightly as needed.    temazepam (RESTORIL) 30 mg capsule Take 30 mg by mouth every evening.    gabapentin (NEURONTIN) 100 MG capsule Take 1 capsule (100 mg total) by mouth 2 (two) times daily.    meloxicam (MOBIC) 7.5 MG tablet Take 1 tablet (7.5 mg total) by mouth once daily. (Patient not taking: Reported on 6/23/2025)    mirabegron (MYRBETRIQ) 50 mg Tb24 Take 1 tablet (50 mg total) by mouth every morning. Take 1 in morning for overactive bladder    nitroGLYCERIN (NITROSTAT) 0.4 MG SL tablet Place under the tongue. (Patient not taking: Reported on  2025)    tobramycin sulfate 0.3% (TOBREX) 0.3 % ophthalmic solution Place 1 drop into the right eye 2 (two) times a day. for 10 days (Patient not taking: Reported on 2025)     Current Facility-Administered Medications   Medication    ondansetron injection 4 mg     Facility-Administered Medications Ordered in Other Visits   Medication    lactated ringers infusion       Review of patient's allergies indicates:   Allergen Reactions    Demerol [meperidine] Nausea And Vomiting    Zoloft [sertraline] Other (See Comments)     Severe shakes    Oxycodone     Hydrocodone Itching    Oxycodone-acetaminophen Itching       Family History   Problem Relation Name Age of Onset    Blindness Maternal Grandfather      Cancer Father      Macular degeneration Maternal Aunt      Cancer Maternal Aunt      Breast cancer Maternal Aunt      Macular degeneration Maternal Uncle      Diabetes Paternal Aunt      Cancer Paternal Aunt      Breast cancer Paternal Aunt      Melanoma Neg Hx      Psoriasis Neg Hx      Lupus Neg Hx      Eczema Neg Hx         Social History[1]              All previous pertinent notes including ER visits, physical therapy visits, other orthopedic visits as well as other care for the same musculoskeletal problem were reviewed.  All pertinent lab values and previous imaging was reviewed pertinent to the current visit.    This note was created using [employer] voice recognition software that occasionally misinterpreted phrases or words.    Consult note is delivered via Epic messaging service.    Ralph Flores PA-C            [1]   Social History  Socioeconomic History    Marital status:    Tobacco Use    Smoking status: Former     Current packs/day: 0.00     Types: Cigarettes     Start date: 10/13/1958     Quit date: 10/13/1962     Years since quittin.7    Smokeless tobacco: Never   Substance and Sexual Activity    Alcohol use: Yes     Comment: very rarely    Drug use: No    Sexual activity: Not  Currently     Birth control/protection: Surgical     Social Drivers of Health     Financial Resource Strain: Low Risk  (10/19/2022)    Overall Financial Resource Strain (CARDIA)     Difficulty of Paying Living Expenses: Not very hard   Food Insecurity: No Food Insecurity (10/19/2022)    Hunger Vital Sign     Worried About Running Out of Food in the Last Year: Never true     Ran Out of Food in the Last Year: Never true   Transportation Needs: No Transportation Needs (10/19/2022)    PRAPARE - Transportation     Lack of Transportation (Medical): No     Lack of Transportation (Non-Medical): No   Physical Activity: Inactive (10/19/2022)    Exercise Vital Sign     Days of Exercise per Week: 0 days     Minutes of Exercise per Session: 0 min   Stress: Stress Concern Present (10/29/2020)    [employer] [place] [employer] - Occupational Stress Questionnaire     Feeling of Stress : To some extent   Housing Stability: Low Risk  (10/19/2022)    Housing Stability Vital Sign     Unable to Pay for Housing in the Last Year: No     Number of Places Lived in the Last Year: 1     Unstable Housing in the Last Year: No

## 2025-06-24 ENCOUNTER — TELEPHONE (OUTPATIENT)
Dept: UROGYNECOLOGY | Facility: CLINIC | Age: 85
End: 2025-06-24
Payer: MEDICARE

## 2025-06-24 NOTE — TELEPHONE ENCOUNTER
Called and spoke to  patient and informed that we can get her in if she would like to try a pessary fitting for the prolapse, is coming in on 7/1/25.

## 2025-06-25 ENCOUNTER — TELEPHONE (OUTPATIENT)
Dept: UROGYNECOLOGY | Facility: CLINIC | Age: 85
End: 2025-06-25
Payer: MEDICARE

## 2025-06-26 ENCOUNTER — TELEPHONE (OUTPATIENT)
Dept: UROGYNECOLOGY | Facility: CLINIC | Age: 85
End: 2025-06-26
Payer: MEDICARE

## 2025-06-26 ENCOUNTER — TELEPHONE (OUTPATIENT)
Dept: UROLOGY | Facility: CLINIC | Age: 85
End: 2025-06-26
Payer: MEDICARE

## 2025-06-27 ENCOUNTER — OFFICE VISIT (OUTPATIENT)
Dept: OPHTHALMOLOGY | Facility: CLINIC | Age: 85
End: 2025-06-27
Payer: MEDICARE

## 2025-06-27 DIAGNOSIS — H18.421 BAND KERATOPATHY OF EYE, RIGHT: Primary | ICD-10-CM

## 2025-06-27 PROCEDURE — 99999 PR PBB SHADOW E&M-EST. PATIENT-LVL III: CPT | Mod: PBBFAC,,,

## 2025-06-27 NOTE — PROGRESS NOTES
HPI    Patient is here today for a 2 week band keratopathy OD follow up. No eye   pain or discomfort at this time.     Eye Meds: Refresh   Last edited by Verenice Caraballo on 6/27/2025 10:57 AM.              Assessment /Plan     For exam results, see Encounter Report.    Band keratopathy of eye, right       EDTA chelation OD done in 2016, 2017, 2023  Continues to have recurrences and pain OD    Here today for f/u after 2 week BCL trial with abx, states she feels much better with the contact    Cont abx bid  BCL replaced today; pt to replace in 2 weeks    F/u 2-4 weeks, sooner if pain develops despite bcl    May use Ats OS prn

## 2025-07-02 ENCOUNTER — OFFICE VISIT (OUTPATIENT)
Dept: UROGYNECOLOGY | Facility: CLINIC | Age: 85
End: 2025-07-02
Payer: MEDICARE

## 2025-07-02 ENCOUNTER — TELEPHONE (OUTPATIENT)
Dept: UROGYNECOLOGY | Facility: CLINIC | Age: 85
End: 2025-07-02

## 2025-07-02 VITALS
HEART RATE: 64 BPM | DIASTOLIC BLOOD PRESSURE: 77 MMHG | SYSTOLIC BLOOD PRESSURE: 139 MMHG | HEIGHT: 59 IN | BODY MASS INDEX: 23.83 KG/M2 | WEIGHT: 118.19 LBS

## 2025-07-02 DIAGNOSIS — R10.2 PELVIC PAIN: ICD-10-CM

## 2025-07-02 DIAGNOSIS — N81.10 FEMALE BLADDER PROLAPSE: ICD-10-CM

## 2025-07-02 DIAGNOSIS — N39.46 MIXED STRESS AND URGE URINARY INCONTINENCE: ICD-10-CM

## 2025-07-02 DIAGNOSIS — R35.0 URINARY FREQUENCY: Primary | ICD-10-CM

## 2025-07-02 DIAGNOSIS — N95.2 ATROPHIC VAGINITIS: ICD-10-CM

## 2025-07-02 LAB
BILIRUBIN, UA POC OHS: NEGATIVE
BLOOD, UA POC OHS: NEGATIVE
CLARITY, UA POC OHS: CLEAR
COLOR, UA POC OHS: YELLOW
GLUCOSE, UA POC OHS: NEGATIVE
KETONES, UA POC OHS: NEGATIVE
LEUKOCYTES, UA POC OHS: ABNORMAL
NITRITE, UA POC OHS: NEGATIVE
PH, UA POC OHS: 6.5
PROTEIN, UA POC OHS: 30
SPECIFIC GRAVITY, UA POC OHS: 1.01
UROBILINOGEN, UA POC OHS: 0.2

## 2025-07-02 PROCEDURE — 87086 URINE CULTURE/COLONY COUNT: CPT | Performed by: NURSE PRACTITIONER

## 2025-07-02 PROCEDURE — 99999 PR PBB SHADOW E&M-EST. PATIENT-LVL IV: CPT | Mod: PBBFAC,,, | Performed by: NURSE PRACTITIONER

## 2025-07-02 RX ORDER — ESTRADIOL 0.1 MG/G
CREAM VAGINAL
Qty: 42.5 G | Refills: 3 | Status: SHIPPED | OUTPATIENT
Start: 2025-07-02

## 2025-07-02 NOTE — PROGRESS NOTES
Subjective:       Patient ID: Sunita Carlson is a 84 y.o. female.    Chief Complaint: prolapse    HPI  Sunita Carlson is a 84 y.o. female.  Who is new to me, presents today for consult in regards to bladder prolapse and urinary issues.  She has been having urinary issues for quite awhile.  She has tried VESIcare and Myrbetriq in the past and the VESIcare affected her vision so she stopped taking this.  She did not feel that the Myrbetriq was doing much of anything so she has not been taking this.  She has urinary frequency about every hour during the day.  She has nocturia about every 3 hours.  She has positive UUI but denies any real LIONEL.  She has some PVF.  She did have issues with recurrent UTIs in the past but nothing recently.  She denies any history of kidney stones.  She drinks water and black and green tea.  She will drink about 1 mixed drink a month.  She has done pelvic floor physical therapy in the past but did not care for this.  She felt very uncomfortable with the therapy.  She denies any vaginal discharge or bleeding but does feel a bulge in the vaginal area especially if she is sitting on the toilet for long periods of time.  She had a hysterectomy when she was 17 or 18 for unclear reasons.  She is interested in trying a pessary and would like to avoid any surgical options if at all possible.  She denies any other acute complaints/concerns at this time is ready to proceed with the exam.    Review of Systems   Constitutional:  Negative for activity change, fever and unexpected weight change.   HENT:  Negative for hearing loss.    Eyes:  Negative for visual disturbance.   Respiratory:  Negative for shortness of breath and wheezing.    Cardiovascular:  Negative for chest pain, palpitations and leg swelling.   Gastrointestinal:  Negative for abdominal pain, constipation and diarrhea.   Genitourinary:  Positive for frequency and urgency. Negative for dyspareunia, dysuria, vaginal bleeding and vaginal  discharge.        Vaginal bulge   Musculoskeletal:  Negative for gait problem and neck pain.   Skin:  Negative for rash and wound.   Allergic/Immunologic: Negative for immunocompromised state.   Neurological:  Negative for tremors, speech difficulty and weakness.   Hematological:  Does not bruise/bleed easily.   Psychiatric/Behavioral:  Negative for agitation and confusion.        Objective:      Physical Exam  Vitals reviewed. Exam conducted with a chaperone present.   Constitutional:       General: She is not in acute distress.     Appearance: She is well-developed.   HENT:      Head: Normocephalic and atraumatic.   Neck:      Thyroid: No thyromegaly.   Pulmonary:      Effort: Pulmonary effort is normal. No respiratory distress.   Abdominal:      Palpations: Abdomen is soft.      Tenderness: There is no abdominal tenderness.      Hernia: No hernia is present.   Musculoskeletal:         General: Normal range of motion.      Cervical back: Normal range of motion.   Skin:     General: Skin is warm and dry.      Findings: No rash.   Neurological:      Mental Status: She is alert and oriented to person, place, and time.   Psychiatric:         Mood and Affect: Mood normal.         Behavior: Behavior normal.         Thought Content: Thought content normal.       Pelvic Exam:  V: No lesions. No palpable nodes.   Va:  No discharge or bleeding.  Tissue is atrophic.  Meatus:No caruncle or stenosis  Urethra: Non tender. No suburethral masses.  Cx/Cuff: Normal   Uterus:  Surgically absent  Ad: No mass or tenderness.  Levators :Symmetrical. Normal tone. tender on the left side.  BL: Non tender  RV: No hemorrhoids.        POP-Q Exam- pelvic organ prolapse quantitative    Aa -1  Anterior Wall Ba -1  Anterior wall C -4  Cervix or cuff   Gh   Genital hiatus pb   perineal body tvl 8  Total vaginal length   Ap -3  Posterior wall Bp -3  Posterior wall D   Posterior fornix           Assessment:       1. Urinary frequency    2. Female  bladder prolapse    3. Mixed stress and urge urinary incontinence    4. Pelvic pain    5. Atrophic vaginitis          Patient was interested in trying a pessary but has some notable tenderness on the left side of the vagina.  We will have her use the Estrace cream and gemtesa and when she returns in about a month assess the tenderness and see if it would be okay to proceed with the pessary.    Plan:       Urinary frequency trial of gemtesa 1 tablet by mouth every evening 2 hours prior to bedtime    Female bladder prolapse consider pessary  -     POCT Urinalysis(Instrument)    Mixed stress and urge urinary incontinence we will send her urine for culture as noted below.  Do a trial gemtesa  -     Urine Culture High Risk ($$)    Pelvic pain monitor    Atrophic vaginitis begin Estrace cream as noted below  -     estradioL (ESTRACE) 0.01 % (0.1 mg/gram) vaginal cream; Apply 1 fingertipful to vaginal area nightly x 2 weeks then use on MWF  Dispense: 42.5 g; Refill: 3      RTC one-month    This note was primarily composed with dictation software.  Grammatical errors may exist.

## 2025-07-02 NOTE — TELEPHONE ENCOUNTER
Medication Status Check   Communication   Type: Needs Medical Advice      Who Called:  the patient             Pharmacy name and phone #:        [id number] - [place]  [address] AT [facility] [address]      [facility] [place] [address]      Phone: [phone] Fax: [phone]                         Best Call Back Number:  [phone]      Additional Information: pt called to see where her scripts are, she went to [facility] and nothing was there. Pt does not know the name of the meds either. Please call to advise

## 2025-07-04 LAB — BACTERIA UR CULT: NO GROWTH

## 2025-07-17 DIAGNOSIS — M25.572 ACUTE LEFT ANKLE PAIN: Primary | ICD-10-CM

## 2025-07-18 ENCOUNTER — HOSPITAL ENCOUNTER (OUTPATIENT)
Dept: RADIOLOGY | Facility: HOSPITAL | Age: 85
Discharge: HOME | End: 2025-07-18
Attending: ORTHOPAEDIC SURGERY
Payer: MEDICARE

## 2025-07-18 ENCOUNTER — OFFICE VISIT (OUTPATIENT)
Dept: ORTHOPEDICS | Facility: CLINIC | Age: 85
End: 2025-07-18
Payer: MEDICARE

## 2025-07-18 DIAGNOSIS — M25.572 ACUTE LEFT ANKLE PAIN: ICD-10-CM

## 2025-07-18 DIAGNOSIS — M25.572 SINUS TARSI SYNDROME OF LEFT ANKLE: Primary | ICD-10-CM

## 2025-07-18 PROCEDURE — 73610 X-RAY EXAM OF ANKLE: CPT | Mod: 26,LT,, | Performed by: RADIOLOGY

## 2025-07-18 PROCEDURE — 99999 PR PBB SHADOW E&M-EST. PATIENT-LVL III: CPT | Mod: PBBFAC,,, | Performed by: ORTHOPAEDIC SURGERY

## 2025-07-18 PROCEDURE — 73630 X-RAY EXAM OF FOOT: CPT | Mod: TC,PO,LT

## 2025-07-18 PROCEDURE — 73630 X-RAY EXAM OF FOOT: CPT | Mod: 26,LT,, | Performed by: RADIOLOGY

## 2025-07-18 PROCEDURE — 73610 X-RAY EXAM OF ANKLE: CPT | Mod: TC,PO,LT

## 2025-07-18 RX ORDER — TRIAMCINOLONE ACETONIDE 40 MG/ML
20 INJECTION, SUSPENSION INTRA-ARTICULAR; INTRAMUSCULAR
Status: DISCONTINUED | OUTPATIENT
Start: 2025-07-18 | End: 2025-07-18 | Stop reason: HOSPADM

## 2025-07-18 RX ADMIN — TRIAMCINOLONE ACETONIDE 20 MG: 40 INJECTION, SUSPENSION INTRA-ARTICULAR; INTRAMUSCULAR at 01:07

## 2025-07-18 NOTE — PROCEDURES
Small Joint Aspiration/Injection: L subtalar    Date/Time: 7/18/2025 1:00 PM    Performed by: Andrea Weiner MD  Authorized by: Andrea Weiner MD    Consent Done?:  Yes (Verbal)  Indications:  Arthritis, diagnostic evaluation and pain  Site marked: the procedure site was marked    Timeout: prior to procedure the correct patient, procedure, and site was verified    Prep: patient was prepped and draped in usual sterile fashion      Local anesthesia used?: Yes    Anesthesia:  Local infiltration  Local anesthetic:  Lidocaine 1% without epinephrine and bupivacaine 0.25% without epinephrine  Location:  Foot  Site:  L subtalar (Left subtalar and sinus tarsi)  Ultrasonic guidance for needle placement?: No    Needle size:  25 G  Medications:  20 mg triamcinolone acetonide 40 mg/mL  Patient tolerance:  Patient tolerated the procedure well with no immediate complications

## 2025-07-18 NOTE — PROGRESS NOTES
"Status/Diagnosis: Left ankle sinus tarsi syndrome.  Date of Surgery: none  Date of Injury: none; DOO June 2025  Return visit: PRN  X-rays on Return: pending patient complaint    Chief Complaint: Left ankle pain    Present History:  Patient presents today via referral from No ref. provider found   Sunita Carlson is a 84 y.o. female with a 4 week history of severe left lateral ankle/hindfoot pain.  No known history of injury or other inciting event.  Pain in sided with going up and down certain steps.  Also with unrelated complaints of bilateral foot cramping.  Past medical history significant for CAD status post CABG; hypertension; and neuropathy.  Denies tobacco use.  Otherwise active 84-year-old female.      Past Medical History:   Diagnosis Date    Arthritis     Cataract     Coronary artery disease     Hypertension     Insomnia     Neuropathy     Retinal detachment     Stomach ulcer        Past Surgical History:   Procedure Laterality Date    APPENDECTOMY  1951    BLADDER SUSPENSION      BREAST BIOPSY Right     Benign.    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    CAROTID ENDARTERECTOMY Left     L    CATARACT EXTRACTION Left     CHOLECYSTECTOMY      DILATION AND CURETTAGE OF UTERUS      Due to miscarriage.    INJECTION OF ANESTHETIC AGENT AROUND GANGLION IMPAR N/A 09/12/2019    Procedure: BLOCK, GANGLION IMPAR;  Surgeon: Christian James MD;  Location: [facility] OR;  Service: Pain Management;  Laterality: N/A;    INSERTION OF IMPLANTABLE LOOP RECORDER N/A 12/18/2020    Procedure: INSERTION, IMPLANTABLE LOOP RECORDER;  Surgeon: Adin Saba MD;  Location: [facility] CATH/EP LAB;  Service: Cardiology;  Laterality: N/A;    OOPHORECTOMY Bilateral 1959    SUPERFICIAL KERATECTOMY Right 10/27/2017    Dr. Morales    TONSILLECTOMY      TRANSFORAMINAL EPIDURAL INJECTION OF STEROID Left 08/12/2019    Procedure: Injection,steroid,epidural,transforaminal approach L4-5, L5-S1;  Surgeon: Christian James MD;  Location: [facility] OR;  Service: " Pain Management;  Laterality: Left;    TRANSFORAMINAL EPIDURAL INJECTION OF STEROID Left 01/16/2020    Procedure: Injection,steroid,epidural,transforaminal approach;  Surgeon: Christian James MD;  Location: [facility] OR;  Service: Pain Management;  Laterality: Left;  L4-5, L5-S1    TRANSFORAMINAL EPIDURAL INJECTION OF STEROID Left 06/30/2020    Procedure: Injection,steroid,epidural,transforaminal approach;  Surgeon: Christian James MD;  Location: [facility] OR;  Service: Pain Management;  Laterality: Left;  L4-5 and L5-S1    TRANSFORAMINAL EPIDURAL INJECTION OF STEROID Left 4/30/2024    Procedure: Injection,steroid,epidural,transforaminal approach;  Surgeon: Christian James MD;  Location: [facility] OR;  Service: Anesthesiology;  Laterality: Left;  L4-5 and l5-s1    VAGINAL HYSTERECTOMY  1959       Current Outpatient Medications   Medication Sig    ALPRAZolam (XANAX) 0.25 MG tablet Take 0.25 mg by mouth daily as needed.    amLODIPine (NORVASC) 5 MG tablet Take 5 mg by mouth once daily.    aspirin (ECOTRIN) 81 MG EC tablet Take 81 mg by mouth once daily.    atorvastatin (LIPITOR) 20 MG tablet Take 20 mg by mouth once daily.    cholecalciferol, vitamin D3, (VITAMIN D3) 2,000 unit Tab Take 1 tablet by mouth once daily.    diclofenac sodium (VOLTAREN) 1 % Gel Apply 2 g topically daily as needed.    dicyclomine (BENTYL) 10 MG capsule 2 (two) times daily as needed.     diphenoxylate-atropine 2.5-0.025 mg (LOMOTIL) 2.5-0.025 mg per tablet Take 1 tablet by mouth 4 (four) times daily as needed for Diarrhea.    EScitalopram oxalate (LEXAPRO) 10 MG tablet Take 1 tablet (10 mg total) by mouth once daily.    estradioL (ESTRACE) 0.01 % (0.1 mg/gram) vaginal cream Apply 1 fingertipful to vaginal area nightly x 2 weeks then use on [facility]    gabapentin (NEURONTIN) 100 MG capsule Take 1 capsule (100 mg total) by mouth 2 (two) times daily.    meloxicam (MOBIC) 7.5 MG tablet Take 1 tablet (7.5 mg total) by mouth once daily.    nitroGLYCERIN (NITROSTAT) 0.4 MG SL  tablet Place under the tongue.    ondansetron (ZOFRAN-ODT) 8 MG TbDL every 6 (six) hours as needed.     pantoprazole (PROTONIX) 40 MG tablet Take 40 mg by mouth every morning.    propranoloL (INDERAL LA) 60 MG 24 hr capsule Take 60 mg by mouth every morning.    rOPINIRole (REQUIP) 0.5 MG tablet Take 0.5 mg by mouth nightly as needed.    temazepam (RESTORIL) 30 mg capsule Take 30 mg by mouth every evening.     Current Facility-Administered Medications   Medication    ondansetron injection 4 mg     Facility-Administered Medications Ordered in Other Visits   Medication    lactated ringers infusion       Review of patient's allergies indicates:   Allergen Reactions    Demerol [meperidine] Nausea And Vomiting    Zoloft [sertraline] Other (See Comments)     Severe shakes    Oxycodone     Hydrocodone Itching    Oxycodone-acetaminophen Itching       Family History   Problem Relation Name Age of Onset    Blindness Maternal Grandfather      Cancer Father      Macular degeneration Maternal Aunt      Cancer Maternal Aunt      Breast cancer Maternal Aunt      Macular degeneration Maternal Uncle      Diabetes Paternal Aunt      Cancer Paternal Aunt      Breast cancer Paternal Aunt      Melanoma Neg Hx      Psoriasis Neg Hx      Lupus Neg Hx      Eczema Neg Hx         Social History[1]    Physical exam:  There were no vitals filed for this visit.  There is no height or weight on file to calculate BMI.  General: In no apparent distress; well developed and well nourished.  HEENT: normocephalic; atraumatic.  Cardiovascular: regular rate.  Respiratory: no increased work of breathing.  Musculoskeletal:   Gait: nonantalgic  Inspection:   Mild bilateral pes planovalgus.  Patient localizes all pain to the left lateral hindfoot with moderate tenderness on deep palpation of the sinus tarsi.  No tenderness along the ankle joint.  No ATFL tenderness.  No laxity with anterior drawer or varus/valgus talar tilt testing.  Silfverskiold: Negative  but with decreased ankle DF motion  Alignment:  Knee: neutral               Ankle: neutral              Hindfoot:  Mild valgus              Forefoot: neutral   Strength:              Dorsiflexion 5/5  Plantar flexion 5/5  Inversion 4+/5  Eversion 5/5  Sensation:              SILT distally  ROM:              Ankle: near full with pain at extremes              Subtalar: near full with pain at extremes  Pulses: Palpable pedal pulse                   Imaging Studies/Outside documentation:  I have ordered/reviewed/interpreted the following images/outside documentation:  1. Weight-bearing 3-views of Left foot and ankle:   On my independent review, no acute bony abnormality noted.  Ankle mortise remains congruent.  Mild-to-moderate tibiotalar joint space narrowing.    Diffuse arthritic changes throughout the foot but most prominent involving the 2nd and 3rd TMT and 1st MTP joints.  Enthesophyte at the Achilles insertion with adjacent Chandrakant deformity.        Assessment:  Sunita Carlson is a 84 y.o. female with  Left ankle sinus tarsi syndrome.     Plan:   Clinical and radiographic findings were discussed.  Diagnostic/therapeutic left sinus tarsi corticosteroid injection performed today without complication.  Patient tolerated this well.  See procedure note for details.    May repeat every 3-6 months as needed   Patient voiced understanding all questions were answered.      This note was created using voice recognition software and may contain grammatical errors.         [1]   Social History  Socioeconomic History    Marital status:    Tobacco Use    Smoking status: Former     Current packs/day: 0.00     Types: Cigarettes     Start date: 10/13/1958     Quit date: 10/13/1962     Years since quittin.8    Smokeless tobacco: Never   Substance and Sexual Activity    Alcohol use: Yes     Comment: very rarely    Drug use: No    Sexual activity: Not Currently     Birth control/protection: Surgical     Social Drivers  of Health     Financial Resource Strain: Low Risk  (10/19/2022)    Overall Financial Resource Strain (CARDIA)     Difficulty of Paying Living Expenses: Not very hard   Food Insecurity: No Food Insecurity (10/19/2022)    Hunger Vital Sign     Worried About Running Out of Food in the Last Year: Never true     Ran Out of Food in the Last Year: Never true   Transportation Needs: No Transportation Needs (10/19/2022)    PRAPARE - Transportation     Lack of Transportation (Medical): No     Lack of Transportation (Non-Medical): No   Physical Activity: Inactive (10/19/2022)    Exercise Vital Sign     Days of Exercise per Week: 0 days     Minutes of Exercise per Session: 0 min   Stress: Stress Concern Present (10/29/2020)    [employer] [place] [employer] - Occupational Stress Questionnaire     Feeling of Stress : To some extent   Housing Stability: Low Risk  (10/19/2022)    Housing Stability Vital Sign     Unable to Pay for Housing in the Last Year: No     Number of Places Lived in the Last Year: 1     Unstable Housing in the Last Year: No

## 2025-07-24 ENCOUNTER — HOSPITAL ENCOUNTER (OUTPATIENT)
Dept: RADIOLOGY | Facility: HOSPITAL | Age: 85
Discharge: HOME | End: 2025-07-24
Attending: INTERNAL MEDICINE
Payer: MEDICARE

## 2025-07-24 DIAGNOSIS — Z12.31 ENCOUNTER FOR SCREENING MAMMOGRAM FOR MALIGNANT NEOPLASM OF BREAST: Primary | ICD-10-CM

## 2025-07-24 DIAGNOSIS — Z12.31 ENCOUNTER FOR SCREENING MAMMOGRAM FOR MALIGNANT NEOPLASM OF BREAST: ICD-10-CM

## 2025-07-24 PROCEDURE — 77063 BREAST TOMOSYNTHESIS BI: CPT | Mod: TC

## 2025-07-24 PROCEDURE — 77067 SCR MAMMO BI INCL CAD: CPT | Mod: 26,,, | Performed by: RADIOLOGY

## 2025-07-24 PROCEDURE — 77063 BREAST TOMOSYNTHESIS BI: CPT | Mod: 26,,, | Performed by: RADIOLOGY

## 2025-07-28 ENCOUNTER — OFFICE VISIT (OUTPATIENT)
Dept: OPHTHALMOLOGY | Facility: CLINIC | Age: 85
End: 2025-07-28
Payer: MEDICARE

## 2025-07-28 DIAGNOSIS — H18.421 BAND KERATOPATHY OF EYE, RIGHT: Primary | ICD-10-CM

## 2025-07-28 PROCEDURE — 99999 PR PBB SHADOW E&M-EST. PATIENT-LVL III: CPT | Mod: PBBFAC,,,

## 2025-07-28 RX ORDER — TOBRAMYCIN 3 MG/ML
1 SOLUTION/ DROPS OPHTHALMIC 2 TIMES DAILY
COMMUNITY

## 2025-07-28 NOTE — PROGRESS NOTES
HPI    Last eye exam was 6/27/25 with Juani Conroy DNP.    S/p EDTA OD 2016/ 2017/ 2023    Tobrex BID OD    Patient here for 1 month Band keratopathy OD follow up. Patient states no   discomfort with BCL OD. Vision is the same.   Last edited by Juani Levin MA on 7/28/2025  2:37 PM.            Assessment /Plan     For exam results, see Encounter Report.    Band keratopathy of eye, right         EDTA chelation OD done in 2016, 2017, 2023  Continues to have recurrences and pain OD    Here today for f/u after 2 week BCL trial with abx, states she feels much better with the contact    Cont tobramycin bid  BCL replaced today; pt to replace in 2 weeks    F/u 2-4 weeks, sooner if pain develops despite bcl    May use Ats OS prn  Referral to Dr Shultz for consult on cosmetic Bcl lens

## 2025-07-29 ENCOUNTER — PATIENT MESSAGE (OUTPATIENT)
Dept: OPTOMETRY | Facility: CLINIC | Age: 85
End: 2025-07-29
Payer: MEDICARE

## 2025-08-04 ENCOUNTER — TELEPHONE (OUTPATIENT)
Dept: UROGYNECOLOGY | Facility: CLINIC | Age: 85
End: 2025-08-04
Payer: MEDICARE

## 2025-08-04 ENCOUNTER — OFFICE VISIT (OUTPATIENT)
Dept: ORTHOPEDICS | Facility: CLINIC | Age: 85
End: 2025-08-04
Payer: MEDICARE

## 2025-08-04 VITALS — HEIGHT: 59 IN | WEIGHT: 118.19 LBS | RESPIRATION RATE: 16 BRPM | BODY MASS INDEX: 23.83 KG/M2

## 2025-08-04 DIAGNOSIS — M17.0 PRIMARY OSTEOARTHRITIS OF BOTH KNEES: Primary | ICD-10-CM

## 2025-08-04 PROCEDURE — 99499 UNLISTED E&M SERVICE: CPT | Mod: S$GLB,,,

## 2025-08-04 PROCEDURE — 20610 DRAIN/INJ JOINT/BURSA W/O US: CPT | Mod: 50,S$GLB,,

## 2025-08-04 PROCEDURE — 99999 PR PBB SHADOW E&M-EST. PATIENT-LVL IV: CPT | Mod: PBBFAC,,,

## 2025-08-04 NOTE — PROCEDURES
Large Joint Aspiration/Injection: bilateral knee    Date/Time: 8/4/2025 2:00 PM    Performed by: Ralph Flores PA-C  Authorized by: Ralph Flores PA-C    Site marked: the procedure site was marked    Timeout: prior to procedure the correct patient, procedure, and site was verified    Local anesthesia used?: No      Details:  Needle Size:  22 G  Approach:  Anteromedial  Location:  Knee  Laterality:  Bilateral  Site:  Bilateral knee  Medications (Right):  16 mg hyaluronate 16 mg/2 mL  Medications (Left):  16 mg hyaluronate 16 mg/2 mL  Patient tolerance:  Patient tolerated the procedure well with no immediate complications

## 2025-08-04 NOTE — PROGRESS NOTES
Chief Complaint:   Chief Complaint   Patient presents with    Follow-up     6wk f/u s/p bilat-knee steroid injections    Injections     Bilat synvisc 1/3       History of present illness:  84-year-old female who presents to clinic for follow up of steroid injections on 06/23/2025 which she reports did release some of her pain.  Pain today is rated as 4/10.  She also presents today for bilateral Synvisc injections.  No other acute changes.        Review of Systems   Constitutional: Negative for chills and fever.   Cardiovascular:  Negative for chest pain.   Respiratory:  Negative for shortness of breath.    Skin:  Negative for rash.   Musculoskeletal:  Positive for arthritis, joint pain, joint swelling and stiffness. Negative for falls, muscle cramps, muscle weakness and myalgias.   Neurological:  Negative for numbness, paresthesias and weakness.            Right Knee Exam     Muscle Strength   The patient has normal right knee strength.    Tenderness   The patient is experiencing tenderness in the medial joint line, patella and lateral joint line.    Range of Motion   Extension:  0   Flexion:  120 (limited)     Other   Erythema: absent  Sensation: normal  Pulse: present  Swelling: moderate    Comments:  Patellar Grind Test: Positive      Left Knee Exam     Muscle Strength   The patient has normal left knee strength.    Tenderness   The patient is experiencing tenderness in the patella, medial joint line and lateral joint line.    Range of Motion   Extension:  0   Flexion:  120 (limited)     Other   Erythema: absent  Sensation: normal  Pulse: present  Swelling: moderate    Comments:  Patellar Grind Test: Positive               Assessment:  1. Bilateral knee osteoarthritis    Plan:  Bilateral knees injected with Synvisc-One out of 3 today.  Follow up next week for 2/3.  She expressed understanding and agreement plan as above.    Primary osteoarthritis of both knees  -     Large Joint Aspiration/Injection: bilateral  "knee           Past Medical History:   Diagnosis Date    Arthritis     Band keratopathy     Cataract     Coronary artery disease     Hypertension     Insomnia     Neuropathy     Retinal detachment     Stomach ulcer        Past Surgical History:   Procedure Laterality Date    APPENDECTOMY  1951    BLADDER SUSPENSION      BREAST BIOPSY Right     Benign.    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    CAROTID ENDARTERECTOMY Left     L    CATARACT EXTRACTION Left     CHOLECYSTECTOMY      DILATION AND CURETTAGE OF UTERUS      Due to miscarriage.    INJECTION OF ANESTHETIC AGENT AROUND GANGLION IMPAR N/A 09/12/2019    Procedure: BLOCK, GANGLION IMPAR;  Surgeon: Christian James MD;  Location: [facility] OR;  Service: Pain Management;  Laterality: N/A;    INSERTION OF IMPLANTABLE LOOP RECORDER N/A 12/18/2020    Procedure: INSERTION, IMPLANTABLE LOOP RECORDER;  Surgeon: Adin Saba MD;  Location: [facility] CATH/EP LAB;  Service: Cardiology;  Laterality: N/A;    OOPHORECTOMY Bilateral 1959    SUPERFICIAL KERATECTOMY Right 10/27/2017    Dr. Morales    TONSILLECTOMY      TRANSFORAMINAL EPIDURAL INJECTION OF STEROID Left 08/12/2019    Procedure: Injection,steroid,epidural,transforaminal approach L4-5, L5-S1;  Surgeon: Christian James MD;  Location: [facility] OR;  Service: Pain Management;  Laterality: Left;    TRANSFORAMINAL EPIDURAL INJECTION OF STEROID Left 01/16/2020    Procedure: Injection,steroid,epidural,transforaminal approach;  Surgeon: Christian James MD;  Location: [facility] OR;  Service: Pain Management;  Laterality: Left;  L4-5, L5-S1    TRANSFORAMINAL EPIDURAL INJECTION OF STEROID Left 06/30/2020    Procedure: Injection,steroid,epidural,transforaminal approach;  Surgeon: Christian James MD;  [facility];  Service: Pain Management;  Laterality: Left;  L4-5 and L5-S1    TRANSFORAMINAL EPIDURAL INJECTION OF STEROID Left 4/30/2024    Procedure: Injection,steroid,epidural,transforaminal approach;  Surgeon: Christian James MD;  Location: [facility] " OR;  Service: Anesthesiology;  Laterality: Left;  L4-5 and l5-s1    VAGINAL HYSTERECTOMY  1959       Current Outpatient Medications   Medication Sig    ALPRAZolam (XANAX) 0.25 MG tablet Take 0.25 mg by mouth daily as needed.    amLODIPine (NORVASC) 5 MG tablet Take 5 mg by mouth once daily.    aspirin (ECOTRIN) 81 MG EC tablet Take 81 mg by mouth once daily.    atorvastatin (LIPITOR) 20 MG tablet Take 20 mg by mouth once daily.    cholecalciferol, vitamin D3, (VITAMIN D3) 2,000 unit Tab Take 1 tablet by mouth once daily.    diclofenac sodium (VOLTAREN) 1 % Gel Apply 2 g topically daily as needed.    dicyclomine (BENTYL) 10 MG capsule 2 (two) times daily as needed.     diphenoxylate-atropine 2.5-0.025 mg (LOMOTIL) 2.5-0.025 mg per tablet Take 1 tablet by mouth 4 (four) times daily as needed for Diarrhea.    EScitalopram oxalate (LEXAPRO) 10 MG tablet Take 1 tablet (10 mg total) by mouth once daily.    estradioL (ESTRACE) 0.01 % (0.1 mg/gram) vaginal cream Apply 1 fingertipful to vaginal area nightly x 2 weeks then use on [facility]    gabapentin (NEURONTIN) 100 MG capsule Take 1 capsule (100 mg total) by mouth 2 (two) times daily.    meloxicam (MOBIC) 7.5 MG tablet Take 1 tablet (7.5 mg total) by mouth once daily.    nitroGLYCERIN (NITROSTAT) 0.4 MG SL tablet Place under the tongue.    ondansetron (ZOFRAN-ODT) 8 MG TbDL every 6 (six) hours as needed.     pantoprazole (PROTONIX) 40 MG tablet Take 40 mg by mouth every morning.    propranoloL (INDERAL LA) 60 MG 24 hr capsule Take 60 mg by mouth every morning.    rOPINIRole (REQUIP) 0.5 MG tablet Take 0.5 mg by mouth nightly as needed.    temazepam (RESTORIL) 30 mg capsule Take 30 mg by mouth every evening.    tobramycin sulfate 0.3% (TOBREX) 0.3 % ophthalmic solution Place 1 drop into the right eye 2 (two) times a day.     Current Facility-Administered Medications   Medication    ondansetron injection 4 mg     Facility-Administered Medications Ordered in Other Visits    Medication    lactated ringers infusion       Review of patient's allergies indicates:   Allergen Reactions    Demerol [meperidine] Nausea And Vomiting    Zoloft [sertraline] Other (See Comments)     Severe shakes    Oxycodone     Hydrocodone Itching    Oxycodone-acetaminophen Itching       Family History   Problem Relation Name Age of Onset    Blindness Maternal Grandfather      Cancer Father      Macular degeneration Maternal Aunt      Cancer Maternal Aunt      Breast cancer Maternal Aunt      Macular degeneration Maternal Uncle      Diabetes Paternal Aunt      Cancer Paternal Aunt      Breast cancer Paternal Aunt      Melanoma Neg Hx      Psoriasis Neg Hx      Lupus Neg Hx      Eczema Neg Hx         Social History[1]              All previous pertinent notes including ER visits, physical therapy visits, other orthopedic visits as well as other care for the same musculoskeletal problem were reviewed.  All pertinent lab values and previous imaging was reviewed pertinent to the current visit.    This note was created using [employer] voice recognition software that occasionally misinterpreted phrases or words.    Consult note is delivered via Epic messaging service.    Ralph Flores PA-C            [1]   Social History  Socioeconomic History    Marital status:    Tobacco Use    Smoking status: Former     Current packs/day: 0.00     Types: Cigarettes     Start date: 10/13/1958     Quit date: 10/13/1962     Years since quittin.8    Smokeless tobacco: Never   Substance and Sexual Activity    Alcohol use: Yes     Comment: very rarely    Drug use: No    Sexual activity: Not Currently     Birth control/protection: Surgical     Social Drivers of Health     Financial Resource Strain: Low Risk  (10/19/2022)    Overall Financial Resource Strain (CARDIA)     Difficulty of Paying Living Expenses: Not very hard   Food Insecurity: No Food Insecurity (10/19/2022)    Hunger Vital Sign     Worried About Running Out  of Food in the Last Year: Never true     Ran Out of Food in the Last Year: Never true   Transportation Needs: No Transportation Needs (10/19/2022)    PRAPARE - Transportation     Lack of Transportation (Medical): No     Lack of Transportation (Non-Medical): No   Physical Activity: Inactive (10/19/2022)    Exercise Vital Sign     Days of Exercise per Week: 0 days     Minutes of Exercise per Session: 0 min   Stress: Stress Concern Present (10/29/2020)    [employer] [place] [employer] - Occupational Stress Questionnaire     Feeling of Stress : To some extent   Housing Stability: Low Risk  (10/19/2022)    Housing Stability Vital Sign     Unable to Pay for Housing in the Last Year: No     Number of Places Lived in the Last Year: 1     Unstable Housing in the Last Year: No

## 2025-08-11 ENCOUNTER — OFFICE VISIT (OUTPATIENT)
Dept: ORTHOPEDICS | Facility: CLINIC | Age: 85
End: 2025-08-11
Payer: MEDICARE

## 2025-08-11 VITALS — HEIGHT: 59 IN | BODY MASS INDEX: 23.83 KG/M2 | WEIGHT: 118.19 LBS

## 2025-08-11 DIAGNOSIS — M17.0 PRIMARY OSTEOARTHRITIS OF BOTH KNEES: Primary | ICD-10-CM

## 2025-08-11 PROCEDURE — 99999 PR PBB SHADOW E&M-EST. PATIENT-LVL III: CPT | Mod: PBBFAC,,,

## 2025-08-11 PROCEDURE — 99499 UNLISTED E&M SERVICE: CPT | Mod: S$GLB,,,

## 2025-08-11 PROCEDURE — 20610 DRAIN/INJ JOINT/BURSA W/O US: CPT | Mod: 50,S$GLB,,

## 2025-08-18 ENCOUNTER — OFFICE VISIT (OUTPATIENT)
Dept: ORTHOPEDICS | Facility: CLINIC | Age: 85
End: 2025-08-18
Payer: MEDICARE

## 2025-08-18 VITALS — BODY MASS INDEX: 23.83 KG/M2 | HEIGHT: 59 IN | WEIGHT: 118.19 LBS

## 2025-08-18 DIAGNOSIS — M17.0 PRIMARY OSTEOARTHRITIS OF BOTH KNEES: Primary | ICD-10-CM

## 2025-08-18 PROCEDURE — 20610 DRAIN/INJ JOINT/BURSA W/O US: CPT | Mod: 50,S$GLB,,

## 2025-08-18 PROCEDURE — 99999 PR PBB SHADOW E&M-EST. PATIENT-LVL III: CPT | Mod: PBBFAC,,,

## 2025-08-18 PROCEDURE — 99499 UNLISTED E&M SERVICE: CPT | Mod: S$GLB,,,

## 2025-08-20 ENCOUNTER — OFFICE VISIT (OUTPATIENT)
Dept: OPHTHALMOLOGY | Facility: CLINIC | Age: 85
End: 2025-08-20
Payer: MEDICARE

## 2025-08-20 DIAGNOSIS — H18.421 BAND KERATOPATHY OF RIGHT EYE: Primary | ICD-10-CM

## 2025-08-20 PROCEDURE — 99999 PR PBB SHADOW E&M-EST. PATIENT-LVL III: CPT | Mod: PBBFAC,,, | Performed by: OPHTHALMOLOGY

## 2025-08-20 PROCEDURE — 1159F MED LIST DOCD IN RCRD: CPT | Mod: CPTII,S$GLB,, | Performed by: OPHTHALMOLOGY

## 2025-08-20 PROCEDURE — 92014 COMPRE OPH EXAM EST PT 1/>: CPT | Mod: S$GLB,,, | Performed by: OPHTHALMOLOGY

## 2025-08-20 PROCEDURE — 1125F AMNT PAIN NOTED PAIN PRSNT: CPT | Mod: CPTII,S$GLB,, | Performed by: OPHTHALMOLOGY

## 2025-08-20 PROCEDURE — 1160F RVW MEDS BY RX/DR IN RCRD: CPT | Mod: CPTII,S$GLB,, | Performed by: OPHTHALMOLOGY

## 2025-08-20 RX ORDER — RIFAXIMIN 550 MG/1
550 TABLET ORAL 3 TIMES DAILY
COMMUNITY
Start: 2025-07-24

## 2025-08-21 ENCOUNTER — OFFICE VISIT (OUTPATIENT)
Dept: UROGYNECOLOGY | Facility: CLINIC | Age: 85
End: 2025-08-21
Payer: MEDICARE

## 2025-08-21 DIAGNOSIS — R10.2 PELVIC PAIN: ICD-10-CM

## 2025-08-21 DIAGNOSIS — N95.2 ATROPHIC VAGINITIS: ICD-10-CM

## 2025-08-21 DIAGNOSIS — N81.11 CYSTOCELE, MIDLINE: ICD-10-CM

## 2025-08-21 DIAGNOSIS — R35.0 URINARY FREQUENCY: Primary | ICD-10-CM

## 2025-08-21 DIAGNOSIS — N39.46 MIXED INCONTINENCE URGE AND STRESS: ICD-10-CM

## 2025-08-21 LAB
BILIRUBIN, UA POC OHS: NEGATIVE
BLOOD, UA POC OHS: NEGATIVE
CLARITY, UA POC OHS: CLEAR
COLOR, UA POC OHS: YELLOW
GLUCOSE, UA POC OHS: NEGATIVE
KETONES, UA POC OHS: NEGATIVE
LEUKOCYTES, UA POC OHS: NEGATIVE
NITRITE, UA POC OHS: NEGATIVE
PH, UA POC OHS: 6
PROTEIN, UA POC OHS: NEGATIVE
SPECIFIC GRAVITY, UA POC OHS: 1.01
UROBILINOGEN, UA POC OHS: 0.2

## 2025-08-21 PROCEDURE — 99999 PR PBB SHADOW E&M-EST. PATIENT-LVL III: CPT | Mod: PBBFAC,,, | Performed by: NURSE PRACTITIONER

## 2025-08-25 ENCOUNTER — TELEPHONE (OUTPATIENT)
Dept: UROGYNECOLOGY | Facility: CLINIC | Age: 85
End: 2025-08-25
Payer: MEDICARE

## 2025-09-03 ENCOUNTER — TELEPHONE (OUTPATIENT)
Dept: UROGYNECOLOGY | Facility: CLINIC | Age: 85
End: 2025-09-03
Payer: MEDICARE

## 2025-09-03 DIAGNOSIS — N39.46 MIXED INCONTINENCE URGE AND STRESS: ICD-10-CM

## 2025-09-03 DIAGNOSIS — R35.0 URINARY FREQUENCY: ICD-10-CM

## 2025-09-05 ENCOUNTER — OFFICE VISIT (OUTPATIENT)
Dept: PAIN MEDICINE | Facility: CLINIC | Age: 85
End: 2025-09-05
Payer: MEDICARE

## 2025-09-05 ENCOUNTER — TELEPHONE (OUTPATIENT)
Dept: PAIN MEDICINE | Facility: CLINIC | Age: 85
End: 2025-09-05

## 2025-09-05 VITALS
HEIGHT: 59 IN | SYSTOLIC BLOOD PRESSURE: 143 MMHG | DIASTOLIC BLOOD PRESSURE: 80 MMHG | BODY MASS INDEX: 23.83 KG/M2 | WEIGHT: 118.19 LBS | HEART RATE: 64 BPM

## 2025-09-05 DIAGNOSIS — M51.369 DEGENERATION OF INTERVERTEBRAL DISC OF LUMBAR REGION, UNSPECIFIED WHETHER PAIN PRESENT: ICD-10-CM

## 2025-09-05 DIAGNOSIS — M54.16 LUMBAR RADICULITIS: Primary | ICD-10-CM

## 2025-09-05 DIAGNOSIS — M47.896 OTHER SPONDYLOSIS, LUMBAR REGION: ICD-10-CM

## 2025-09-05 PROCEDURE — 99999 PR PBB SHADOW E&M-EST. PATIENT-LVL III: CPT | Mod: PBBFAC,,, | Performed by: ANESTHESIOLOGY

## 2025-09-05 RX ORDER — LIDOCAINE 50 MG/G
1 PATCH TOPICAL DAILY
Qty: 30 PATCH | Refills: 0 | Status: SHIPPED | OUTPATIENT
Start: 2025-09-05

## 2025-09-05 RX ORDER — KETOROLAC TROMETHAMINE 30 MG/ML
30 INJECTION, SOLUTION INTRAMUSCULAR; INTRAVENOUS
Status: COMPLETED | OUTPATIENT
Start: 2025-09-05 | End: 2025-09-05

## 2025-09-05 RX ADMIN — KETOROLAC TROMETHAMINE 30 MG: 30 INJECTION, SOLUTION INTRAMUSCULAR; INTRAVENOUS at 11:09

## (undated) DEVICE — NDL SAFETY 25G X 1.5 ECLIPSE

## (undated) DEVICE — SYS LABEL CORRECT MED

## (undated) DEVICE — NDL HYPODERMIC BLUNT 18G 1.5IN

## (undated) DEVICE — NDL SPINAL 22GX5

## (undated) DEVICE — GLOVE SURGEONS ULTRA TOUCH 6.5

## (undated) DEVICE — SYR DISP LL 5CC

## (undated) DEVICE — TUBING MINIBORE EXTENSION

## (undated) DEVICE — GLOVE SURG ULTRA TOUCH 7.5

## (undated) DEVICE — CHLORAPREP 10.5 ML APPLICATOR

## (undated) DEVICE — NDL SPINAL SPINOCAN 22GX3.5

## (undated) DEVICE — GLOVE SURG ULTRA TOUCH 6

## (undated) DEVICE — SYR 10CC LUER LOCK

## (undated) DEVICE — APPLICATOR CHLORAPREP CLR 10.5

## (undated) DEVICE — GLOVE SENSICARE PI GRN 7.5